# Patient Record
Sex: FEMALE | Race: WHITE | Employment: FULL TIME | URBAN - METROPOLITAN AREA
[De-identification: names, ages, dates, MRNs, and addresses within clinical notes are randomized per-mention and may not be internally consistent; named-entity substitution may affect disease eponyms.]

---

## 2017-03-20 ENCOUNTER — OFFICE VISIT (OUTPATIENT)
Dept: FAMILY MEDICINE CLINIC | Facility: CLINIC | Age: 52
End: 2017-03-20

## 2017-03-20 VITALS
TEMPERATURE: 98 F | BODY MASS INDEX: 36 KG/M2 | WEIGHT: 221 LBS | RESPIRATION RATE: 20 BRPM | OXYGEN SATURATION: 97 % | HEART RATE: 77 BPM | DIASTOLIC BLOOD PRESSURE: 68 MMHG | SYSTOLIC BLOOD PRESSURE: 122 MMHG

## 2017-03-20 DIAGNOSIS — D64.9 ANEMIA, UNSPECIFIED TYPE: ICD-10-CM

## 2017-03-20 DIAGNOSIS — I10 BENIGN DIASTOLIC HYPERTENSION: Primary | ICD-10-CM

## 2017-03-20 DIAGNOSIS — G47.30 SLEEP APNEA, UNSPECIFIED TYPE: ICD-10-CM

## 2017-03-20 PROCEDURE — 99213 OFFICE O/P EST LOW 20 MIN: CPT | Performed by: FAMILY MEDICINE

## 2017-03-20 RX ORDER — NEBIVOLOL 10 MG/1
10 TABLET ORAL DAILY
Qty: 90 TABLET | Refills: 1 | Status: SHIPPED | OUTPATIENT
Start: 2017-03-20 | End: 2018-08-03 | Stop reason: ALTCHOICE

## 2017-03-20 RX ORDER — RIZATRIPTAN BENZOATE 10 MG/1
10 TABLET ORAL AS NEEDED
Qty: 9 TABLET | Refills: 1 | Status: SHIPPED | OUTPATIENT
Start: 2017-03-20 | End: 2017-03-20

## 2017-03-20 RX ORDER — RIZATRIPTAN BENZOATE 10 MG/1
10 TABLET ORAL AS NEEDED
Qty: 9 TABLET | Refills: 1 | Status: SHIPPED | OUTPATIENT
Start: 2017-03-20 | End: 2017-10-11

## 2017-03-20 NOTE — PROGRESS NOTES
Presents for discussion of recent stress echocardiogram that she had performed.   It was especially noteworthy because of persistent diastolic hypertension both before during and after her stress test.  I contrasted this interpretation with a similar exam d

## 2017-03-22 ENCOUNTER — TELEPHONE (OUTPATIENT)
Dept: FAMILY MEDICINE CLINIC | Facility: CLINIC | Age: 52
End: 2017-03-22

## 2017-03-22 ENCOUNTER — LAB ENCOUNTER (OUTPATIENT)
Dept: LAB | Age: 52
End: 2017-03-22
Attending: FAMILY MEDICINE
Payer: COMMERCIAL

## 2017-03-22 DIAGNOSIS — D64.9 ANEMIA, UNSPECIFIED TYPE: ICD-10-CM

## 2017-03-22 DIAGNOSIS — D64.9 ANEMIA, UNSPECIFIED: Primary | ICD-10-CM

## 2017-03-22 LAB
25-HYDROXYVITAMIN D (TOTAL): 18.3 NG/ML (ref 30–100)
ALBUMIN SERPL-MCNC: 4 G/DL (ref 3.5–4.8)
ALP LIVER SERPL-CCNC: 131 U/L (ref 39–100)
ALT SERPL-CCNC: 31 U/L (ref 14–54)
AST SERPL-CCNC: 16 U/L (ref 15–41)
BASOPHILS # BLD AUTO: 0.06 X10(3) UL (ref 0–0.1)
BASOPHILS NFR BLD AUTO: 0.7 %
BILIRUB SERPL-MCNC: 0.4 MG/DL (ref 0.1–2)
BUN BLD-MCNC: 17 MG/DL (ref 8–20)
CALCIUM BLD-MCNC: 9.4 MG/DL (ref 8.3–10.3)
CHLORIDE: 105 MMOL/L (ref 101–111)
CHOLEST SMN-MCNC: 187 MG/DL (ref ?–200)
CO2: 28 MMOL/L (ref 22–32)
CREAT BLD-MCNC: 0.74 MG/DL (ref 0.55–1.02)
EOSINOPHIL # BLD AUTO: 0.67 X10(3) UL (ref 0–0.3)
EOSINOPHIL NFR BLD AUTO: 8.3 %
ERYTHROCYTE [DISTWIDTH] IN BLOOD BY AUTOMATED COUNT: 13.5 % (ref 11.5–16)
EST. AVERAGE GLUCOSE BLD GHB EST-MCNC: 123 MG/DL (ref 68–126)
GLUCOSE BLD-MCNC: 111 MG/DL (ref 70–99)
HBA1C MFR BLD HPLC: 5.9 % (ref ?–5.7)
HCT VFR BLD AUTO: 42.6 % (ref 34–50)
HDLC SERPL-MCNC: 55 MG/DL (ref 45–?)
HDLC SERPL: 3.4 {RATIO} (ref ?–4.44)
HEPATITIS C VIRUS AB INTERPRETATION: NONREACTIVE
HGB BLD-MCNC: 13.9 G/DL (ref 12–16)
IMMATURE GRANULOCYTE COUNT: 0.01 X10(3) UL (ref 0–1)
IMMATURE GRANULOCYTE RATIO %: 0.1 %
LDLC SERPL CALC-MCNC: 104 MG/DL (ref ?–130)
LYMPHOCYTES # BLD AUTO: 3.04 X10(3) UL (ref 0.9–4)
LYMPHOCYTES NFR BLD AUTO: 37.9 %
M PROTEIN MFR SERPL ELPH: 6.9 G/DL (ref 6.1–8.3)
MCH RBC QN AUTO: 30.5 PG (ref 27–33.2)
MCHC RBC AUTO-ENTMCNC: 32.6 G/DL (ref 31–37)
MCV RBC AUTO: 93.6 FL (ref 81–100)
MONOCYTES # BLD AUTO: 0.44 X10(3) UL (ref 0.1–0.6)
MONOCYTES NFR BLD AUTO: 5.5 %
NEUTROPHIL ABS PRELIM: 3.81 X10 (3) UL (ref 1.3–6.7)
NEUTROPHILS # BLD AUTO: 3.81 X10(3) UL (ref 1.3–6.7)
NEUTROPHILS NFR BLD AUTO: 47.5 %
NONHDLC SERPL-MCNC: 132 MG/DL (ref ?–130)
PLATELET # BLD AUTO: 192 10(3)UL (ref 150–450)
POTASSIUM SERPL-SCNC: 4.4 MMOL/L (ref 3.6–5.1)
RBC # BLD AUTO: 4.55 X10(6)UL (ref 3.8–5.1)
RED CELL DISTRIBUTION WIDTH-SD: 45.4 FL (ref 35.1–46.3)
SODIUM SERPL-SCNC: 141 MMOL/L (ref 136–144)
TRIGLYCERIDES: 138 MG/DL (ref ?–150)
TSI SER-ACNC: 1.64 MIU/ML (ref 0.35–5.5)
VLDL: 28 MG/DL (ref 5–40)
WBC # BLD AUTO: 8 X10(3) UL (ref 4–13)

## 2017-03-22 PROCEDURE — 84443 ASSAY THYROID STIM HORMONE: CPT

## 2017-03-22 PROCEDURE — 85025 COMPLETE CBC W/AUTO DIFF WBC: CPT

## 2017-03-22 PROCEDURE — 83036 HEMOGLOBIN GLYCOSYLATED A1C: CPT

## 2017-03-22 PROCEDURE — 86803 HEPATITIS C AB TEST: CPT

## 2017-03-22 PROCEDURE — 36415 COLL VENOUS BLD VENIPUNCTURE: CPT

## 2017-03-22 PROCEDURE — 82306 VITAMIN D 25 HYDROXY: CPT

## 2017-03-22 PROCEDURE — 80061 LIPID PANEL: CPT

## 2017-03-22 PROCEDURE — 80053 COMPREHEN METABOLIC PANEL: CPT

## 2017-04-12 ENCOUNTER — OFFICE VISIT (OUTPATIENT)
Dept: SLEEP CENTER | Facility: HOSPITAL | Age: 52
End: 2017-04-12
Attending: FAMILY MEDICINE
Payer: COMMERCIAL

## 2017-04-12 PROCEDURE — 95810 POLYSOM 6/> YRS 4/> PARAM: CPT

## 2017-04-17 ENCOUNTER — PATIENT MESSAGE (OUTPATIENT)
Dept: FAMILY MEDICINE CLINIC | Facility: CLINIC | Age: 52
End: 2017-04-17

## 2017-04-17 NOTE — PROCEDURES
[address] [address]       Accredited by the [facility] ([facility])    PATIENT'S NAME:        [facility]  ATTENDING PHYSICIAN:   Velia Tillman M.D.   REFERRING PHYSICIAN:   Ricky Alas D.O. hypopneas with a total apnea-hypopnea index of 14 events per hour. The supine apnea-hypopnea index was 20 events per hour. The non-supine apnea-hypopnea index was 9 events per hour.   Throughout the study, the patient maintained an oxyhemoglobin saturatio

## 2017-04-18 NOTE — TELEPHONE ENCOUNTER
From: Laura Du  To: Clari Romero DO  Sent: 4/17/2017 6:19 PM CDT  Subject: Test Results Question    Never mind. ... I see them. ...agustín.

## 2017-04-18 NOTE — TELEPHONE ENCOUNTER
From: Chet Covarrubias  To: Lisa Baez DO  Sent: 4/17/2017 6:17 PM CDT  Subject: Test Results Question    Are my lab tests back yet?

## 2017-10-11 RX ORDER — RIZATRIPTAN BENZOATE 10 MG/1
10 TABLET ORAL AS NEEDED
Qty: 9 TABLET | Refills: 1
Start: 2017-10-11

## 2017-10-12 NOTE — TELEPHONE ENCOUNTER
From: Carmen Wong  Sent: 10/11/2017 3:15 PM CDT  Subject: Medication Renewal Request    Carmen Wong would like a refill of the following medications:     Ondansetron HCl (ZOFRAN) 4 mg tablet JHON Cano   Patient Comment: Can I have MLT

## 2017-10-12 NOTE — TELEPHONE ENCOUNTER
From: Buck Boothe  Sent: 10/11/2017 3:13 PM CDT  Subject: Medication Renewal Request    Buck Boothe would like a refill of the following medications:     Rizatriptan Benzoate (MAXALT) 10 MG Oral Tab Jake Jimenez DO]   Patient Comment: Same co

## 2017-10-14 RX ORDER — ONDANSETRON 4 MG/1
4 TABLET, FILM COATED ORAL EVERY 8 HOURS PRN
Qty: 30 TABLET | Refills: 0 | Status: SHIPPED
Start: 2017-10-14 | End: 2017-12-05

## 2017-10-14 RX ORDER — RIZATRIPTAN BENZOATE 10 MG/1
10 TABLET ORAL AS NEEDED
Qty: 9 TABLET | Refills: 1
Start: 2017-10-14 | End: 2017-10-20

## 2017-10-17 ENCOUNTER — TELEPHONE (OUTPATIENT)
Dept: FAMILY MEDICINE CLINIC | Facility: CLINIC | Age: 52
End: 2017-10-17

## 2017-10-18 RX ORDER — RIZATRIPTAN BENZOATE 10 MG/1
TABLET ORAL
Qty: 9 TABLET | Refills: 0 | OUTPATIENT
Start: 2017-10-18

## 2017-10-20 RX ORDER — RIZATRIPTAN BENZOATE 10 MG/1
10 TABLET ORAL AS NEEDED
Qty: 9 TABLET | Refills: 1 | Status: SHIPPED | OUTPATIENT
Start: 2017-10-20 | End: 2017-12-05

## 2017-10-20 NOTE — TELEPHONE ENCOUNTER
Pharmacy checking status of rizatripton    Pharmacist is calling about Maxalt refill   States patient has same co pay whether quantity is #10 or #24   Patient would rather have #24. Please advise if OK? Thanks.

## 2017-10-21 NOTE — TELEPHONE ENCOUNTER
Called pharmacy and spoke with Brown Jason. Ross Pena gave approval for Maxalt refill #24   Task is complete.

## 2017-12-05 DIAGNOSIS — R32 INCONTINENCE IN FEMALE: ICD-10-CM

## 2017-12-06 ENCOUNTER — TELEPHONE (OUTPATIENT)
Dept: FAMILY MEDICINE CLINIC | Facility: CLINIC | Age: 52
End: 2017-12-06

## 2017-12-06 RX ORDER — RIZATRIPTAN BENZOATE 10 MG/1
10 TABLET ORAL AS NEEDED
Qty: 9 TABLET | Refills: 1 | Status: SHIPPED
Start: 2017-12-06 | End: 2018-08-03 | Stop reason: ALTCHOICE

## 2017-12-06 RX ORDER — ONDANSETRON 4 MG/1
4 TABLET, FILM COATED ORAL EVERY 8 HOURS PRN
Qty: 30 TABLET | Refills: 0 | Status: SHIPPED
Start: 2017-12-06 | End: 2018-05-04

## 2017-12-06 RX ORDER — RIZATRIPTAN BENZOATE 10 MG/1
10 TABLET, ORALLY DISINTEGRATING ORAL AS NEEDED
Qty: 36 TABLET | Refills: 0 | Status: SHIPPED
Start: 2017-12-06 | End: 2018-03-22

## 2017-12-06 RX ORDER — OXYBUTYNIN CHLORIDE 10 MG/1
10 TABLET, EXTENDED RELEASE ORAL DAILY
Qty: 90 TABLET | Refills: 1 | Status: SHIPPED
Start: 2017-12-06 | End: 2018-08-03

## 2017-12-06 NOTE — TELEPHONE ENCOUNTER
From: Tari Calderon  Sent: 12/5/2017 10:09 PM CST  Subject: Medication Renewal Request    Tari Calderon would like a refill of the following medications:     Oxybutynin Chloride ER (DITROPAN XL) 10 MG Oral Tablet 24 Hr Leopoldo Smothers, PA]    Preferr

## 2017-12-06 NOTE — TELEPHONE ENCOUNTER
From: Adelina Smith  Sent: 12/5/2017 10:09 PM CST  Subject: Medication Renewal Request    Adelina Smith would like a refill of the following medications:     Rizatriptan Benzoate 10 MG Oral Tablet Dispersible JHON Ignacio   Patient Comment: I

## 2017-12-06 NOTE — TELEPHONE ENCOUNTER
Last ov was  3/20/17  Last refill zofran 4mg 10/14/17  Last refill maxalt 10 mg 10/20/17  Last refill ritzatriptan bemzotae 10 mg 10/20/17

## 2018-02-26 ENCOUNTER — OFFICE VISIT (OUTPATIENT)
Dept: FAMILY MEDICINE CLINIC | Facility: CLINIC | Age: 53
End: 2018-02-26

## 2018-02-26 VITALS
RESPIRATION RATE: 20 BRPM | DIASTOLIC BLOOD PRESSURE: 60 MMHG | TEMPERATURE: 101 F | HEART RATE: 156 BPM | SYSTOLIC BLOOD PRESSURE: 118 MMHG

## 2018-02-26 DIAGNOSIS — J11.00 PNEUMONIA AND INFLUENZA: Primary | ICD-10-CM

## 2018-02-26 PROCEDURE — 99213 OFFICE O/P EST LOW 20 MIN: CPT | Performed by: FAMILY MEDICINE

## 2018-02-26 PROCEDURE — 96372 THER/PROPH/DIAG INJ SC/IM: CPT | Performed by: FAMILY MEDICINE

## 2018-02-26 RX ORDER — AZITHROMYCIN 250 MG/1
TABLET, FILM COATED ORAL
Qty: 6 TABLET | Refills: 0 | Status: SHIPPED | OUTPATIENT
Start: 2018-02-26 | End: 2018-08-03 | Stop reason: ALTCHOICE

## 2018-02-26 RX ORDER — CEFTRIAXONE 1 G/1
1 INJECTION, POWDER, FOR SOLUTION INTRAMUSCULAR; INTRAVENOUS ONCE
Status: COMPLETED | OUTPATIENT
Start: 2018-02-26 | End: 2018-02-26

## 2018-02-26 RX ADMIN — CEFTRIAXONE 1 G: 1 INJECTION, POWDER, FOR SOLUTION INTRAMUSCULAR; INTRAVENOUS at 19:02:00

## 2018-02-26 NOTE — PROGRESS NOTES
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 Unfortunately has extremely limited finances and declines the usual workup and treatment.   Her chief complaint is that of shortness of breath, deep cough, audible wheezing, sweating, and

## 2018-02-27 ENCOUNTER — TELEPHONE (OUTPATIENT)
Dept: FAMILY MEDICINE CLINIC | Facility: CLINIC | Age: 53
End: 2018-02-27

## 2018-02-27 ENCOUNTER — PATIENT MESSAGE (OUTPATIENT)
Dept: FAMILY MEDICINE CLINIC | Facility: CLINIC | Age: 53
End: 2018-02-27

## 2018-02-27 RX ORDER — PREDNISONE 20 MG/1
20 TABLET ORAL DAILY
Qty: 7 TABLET | Refills: 0 | Status: SHIPPED | OUTPATIENT
Start: 2018-02-27 | End: 2018-03-06

## 2018-02-27 NOTE — TELEPHONE ENCOUNTER
Call placed to patient per Dr. Oscar Mai. Just wanted to let you know, each time I use the Agusto , I throw up from such forceful coughing. Any advice? Patient in bed with headache,cough and body aches. To [facility] for review.

## 2018-02-27 NOTE — TELEPHONE ENCOUNTER
From: Laura Du  To: Clari Romero DO  Sent: 2/27/2018 12:52 PM CST  Subject: Other    Just wanted to let you know, each time I use the Agusto , I throw up from such forceful coughing. Any advice?

## 2018-03-22 ENCOUNTER — TELEPHONE (OUTPATIENT)
Dept: FAMILY MEDICINE CLINIC | Facility: CLINIC | Age: 53
End: 2018-03-22

## 2018-03-22 RX ORDER — RIZATRIPTAN BENZOATE 10 MG/1
10 TABLET, ORALLY DISINTEGRATING ORAL AS NEEDED
Qty: 36 TABLET | Refills: 0 | Status: SHIPPED | OUTPATIENT
Start: 2018-03-22 | End: 2018-05-04

## 2018-03-22 NOTE — TELEPHONE ENCOUNTER
From: Harold Pallas  Sent: 3/22/2018 12:14 PM CDT  Subject: Medication Renewal Request    Harold Pallas would like a refill of the following medications:     Rizatriptan Benzoate 10 MG Oral Tablet Dispersible JOHN Brito    Preferred pharmac

## 2018-05-04 RX ORDER — RIZATRIPTAN BENZOATE 10 MG/1
10 TABLET, ORALLY DISINTEGRATING ORAL AS NEEDED
Qty: 36 TABLET | Refills: 0 | Status: SHIPPED
Start: 2018-05-04 | End: 2018-07-01

## 2018-05-04 RX ORDER — ONDANSETRON 4 MG/1
4 TABLET, FILM COATED ORAL EVERY 8 HOURS PRN
Qty: 30 TABLET | Refills: 0 | Status: SHIPPED
Start: 2018-05-04 | End: 2018-07-06

## 2018-05-04 NOTE — TELEPHONE ENCOUNTER
From: Livia Malave  Sent: 5/4/2018 1:24 PM CDT  Subject: Medication Renewal Request    Livia Malave would like a refill of the following medications:     Ondansetron HCl (ZOFRAN) 4 mg tablet Holly Weiner, DOBill     Rizatriptan Benzoate 10 MG Oral

## 2018-07-03 RX ORDER — ONDANSETRON 4 MG/1
4 TABLET, FILM COATED ORAL EVERY 8 HOURS PRN
Qty: 30 TABLET | Refills: 0 | Status: CANCELLED
Start: 2018-07-03

## 2018-07-03 RX ORDER — RIZATRIPTAN BENZOATE 10 MG/1
10 TABLET, ORALLY DISINTEGRATING ORAL AS NEEDED
Qty: 36 TABLET | Refills: 0 | Status: CANCELLED
Start: 2018-07-03

## 2018-07-06 RX ORDER — ONDANSETRON 4 MG/1
4 TABLET, FILM COATED ORAL EVERY 8 HOURS PRN
Qty: 30 TABLET | Refills: 0 | Status: SHIPPED
Start: 2018-07-06 | End: 2018-09-17

## 2018-07-06 RX ORDER — RIZATRIPTAN BENZOATE 10 MG/1
10 TABLET, ORALLY DISINTEGRATING ORAL AS NEEDED
Qty: 36 TABLET | Refills: 0 | Status: SHIPPED
Start: 2018-07-06 | End: 2018-09-13

## 2018-07-06 RX ORDER — RIZATRIPTAN BENZOATE 10 MG/1
10 TABLET, ORALLY DISINTEGRATING ORAL AS NEEDED
Qty: 36 TABLET | Refills: 0
Start: 2018-07-06

## 2018-07-06 RX ORDER — RIZATRIPTAN BENZOATE 10 MG/1
10 TABLET, ORALLY DISINTEGRATING ORAL AS NEEDED
Qty: 36 TABLET | Refills: 0 | Status: SHIPPED
Start: 2018-07-06 | End: 2018-08-03

## 2018-07-06 RX ORDER — ONDANSETRON 4 MG/1
4 TABLET, FILM COATED ORAL EVERY 8 HOURS PRN
Qty: 30 TABLET | Refills: 0
Start: 2018-07-06

## 2018-07-06 NOTE — TELEPHONE ENCOUNTER
From: Zahida Campuzano  Sent: 7/3/2018 8:43 AM CDT  Subject: Medication Renewal Request    Zahida Campuzano would like a refill of the following medications:     Ondansetron HCl (ZOFRAN) 4 mg tablet JHON Diallo     Rizatriptan Benzoate 10 MG Oral

## 2018-07-06 NOTE — TELEPHONE ENCOUNTER
From: Tari Calderon  Sent: 7/6/2018 2:34 PM CDT  Subject: Medication Renewal Request    Tari Calderon would like a refill of the following medications:     Ondansetron HCl (ZOFRAN) 4 mg tablet Aleena Dey, DOBill     Rizatriptan Benzoate 10 MG Oral

## 2018-07-06 NOTE — TELEPHONE ENCOUNTER
From: Sage Campa  Sent: 7/1/2018 7:12 PM CDT  Subject: Medication Renewal Request    Sage Campa would like a refill of the following medications:     Rizatriptan Benzoate 10 MG Oral Tablet Dispersible Michelle Wise DO]    Preferred pharmacy:

## 2018-08-03 ENCOUNTER — APPOINTMENT (OUTPATIENT)
Dept: LAB | Age: 53
End: 2018-08-03
Attending: FAMILY MEDICINE
Payer: COMMERCIAL

## 2018-08-03 ENCOUNTER — OFFICE VISIT (OUTPATIENT)
Dept: FAMILY MEDICINE CLINIC | Facility: CLINIC | Age: 53
End: 2018-08-03
Payer: COMMERCIAL

## 2018-08-03 VITALS
DIASTOLIC BLOOD PRESSURE: 84 MMHG | RESPIRATION RATE: 16 BRPM | HEIGHT: 66 IN | TEMPERATURE: 99 F | WEIGHT: 246 LBS | BODY MASS INDEX: 39.53 KG/M2 | SYSTOLIC BLOOD PRESSURE: 124 MMHG | HEART RATE: 76 BPM | OXYGEN SATURATION: 98 %

## 2018-08-03 DIAGNOSIS — F32.A DEPRESSION, UNSPECIFIED DEPRESSION TYPE: ICD-10-CM

## 2018-08-03 DIAGNOSIS — E55.9 HYPOVITAMINOSIS D: ICD-10-CM

## 2018-08-03 DIAGNOSIS — Z13.1 SCREENING FOR DIABETES MELLITUS: ICD-10-CM

## 2018-08-03 DIAGNOSIS — Z79.899 POLYPHARMACY: ICD-10-CM

## 2018-08-03 DIAGNOSIS — Z12.31 ENCOUNTER FOR SCREENING MAMMOGRAM FOR MALIGNANT NEOPLASM OF BREAST: ICD-10-CM

## 2018-08-03 DIAGNOSIS — M19.90 ARTHRITIS: Primary | ICD-10-CM

## 2018-08-03 LAB
ALBUMIN SERPL-MCNC: 4.1 G/DL (ref 3.5–4.8)
ALBUMIN/GLOB SERPL: 1.6 {RATIO} (ref 1–2)
ALP LIVER SERPL-CCNC: 115 U/L (ref 41–108)
ALT SERPL-CCNC: 32 U/L (ref 14–54)
ANION GAP SERPL CALC-SCNC: 4 MMOL/L (ref 0–18)
AST SERPL-CCNC: 20 U/L (ref 15–41)
BASOPHILS # BLD AUTO: 0.05 X10(3) UL (ref 0–0.1)
BASOPHILS NFR BLD AUTO: 0.5 %
BILIRUB SERPL-MCNC: 0.4 MG/DL (ref 0.1–2)
BUN BLD-MCNC: 23 MG/DL (ref 8–20)
BUN/CREAT SERPL: 28 (ref 10–20)
CALCIUM BLD-MCNC: 8.9 MG/DL (ref 8.3–10.3)
CHLORIDE SERPL-SCNC: 109 MMOL/L (ref 101–111)
CHOLEST SMN-MCNC: 173 MG/DL (ref ?–200)
CO2 SERPL-SCNC: 31 MMOL/L (ref 22–32)
CREAT BLD-MCNC: 0.82 MG/DL (ref 0.55–1.02)
EOSINOPHIL # BLD AUTO: 1.38 X10(3) UL (ref 0–0.3)
EOSINOPHIL NFR BLD AUTO: 14.3 %
ERYTHROCYTE [DISTWIDTH] IN BLOOD BY AUTOMATED COUNT: 13.6 % (ref 11.5–16)
EST. AVERAGE GLUCOSE BLD GHB EST-MCNC: 126 MG/DL (ref 68–126)
GLOBULIN PLAS-MCNC: 2.6 G/DL (ref 2.5–3.7)
GLUCOSE BLD-MCNC: 98 MG/DL (ref 70–99)
HBA1C MFR BLD HPLC: 6 % (ref ?–5.7)
HCT VFR BLD AUTO: 41.2 % (ref 34–50)
HDLC SERPL-MCNC: 50 MG/DL (ref 40–59)
HGB BLD-MCNC: 13 G/DL (ref 12–16)
IMMATURE GRANULOCYTE COUNT: 0.02 X10(3) UL (ref 0–1)
IMMATURE GRANULOCYTE RATIO %: 0.2 %
LDLC SERPL CALC-MCNC: 98 MG/DL (ref ?–100)
LYMPHOCYTES # BLD AUTO: 3.37 X10(3) UL (ref 0.9–4)
LYMPHOCYTES NFR BLD AUTO: 35 %
M PROTEIN MFR SERPL ELPH: 6.7 G/DL (ref 6.1–8.3)
MCH RBC QN AUTO: 29.5 PG (ref 27–33.2)
MCHC RBC AUTO-ENTMCNC: 31.6 G/DL (ref 31–37)
MCV RBC AUTO: 93.6 FL (ref 81–100)
MONOCYTES # BLD AUTO: 0.61 X10(3) UL (ref 0.1–1)
MONOCYTES NFR BLD AUTO: 6.3 %
NEUTROPHIL ABS PRELIM: 4.2 X10 (3) UL (ref 1.3–6.7)
NEUTROPHILS # BLD AUTO: 4.2 X10(3) UL (ref 1.3–6.7)
NEUTROPHILS NFR BLD AUTO: 43.7 %
NONHDLC SERPL-MCNC: 123 MG/DL (ref ?–130)
OSMOLALITY SERPL CALC.SUM OF ELEC: 302 MOSM/KG (ref 275–295)
PLATELET # BLD AUTO: 256 10(3)UL (ref 150–450)
POTASSIUM SERPL-SCNC: 4.6 MMOL/L (ref 3.6–5.1)
RBC # BLD AUTO: 4.4 X10(6)UL (ref 3.8–5.1)
RED CELL DISTRIBUTION WIDTH-SD: 47 FL (ref 35.1–46.3)
SED RATE-ML: 3 MM/HR (ref 0–25)
SODIUM SERPL-SCNC: 144 MMOL/L (ref 136–144)
TRIGL SERPL-MCNC: 127 MG/DL (ref 30–149)
TSI SER-ACNC: 1.53 MIU/ML (ref 0.35–5.5)
VIT D+METAB SERPL-MCNC: 20.2 NG/ML (ref 30–100)
VLDLC SERPL CALC-MCNC: 25 MG/DL (ref 0–30)
WBC # BLD AUTO: 9.6 X10(3) UL (ref 4–13)

## 2018-08-03 PROCEDURE — 82306 VITAMIN D 25 HYDROXY: CPT | Performed by: FAMILY MEDICINE

## 2018-08-03 PROCEDURE — 90732 PPSV23 VACC 2 YRS+ SUBQ/IM: CPT | Performed by: FAMILY MEDICINE

## 2018-08-03 PROCEDURE — 85652 RBC SED RATE AUTOMATED: CPT | Performed by: FAMILY MEDICINE

## 2018-08-03 PROCEDURE — 80061 LIPID PANEL: CPT | Performed by: FAMILY MEDICINE

## 2018-08-03 PROCEDURE — 90472 IMMUNIZATION ADMIN EACH ADD: CPT | Performed by: FAMILY MEDICINE

## 2018-08-03 PROCEDURE — 90632 HEPA VACCINE ADULT IM: CPT | Performed by: FAMILY MEDICINE

## 2018-08-03 PROCEDURE — 90471 IMMUNIZATION ADMIN: CPT | Performed by: FAMILY MEDICINE

## 2018-08-03 PROCEDURE — 99213 OFFICE O/P EST LOW 20 MIN: CPT | Performed by: FAMILY MEDICINE

## 2018-08-03 PROCEDURE — 36415 COLL VENOUS BLD VENIPUNCTURE: CPT | Performed by: FAMILY MEDICINE

## 2018-08-03 PROCEDURE — 80050 GENERAL HEALTH PANEL: CPT | Performed by: FAMILY MEDICINE

## 2018-08-03 PROCEDURE — 83036 HEMOGLOBIN GLYCOSYLATED A1C: CPT | Performed by: FAMILY MEDICINE

## 2018-08-03 NOTE — PROGRESS NOTES
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 She is fallen under higher times in her life and has, through hard work, pulled herself up to safety.     I took the time reviewing her numerous medications and pointing o

## 2018-09-15 RX ORDER — RIZATRIPTAN BENZOATE 10 MG/1
10 TABLET, ORALLY DISINTEGRATING ORAL AS NEEDED
Qty: 36 TABLET | Refills: 0 | Status: SHIPPED | OUTPATIENT
Start: 2018-09-15 | End: 2018-12-19

## 2018-09-17 RX ORDER — ONDANSETRON 4 MG/1
4 TABLET, FILM COATED ORAL EVERY 8 HOURS PRN
Qty: 30 TABLET | Refills: 0 | Status: SHIPPED | OUTPATIENT
Start: 2018-09-17 | End: 2018-12-18

## 2018-10-04 DIAGNOSIS — Z12.31 ENCOUNTER FOR SCREENING MAMMOGRAM FOR MALIGNANT NEOPLASM OF BREAST: Primary | ICD-10-CM

## 2018-11-03 ENCOUNTER — HOSPITAL ENCOUNTER (OUTPATIENT)
Dept: MAMMOGRAPHY | Age: 53
Discharge: HOME | End: 2018-11-03
Attending: INTERNAL MEDICINE
Payer: COMMERCIAL

## 2018-11-03 DIAGNOSIS — Z12.31 ENCOUNTER FOR SCREENING MAMMOGRAM FOR MALIGNANT NEOPLASM OF BREAST: ICD-10-CM

## 2018-11-03 PROCEDURE — 77067 SCR MAMMO BI INCL CAD: CPT | Performed by: INTERNAL MEDICINE

## 2018-11-03 PROCEDURE — 77063 BREAST TOMOSYNTHESIS BI: CPT | Performed by: INTERNAL MEDICINE

## 2018-12-18 ENCOUNTER — OFFICE VISIT (OUTPATIENT)
Dept: FAMILY MEDICINE CLINIC | Facility: CLINIC | Age: 53
End: 2018-12-18
Payer: COMMERCIAL

## 2018-12-18 ENCOUNTER — TELEPHONE (OUTPATIENT)
Dept: FAMILY MEDICINE CLINIC | Facility: CLINIC | Age: 53
End: 2018-12-18

## 2018-12-18 VITALS
OXYGEN SATURATION: 96 % | WEIGHT: 246 LBS | HEIGHT: 66 IN | RESPIRATION RATE: 16 BRPM | TEMPERATURE: 98 F | DIASTOLIC BLOOD PRESSURE: 86 MMHG | SYSTOLIC BLOOD PRESSURE: 138 MMHG | HEART RATE: 96 BPM | BODY MASS INDEX: 39.53 KG/M2

## 2018-12-18 DIAGNOSIS — M54.16 LUMBAR RADICULOPATHY: ICD-10-CM

## 2018-12-18 DIAGNOSIS — R07.89 ATYPICAL CHEST PAIN: Primary | ICD-10-CM

## 2018-12-18 PROCEDURE — 99213 OFFICE O/P EST LOW 20 MIN: CPT | Performed by: FAMILY MEDICINE

## 2018-12-18 RX ORDER — MELOXICAM 15 MG/1
TABLET ORAL
COMMUNITY
Start: 2018-09-19 | End: 2019-10-31 | Stop reason: ALTCHOICE

## 2018-12-18 RX ORDER — ONDANSETRON 4 MG/1
4 TABLET, FILM COATED ORAL EVERY 8 HOURS PRN
Qty: 30 TABLET | Refills: 2 | Status: SHIPPED | OUTPATIENT
Start: 2018-12-18 | End: 2019-03-08

## 2018-12-18 RX ORDER — RIZATRIPTAN BENZOATE 10 MG/1
10 TABLET ORAL AS NEEDED
Qty: 36 TABLET | Refills: 0 | Status: SHIPPED | OUTPATIENT
Start: 2018-12-18 | End: 2018-12-19

## 2018-12-18 RX ORDER — CYCLOBENZAPRINE HYDROCHLORIDE 30 MG/1
30 CAPSULE, EXTENDED RELEASE ORAL
Qty: 21 CAPSULE | Refills: 0 | Status: SHIPPED | OUTPATIENT
Start: 2018-12-18 | End: 2019-05-25

## 2018-12-18 RX ORDER — NEBIVOLOL 20 MG/1
20 TABLET ORAL
Qty: 90 TABLET | Refills: 3 | Status: SHIPPED | OUTPATIENT
Start: 2018-12-18 | End: 2019-05-25

## 2018-12-18 RX ORDER — DULOXETIN HYDROCHLORIDE 60 MG/1
120 CAPSULE, DELAYED RELEASE ORAL DAILY
COMMUNITY
Start: 2018-11-09

## 2018-12-18 NOTE — PROGRESS NOTES
Came in complaining of disseminated pain. The vast majority of the complaints satisfied the diagnosis of or variation of fibromyalgia. Thankfully she is managed to stay away from opioids. None of the medicines are necessarily habit-forming Mani Serrano

## 2018-12-19 RX ORDER — RIZATRIPTAN BENZOATE 10 MG/1
TABLET ORAL
Qty: 36 TABLET | Refills: 0 | Status: SHIPPED | OUTPATIENT
Start: 2018-12-19 | End: 2019-05-25

## 2018-12-19 NOTE — TELEPHONE ENCOUNTER
Pharmacy requesting sig clarification - \"take 1 tablet, may repeat in 2 hours not to exceed 2 tabs per 24 hrs\"    Sig correct, med resent to pharm

## 2018-12-31 ENCOUNTER — HOSPITAL ENCOUNTER (OUTPATIENT)
Dept: CV DIAGNOSTICS | Facility: HOSPITAL | Age: 53
Discharge: HOME | End: 2018-12-31
Attending: FAMILY MEDICINE
Payer: COMMERCIAL

## 2018-12-31 DIAGNOSIS — R07.89 ATYPICAL CHEST PAIN: ICD-10-CM

## 2018-12-31 PROCEDURE — 93018 CV STRESS TEST I&R ONLY: CPT | Performed by: FAMILY MEDICINE

## 2018-12-31 PROCEDURE — 78452 HT MUSCLE IMAGE SPECT MULT: CPT | Performed by: FAMILY MEDICINE

## 2018-12-31 PROCEDURE — 93017 CV STRESS TEST TRACING ONLY: CPT | Performed by: FAMILY MEDICINE

## 2019-03-08 ENCOUNTER — PATIENT MESSAGE (OUTPATIENT)
Dept: FAMILY MEDICINE CLINIC | Facility: CLINIC | Age: 54
End: 2019-03-08

## 2019-03-08 RX ORDER — RIZATRIPTAN BENZOATE 10 MG/1
TABLET ORAL
Qty: 36 TABLET | Refills: 0 | Status: CANCELLED | OUTPATIENT
Start: 2019-03-08

## 2019-03-08 RX ORDER — ONDANSETRON 4 MG/1
4 TABLET, FILM COATED ORAL EVERY 8 HOURS PRN
Qty: 30 TABLET | Refills: 2 | Status: SHIPPED | OUTPATIENT
Start: 2019-03-08 | End: 2019-05-25

## 2019-03-08 RX ORDER — RIZATRIPTAN BENZOATE 10 MG/1
TABLET, ORALLY DISINTEGRATING ORAL
Qty: 36 TABLET | Refills: 0 | Status: SHIPPED | OUTPATIENT
Start: 2019-03-08 | End: 2020-01-09

## 2019-03-08 NOTE — TELEPHONE ENCOUNTER
From: Carmen Links  To: Selin Shine DO  Sent: 3/8/2019 12:38 PM CST  Subject: Test Results Question    I had a sleep study back in 2017, April I think, but I do not see the result. Is it possible to put that here so I can review it?

## 2019-03-09 NOTE — TELEPHONE ENCOUNTER
From: Ace Coates  To: Bryson Rodriges DO  Sent: 3/8/2019 4:21 PM CST  Subject: Prescription Question    Will do the blood test. Will you refill rxs??

## 2019-03-11 ENCOUNTER — TELEPHONE (OUTPATIENT)
Dept: FAMILY MEDICINE CLINIC | Facility: CLINIC | Age: 54
End: 2019-03-11

## 2019-03-15 NOTE — TELEPHONE ENCOUNTER
Received notification that medication has been approved.  And paperwork will follow via [facility] Triage

## 2019-03-18 ENCOUNTER — TELEPHONE (OUTPATIENT)
Dept: FAMILY MEDICINE CLINIC | Facility: CLINIC | Age: 54
End: 2019-03-18

## 2019-03-18 NOTE — TELEPHONE ENCOUNTER
Left message for pt RE fax we received for Rizatriptan  PA. See telephone note from  3/11/19 stating Rizatriptan approved.

## 2019-04-28 ENCOUNTER — PATIENT MESSAGE (OUTPATIENT)
Dept: FAMILY MEDICINE CLINIC | Facility: CLINIC | Age: 54
End: 2019-04-28

## 2019-04-29 NOTE — TELEPHONE ENCOUNTER
From: Jessy Rossi  To: Corina Molina DO  Sent: 4/28/2019 10:53 AM CDT  Subject: Prescription Question    I never received bystolic from my insurance and too expensive to buy.  Could you please send in Rx for metoprolol 100mg BID, which is what I hav

## 2019-05-25 ENCOUNTER — TELEPHONE (OUTPATIENT)
Dept: FAMILY MEDICINE CLINIC | Facility: CLINIC | Age: 54
End: 2019-05-25

## 2019-05-25 ENCOUNTER — OFFICE VISIT (OUTPATIENT)
Dept: FAMILY MEDICINE CLINIC | Facility: CLINIC | Age: 54
End: 2019-05-25
Payer: COMMERCIAL

## 2019-05-25 VITALS
DIASTOLIC BLOOD PRESSURE: 88 MMHG | OXYGEN SATURATION: 98 % | HEIGHT: 66 IN | RESPIRATION RATE: 20 BRPM | TEMPERATURE: 98 F | HEART RATE: 67 BPM | BODY MASS INDEX: 39.21 KG/M2 | SYSTOLIC BLOOD PRESSURE: 124 MMHG | WEIGHT: 244 LBS

## 2019-05-25 DIAGNOSIS — K00.2 HYPERTAURODONTISM: Primary | ICD-10-CM

## 2019-05-25 PROCEDURE — 99213 OFFICE O/P EST LOW 20 MIN: CPT | Performed by: FAMILY MEDICINE

## 2019-05-25 RX ORDER — ONDANSETRON 4 MG/1
4 TABLET, FILM COATED ORAL EVERY 8 HOURS PRN
Qty: 30 TABLET | Refills: 2 | Status: SHIPPED | OUTPATIENT
Start: 2019-05-25 | End: 2019-10-04

## 2019-05-25 RX ORDER — METOPROLOL TARTRATE 100 MG/1
100 TABLET ORAL 2 TIMES DAILY
Qty: 180 TABLET | Refills: 3 | Status: SHIPPED | OUTPATIENT
Start: 2019-05-25 | End: 2020-06-04

## 2019-05-25 RX ORDER — RIZATRIPTAN BENZOATE 10 MG/1
TABLET ORAL
Qty: 36 TABLET | Refills: 1 | Status: SHIPPED | OUTPATIENT
Start: 2019-05-25 | End: 2019-09-26

## 2019-05-25 RX ORDER — TRAZODONE HYDROCHLORIDE 50 MG/1
50 TABLET ORAL NIGHTLY PRN
COMMUNITY
Start: 2019-02-20

## 2019-05-25 RX ORDER — LOSARTAN POTASSIUM 100 MG/1
100 TABLET ORAL DAILY
Qty: 90 TABLET | Refills: 3 | Status: SHIPPED | OUTPATIENT
Start: 2019-05-25 | End: 2019-10-31 | Stop reason: ALTCHOICE

## 2019-05-25 RX ORDER — ALPRAZOLAM 0.25 MG/1
TABLET ORAL
Refills: 0 | COMMUNITY
Start: 2019-03-14 | End: 2020-06-30 | Stop reason: ALTCHOICE

## 2019-05-25 NOTE — PROGRESS NOTES
Patient is here for refill of her medications. She does have essential hypertension that is controlled her blood pressure today is 124/88 with no cysts or areas side effects. Continues to struggle with depression. Her insight remains good.   She is con

## 2019-06-01 ENCOUNTER — HOSPITAL ENCOUNTER (OUTPATIENT)
Dept: MRI IMAGING | Age: 54
Discharge: HOME | End: 2019-06-01
Attending: SPECIALIST
Payer: COMMERCIAL

## 2019-06-01 DIAGNOSIS — R51.9 NEW ONSET OF HEADACHES: ICD-10-CM

## 2019-06-01 PROCEDURE — 70551 MRI BRAIN STEM W/O DYE: CPT | Performed by: SPECIALIST

## 2019-06-05 ENCOUNTER — TELEPHONE (OUTPATIENT)
Dept: FAMILY MEDICINE CLINIC | Facility: CLINIC | Age: 54
End: 2019-06-05

## 2019-07-10 ENCOUNTER — OFFICE VISIT (OUTPATIENT)
Dept: FAMILY MEDICINE CLINIC | Facility: CLINIC | Age: 54
End: 2019-07-10
Payer: COMMERCIAL

## 2019-07-10 VITALS
SYSTOLIC BLOOD PRESSURE: 118 MMHG | RESPIRATION RATE: 20 BRPM | HEART RATE: 84 BPM | BODY MASS INDEX: 37.12 KG/M2 | TEMPERATURE: 98 F | WEIGHT: 231 LBS | HEIGHT: 66 IN | DIASTOLIC BLOOD PRESSURE: 78 MMHG | OXYGEN SATURATION: 98 %

## 2019-07-10 DIAGNOSIS — H66.002 NON-RECURRENT ACUTE SUPPURATIVE OTITIS MEDIA OF LEFT EAR WITHOUT SPONTANEOUS RUPTURE OF TYMPANIC MEMBRANE: Primary | ICD-10-CM

## 2019-07-10 PROCEDURE — 99213 OFFICE O/P EST LOW 20 MIN: CPT | Performed by: NURSE PRACTITIONER

## 2019-07-10 RX ORDER — CEFDINIR 300 MG/1
300 CAPSULE ORAL 2 TIMES DAILY
Qty: 20 CAPSULE | Refills: 0 | Status: SHIPPED | OUTPATIENT
Start: 2019-07-10 | End: 2019-07-20

## 2019-07-10 NOTE — PROGRESS NOTES
CHIEF COMPLAINT:   Patient presents with:  Nasal Congestion: Feeling off balance, chills, cough, left ear pain, sinus pressure, X 1-2 week      HPI:   Jenna Caldwell is a 48year old female who presents for upper respiratory symptoms for  1 weeks.  Patient gabapentin (NEURONTIN) 300 MG Oral Cap Take 1 capsule (300 mg total) by mouth 3 (three) times daily as needed. Disp: 90 capsule Rfl: 3   Ondansetron HCl (ZOFRAN) 4 mg tablet Take 1 tablet (4 mg total) by mouth every 8 (eight) hours as needed for Nausea.  Marvel Memory HEAD: atraumatic, normocephalic.  no tenderness on palpation of  sinuses  EYES: conjunctiva clear, EOM intact  EARS: Right TM gray, no bulging, no retraction,no fluid, bony landmarks visible. Left Tm erythematous, + bulging, +fluid.    NOSE: Nostrils patent You have an infection of the middle ear, the space behind the eardrum. This is also called acute otitis media (AOM). Sometimes it is caused by the common cold.  This is because congestion can block the internal passage (eustachian tube) that drains fluid fr The patient indicates understanding of these issues and agrees to the plan. The patient is asked to return if sx's persist or worsen.

## 2019-09-20 ENCOUNTER — TELEPHONE (OUTPATIENT)
Dept: FAMILY MEDICINE CLINIC | Facility: CLINIC | Age: 54
End: 2019-09-20

## 2019-09-20 NOTE — TELEPHONE ENCOUNTER
Patient called and stated back on 9/9/19 she was in ER at [facility]. She had collapsed at work. Her BP was low as well as her pulse. She currently states she is extremely fatigued and is having numbness and tingling in legs.     Offered her appt tod

## 2019-09-26 RX ORDER — RIZATRIPTAN BENZOATE 10 MG/1
TABLET ORAL
Qty: 36 TABLET | Refills: 1 | Status: SHIPPED | OUTPATIENT
Start: 2019-09-26 | End: 2019-10-04

## 2019-10-07 RX ORDER — RIZATRIPTAN BENZOATE 10 MG/1
TABLET ORAL
Qty: 36 TABLET | Refills: 1 | Status: SHIPPED | OUTPATIENT
Start: 2019-10-07 | End: 2019-10-31

## 2019-10-07 RX ORDER — ONDANSETRON 4 MG/1
4 TABLET, FILM COATED ORAL EVERY 8 HOURS PRN
Qty: 30 TABLET | Refills: 2 | Status: SHIPPED | OUTPATIENT
Start: 2019-10-07 | End: 2020-01-09

## 2019-10-07 RX ORDER — RIZATRIPTAN BENZOATE 10 MG/1
TABLET, ORALLY DISINTEGRATING ORAL
Qty: 36 TABLET | Refills: 0 | OUTPATIENT
Start: 2019-10-07

## 2019-10-31 ENCOUNTER — OFFICE VISIT (OUTPATIENT)
Dept: FAMILY MEDICINE CLINIC | Facility: CLINIC | Age: 54
End: 2019-10-31
Payer: COMMERCIAL

## 2019-10-31 VITALS
OXYGEN SATURATION: 97 % | DIASTOLIC BLOOD PRESSURE: 72 MMHG | RESPIRATION RATE: 16 BRPM | HEART RATE: 72 BPM | TEMPERATURE: 99 F | SYSTOLIC BLOOD PRESSURE: 118 MMHG | BODY MASS INDEX: 38.09 KG/M2 | WEIGHT: 237 LBS | HEIGHT: 66 IN

## 2019-10-31 DIAGNOSIS — S80.12XA HEMATOMA OF LEG, LEFT, INITIAL ENCOUNTER: Primary | ICD-10-CM

## 2019-10-31 PROCEDURE — 99213 OFFICE O/P EST LOW 20 MIN: CPT | Performed by: FAMILY MEDICINE

## 2019-10-31 NOTE — PROGRESS NOTES
About 5 days ago accidentally fell into a hole striking her left leg and producing a very large clot the tibia is in tact there is no crepitance neurovascular status is normal no chest pain or shortness of breath.     Also requests of filling out of FMLA fo

## 2019-12-09 ENCOUNTER — TELEPHONE (OUTPATIENT)
Dept: FAMILY MEDICINE CLINIC | Facility: CLINIC | Age: 54
End: 2019-12-09

## 2019-12-09 NOTE — TELEPHONE ENCOUNTER
Pt received message Dr. Jonathan Handy wanted to speak with her. She will try back later as she is not able to answer her phone where she works.

## 2019-12-18 ENCOUNTER — PATIENT MESSAGE (OUTPATIENT)
Dept: FAMILY MEDICINE CLINIC | Facility: CLINIC | Age: 54
End: 2019-12-18

## 2019-12-18 DIAGNOSIS — M25.551 PAIN OF BOTH HIP JOINTS: Primary | ICD-10-CM

## 2019-12-18 DIAGNOSIS — M25.552 PAIN OF BOTH HIP JOINTS: Primary | ICD-10-CM

## 2019-12-18 NOTE — TELEPHONE ENCOUNTER
From: Dexter Brizuela  To: Nader Gomez DO  Sent: 12/18/2019 12:56 PM CST  Subject: Non-Urgent Medical Question    I have been having R hip pain, more severe lately. Not able to sleep, waking me up at night.  When I sit for even short periods of time, I

## 2019-12-19 NOTE — TELEPHONE ENCOUNTER
Yes   Ok for bilateral hips and pelvis       Sure it. >s nor trochanteric bursitis      ?             Sophia spears

## 2020-01-01 NOTE — LETTER
21    RE: Ace Median     : 3/10/1965    Dear Dr. Key Bailey,    This letter is to inform you that your patient is being scheduled for surgery with Dr. Janki Song on 3-8-2021 at [facility]. We have asked the patient to contact your office to sc [place] affected by maternal hypertensive disorders [place] infant of 41 completed weeks of gestation [place] affected by maternal hypertensive disorders [place] affected by maternal hypertensive disorders [place] infant of 41 completed weeks of gestation [place] infant of 41 completed weeks of gestation

## 2020-01-06 ENCOUNTER — HOSPITAL ENCOUNTER (OUTPATIENT)
Dept: GENERAL RADIOLOGY | Age: 55
Discharge: HOME | End: 2020-01-06
Attending: FAMILY MEDICINE
Payer: COMMERCIAL

## 2020-01-06 DIAGNOSIS — M25.551 PAIN OF BOTH HIP JOINTS: ICD-10-CM

## 2020-01-06 DIAGNOSIS — M25.552 PAIN OF BOTH HIP JOINTS: ICD-10-CM

## 2020-01-06 PROCEDURE — 73523 X-RAY EXAM HIPS BI 5/> VIEWS: CPT | Performed by: FAMILY MEDICINE

## 2020-01-10 RX ORDER — RIZATRIPTAN BENZOATE 10 MG/1
TABLET, ORALLY DISINTEGRATING ORAL
Qty: 36 TABLET | Refills: 0 | Status: SHIPPED | OUTPATIENT
Start: 2020-01-10 | End: 2020-04-08

## 2020-01-10 RX ORDER — ONDANSETRON 4 MG/1
4 TABLET, FILM COATED ORAL EVERY 8 HOURS PRN
Qty: 30 TABLET | Refills: 2 | Status: SHIPPED | OUTPATIENT
Start: 2020-01-10 | End: 2020-04-08

## 2020-01-16 ENCOUNTER — HOSPITAL ENCOUNTER (OUTPATIENT)
Dept: GENERAL RADIOLOGY | Age: 55
Discharge: HOME | End: 2020-01-16
Attending: FAMILY MEDICINE
Payer: COMMERCIAL

## 2020-01-16 ENCOUNTER — OFFICE VISIT (OUTPATIENT)
Dept: FAMILY MEDICINE CLINIC | Facility: CLINIC | Age: 55
End: 2020-01-16
Payer: COMMERCIAL

## 2020-01-16 VITALS
OXYGEN SATURATION: 98 % | HEIGHT: 66 IN | TEMPERATURE: 99 F | DIASTOLIC BLOOD PRESSURE: 82 MMHG | HEART RATE: 72 BPM | SYSTOLIC BLOOD PRESSURE: 112 MMHG | BODY MASS INDEX: 35.84 KG/M2 | WEIGHT: 223 LBS | RESPIRATION RATE: 16 BRPM

## 2020-01-16 DIAGNOSIS — M54.16 LUMBAR RADICULOPATHY: Primary | ICD-10-CM

## 2020-01-16 DIAGNOSIS — M54.16 LUMBAR RADICULOPATHY: ICD-10-CM

## 2020-01-16 PROBLEM — M70.61 TROCHANTERIC BURSITIS OF RIGHT HIP: Status: ACTIVE | Noted: 2020-01-16

## 2020-01-16 PROCEDURE — 99213 OFFICE O/P EST LOW 20 MIN: CPT | Performed by: FAMILY MEDICINE

## 2020-01-16 PROCEDURE — 72110 X-RAY EXAM L-2 SPINE 4/>VWS: CPT | Performed by: FAMILY MEDICINE

## 2020-01-16 RX ORDER — ACETAMINOPHEN AND CODEINE PHOSPHATE 300; 60 MG/1; MG/1
1 TABLET ORAL EVERY 8 HOURS PRN
Refills: 0 | COMMUNITY
Start: 2019-12-11 | End: 2021-04-27

## 2020-01-16 RX ORDER — LOSARTAN POTASSIUM 100 MG/1
100 TABLET ORAL
Refills: 3 | COMMUNITY
Start: 2019-11-29 | End: 2020-06-04

## 2020-01-16 RX ORDER — LURASIDONE HYDROCHLORIDE 60 MG/1
60 TABLET, FILM COATED ORAL
COMMUNITY
Start: 2020-01-13

## 2020-01-16 NOTE — PROGRESS NOTES
Accidental fall at a party in her home. Left her with pain in the right hip region accentuated with external rotation actively.   She also has pain on palpation in the upper third of the midline of the spine x-rays both done recently and on today's study w

## 2020-04-08 RX ORDER — ONDANSETRON 4 MG/1
4 TABLET, FILM COATED ORAL EVERY 8 HOURS PRN
Qty: 30 TABLET | Refills: 2 | Status: SHIPPED | OUTPATIENT
Start: 2020-04-08 | End: 2020-08-10

## 2020-04-08 RX ORDER — RIZATRIPTAN BENZOATE 10 MG/1
TABLET, ORALLY DISINTEGRATING ORAL
Qty: 36 TABLET | Refills: 0 | Status: SHIPPED | OUTPATIENT
Start: 2020-04-08 | End: 2020-08-10

## 2020-04-08 NOTE — TELEPHONE ENCOUNTER
Last Office Visit: 1-16-20 with Baldemar Senior for back pain   Last Rx Filled:   Rizatriptan 1-10-20 36 tabs with no refills   Zofran 1-10-20 30 tabs with 2 refills   Last Labs: 8-3-18 cbc/cmp/lipid/vitamin D/sed rate/tsh/hga1c  Future Appointment: none    Pe

## 2020-06-04 RX ORDER — LOSARTAN POTASSIUM 100 MG/1
TABLET ORAL
Qty: 90 TABLET | Refills: 3 | Status: ON HOLD | OUTPATIENT
Start: 2020-06-04 | End: 2021-03-08

## 2020-06-04 RX ORDER — METOPROLOL TARTRATE 100 MG/1
TABLET ORAL
Qty: 180 TABLET | Refills: 3 | Status: ON HOLD | OUTPATIENT
Start: 2020-06-04 | End: 2021-03-08

## 2020-06-07 ENCOUNTER — HOSPITAL ENCOUNTER (OUTPATIENT)
Dept: MRI IMAGING | Age: 55
Discharge: HOME | End: 2020-06-07
Attending: SPECIALIST
Payer: COMMERCIAL

## 2020-06-07 DIAGNOSIS — M54.50 LOW BACK PAIN: ICD-10-CM

## 2020-06-07 PROCEDURE — 72148 MRI LUMBAR SPINE W/O DYE: CPT | Performed by: SPECIALIST

## 2020-06-12 ENCOUNTER — TELEPHONE (OUTPATIENT)
Dept: SURGERY | Facility: CLINIC | Age: 55
End: 2020-06-12

## 2020-06-12 NOTE — TELEPHONE ENCOUNTER
Pt calling self referring herself to Dr Aileen Smith. I let pt know that we need a referral from a Dr. To see NS.  Pt understands & will see if Dr Dee Cummins will send us a referral so we can schedule her

## 2020-06-17 ENCOUNTER — PATIENT MESSAGE (OUTPATIENT)
Dept: FAMILY MEDICINE CLINIC | Facility: CLINIC | Age: 55
End: 2020-06-17

## 2020-06-17 DIAGNOSIS — D18.09 SPINAL HEMANGIOMA: Primary | ICD-10-CM

## 2020-06-18 NOTE — TELEPHONE ENCOUNTER
Dr. Efe Moeller PA-C. Is it ok to put referral in? Please see tele enc from 6/12/2020 Dr. Ann Marie eFrraro office.

## 2020-06-18 NOTE — TELEPHONE ENCOUNTER
From: Dolly Phan  To: Rizwana Levine DO  Sent: 6/17/2020 9:22 PM CDT  Subject: Test Results Question    I wrote on Friday last week about an L Spine MRI I had at [facility] on June 7. Its in your system. Please review.  It's quite abnormal with many hema

## 2020-06-23 ENCOUNTER — TELEPHONE (OUTPATIENT)
Dept: SURGERY | Facility: CLINIC | Age: 55
End: 2020-06-23

## 2020-06-23 DIAGNOSIS — D18.09 HEMANGIOMA OF BONE: Primary | ICD-10-CM

## 2020-06-23 NOTE — TELEPHONE ENCOUNTER
patient called was referred by Dr. Matt Ortega to Dr. Nirmal Hurtado    has multiple hemangiomas ushing in the spine. Please advise where we add this patient.

## 2020-06-24 NOTE — TELEPHONE ENCOUNTER
MRI lumbar spine completed on 6/7/20 and per Dr. Darby Florence:    \"CONCLUSION:    1.  There is an aggressive vertebral hemangioma involving the entire L2 vertebral body with extension into the lamina bilaterally and expansion with an extraosseous component

## 2020-06-24 NOTE — TELEPHONE ENCOUNTER
Patient called. No answer, left detailed VM per HIPPA. Reviewed imaging with Dr. Isidro Muhammad, patient requires new MRI lumbar with and without contrast. Will order now. Central Scheduling phone provided.      Patient to complete prior to next Tuesday if possi

## 2020-06-25 NOTE — TELEPHONE ENCOUNTER
Pt returning [facility] phone call, made pt aware new MRI lumbar order is in chart, pt states she will have all imaging done prior to 6/30/20 apt.  Pt aware she will be seen in [address] at 11am.

## 2020-06-25 NOTE — TELEPHONE ENCOUNTER
Noted that imaging appointments have been made for 6/28/20, and follow up appointment to review imaging scheduled for 6/30/20 with Dr. Charla Meng.

## 2020-06-25 NOTE — TELEPHONE ENCOUNTER
Patient called to see if any further questions/concerns in regards to imaging orders. Left VM per HIPPA.  Provided call back number if needed

## 2020-06-28 ENCOUNTER — HOSPITAL ENCOUNTER (OUTPATIENT)
Dept: MRI IMAGING | Facility: HOSPITAL | Age: 55
Discharge: HOME | End: 2020-06-28
Attending: PHYSICIAN ASSISTANT
Payer: COMMERCIAL

## 2020-06-28 DIAGNOSIS — D18.09 HEMANGIOMA OF BONE: ICD-10-CM

## 2020-06-28 PROCEDURE — 72156 MRI NECK SPINE W/O & W/DYE: CPT | Performed by: PHYSICIAN ASSISTANT

## 2020-06-28 PROCEDURE — 72157 MRI CHEST SPINE W/O & W/DYE: CPT | Performed by: PHYSICIAN ASSISTANT

## 2020-06-28 PROCEDURE — A9575 INJ GADOTERATE MEGLUMI 0.1ML: HCPCS

## 2020-06-28 PROCEDURE — 72158 MRI LUMBAR SPINE W/O & W/DYE: CPT | Performed by: PHYSICIAN ASSISTANT

## 2020-06-30 ENCOUNTER — OFFICE VISIT (OUTPATIENT)
Dept: SURGERY | Facility: CLINIC | Age: 55
End: 2020-06-30
Payer: COMMERCIAL

## 2020-06-30 VITALS
WEIGHT: 210 LBS | BODY MASS INDEX: 33.75 KG/M2 | HEART RATE: 70 BPM | HEIGHT: 66 IN | SYSTOLIC BLOOD PRESSURE: 115 MMHG | DIASTOLIC BLOOD PRESSURE: 72 MMHG

## 2020-06-30 DIAGNOSIS — M54.16 LUMBAR RADICULITIS: ICD-10-CM

## 2020-06-30 DIAGNOSIS — M48.061 SPINAL STENOSIS OF LUMBAR REGION, UNSPECIFIED WHETHER NEUROGENIC CLAUDICATION PRESENT: Primary | ICD-10-CM

## 2020-06-30 DIAGNOSIS — M47.816 LUMBAR SPONDYLOSIS: ICD-10-CM

## 2020-06-30 PROCEDURE — 99204 OFFICE O/P NEW MOD 45 MIN: CPT | Performed by: NEUROLOGICAL SURGERY

## 2020-06-30 NOTE — PROGRESS NOTES
[facility]  Neurosurgery Consult    Da Fernando Patient Status:  No patient class for patient encounter    3/10/1965 MRN [id number]     REASON FOR CONSULTATION:  Multiple hemangiomas    HISTORY OF PRESENT ILLNESS:  Da Fernando is a(n) 50 ye • ABDOMINAL HYSTERECTOMY, BSO Bilateral 2015    Performed by Shirley Lynn MD at [facility] MAIN OR   • APPENDECTOMY  08   • BREAST SURGERY PROCEDURE UNLISTED      enlargement   •      • COLONOSCOPY  09    negative   • HYSTERECTOMY 5 5 5     Left 5 5 5 5 5   Lower extremity strength:      Iliospoas  Hamstrings  Quads  D-flexion  P-flexion     Right 5 5 5 5 5     Left 5 5 5 5 5     Reflexes:     Biceps Brachioradialis Triceps Patella Achilles Clonus Joya's   Right 2/4 2/4 2/4 2/4 2 There is deformity of the exiting right-sided   L2 nerve root which is swollen. No definite pathologic fracture noted. There is deformity of the ventral surface of the thecal sac right greater than left.   CORD/CAUDA EQUINA:  Normal caliber, contour, and within the cervical and thoracic spine show typical signal features of hemangioma, do not show any pathologic fracture, do not show any soft tissue mass extension or signs of any associated central canal stenosis or   cord compression.      As shown on the bulge  Lumbar spondylosis  Lumbar radiculitis    Plan:  Pt was seen with Dr. Soham Chavez. Pt's imaging was reviewed today in neuro-oncology conference. We discussed with pt that she does have multiple hemangiomas through the spine, but that they are benign.  The

## 2020-06-30 NOTE — PROGRESS NOTES
New Pt referred by Pt's PCP for Hamangioma     Imaging:     MRI of the cervical, thoracic and lumbar     Pt states that she has been having issues with pain in the right rip. Pt states that she has issues with radiating pain in the right leg.  Pt states shahida

## 2020-08-10 RX ORDER — RIZATRIPTAN BENZOATE 10 MG/1
TABLET, ORALLY DISINTEGRATING ORAL
Qty: 36 TABLET | Refills: 0 | Status: CANCELLED | OUTPATIENT
Start: 2020-08-10

## 2020-08-12 RX ORDER — RIZATRIPTAN BENZOATE 10 MG/1
TABLET, ORALLY DISINTEGRATING ORAL
Qty: 36 TABLET | Refills: 0 | Status: SHIPPED | OUTPATIENT
Start: 2020-08-12 | End: 2021-04-27

## 2020-08-12 RX ORDER — ONDANSETRON 4 MG/1
4 TABLET, FILM COATED ORAL EVERY 8 HOURS PRN
Qty: 30 TABLET | Refills: 2 | Status: SHIPPED | OUTPATIENT
Start: 2020-08-12 | End: 2021-04-27

## 2020-08-13 ENCOUNTER — OFFICE VISIT (OUTPATIENT)
Dept: SURGERY | Facility: CLINIC | Age: 55
End: 2020-08-13
Payer: COMMERCIAL

## 2020-08-13 VITALS — SYSTOLIC BLOOD PRESSURE: 132 MMHG | DIASTOLIC BLOOD PRESSURE: 82 MMHG | HEART RATE: 70 BPM

## 2020-08-13 DIAGNOSIS — D18.09 HEMANGIOMA OF BONE: ICD-10-CM

## 2020-08-13 DIAGNOSIS — M47.816 LUMBAR SPONDYLOSIS: ICD-10-CM

## 2020-08-13 DIAGNOSIS — M51.16 LUMBAR DISC HERNIATION WITH RADICULOPATHY: Primary | ICD-10-CM

## 2020-08-13 PROCEDURE — 3075F SYST BP GE 130 - 139MM HG: CPT | Performed by: PHYSICIAN ASSISTANT

## 2020-08-13 PROCEDURE — 99214 OFFICE O/P EST MOD 30 MIN: CPT | Performed by: PHYSICIAN ASSISTANT

## 2020-08-13 PROCEDURE — 3079F DIAST BP 80-89 MM HG: CPT | Performed by: PHYSICIAN ASSISTANT

## 2020-08-13 NOTE — PROGRESS NOTES
Pt is here for follow for post injections. Pt was last seen 6/30/2020     FREEMAN lumbar epidural injection Dr Ramakrishna Adamson. July 10 th 0% of relief. Pt states that since LOV she has noticed a increase in pain since LOV.  Per Pt more so in the back and bilateral l

## 2020-08-13 NOTE — PROGRESS NOTES
[facility]  Neurosurgery Follow up  [address] Patient Status:  No patient class for patient encounter    3/10/1965 MRN [id number]         HISTORY OF PRESENT ILLNESS:  Mando Hernandez is a(n) 50year old female in follow up.  Lying rig (postoperative nausea and vomiting)     • Snoring Edward PSG 4-8-14     AHI 4.4 SaO2 janelle 86-.5 primary snoring         PAST SURGICAL HISTORY:        Past Surgical History:   Procedure Laterality Date   • ABDOMINAL HYSTERECTOMY, BSO Bilateral 2/20/2015 bilaterally. Motor: Slightly antalgic gait. DTRs symmetric. No clonus. Neg Joya's. Difficulty with standing knee bend on the right, normal on the left. Tender L1-2. Paraspinal muscle tight. R. SI pain.  Right hip      Upper extremity strength:    this involves T1, T2, T4, T7, T8, and T11.  No pathologic fracture noted thoracic spine on the localizing image. Clemente Jeffrey is an   hemangioma replacing the entire L2 vertebral body.  There is posterior wall expansion by the hemangioma into the epidural space the midline.  T7 hemangioma in the vertebral body to the right side 17 mm.  Anterior vertebral body hemangioma measuring 8 mm within T8.  Tiny patchy hemangiomas within T9.  Large hemangioma centrally and to the left side of T11 measuring 2.7   cm.  Tiny h hemangioma involves L2 with complete replacement, soft tissue   breakthrough including right greater than left anterior epidural soft tissue, and soft tissue extending into the right neural foramen around the nerve root     ASSESSMENT:  Multiple spinal hem

## 2020-10-29 ENCOUNTER — OFFICE VISIT (OUTPATIENT)
Dept: PAIN CLINIC | Facility: CLINIC | Age: 55
End: 2020-10-29
Payer: COMMERCIAL

## 2020-10-29 VITALS
BODY MASS INDEX: 32.78 KG/M2 | HEIGHT: 66 IN | OXYGEN SATURATION: 99 % | RESPIRATION RATE: 16 BRPM | HEART RATE: 66 BPM | SYSTOLIC BLOOD PRESSURE: 120 MMHG | DIASTOLIC BLOOD PRESSURE: 82 MMHG | WEIGHT: 204 LBS

## 2020-10-29 DIAGNOSIS — M54.16 LUMBAR RADICULITIS: Primary | ICD-10-CM

## 2020-10-29 PROCEDURE — 3079F DIAST BP 80-89 MM HG: CPT | Performed by: ANESTHESIOLOGY

## 2020-10-29 PROCEDURE — 3074F SYST BP LT 130 MM HG: CPT | Performed by: ANESTHESIOLOGY

## 2020-10-29 PROCEDURE — 99204 OFFICE O/P NEW MOD 45 MIN: CPT | Performed by: ANESTHESIOLOGY

## 2020-10-29 PROCEDURE — 3008F BODY MASS INDEX DOCD: CPT | Performed by: ANESTHESIOLOGY

## 2020-10-29 RX ORDER — LEVOFLOXACIN 500 MG/1
TABLET, FILM COATED ORAL
COMMUNITY
Start: 2020-08-21 | End: 2021-02-04 | Stop reason: ALTCHOICE

## 2020-10-29 RX ORDER — ALBUTEROL SULFATE 90 UG/1
AEROSOL, METERED RESPIRATORY (INHALATION)
Status: ON HOLD | COMMUNITY
Start: 2020-08-21 | End: 2021-03-08

## 2020-10-29 RX ORDER — AZITHROMYCIN 250 MG/1
250 TABLET, FILM COATED ORAL DAILY
COMMUNITY
Start: 2020-08-14 | End: 2021-02-04 | Stop reason: ALTCHOICE

## 2020-10-29 NOTE — PROGRESS NOTES
PHYSICAL EXAM:   Physical Exam   Constitutional:           Patient presents in office today with reported pain in lower back, and right thigh    Current pain level reported = 4/10    Acetaminophen-codeine 600-60mg pt states day prior to OV    Location of

## 2020-10-30 NOTE — PROGRESS NOTES
Name: Ivy Wilson   : 3/10/1965   DOS: 10/29/2020     Chief complaint: Low back pain    History of present illness:  Ivy Wilson is a 50year old female complaining of  pain in the lower back for  years but the last 1 year got significantly wors Sig Dispense Refill   • azithromycin 250 MG Oral Tab Take 250 mg by mouth daily.      • levofloxacin 500 MG Oral Tab TAKE 1 TABLET BY MOUTH EVERY DAY FOR INFECTION     • Albuterol Sulfate  (90 Base) MCG/ACT Inhalation Aero Soln INHALE 2 PUFFS EVERY 4 type 2   • Other (renal failure) Paternal Grandmother    • Other (leukemia) Maternal Grandfather    • Depression Mother    • Other (Other) Mother    • Hypertension Father    • Other (Other) Father    • Other (lung cancer) Paternal Grandfather a normal affect. The patient ambulates with normal gait. HEENT: No gross lesion noted. PEERL. No icterus. Neck and Upper Extremity: Supple. No thyromegaly or lymphadenopathy. No tenderness over the cervical paravertebral muscle and trapezius muscle.  Flex Examination:   (R)   (L)   Hip Abduction:               5    5   Hip Flexion:    5    5   Knee Extension:   5    5   Knee Flexion:    5    5   Ant.  Tibialis:    5    5  Extensor Hallucis Longus:   5    5  Peroneals:     5    5  Gastrocsoleus:     5    5

## 2020-11-13 ENCOUNTER — TELEPHONE (OUTPATIENT)
Dept: SURGERY | Facility: CLINIC | Age: 55
End: 2020-11-13

## 2020-11-13 DIAGNOSIS — M54.16 LUMBAR RADICULITIS: Primary | ICD-10-CM

## 2020-11-13 NOTE — TELEPHONE ENCOUNTER
Prior authorization request completed for: Right L2 SNRB   Authorization #No Prior Authorization/Nor Pre Determination is Required   -No Exclusions   Spoke with Justin Mcclure at [address] [facility]  Reference #:[id number]  Time:07:34    Authorization dates

## 2020-11-13 NOTE — TELEPHONE ENCOUNTER
Spoke to pt to advise that Dr. Antonina Valdez did not relay recommendations to staff pertaining to procedure. Nereyda Juarez that PA will be initiated today and will request that PA is processed today d/t the delay.  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

## 2020-11-13 NOTE — TELEPHONE ENCOUNTER
Medical clearance needed- No    Implanted cardiac device/SCS/PNS: N/A    Pt evaluated by Dr. Antonina Valdez and recommended for right selective nerve root block (X 1). Please begin PA process for procedure(s).      Laterality: Right  Level(s): L2    Pt informed call

## 2020-11-16 NOTE — TELEPHONE ENCOUNTER
I will order the injection without sedation. If she prefers sedation please feel free to change the consent to with sedation.   Thank you

## 2020-11-19 NOTE — TELEPHONE ENCOUNTER
Patient advised of insurance approval to proceed with injections and is agreeable to scheduling. Patient scheduled for procedure, pre-procedure instructions reviewed. Patient prefers local sedation.  Reviewed sedation instructions including no need to be fa Number of days you need to be off for the following medications:  ? Aggrenox 10 days   ? Agrylin (Anagrelide) 10 days  ? Brilinta (Ticagrelor) 7 days  ? Imbruvica (Ibrutinib) 3 days   ? Enbrel (Etanercept) 24 hours   ? Fragmin (Dalteparin) 24 hours   ?  Pl Please call our office with any questions or concerns before or after your procedure at  [phone].   If you are a diabetic, please increase the frequency of your glucose monitoring after the procedure as this may cause a temporary increase in your blood

## 2020-11-30 ENCOUNTER — APPOINTMENT (OUTPATIENT)
Dept: GENERAL RADIOLOGY | Facility: HOSPITAL | Age: 55
End: 2020-11-30
Attending: ANESTHESIOLOGY
Payer: COMMERCIAL

## 2020-11-30 ENCOUNTER — HOSPITAL ENCOUNTER (OUTPATIENT)
Facility: HOSPITAL | Age: 55
Setting detail: HOSPITAL OUTPATIENT SURGERY
Discharge: HOME | End: 2020-11-30
Attending: ANESTHESIOLOGY | Admitting: ANESTHESIOLOGY
Payer: COMMERCIAL

## 2020-11-30 VITALS
RESPIRATION RATE: 18 BRPM | TEMPERATURE: 98 F | OXYGEN SATURATION: 100 % | SYSTOLIC BLOOD PRESSURE: 159 MMHG | DIASTOLIC BLOOD PRESSURE: 94 MMHG | HEART RATE: 94 BPM

## 2020-11-30 DIAGNOSIS — R52 PAIN: ICD-10-CM

## 2020-11-30 DIAGNOSIS — M54.16 LUMBAR RADICULITIS: ICD-10-CM

## 2020-11-30 PROCEDURE — 3E0R33Z INTRODUCTION OF ANTI-INFLAMMATORY INTO SPINAL CANAL, PERCUTANEOUS APPROACH: ICD-10-PCS | Performed by: ANESTHESIOLOGY

## 2020-11-30 PROCEDURE — 3E0R3BZ INTRODUCTION OF ANESTHETIC AGENT INTO SPINAL CANAL, PERCUTANEOUS APPROACH: ICD-10-PCS | Performed by: ANESTHESIOLOGY

## 2020-11-30 PROCEDURE — 77002 NEEDLE LOCALIZATION BY XRAY: CPT | Performed by: ANESTHESIOLOGY

## 2020-11-30 RX ORDER — LIDOCAINE HYDROCHLORIDE 10 MG/ML
INJECTION, SOLUTION EPIDURAL; INFILTRATION; INTRACAUDAL; PERINEURAL AS NEEDED
Status: DISCONTINUED | OUTPATIENT
Start: 2020-11-30 | End: 2020-11-30

## 2020-11-30 RX ORDER — ONDANSETRON 2 MG/ML
4 INJECTION INTRAMUSCULAR; INTRAVENOUS ONCE AS NEEDED
Status: DISCONTINUED | OUTPATIENT
Start: 2020-11-30 | End: 2020-11-30

## 2020-11-30 RX ORDER — DEXAMETHASONE SODIUM PHOSPHATE 10 MG/ML
INJECTION, SOLUTION INTRAMUSCULAR; INTRAVENOUS AS NEEDED
Status: DISCONTINUED | OUTPATIENT
Start: 2020-11-30 | End: 2020-11-30

## 2020-11-30 RX ORDER — DIPHENHYDRAMINE HYDROCHLORIDE 50 MG/ML
50 INJECTION INTRAMUSCULAR; INTRAVENOUS ONCE AS NEEDED
Status: DISCONTINUED | OUTPATIENT
Start: 2020-11-30 | End: 2020-11-30

## 2020-11-30 NOTE — H&P
History & Physical Examination    Patient Name: Jose Stinson  MRN: [id number]  CSN: [id number]  YOB: 1965    Pre-Operative Diagnosis:  Lumbar radiculitis [M54.16]    Present Illness: 82-year-old female patient with chronic low back pain jr daily ), Disp: 90 capsule, Rfl: 3      No current facility-administered medications for this encounter.        Allergies:   Hydrocodone             NAUSEA AND VOMITING  Benzoyl Peroxide        RASH    Comment:CREA  Pcn [Penicillins]       RASH    Past Medic ]    UROGENITAL [x ] [x ]    EXTREMITIES [x ] [x ]    OTHER        [ x ] I have discussed the risks and benefits and alternatives with the patient/family. They understand and agree to proceed with plan of care.   [ x ] I have reviewed the History and Physi

## 2020-12-01 NOTE — OPERATIVE REPORT
[facility]  Operative Report  2020     Jessy Rossi Patient Status:  Hospital Outpatient Surgery    3/10/1965 MRN [id number]   [facility] ENDOSCOPY Attending No att. providers found   Hosp Day # 0 PCP Rhina Carr DO     I position was confirmed under AP and lateral fluoroscopic view. Following negative aspiration for CSF and blood, approximately 1 cc of Omnipaque 240 was injected.   An excellent contrast spread along the epidural space and the right L2 nerve root was obtain

## 2020-12-03 ENCOUNTER — OFFICE VISIT (OUTPATIENT)
Dept: SURGERY | Facility: CLINIC | Age: 55
End: 2020-12-03
Payer: COMMERCIAL

## 2020-12-03 VITALS
SYSTOLIC BLOOD PRESSURE: 110 MMHG | HEIGHT: 66 IN | BODY MASS INDEX: 32.14 KG/M2 | DIASTOLIC BLOOD PRESSURE: 68 MMHG | HEART RATE: 62 BPM | WEIGHT: 200 LBS

## 2020-12-03 DIAGNOSIS — M51.16 LUMBAR DISC HERNIATION WITH RADICULOPATHY: ICD-10-CM

## 2020-12-03 DIAGNOSIS — M54.16 LUMBAR RADICULITIS: Primary | ICD-10-CM

## 2020-12-03 PROCEDURE — 3074F SYST BP LT 130 MM HG: CPT | Performed by: NEUROLOGICAL SURGERY

## 2020-12-03 PROCEDURE — 3078F DIAST BP <80 MM HG: CPT | Performed by: NEUROLOGICAL SURGERY

## 2020-12-03 PROCEDURE — 3008F BODY MASS INDEX DOCD: CPT | Performed by: NEUROLOGICAL SURGERY

## 2020-12-03 PROCEDURE — 99213 OFFICE O/P EST LOW 20 MIN: CPT | Performed by: NEUROLOGICAL SURGERY

## 2020-12-03 RX ORDER — METAXALONE 800 MG/1
TABLET ORAL
COMMUNITY
Start: 2020-07-03 | End: 2021-04-27

## 2020-12-03 NOTE — PROGRESS NOTES
[facility]  Neurosurgery Follow up  [address] Patient Status:  No patient class for patient encounter    3/10/1965 MRN [id number]         HISTORY OF PRESENT ILLNESS:  Danita Muhammad is a(n) 50year old female in follow up after [facility] Diagnosis Date   • Anesthesia complication       n&v   • Arrhythmia       PAT   • Bronchitis, not specified as acute or chronic     • Depression     • Extrinsic asthma, unspecified     • Lipid screening 12/29/11   • GIANLUCA (obstructive sleep apnea) 4/12/17- (95.3 kg)   LMP 12/23/2014 (Exact Date)   BMI 33.89 kg/m²   GENERAL:  Patient is a 50year old female in no acute distress. HEENT:  Normocephalic, atraumatic. NEUROLOGICAL:  This patient is alert and orientated x 3. Speech fluent. Comprehension intact.   There is a small left parasagittal disc protrusion deforming the thecal sac and adjacent to the exiting left-sided nerve root without central canal stenosis.  The facet joints are unremarkable.     PARASPINAL AREA:  Normal with no visible mass.    BONY S hemangioma.     Numerous hemangioma are seen, generally showing increased signal on T1 and T2 weighted images.     Left upper posterior corner of C6 vertebra, 6 mm hemangioma.  There is 11 mm hemangioma left upper posterior corner of T1.  Within T2 there i the cervical, and thoracic spine, with a single aggressive lesion in the lumbar spine.  The cervical and thoracic lesions are not associated with aggressive appearance at this time, show no soft   tissue breakthrough, central canal stenosis, cord compressi

## 2020-12-03 NOTE — PATIENT INSTRUCTIONS
Refill policies:    • Allow 2-3 business days for refills; controlled substances may take longer.   • Contact your pharmacy at least 5 days prior to running out of medication and have them send an electronic request or submit request through the “request re Depending on your insurance carrier, approval may take 3-10 days. It is highly recommended patients contact their insurance carrier directly to determine coverage.   If test is done without insurance authorization, patient may be responsible for the entire able to embolize lesion then we will consider surgery

## 2020-12-03 NOTE — PROGRESS NOTES
Patient here for follow-up post injections. Patient had L2 injection performed on Monday, had relief all day Tuesday, but pain  To right thigh returned, rated 6/10 today. Patient notes pain to L3-5 region due to herniated discs.

## 2020-12-11 ENCOUNTER — TELEPHONE (OUTPATIENT)
Dept: SURGERY | Facility: CLINIC | Age: 55
End: 2020-12-11

## 2020-12-11 NOTE — TELEPHONE ENCOUNTER
Per last office visit \"-Dr Lena Yoon to discuss hemangioma and see if they can embolize it.  -She will likely need an angiogram  -if not able to embolize lesion then we will consider surgery\"    [facility]--please address, do you want pt to make an apt?

## 2020-12-11 NOTE — TELEPHONE ENCOUNTER
From last visit on 12/3 with Dr. Darrel Manzanares he was going to talk with interventional to see if they could do embolization on hemangioma on spine.  patient calling to inquire about status

## 2021-01-22 NOTE — TELEPHONE ENCOUNTER
LVM on patieint number to call back and schedule an appointment with one of the Interventional doctors. Please help patient make appointment when she calls back.

## 2021-01-22 NOTE — TELEPHONE ENCOUNTER
Discussed with Dr. Darrel Manzanares, this patient needs to schedule an appointment with [facility].        They will need to decide if they can preform a Spinal angiogram and to determine if they can embolize the hemangioma at the L2-3

## 2021-02-04 ENCOUNTER — TELEPHONE (OUTPATIENT)
Dept: SURGERY | Facility: CLINIC | Age: 56
End: 2021-02-04

## 2021-02-04 ENCOUNTER — OFFICE VISIT (OUTPATIENT)
Dept: SURGERY | Facility: CLINIC | Age: 56
End: 2021-02-04
Payer: COMMERCIAL

## 2021-02-04 VITALS
HEIGHT: 66 IN | WEIGHT: 200 LBS | SYSTOLIC BLOOD PRESSURE: 136 MMHG | BODY MASS INDEX: 32.14 KG/M2 | DIASTOLIC BLOOD PRESSURE: 88 MMHG

## 2021-02-04 DIAGNOSIS — D18.09 HEMANGIOMA OF VERTEBRAL BODY: Primary | ICD-10-CM

## 2021-02-04 DIAGNOSIS — D18.09 VERTEBRAL BODY HEMANGIOMA: Primary | ICD-10-CM

## 2021-02-04 PROCEDURE — 3079F DIAST BP 80-89 MM HG: CPT | Performed by: RADIOLOGY

## 2021-02-04 PROCEDURE — 3008F BODY MASS INDEX DOCD: CPT | Performed by: RADIOLOGY

## 2021-02-04 PROCEDURE — 3075F SYST BP GE 130 - 139MM HG: CPT | Performed by: RADIOLOGY

## 2021-02-04 PROCEDURE — 99203 OFFICE O/P NEW LOW 30 MIN: CPT | Performed by: RADIOLOGY

## 2021-02-04 NOTE — TELEPHONE ENCOUNTER
Patient is scheduled for spinal angiogram with possible hemangioma particle embolization on 3-8-21 with Dr. Carlos Boykin.     X  Neuro IR (IVS) order placed  X  Pre-op lab order placed  X [facility] message sent with pre-op instructions  X Faxed pre-op clearance req

## 2021-02-04 NOTE — PROGRESS NOTES
Patient is here for consult on multiple spinal hemangiomas. She complains of low back pain that radiates to her right groin. Her pain is an 8 today and she took T#3 this morning and it did not help.   She has been having this pain for many years but it has

## 2021-02-04 NOTE — TELEPHONE ENCOUNTER
Patient scheduled for a spinal angiogram with possible hemangioma particle embolization on 3-8-21 at 8 am.     Discussed Angiogram instructions with patient:     PCP Clearance is needed. Please call their office for an appointment.     · Labs can be complet

## 2021-02-04 NOTE — TELEPHONE ENCOUNTER
pt wanting to make 2wk and 1mo follow up post march procedure. was unable to schedule as d[place]'s schedules are not up that far out.  Pt will call in one month to schedule

## 2021-02-04 NOTE — H&P
[facility]  Interventional Neuroradiology Clinic Note        Neurology  [employer]    Ole Vaucl[place], DO  Primary Care      Date of Service: 2/4/2021    Dear Kathleen Oh,     We had the pleasure of seeing Danita Muhammad in the Int • BREAST SURGERY PROCEDURE UNLISTED      enlargement   •      • COLONOSCOPY  09    negative   • HYSTERECTOMY     • OOPHORECTOMY      partial oopherectomy   • SELECTIVE NERVE ROOT BLOCKS Right 2020    Performed by Chelsea Allred 300-60 MG Oral Tab, , Disp: , Rfl: 0  •  LATUDA 60 MG Oral Tab, , Disp: , Rfl:   •  triamcinolone acetonide 0.1 % External Cream, Apply topically 2 (two) times daily.  To affected skin., Disp: 80 g, Rfl: 1  •  traZODone HCl 50 MG Oral Tab, , Disp: , Rfl: epidural extension with deformity of the thecal sac, right greater than   left. There is some expansion into the right neural foramina superiorly at L2-3 with right-sided exiting nerve swelling and edema. No pathologic fracture.   2. There is a small left [place], [address] [address]  Patient seen together with Dr. Juan Carlos Joy    2/4/2021 12:22 PM      I spent approximately 45 minutes with the patient with at least half the time spent on counseling the patient on her

## 2021-02-05 NOTE — TELEPHONE ENCOUNTER
March & April schedules not yet available, will call pt once able to schedule    PA needed   - please call patient to schedule 2 and 4 week post op apts, pt left without scheduling today   Thanks ladies!

## 2021-02-18 ENCOUNTER — PATIENT MESSAGE (OUTPATIENT)
Dept: SURGERY | Facility: CLINIC | Age: 56
End: 2021-02-18

## 2021-02-19 NOTE — TELEPHONE ENCOUNTER
From: Harold Pallas  To: Jin Ramos MD  Sent: 2/18/2021 11:21 AM CST  Subject: Visit Follow-up Question    My procedure for the L2 hemangioma is scheduled for March 8 and I stay overnight until the 9th. Am I able to drive myself home?  I don't want m

## 2021-02-19 NOTE — TELEPHONE ENCOUNTER
Patient will not be able to drive her self home day after the surgery. Will route to providers to advise on work status.

## 2021-02-24 ENCOUNTER — EKG ENCOUNTER (OUTPATIENT)
Dept: LAB | Age: 56
End: 2021-02-24
Attending: FAMILY MEDICINE
Payer: COMMERCIAL

## 2021-02-24 ENCOUNTER — LAB ENCOUNTER (OUTPATIENT)
Dept: LAB | Age: 56
End: 2021-02-24
Attending: FAMILY MEDICINE
Payer: COMMERCIAL

## 2021-02-24 ENCOUNTER — OFFICE VISIT (OUTPATIENT)
Dept: FAMILY MEDICINE CLINIC | Facility: CLINIC | Age: 56
End: 2021-02-24
Payer: COMMERCIAL

## 2021-02-24 VITALS
TEMPERATURE: 98 F | HEIGHT: 66.25 IN | BODY MASS INDEX: 32.56 KG/M2 | DIASTOLIC BLOOD PRESSURE: 72 MMHG | OXYGEN SATURATION: 98 % | SYSTOLIC BLOOD PRESSURE: 116 MMHG | WEIGHT: 202.63 LBS | RESPIRATION RATE: 18 BRPM | HEART RATE: 74 BPM

## 2021-02-24 DIAGNOSIS — Z01.818 PRE-OP EXAMINATION: Primary | ICD-10-CM

## 2021-02-24 DIAGNOSIS — R73.03 PREDIABETES: ICD-10-CM

## 2021-02-24 DIAGNOSIS — D18.09 SPINAL HEMANGIOMA: ICD-10-CM

## 2021-02-24 DIAGNOSIS — Z01.818 PRE-OP EXAMINATION: ICD-10-CM

## 2021-02-24 LAB
ALBUMIN SERPL-MCNC: 4.2 G/DL (ref 3.4–5)
ALBUMIN/GLOB SERPL: 1.7 {RATIO} (ref 1–2)
ALP LIVER SERPL-CCNC: 91 U/L
ALT SERPL-CCNC: 26 U/L
ANION GAP SERPL CALC-SCNC: 3 MMOL/L (ref 0–18)
APTT PPP: 25.6 SECONDS (ref 25.4–36.1)
AST SERPL-CCNC: 9 U/L (ref 15–37)
ATRIAL RATE: 64 BPM
BASOPHILS # BLD AUTO: 0.09 X10(3) UL (ref 0–0.2)
BASOPHILS NFR BLD AUTO: 1.2 %
BILIRUB SERPL-MCNC: 0.4 MG/DL (ref 0.1–2)
BUN BLD-MCNC: 22 MG/DL (ref 7–18)
BUN/CREAT SERPL: 33.8 (ref 10–20)
CALCIUM BLD-MCNC: 9.2 MG/DL (ref 8.5–10.1)
CHLORIDE SERPL-SCNC: 109 MMOL/L (ref 98–112)
CO2 SERPL-SCNC: 28 MMOL/L (ref 21–32)
CREAT BLD-MCNC: 0.65 MG/DL
DEPRECATED RDW RBC AUTO: 50.4 FL (ref 35.1–46.3)
EOSINOPHIL # BLD AUTO: 1.34 X10(3) UL (ref 0–0.7)
EOSINOPHIL NFR BLD AUTO: 17.2 %
ERYTHROCYTE [DISTWIDTH] IN BLOOD BY AUTOMATED COUNT: 14 % (ref 11–15)
EST. AVERAGE GLUCOSE BLD GHB EST-MCNC: 111 MG/DL (ref 68–126)
GLOBULIN PLAS-MCNC: 2.5 G/DL (ref 2.8–4.4)
GLUCOSE BLD-MCNC: 107 MG/DL (ref 70–99)
HBA1C MFR BLD HPLC: 5.5 % (ref ?–5.7)
HCT VFR BLD AUTO: 42.5 %
HGB BLD-MCNC: 13.2 G/DL
IMM GRANULOCYTES # BLD AUTO: 0.01 X10(3) UL (ref 0–1)
IMM GRANULOCYTES NFR BLD: 0.1 %
INR BLD: 0.92 (ref 0.89–1.11)
LYMPHOCYTES # BLD AUTO: 2.48 X10(3) UL (ref 1–4)
LYMPHOCYTES NFR BLD AUTO: 31.8 %
M PROTEIN MFR SERPL ELPH: 6.7 G/DL (ref 6.4–8.2)
MCH RBC QN AUTO: 30.1 PG (ref 26–34)
MCHC RBC AUTO-ENTMCNC: 31.1 G/DL (ref 31–37)
MCV RBC AUTO: 96.8 FL
MONOCYTES # BLD AUTO: 0.54 X10(3) UL (ref 0.1–1)
MONOCYTES NFR BLD AUTO: 6.9 %
NEUTROPHILS # BLD AUTO: 3.34 X10 (3) UL (ref 1.5–7.7)
NEUTROPHILS # BLD AUTO: 3.34 X10(3) UL (ref 1.5–7.7)
NEUTROPHILS NFR BLD AUTO: 42.8 %
OSMOLALITY SERPL CALC.SUM OF ELEC: 294 MOSM/KG (ref 275–295)
P AXIS: -6 DEGREES
P-R INTERVAL: 152 MS
PATIENT FASTING Y/N/NP: YES
PLATELET # BLD AUTO: 257 10(3)UL (ref 150–450)
POTASSIUM SERPL-SCNC: 4.8 MMOL/L (ref 3.5–5.1)
PSA SERPL DL<=0.01 NG/ML-MCNC: 12.7 SECONDS (ref 12.4–14.6)
Q-T INTERVAL: 430 MS
QRS DURATION: 96 MS
QTC CALCULATION (BEZET): 443 MS
R AXIS: 7 DEGREES
RBC # BLD AUTO: 4.39 X10(6)UL
SODIUM SERPL-SCNC: 140 MMOL/L (ref 136–145)
T AXIS: 48 DEGREES
VENTRICULAR RATE: 64 BPM
WBC # BLD AUTO: 7.8 X10(3) UL (ref 4–11)

## 2021-02-24 PROCEDURE — 85025 COMPLETE CBC W/AUTO DIFF WBC: CPT | Performed by: RADIOLOGY

## 2021-02-24 PROCEDURE — 93010 ELECTROCARDIOGRAM REPORT: CPT | Performed by: INTERNAL MEDICINE

## 2021-02-24 PROCEDURE — 3074F SYST BP LT 130 MM HG: CPT | Performed by: FAMILY MEDICINE

## 2021-02-24 PROCEDURE — 85730 THROMBOPLASTIN TIME PARTIAL: CPT | Performed by: RADIOLOGY

## 2021-02-24 PROCEDURE — 93005 ELECTROCARDIOGRAM TRACING: CPT

## 2021-02-24 PROCEDURE — 3078F DIAST BP <80 MM HG: CPT | Performed by: FAMILY MEDICINE

## 2021-02-24 PROCEDURE — 36415 COLL VENOUS BLD VENIPUNCTURE: CPT | Performed by: RADIOLOGY

## 2021-02-24 PROCEDURE — 80053 COMPREHEN METABOLIC PANEL: CPT | Performed by: RADIOLOGY

## 2021-02-24 PROCEDURE — 83036 HEMOGLOBIN GLYCOSYLATED A1C: CPT | Performed by: FAMILY MEDICINE

## 2021-02-24 PROCEDURE — 36415 COLL VENOUS BLD VENIPUNCTURE: CPT | Performed by: FAMILY MEDICINE

## 2021-02-24 PROCEDURE — 99243 OFF/OP CNSLTJ NEW/EST LOW 30: CPT | Performed by: FAMILY MEDICINE

## 2021-02-24 PROCEDURE — 85610 PROTHROMBIN TIME: CPT | Performed by: RADIOLOGY

## 2021-02-24 PROCEDURE — 3008F BODY MASS INDEX DOCD: CPT | Performed by: FAMILY MEDICINE

## 2021-02-24 NOTE — H&P
HPI:   Carmen Wong is a 50year old female who presents for a pre-op exam  Pt is scheduled for surgery of L2 hemangioma on 3/8/21 with Dr. Willian Mathis. This is a chronic problem which onset  > 1 year ago.  Associated symptoms inclued pain of right gr differently: Take 300 mg by mouth 3 (three) times daily as needed.  3 tabs daily 3 times daily ) 90 capsule 3      Past Medical History:   Diagnosis Date   • Anesthesia complication     n&v   • Arrhythmia     PAT   • Bronchitis, not specified as acute or ch sore throat  LUNGS: denies shortness of breath, chest heaviness or cough  CV: denies chest pain, pressure or palpitations  GI: denies abdominal pain; denies heartburn, frequent diarrhea or constipation  : denies dysuria, vaginal discharge or itching; den

## 2021-03-02 NOTE — TELEPHONE ENCOUNTER
Prior authorization request completed for: Spinal Angiogram and possible particle embloization  Authorization #NO AUTHORIZATION REQUIRED  Authorization dates: 3/8/21  CPT codes approved: 44609,91401  Number of visits/dates of service approved: OUTPATIENT

## 2021-03-03 NOTE — TELEPHONE ENCOUNTER
LMTCB on unidentified VM      Sx 3-8.  Please call and schedule patient for 2 week f/u with Hieu Xiong set reminder to make 1 month f/u with Dr Rolando Francis    Routing comment

## 2021-03-03 NOTE — TELEPHONE ENCOUNTER
Per PCP Dr Jazmín Swift for surgery. Results are fine\"    PCP clearance received. 2 week post op still needed. Will need to set up 1 month f/u with Dr Rolando Francis once April schedule is out.

## 2021-03-05 ENCOUNTER — LAB ENCOUNTER (OUTPATIENT)
Dept: LAB | Age: 56
End: 2021-03-05
Attending: RADIOLOGY
Payer: COMMERCIAL

## 2021-03-05 DIAGNOSIS — Z01.812 PRE-PROCEDURE LAB EXAM: Primary | ICD-10-CM

## 2021-03-06 LAB — SARS-COV-2 RNA RESP QL NAA+PROBE: NOT DETECTED

## 2021-03-07 ENCOUNTER — ANESTHESIA EVENT (OUTPATIENT)
Dept: INTERVENTIONAL RADIOLOGY/VASCULAR | Facility: HOSPITAL | Age: 56
DRG: 316 | End: 2021-03-07
Payer: COMMERCIAL

## 2021-03-08 ENCOUNTER — ANESTHESIA (OUTPATIENT)
Dept: INTERVENTIONAL RADIOLOGY/VASCULAR | Facility: HOSPITAL | Age: 56
DRG: 316 | End: 2021-03-08
Payer: COMMERCIAL

## 2021-03-08 ENCOUNTER — HOSPITAL ENCOUNTER (OUTPATIENT)
Dept: INTERVENTIONAL RADIOLOGY/VASCULAR | Facility: HOSPITAL | Age: 56
Discharge: HOME | DRG: 316 | End: 2021-03-09
Attending: RADIOLOGY | Admitting: RADIOLOGY
Payer: COMMERCIAL

## 2021-03-08 DIAGNOSIS — D18.09 HEMANGIOMA OF VERTEBRAL BODY: ICD-10-CM

## 2021-03-08 LAB
ISTAT ACTIVATED CLOTTING TIME: 197 SECONDS (ref 74–137)
ISTAT ACTIVATED CLOTTING TIME: 202 SECONDS (ref 74–137)
ISTAT ACTIVATED CLOTTING TIME: 208 SECONDS (ref 74–137)
ISTAT ACTIVATED CLOTTING TIME: 208 SECONDS (ref 74–137)

## 2021-03-08 PROCEDURE — B310YZZ FLUOROSCOPY OF THORACIC AORTA USING OTHER CONTRAST: ICD-10-PCS | Performed by: RADIOLOGY

## 2021-03-08 PROCEDURE — 99291 CRITICAL CARE FIRST HOUR: CPT | Performed by: INTERNAL MEDICINE

## 2021-03-08 PROCEDURE — 3077F SYST BP >= 140 MM HG: CPT | Performed by: INTERNAL MEDICINE

## 2021-03-08 PROCEDURE — 3079F DIAST BP 80-89 MM HG: CPT | Performed by: INTERNAL MEDICINE

## 2021-03-08 PROCEDURE — 04L03DZ OCCLUSION OF ABDOMINAL AORTA WITH INTRALUMINAL DEVICE, PERCUTANEOUS APPROACH: ICD-10-PCS | Performed by: RADIOLOGY

## 2021-03-08 PROCEDURE — B410YZZ FLUOROSCOPY OF ABDOMINAL AORTA USING OTHER CONTRAST: ICD-10-PCS | Performed by: RADIOLOGY

## 2021-03-08 RX ORDER — TRAZODONE HYDROCHLORIDE 50 MG/1
50 TABLET ORAL NIGHTLY PRN
Status: DISCONTINUED | OUTPATIENT
Start: 2021-03-08 | End: 2021-03-09

## 2021-03-08 RX ORDER — PROTAMINE SULFATE 10 MG/ML
INJECTION, SOLUTION INTRAVENOUS AS NEEDED
Status: DISCONTINUED | OUTPATIENT
Start: 2021-03-08 | End: 2021-03-08 | Stop reason: SURG

## 2021-03-08 RX ORDER — CLINDAMYCIN PHOSPHATE 150 MG/ML
INJECTION, SOLUTION INTRAVENOUS
Status: COMPLETED
Start: 2021-03-08 | End: 2021-03-08

## 2021-03-08 RX ORDER — GLYCOPYRROLATE 0.2 MG/ML
INJECTION, SOLUTION INTRAMUSCULAR; INTRAVENOUS AS NEEDED
Status: DISCONTINUED | OUTPATIENT
Start: 2021-03-08 | End: 2021-03-08 | Stop reason: SURG

## 2021-03-08 RX ORDER — ACETAMINOPHEN 325 MG/1
650 TABLET ORAL EVERY 4 HOURS PRN
Status: DISCONTINUED | OUTPATIENT
Start: 2021-03-08 | End: 2021-03-09

## 2021-03-08 RX ORDER — LIDOCAINE HYDROCHLORIDE 10 MG/ML
INJECTION, SOLUTION INFILTRATION; PERINEURAL
Status: COMPLETED
Start: 2021-03-08 | End: 2021-03-08

## 2021-03-08 RX ORDER — HEPARIN SODIUM 5000 [USP'U]/ML
INJECTION, SOLUTION INTRAVENOUS; SUBCUTANEOUS
Status: COMPLETED
Start: 2021-03-08 | End: 2021-03-08

## 2021-03-08 RX ORDER — SODIUM CHLORIDE 9 MG/ML
INJECTION, SOLUTION INTRAVENOUS CONTINUOUS
Status: DISCONTINUED | OUTPATIENT
Start: 2021-03-08 | End: 2021-03-09

## 2021-03-08 RX ORDER — ACETAMINOPHEN 650 MG/1
650 SUPPOSITORY RECTAL EVERY 4 HOURS PRN
Status: DISCONTINUED | OUTPATIENT
Start: 2021-03-08 | End: 2021-03-09

## 2021-03-08 RX ORDER — METOCLOPRAMIDE HYDROCHLORIDE 5 MG/ML
10 INJECTION INTRAMUSCULAR; INTRAVENOUS EVERY 8 HOURS PRN
Status: DISCONTINUED | OUTPATIENT
Start: 2021-03-08 | End: 2021-03-09

## 2021-03-08 RX ORDER — PHENYLEPHRINE HCL 10 MG/ML
VIAL (ML) INJECTION AS NEEDED
Status: DISCONTINUED | OUTPATIENT
Start: 2021-03-08 | End: 2021-03-08 | Stop reason: SURG

## 2021-03-08 RX ORDER — MIDAZOLAM HYDROCHLORIDE 1 MG/ML
1 INJECTION INTRAMUSCULAR; INTRAVENOUS EVERY 5 MIN PRN
Status: ACTIVE | OUTPATIENT
Start: 2021-03-08 | End: 2021-03-08

## 2021-03-08 RX ORDER — PROTAMINE SULFATE 10 MG/ML
INJECTION, SOLUTION INTRAVENOUS
Status: COMPLETED
Start: 2021-03-08 | End: 2021-03-08

## 2021-03-08 RX ORDER — CEFAZOLIN SODIUM/WATER 2 G/20 ML
SYRINGE (ML) INTRAVENOUS
Status: DISCONTINUED
Start: 2021-03-08 | End: 2021-03-08 | Stop reason: WASHOUT

## 2021-03-08 RX ORDER — NALOXONE HYDROCHLORIDE 0.4 MG/ML
80 INJECTION, SOLUTION INTRAMUSCULAR; INTRAVENOUS; SUBCUTANEOUS AS NEEDED
Status: ACTIVE | OUTPATIENT
Start: 2021-03-08 | End: 2021-03-08

## 2021-03-08 RX ORDER — ROCURONIUM BROMIDE 10 MG/ML
INJECTION, SOLUTION INTRAVENOUS AS NEEDED
Status: DISCONTINUED | OUTPATIENT
Start: 2021-03-08 | End: 2021-03-08 | Stop reason: SURG

## 2021-03-08 RX ORDER — HYDROMORPHONE HYDROCHLORIDE 1 MG/ML
0.4 INJECTION, SOLUTION INTRAMUSCULAR; INTRAVENOUS; SUBCUTANEOUS EVERY 5 MIN PRN
Status: ACTIVE | OUTPATIENT
Start: 2021-03-08 | End: 2021-03-08

## 2021-03-08 RX ORDER — NAPROXEN 500 MG/1
500 TABLET ORAL EVERY MORNING
Status: DISCONTINUED | OUTPATIENT
Start: 2021-03-08 | End: 2021-03-09

## 2021-03-08 RX ORDER — DEXAMETHASONE SODIUM PHOSPHATE 4 MG/ML
VIAL (ML) INJECTION AS NEEDED
Status: DISCONTINUED | OUTPATIENT
Start: 2021-03-08 | End: 2021-03-08 | Stop reason: SURG

## 2021-03-08 RX ORDER — VERAPAMIL HYDROCHLORIDE 2.5 MG/ML
INJECTION, SOLUTION INTRAVENOUS
Status: COMPLETED
Start: 2021-03-08 | End: 2021-03-08

## 2021-03-08 RX ORDER — NEOSTIGMINE METHYLSULFATE 1 MG/ML
INJECTION INTRAVENOUS AS NEEDED
Status: DISCONTINUED | OUTPATIENT
Start: 2021-03-08 | End: 2021-03-08 | Stop reason: SURG

## 2021-03-08 RX ORDER — METOCLOPRAMIDE HYDROCHLORIDE 5 MG/ML
10 INJECTION INTRAMUSCULAR; INTRAVENOUS AS NEEDED
Status: ACTIVE | OUTPATIENT
Start: 2021-03-08 | End: 2021-03-08

## 2021-03-08 RX ORDER — METOPROLOL TARTRATE 50 MG/1
100 TABLET, FILM COATED ORAL
Status: DISCONTINUED | OUTPATIENT
Start: 2021-03-08 | End: 2021-03-09

## 2021-03-08 RX ORDER — LABETALOL HYDROCHLORIDE 5 MG/ML
10 INJECTION, SOLUTION INTRAVENOUS EVERY 10 MIN PRN
Status: COMPLETED | OUTPATIENT
Start: 2021-03-08 | End: 2021-03-09

## 2021-03-08 RX ORDER — ONDANSETRON 2 MG/ML
4 INJECTION INTRAMUSCULAR; INTRAVENOUS AS NEEDED
Status: ACTIVE | OUTPATIENT
Start: 2021-03-08 | End: 2021-03-08

## 2021-03-08 RX ORDER — ONDANSETRON 2 MG/ML
4 INJECTION INTRAMUSCULAR; INTRAVENOUS EVERY 6 HOURS PRN
Status: DISCONTINUED | OUTPATIENT
Start: 2021-03-08 | End: 2021-03-09

## 2021-03-08 RX ORDER — HEPARIN SODIUM 5000 [USP'U]/ML
INJECTION, SOLUTION INTRAVENOUS; SUBCUTANEOUS AS NEEDED
Status: DISCONTINUED | OUTPATIENT
Start: 2021-03-08 | End: 2021-03-08 | Stop reason: SURG

## 2021-03-08 RX ORDER — ONDANSETRON 2 MG/ML
INJECTION INTRAMUSCULAR; INTRAVENOUS AS NEEDED
Status: DISCONTINUED | OUTPATIENT
Start: 2021-03-08 | End: 2021-03-08 | Stop reason: SURG

## 2021-03-08 RX ORDER — LIDOCAINE HYDROCHLORIDE 10 MG/ML
INJECTION, SOLUTION EPIDURAL; INFILTRATION; INTRACAUDAL; PERINEURAL AS NEEDED
Status: DISCONTINUED | OUTPATIENT
Start: 2021-03-08 | End: 2021-03-08 | Stop reason: SURG

## 2021-03-08 RX ORDER — METOPROLOL TARTRATE 100 MG/1
100 TABLET ORAL 2 TIMES DAILY
COMMUNITY
End: 2021-04-27

## 2021-03-08 RX ORDER — ACETAMINOPHEN 500 MG
1000 TABLET ORAL ONCE AS NEEDED
Status: COMPLETED | OUTPATIENT
Start: 2021-03-08 | End: 2021-03-08

## 2021-03-08 RX ORDER — SODIUM CHLORIDE 9 MG/ML
INJECTION, SOLUTION INTRAVENOUS CONTINUOUS PRN
Status: DISCONTINUED | OUTPATIENT
Start: 2021-03-08 | End: 2021-03-08 | Stop reason: SURG

## 2021-03-08 RX ORDER — LOSARTAN POTASSIUM 100 MG/1
100 TABLET ORAL DAILY
COMMUNITY
End: 2021-04-27

## 2021-03-08 RX ORDER — CLINDAMYCIN PHOSPHATE 900 MG/50ML
INJECTION INTRAVENOUS AS NEEDED
Status: DISCONTINUED | OUTPATIENT
Start: 2021-03-08 | End: 2021-03-08 | Stop reason: SURG

## 2021-03-08 RX ORDER — ARMODAFINIL 250 MG/1
250 TABLET ORAL DAILY
COMMUNITY

## 2021-03-08 RX ORDER — SODIUM CHLORIDE, SODIUM LACTATE, POTASSIUM CHLORIDE, CALCIUM CHLORIDE 600; 310; 30; 20 MG/100ML; MG/100ML; MG/100ML; MG/100ML
INJECTION, SOLUTION INTRAVENOUS CONTINUOUS
Status: DISCONTINUED | OUTPATIENT
Start: 2021-03-08 | End: 2021-03-08

## 2021-03-08 RX ORDER — NAPROXEN 250 MG/1
500 TABLET ORAL EVERY MORNING
COMMUNITY
End: 2021-08-31

## 2021-03-08 RX ADMIN — SODIUM CHLORIDE: 9 INJECTION, SOLUTION INTRAVENOUS at 08:04:00

## 2021-03-08 RX ADMIN — NAPROXEN 500 MG: 500 TABLET ORAL at 15:16:00

## 2021-03-08 RX ADMIN — METOPROLOL TARTRATE 100 MG: 50 TABLET, FILM COATED ORAL at 17:22:00

## 2021-03-08 RX ADMIN — LIDOCAINE HYDROCHLORIDE 50 MG: 10 INJECTION, SOLUTION EPIDURAL; INFILTRATION; INTRACAUDAL; PERINEURAL at 08:05:00

## 2021-03-08 RX ADMIN — SODIUM CHLORIDE: 9 INJECTION, SOLUTION INTRAVENOUS at 07:15:00

## 2021-03-08 RX ADMIN — PROTAMINE SULFATE 50 MG: 10 INJECTION, SOLUTION INTRAVENOUS at 10:53:00

## 2021-03-08 RX ADMIN — LABETALOL HYDROCHLORIDE 10 MG: 5 INJECTION, SOLUTION INTRAVENOUS at 19:29:00

## 2021-03-08 RX ADMIN — ACETAMINOPHEN 650 MG: 325 TABLET ORAL at 23:40:00

## 2021-03-08 RX ADMIN — ACETAMINOPHEN 1000 MG: 500 MG TABLET ORAL at 19:07:00

## 2021-03-08 RX ADMIN — DEXAMETHASONE SODIUM PHOSPHATE 8 MG: 4 MG/ML VIAL (ML) INJECTION at 08:30:00

## 2021-03-08 RX ADMIN — ROCURONIUM BROMIDE 60 MG: 10 INJECTION, SOLUTION INTRAVENOUS at 08:05:00

## 2021-03-08 RX ADMIN — HEPARIN SODIUM 1500 UNITS: 5000 INJECTION, SOLUTION INTRAVENOUS; SUBCUTANEOUS at 09:24:00

## 2021-03-08 RX ADMIN — NEOSTIGMINE METHYLSULFATE 1 MG: 1 INJECTION INTRAVENOUS at 11:10:00

## 2021-03-08 RX ADMIN — CLINDAMYCIN PHOSPHATE 900 MG: 900 INJECTION INTRAVENOUS at 08:40:00

## 2021-03-08 RX ADMIN — GLYCOPYRROLATE 0.2 MG: 0.2 INJECTION, SOLUTION INTRAMUSCULAR; INTRAVENOUS at 11:10:00

## 2021-03-08 RX ADMIN — ONDANSETRON 8 MG: 2 INJECTION INTRAMUSCULAR; INTRAVENOUS at 10:55:00

## 2021-03-08 RX ADMIN — SODIUM CHLORIDE: 9 INJECTION, SOLUTION INTRAVENOUS at 13:45:00

## 2021-03-08 RX ADMIN — HEPARIN SODIUM 1500 UNITS: 5000 INJECTION, SOLUTION INTRAVENOUS; SUBCUTANEOUS at 09:54:00

## 2021-03-08 RX ADMIN — PHENYLEPHRINE HCL 40 MCG: 10 MG/ML VIAL (ML) INJECTION at 10:09:00

## 2021-03-08 RX ADMIN — SODIUM CHLORIDE: 9 INJECTION, SOLUTION INTRAVENOUS at 11:43:00

## 2021-03-08 RX ADMIN — HEPARIN SODIUM 7000 UNITS: 5000 INJECTION, SOLUTION INTRAVENOUS; SUBCUTANEOUS at 08:54:00

## 2021-03-08 NOTE — PROCEDURES
[facility]  Neurointerventional Surgery Operative Report  Val Connelly Patient Status:  Inpatient    3/10/1965 MRN [id number]   [facility] 6NE-A Attending David Gill MD   Hosp Day # 0 PCP Gigi Bocanegra DO     Procedure: spinal

## 2021-03-08 NOTE — CONSULTS
[facility]  Neurocritical Care Consult Note    Jeronimo Sharp Patient Status:  Outpatient in a Bed    3/10/1965 MRN [id number]   Location [address] [facility] Attending Lindsay Vargas MD   Hosp Day # 0 PCP Hieu Mclain time  Rizatriptan Benzoate 10 MG Oral Tablet Dispersible, Take 1 tablet by mouth as needed for migraine.  May take second tablet 2 hours later if needed, not to exceed 2 tabs in 24 hours, Disp: 36 tablet, Rfl: 0, 3/7/2021 at Unknown time  METOPROLOL TARTRAT used    Substance and Sexual Activity      Alcohol use: Not Currently        Alcohol/week: 3.3 standard drinks        Types: 4 Standard drinks or equivalent per week        Comment: rare      Drug use: No    Other Topics      Concerns:        Caffeine Conc PRN  0.9% NaCl infusion, , Intravenous, Continuous PRN  propofol (DIPRIVAN) infusion, , Intravenous, Continuous PRN  clindamycin (CLEOCIN) in D5W 900mg/50ml premix, , Intravenous, PRN  Heparin Sodium (Porcine) 5000 UNIT/ML injection, , Intravenous, PRN  ph neurochecks/pt/ot  Depression- cont trazadone and duloxetine  Cardiac:  HTN- sbp goal 100-150, cont metop and losartan  Pulmonary:  GIANLUCA- stable on RA  Renal:  IVFs, monitor BUN/Cr  GI:  PO as tolerated  Heme/ID:  Afebrile, no leukocytosis  Endocrine:  [facility]

## 2021-03-08 NOTE — ANESTHESIA POSTPROCEDURE EVALUATION
[facility] Patient Status:  Inpatient   Age/Gender 50year old female MRN [id number]   [facility] 6NE-A Attending Lionel Suero MD   [facility] Day # 0 PCP Bruna Vizcaino DO       Anesthesia Post-op Note    * No procedures

## 2021-03-08 NOTE — ANESTHESIA PROCEDURE NOTES
Arterial Line  Performed by: Hayde Chambers MD  Authorized by: Hayde Chambers MD     General Information and Staff    Procedure Start:   Anesthesiologist: Hayde Chambers MD  Patient Location:  OR  Indication: continuous blood pressure m

## 2021-03-08 NOTE — ANESTHESIA PREPROCEDURE EVALUATION
PRE-OP EVALUATION    Patient Name: Zahida Campuzano    Pre-op Diagnosis: * No pre-op diagnosis entered *    * No procedures listed *    * No surgeons found in log *    Pre-op vitals reviewed. There is no height or weight on file to calculate BMI. 3/8/2021 at Unknown time  Ondansetron HCl (ZOFRAN) 4 mg tablet, Take 1 tablet (4 mg total) by mouth every 8 (eight) hours as needed for Nausea., Disp: 30 tablet, Rfl: 2        Allergies: Hydrocodone, Benzoyl Peroxide, and Pcn [Penicillins]      Anesthesia 02/24/2021    CA 9.2 02/24/2021     Lab Results   Component Value Date    INR 0.92 02/24/2021         Airway      Mallampati: II  Mouth opening: >3 FB  TM distance: > 6 cm  Neck ROM: full Cardiovascular    Cardiovascular exam normal.  Rhythm: regular  Rate

## 2021-03-08 NOTE — ANESTHESIA PROCEDURE NOTES
Airway  Urgency: elective    Airway not difficult    General Information and Staff    Patient location during procedure: OR  Anesthesiologist: Freda Mccabe MD  Performed: anesthesiologist     Indications and Patient Condition  Indications for airwa

## 2021-03-08 NOTE — H&P
History & Physical Examination    Patient Name: Jeronimo Sharp  MRN: [id number]  CSN: [id number]  YOB: 1965    Diagnosis: L2-L3 Hemangioma    Present Illness:  Josie Stone is a 50year old female with history of back pain that radiates to b Latest Ref Rng & Units 2/24/2021   PT      12.4 - 14.6 seconds 12.7   INR      0.89 - 1.11 0.92   APTT      25.4 - 36.1 seconds 25.6     Component      Latest Ref Rng & Units 2/24/2021   Glucose      70 - 99 mg/dL 107 (H)   Sodium      136 - 145 mmol/L 140 negative   • HYSTERECTOMY     • OOPHORECTOMY  94/98    partial oopherectomy   • SELECTIVE NERVE ROOT BLOCKS Right 11/30/2020    Performed by Jennifer Lockett MD at [facility] ENDOSCOPY     Family History   Problem Relation Age of Onset   • Heart Attack Maternal G NSAID perioperatively. The risk of spinal cord injury is there but less than 1% with good angiographic assessment and avoidance of the artery of Adamkiewicz.

## 2021-03-09 VITALS
SYSTOLIC BLOOD PRESSURE: 140 MMHG | TEMPERATURE: 99 F | DIASTOLIC BLOOD PRESSURE: 82 MMHG | OXYGEN SATURATION: 99 % | HEART RATE: 65 BPM | RESPIRATION RATE: 16 BRPM

## 2021-03-09 LAB
ANION GAP SERPL CALC-SCNC: 5 MMOL/L (ref 0–18)
BUN BLD-MCNC: 16 MG/DL (ref 7–18)
BUN/CREAT SERPL: 34.8 (ref 10–20)
CALCIUM BLD-MCNC: 9.1 MG/DL (ref 8.5–10.1)
CHLORIDE SERPL-SCNC: 107 MMOL/L (ref 98–112)
CO2 SERPL-SCNC: 27 MMOL/L (ref 21–32)
CREAT BLD-MCNC: 0.46 MG/DL
DEPRECATED RDW RBC AUTO: 44 FL (ref 35.1–46.3)
ERYTHROCYTE [DISTWIDTH] IN BLOOD BY AUTOMATED COUNT: 13.4 % (ref 11–15)
GLUCOSE BLD-MCNC: 107 MG/DL (ref 70–99)
HCT VFR BLD AUTO: 33.8 %
HGB BLD-MCNC: 11.4 G/DL
MCH RBC QN AUTO: 30.4 PG (ref 26–34)
MCHC RBC AUTO-ENTMCNC: 33.7 G/DL (ref 31–37)
MCV RBC AUTO: 90.1 FL
OSMOLALITY SERPL CALC.SUM OF ELEC: 290 MOSM/KG (ref 275–295)
PLATELET # BLD AUTO: 233 10(3)UL (ref 150–450)
POTASSIUM SERPL-SCNC: 3.6 MMOL/L (ref 3.5–5.1)
RBC # BLD AUTO: 3.75 X10(6)UL
SODIUM SERPL-SCNC: 139 MMOL/L (ref 136–145)
WBC # BLD AUTO: 9.2 X10(3) UL (ref 4–11)

## 2021-03-09 PROCEDURE — 99291 CRITICAL CARE FIRST HOUR: CPT | Performed by: INTERNAL MEDICINE

## 2021-03-09 PROCEDURE — 99232 SBSQ HOSP IP/OBS MODERATE 35: CPT | Performed by: NURSE PRACTITIONER

## 2021-03-09 RX ORDER — LOSARTAN POTASSIUM 100 MG/1
100 TABLET ORAL DAILY
Status: DISCONTINUED | OUTPATIENT
Start: 2021-03-09 | End: 2021-03-09

## 2021-03-09 RX ADMIN — ACETAMINOPHEN 650 MG: 325 TABLET ORAL at 06:39:00

## 2021-03-09 RX ADMIN — LABETALOL HYDROCHLORIDE 10 MG: 5 INJECTION, SOLUTION INTRAVENOUS at 06:38:00

## 2021-03-09 RX ADMIN — NAPROXEN 500 MG: 500 TABLET ORAL at 08:09:00

## 2021-03-09 RX ADMIN — LOSARTAN POTASSIUM 100 MG: 100 TABLET ORAL at 09:49:00

## 2021-03-09 RX ADMIN — METOPROLOL TARTRATE 100 MG: 50 TABLET, FILM COATED ORAL at 06:39:00

## 2021-03-09 NOTE — PROGRESS NOTES
[facility]  Interventional Neuroradiology Progress Note    Val Connelly Patient Status:  Inpatient    3/10/1965 MRN [id number]   [facility] 6NE-A Attending David Gill MD   [facility] Day # 1 PCP Gigi Bocanegra DO     CC:   L2/L3 YUKI DUMONT & LULU SOSA [facility] Angiogram 3/8/2021  The preliminary findings are: dilated capillary bed in L2 vertebral predominantly supplied by right L2 lumbar artery.  Coil ligated the left L2 lumbar proximally and embosphere 300-500 particle embolized the right L2 supply with 1 vial r

## 2021-03-09 NOTE — PAYOR COMM NOTE
--------------  CONTINUED STAY REVIEW    Payor: Roz Aleman #:  [id number]  Authorization Number: pend ip # [id number]    Admit date: 3/8/21  Admit time: 11:37 AM    Admitting Physician: Moody De La Rosa MD  Attending Physician:  Prem Darby supply. The right L1 gives only a trace amount of collateral supply. Robyn Hoang           REVIEW DOCUMENTATION:   3-9-21       03/09/21 0400 97.7 °F (36.5 °C) 73 13 126/63 96 % — None (Room air) —    03/09/21 0300 — 75 13 123/66 95 % — — —    03/09/21 0200 — 68 1 hemangioma s/p embolization pod #1  Lumbar pain     Ambulate              MEDICATIONS ADMINISTERED IN LAST 1 DAY:  acetaminophen (TYLENOL) tab 650 mg     Date Action Dose Route User    3/9/2021 0639 Given 650 mg Oral Kendrick Ghosh RN    3/8/2021 7521 Gi Date Action Dose Route User    3/8/2021 1115 Given 50 mg Intravenous Rasta Mccabe MD      propofol (DIPRIVAN) infusion     Date Action Dose Route User    3/8/2021 1111 Rate/Dose Change 10 mcg/kg/min × 90 kg (Order-Specific) Intravenous Reji Rodriguez

## 2021-03-09 NOTE — PLAN OF CARE
Patient VSS. Neuro assessments intact with no change in baseline. Groin site stable and intact with no signs of complications. A-line and duff to be discontinued in the AM.  Up to chair and bathroom, possible discharge 3/9.

## 2021-03-09 NOTE — PHYSICAL THERAPY NOTE
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 SUBJECTIVE  Pt reports she has been up and ambulating to the bathroom and states, \"I'm ready! \" to walk. OBJECTIVE  Precautions:  Other (Comment) (-150)  Fall Risk: Standard fall risk    WEIGHT BEARING RESTRIC Pt ascended/descended 12 stairs with use of unilateral handrail. Pt able to ascend/descend stairs with a step-over step stair pattern, but pt experienced increased pain in R groin with step-over pattern.   Pt instructed in step-to pattern and had decreased degree of impairment in mobility. Research supports that patients with this level of impairment may be safe for discharge home without therapy services.     Based on this evaluation, patient's clinical presentation is stable and overall evaluation complexit

## 2021-03-09 NOTE — PLAN OF CARE
Received pt at 0730. Pt alert and oriented x4, neurologically intact with some intermittent mild pain in her lower back. VSS. On RA. Pt given discharge orders as long as she urinates and walks the unit.  Pt successfully voids and walks with stand-by assist.

## 2021-03-09 NOTE — OCCUPATIONAL THERAPY NOTE
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 Pt lives alone in 2 story home with two children often staying.  24 y/o son Yenni Likes stairs. Pt c/o some pain in groin. Pt educated on appropriate LB clothing after surgery. Pt mentioned one of her dogs that is bigger jumps at her, educated pt on proper set up upon return home to avoid injury or fall. Pt demonstrated understanding.  Pt repo not wear mask, reports being fully vaccinated for COVID-19.

## 2021-03-09 NOTE — PLAN OF CARE
Received pt at 1145. Pt drowsy but oriented x4. Pt with q1hr neuros, intact. Denies any numbness/tingling. Pt on 2L NC initially and weaned to RA. Pt in NSR, SBP goal of 100-150 maintained.  Pt R groin is soft with no hematoma, dressing is C/D/I, pt flat fo

## 2021-03-09 NOTE — PROGRESS NOTES
[facility]  Neurocritical Care Progress Note     Dion Douglas Patient Status:  Inpatient    3/10/1965 MRN [id number]   [facility] 6NE-A Attending Moody De La Rosa MD   [facility] Day # 1 PCP Nikhil Hodges DO     Subjective: throughout  · Sens-  Intact to light touch    Diagnostics:   No results found.   Lab Review     Recent Labs   Lab 03/09/21  0416      K 3.6      CO2 27.0   *   BUN 16   CREATSERUM 0.46*     Recent Labs   Lab 03/09/21 0416   WBC 9.2   HGB

## 2021-03-10 NOTE — PAYOR COMM NOTE
--------------  DISCHARGE REVIEW    Payor: Ernesto Champ #:  [id number]  Authorization Number: pend ip # [id number]    Admit date: 3/8/21  Admit time:  11:37 AM  Discharge Date: 3/9/2021 11:55 AM     Admitting Physician: Alda Carroll MD

## 2021-03-11 NOTE — PROGRESS NOTES
Patient referred by Neurosurgery for vertebral hemangioma of L2 with extension to right L2-3 foramen and adjacent L2 nerve swelling. She has severe right L2 sciatica despite multiple conservative strategies including pain injections.  Neurosurgery did not f ENDOSCOPY     Metaxalone 800 MG Oral Tab, Take by mouth.  , Disp: , Rfl:   Rizatriptan Benzoate 10 MG Oral Tablet Dispersible, Take 1 tablet by mouth as needed for migraine.  May take second tablet 2 hours later if needed, not to exceed 2 tabs in 24 hours, History      Not on file    Tobacco Use      Smoking status: Never Smoker      Smokeless tobacco: Never Used    Vaping Use      Vaping Use: Never used    Substance and Sexual Activity      Alcohol use: Not Currently        Alcohol/week: 3.3 standard drinks sensation in all extremities. Impression:    Intractable R L2 sciatica associated with extension of L2 vertebral hemangioma. As discussed above we will proceed with a transarterial particle embolizatoin and watch for a clinical response.     Total fac

## 2021-03-16 ENCOUNTER — PATIENT MESSAGE (OUTPATIENT)
Dept: SURGERY | Facility: CLINIC | Age: 56
End: 2021-03-16

## 2021-03-17 NOTE — TELEPHONE ENCOUNTER
From: Francesca Hopkins  To: BRE Swenson  Sent: 3/16/2021 5:48 PM CDT  Subject: Other    Just wanted to give you an update on how I'm feeling. I thought I was doing very well right after the procedure.  I didn't have any of the pain around my right

## 2021-03-18 NOTE — TELEPHONE ENCOUNTER
Per Lauren Cole 'I was able to discuss with Dr. Duarte Ronquillo. Ruslan Bach" (embospheres) take months to break down.  We are unsure why the pain has returned at this time. [facility] clinic appointment for next week. Maximiliano White would be do too early to do an MRI.   \"    pascaleh

## 2021-03-24 ENCOUNTER — OFFICE VISIT (OUTPATIENT)
Dept: SURGERY | Facility: CLINIC | Age: 56
End: 2021-03-24
Payer: COMMERCIAL

## 2021-03-24 VITALS — DIASTOLIC BLOOD PRESSURE: 86 MMHG | SYSTOLIC BLOOD PRESSURE: 130 MMHG

## 2021-03-24 DIAGNOSIS — D18.09 HEMANGIOMA OF VERTEBRAL BODY: Primary | ICD-10-CM

## 2021-03-24 DIAGNOSIS — M54.16 LUMBAR RADICULITIS: ICD-10-CM

## 2021-03-24 PROCEDURE — 99212 OFFICE O/P EST SF 10 MIN: CPT | Performed by: NURSE PRACTITIONER

## 2021-03-24 PROCEDURE — 3079F DIAST BP 80-89 MM HG: CPT | Performed by: NURSE PRACTITIONER

## 2021-03-24 PROCEDURE — 3075F SYST BP GE 130 - 139MM HG: CPT | Performed by: NURSE PRACTITIONER

## 2021-03-24 NOTE — PROGRESS NOTES
[facility]  Interventional Neuroradiology Progress Note    Ulysess Median     3/10/1965 MRN [id number]   Location [facility], [facility] [place] PCP Emanuel Jeffries DO     CC:   2 week follow up groin check after L2/L3 hemangioma e the following day. Current complaints: since her embo she continues to deny groin discomfort. She has on going lower back pain. She reports the pain is exacerbated by too little activity or too much activity or going up the stairs.   She has not had an Tab, Take 60 mg by mouth daily with food. , Disp: , Rfl:   •  triamcinolone acetonide 0.1 % External Cream, Apply topically 2 (two) times daily. To affected skin., Disp: 80 g, Rfl: 1  •  traZODone HCl 50 MG Oral Tab, Take 50 mg by mouth nightly as needed. collateral down to the level of L2 with no significant supply to the vertebra at L2. Assessment:  49year old female L2/L3 hemangioma s/p embolization  Chronic back pain    Plan:    Follow up prn based on symptoms.      Luetta Hunter, APRN Darylene Span

## 2021-03-24 NOTE — PROGRESS NOTES
Patient is here to follow up on spinal angiography and embolization of L2 vertebral hemangioma performed on 3-8-21. She had an ART line placed on left radial and right groin used for catheter which is still bruised.         Review of Systems:    Sebas Shields

## 2021-03-25 ENCOUNTER — TELEPHONE (OUTPATIENT)
Dept: SURGERY | Facility: CLINIC | Age: 56
End: 2021-03-25

## 2021-03-25 NOTE — TELEPHONE ENCOUNTER
Received [facility] procedure record from patient's spinal angiogram she had on 3-8-21. Patient had follow up apt on 3-24-21. Plan to follow up as needed based on symptoms. Copy made and sent to scanning. Copy placed in [facility] 2021 binder.

## 2021-04-27 ENCOUNTER — TELEPHONE (OUTPATIENT)
Dept: FAMILY MEDICINE CLINIC | Facility: CLINIC | Age: 56
End: 2021-04-27

## 2021-04-27 ENCOUNTER — OFFICE VISIT (OUTPATIENT)
Dept: FAMILY MEDICINE CLINIC | Facility: CLINIC | Age: 56
End: 2021-04-27
Payer: COMMERCIAL

## 2021-04-27 VITALS
HEART RATE: 72 BPM | WEIGHT: 207 LBS | OXYGEN SATURATION: 96 % | RESPIRATION RATE: 16 BRPM | BODY MASS INDEX: 33.27 KG/M2 | DIASTOLIC BLOOD PRESSURE: 80 MMHG | SYSTOLIC BLOOD PRESSURE: 128 MMHG | HEIGHT: 66.25 IN

## 2021-04-27 DIAGNOSIS — Z12.11 COLON CANCER SCREENING: Primary | ICD-10-CM

## 2021-04-27 DIAGNOSIS — Z12.31 ENCOUNTER FOR SCREENING MAMMOGRAM FOR MALIGNANT NEOPLASM OF BREAST: ICD-10-CM

## 2021-04-27 DIAGNOSIS — G89.29 CHRONIC RIGHT-SIDED LOW BACK PAIN WITHOUT SCIATICA: ICD-10-CM

## 2021-04-27 DIAGNOSIS — Z00.00 LABORATORY EXAMINATION ORDERED AS PART OF A COMPLETE PHYSICAL EXAMINATION: ICD-10-CM

## 2021-04-27 DIAGNOSIS — I10 ESSENTIAL HYPERTENSION: ICD-10-CM

## 2021-04-27 DIAGNOSIS — M54.50 CHRONIC RIGHT-SIDED LOW BACK PAIN WITHOUT SCIATICA: ICD-10-CM

## 2021-04-27 DIAGNOSIS — L30.9 ECZEMA, UNSPECIFIED TYPE: ICD-10-CM

## 2021-04-27 DIAGNOSIS — E55.9 VITAMIN D DEFICIENCY: ICD-10-CM

## 2021-04-27 DIAGNOSIS — M79.604 RIGHT LEG PAIN: ICD-10-CM

## 2021-04-27 DIAGNOSIS — R73.03 PREDIABETES: ICD-10-CM

## 2021-04-27 DIAGNOSIS — Z78.0 POST-MENOPAUSAL: ICD-10-CM

## 2021-04-27 PROCEDURE — 3074F SYST BP LT 130 MM HG: CPT | Performed by: FAMILY MEDICINE

## 2021-04-27 PROCEDURE — 3008F BODY MASS INDEX DOCD: CPT | Performed by: FAMILY MEDICINE

## 2021-04-27 PROCEDURE — 99214 OFFICE O/P EST MOD 30 MIN: CPT | Performed by: FAMILY MEDICINE

## 2021-04-27 PROCEDURE — 3079F DIAST BP 80-89 MM HG: CPT | Performed by: FAMILY MEDICINE

## 2021-04-27 RX ORDER — LOSARTAN POTASSIUM 100 MG/1
100 TABLET ORAL DAILY
Qty: 90 TABLET | Refills: 1 | Status: SHIPPED | OUTPATIENT
Start: 2021-04-27 | End: 2022-01-18

## 2021-04-27 RX ORDER — METOPROLOL TARTRATE 100 MG/1
100 TABLET ORAL 2 TIMES DAILY
Qty: 180 TABLET | Refills: 2 | Status: SHIPPED | OUTPATIENT
Start: 2021-04-27

## 2021-04-27 RX ORDER — GABAPENTIN 400 MG/1
400 CAPSULE ORAL 3 TIMES DAILY PRN
Qty: 90 CAPSULE | Refills: 3 | Status: SHIPPED | OUTPATIENT
Start: 2021-04-27 | End: 2021-06-01 | Stop reason: DRUGHIGH

## 2021-04-27 RX ORDER — ONDANSETRON 4 MG/1
4 TABLET, FILM COATED ORAL EVERY 8 HOURS PRN
Qty: 30 TABLET | Refills: 2 | Status: SHIPPED | OUTPATIENT
Start: 2021-04-27

## 2021-04-27 RX ORDER — RIZATRIPTAN BENZOATE 10 MG/1
10 TABLET, ORALLY DISINTEGRATING ORAL AS NEEDED
Qty: 90 TABLET | Refills: 1 | Status: SHIPPED | OUTPATIENT
Start: 2021-04-27

## 2021-04-27 RX ORDER — ACETAMINOPHEN AND CODEINE PHOSPHATE 300; 60 MG/1; MG/1
1 TABLET ORAL EVERY 8 HOURS PRN
Qty: 30 TABLET | Refills: 0 | Status: SHIPPED | OUTPATIENT
Start: 2021-04-27 | End: 2021-05-22

## 2021-04-27 RX ORDER — METAXALONE 800 MG/1
400 TABLET ORAL DAILY PRN
Qty: 90 TABLET | Refills: 1 | Status: SHIPPED | OUTPATIENT
Start: 2021-04-27 | End: 2021-07-02

## 2021-04-27 NOTE — PROGRESS NOTES
HPI/Subjective:   Patient ID: Laura Du is a 49year old female. Prediabetes  Hx of prediabetes. This is a chronic problem. This problem onset over 1 year ago. This problem occurs constantly. Pts last a1c on 2/24/21 was 5.4. Pt is not symptomatic.  P History/Other:   Review of Systems   Constitutional: Negative for malaise/fatigue. Eyes: Negative for blurred vision. Cardiovascular: Negative for chest pain and palpitations. Gastrointestinal: Positive for nausea.    Musculoskeletal: Positive f Physical Exam  Vitals reviewed. Constitutional:       General: She is not in acute distress. Appearance: She is well-developed. She is not diaphoretic. Eyes:      General:         Right eye: No discharge. Left eye: No discharge.       Conj she is able to establish care. - Ondansetron HCl (ZOFRAN) 4 mg tablet; Take 1 tablet (4 mg total) by mouth every 8 (eight) hours as needed for Nausea. Dispense: 30 tablet; Refill: 2  - Metaxalone 800 MG Oral Tab;  Take 0.5 tablets (400 mg total) by mouth topically 2 (two) times daily. To affected skin. Dispense: 80 g; Refill: 1    10. Essential hypertension  Controlled, CPM. Buy blood pressure cuff for home and monitor regularly due to recent fluctuations. - Metoprolol Tartrate 100 MG Oral Tab;  Take 1 ta

## 2021-05-11 ENCOUNTER — TELEMEDICINE (OUTPATIENT)
Dept: SURGERY | Facility: CLINIC | Age: 56
End: 2021-05-11
Payer: COMMERCIAL

## 2021-05-11 DIAGNOSIS — M54.16 LUMBAR RADICULITIS: ICD-10-CM

## 2021-05-11 DIAGNOSIS — D18.09 HEMANGIOMA OF VERTEBRAL BODY: Primary | ICD-10-CM

## 2021-05-11 PROCEDURE — 99213 OFFICE O/P EST LOW 20 MIN: CPT | Performed by: PHYSICIAN ASSISTANT

## 2021-05-11 NOTE — PROGRESS NOTES
Virtual Telephone Check-In     Damaso De La Rosa verbally consents to a Virtual/Telephone Check-In visit on 05/11/21.   Patient has been referred to the [facility] website at [email or web address] to review the yearly Consent to Treat document.     Patient unde

## 2021-05-20 DIAGNOSIS — G89.29 CHRONIC RIGHT-SIDED LOW BACK PAIN WITHOUT SCIATICA: ICD-10-CM

## 2021-05-20 DIAGNOSIS — M79.604 RIGHT LEG PAIN: ICD-10-CM

## 2021-05-20 DIAGNOSIS — M54.50 CHRONIC RIGHT-SIDED LOW BACK PAIN WITHOUT SCIATICA: ICD-10-CM

## 2021-05-21 DIAGNOSIS — M79.604 RIGHT LEG PAIN: ICD-10-CM

## 2021-05-21 DIAGNOSIS — M54.50 CHRONIC RIGHT-SIDED LOW BACK PAIN WITHOUT SCIATICA: ICD-10-CM

## 2021-05-21 DIAGNOSIS — G89.29 CHRONIC RIGHT-SIDED LOW BACK PAIN WITHOUT SCIATICA: ICD-10-CM

## 2021-05-21 NOTE — TELEPHONE ENCOUNTER
Rx Request  acetaminophen-codeine 300-60 MG Oral Tab    Disp:    30                R: 0    Associated Dx: Chronic right-sided low back pain without sciatica, Right leg pain    Last Refilled: 04/27/2021    Last Visit: 04/27/2021

## 2021-05-22 ENCOUNTER — PATIENT MESSAGE (OUTPATIENT)
Dept: PAIN CLINIC | Facility: CLINIC | Age: 56
End: 2021-05-22

## 2021-05-22 ENCOUNTER — PATIENT MESSAGE (OUTPATIENT)
Dept: FAMILY MEDICINE CLINIC | Facility: CLINIC | Age: 56
End: 2021-05-22

## 2021-05-22 RX ORDER — ACETAMINOPHEN AND CODEINE PHOSPHATE 300; 60 MG/1; MG/1
TABLET ORAL
Qty: 30 TABLET | Refills: 0 | Status: SHIPPED | OUTPATIENT
Start: 2021-05-22 | End: 2021-08-31

## 2021-05-24 NOTE — TELEPHONE ENCOUNTER
From: Julien Lizarraga  To: Qasim Fernandes DO  Sent: 5/22/2021 5:54 PM CDT  Subject: Prescription Question    Thank you for approving the medicine.  Without something for pain, I can't garden, I can't walk my dogs, I can't do laundry or get dishes out of the

## 2021-05-24 NOTE — TELEPHONE ENCOUNTER
From: Tari Calderon  To: Kelsey Bustamante MD  Sent: 5/22/2021 6:23 PM CDT  Subject: Non-Urgent Medical Question    Do you take care of pain management medications?  I was seeing an md in [place], but when I questioned a tray charge of $300 for literally a

## 2021-05-25 ENCOUNTER — PATIENT MESSAGE (OUTPATIENT)
Dept: PAIN CLINIC | Facility: CLINIC | Age: 56
End: 2021-05-25

## 2021-05-25 RX ORDER — ACETAMINOPHEN AND CODEINE PHOSPHATE 300; 60 MG/1; MG/1
1 TABLET ORAL EVERY 8 HOURS PRN
Qty: 30 TABLET | Refills: 0 | OUTPATIENT
Start: 2021-05-25

## 2021-05-26 NOTE — TELEPHONE ENCOUNTER
From: Letitia Farah  To: Elier Hudson MD  Sent: 5/25/2021 7:30 PM CDT  Subject: Non-Urgent Medical Question    To Lynda Lizama. I know I would need to make an appt with Dr Pierre Puga to establish for medication management.  And I will call the office to do so

## 2021-06-01 ENCOUNTER — OFFICE VISIT (OUTPATIENT)
Dept: PAIN CLINIC | Facility: CLINIC | Age: 56
End: 2021-06-01
Payer: COMMERCIAL

## 2021-06-01 ENCOUNTER — LAB ENCOUNTER (OUTPATIENT)
Dept: LAB | Age: 56
End: 2021-06-01
Attending: ANESTHESIOLOGY
Payer: COMMERCIAL

## 2021-06-01 ENCOUNTER — MED REC SCAN ONLY (OUTPATIENT)
Dept: PAIN CLINIC | Facility: CLINIC | Age: 56
End: 2021-06-01

## 2021-06-01 VITALS
HEART RATE: 64 BPM | BODY MASS INDEX: 33 KG/M2 | WEIGHT: 207 LBS | SYSTOLIC BLOOD PRESSURE: 118 MMHG | OXYGEN SATURATION: 98 % | DIASTOLIC BLOOD PRESSURE: 68 MMHG

## 2021-06-01 DIAGNOSIS — D18.09 HEMANGIOMA OF VERTEBRAL BODY: Primary | ICD-10-CM

## 2021-06-01 PROCEDURE — 99215 OFFICE O/P EST HI 40 MIN: CPT | Performed by: ANESTHESIOLOGY

## 2021-06-01 PROCEDURE — 80307 DRUG TEST PRSMV CHEM ANLYZR: CPT | Performed by: ANESTHESIOLOGY

## 2021-06-01 PROCEDURE — 3074F SYST BP LT 130 MM HG: CPT | Performed by: ANESTHESIOLOGY

## 2021-06-01 PROCEDURE — 3078F DIAST BP <80 MM HG: CPT | Performed by: ANESTHESIOLOGY

## 2021-06-01 RX ORDER — ACETAMINOPHEN AND CODEINE PHOSPHATE 60; 300 MG/1; MG/1
1 TABLET ORAL EVERY 8 HOURS PRN
Qty: 90 TABLET | Refills: 0 | Status: SHIPPED | OUTPATIENT
Start: 2021-06-01 | End: 2021-07-02

## 2021-06-01 RX ORDER — GABAPENTIN 300 MG/1
300 CAPSULE ORAL 3 TIMES DAILY
COMMUNITY
End: 2021-08-02

## 2021-06-01 NOTE — PROGRESS NOTES
Patient presents in office today with reported pain in low back, shoulders, right leg    Current pain level reported = 4/10    Last reported dose of tylenol, naproxen, Cymbalta and muscle relaxer

## 2021-06-01 NOTE — PROGRESS NOTES
Pt here in office for a visit with Dr Chelly Watters . Reviewed pain agreement with pt. Pt verbalized understanding and signed. Copy provided. Pt sent to lab for urine specimen. Document sent to scanning. Snapshot and FYI updated.

## 2021-06-07 NOTE — PROGRESS NOTES
Name: Jessy Rossi   : 3/10/1965   DOS: 2021     Follow-up visit note: Jessy Rossi is a 49year old female patient with chronic low back pain is being followed in the pain clinic.   The patient was seen by me for a consultation 1 year ago unspecified    • Lipid screening 12/29/11   • GIANLUCA (obstructive sleep apnea) 4/12/17-EDW PSG    AHI 14 Supine AHI 20 non-supine AHI 9   • PONV (postoperative nausea and vomiting)    • Snoring Edward PSG 4-8-14    AHI 4.4 SaO2 janelle 86-.5 primary snoring   • partial oopherectomy   • OTHER  03/08/2021    spinal angiogram with embolization   • OTHER SURGICAL HISTORY Bilateral 2/20/2015    Procedure: ABDOMINAL HYSTERECTOMY, BSO;  Surgeon: Kay Fischer MD;  Location: [address] [place] [address] OR      Family History   Proble Extension:     5    5  Finger Flexsion:     5    5    Deep Tendon Reflexes:            Biceps:     5   5   Triceps:    5   5   Brachioradialis:               5   5  Sensory Examination:    R   L   C5:     N   N   C6:     N   N   C7:     N   N   C8:     N S1:      N                          N   S3:      N                          N    Radiology diagnostic studies: MRI lumbar spine showed aggressive hemangioma involving L2 vertebral body with complete replacement soft tissue breakthrough the spinal canal

## 2021-06-29 ENCOUNTER — TELEPHONE (OUTPATIENT)
Dept: PAIN CLINIC | Facility: CLINIC | Age: 56
End: 2021-06-29

## 2021-06-29 DIAGNOSIS — G89.29 CHRONIC RIGHT-SIDED LOW BACK PAIN WITHOUT SCIATICA: ICD-10-CM

## 2021-06-29 DIAGNOSIS — M54.50 CHRONIC RIGHT-SIDED LOW BACK PAIN WITHOUT SCIATICA: ICD-10-CM

## 2021-06-29 DIAGNOSIS — M79.604 RIGHT LEG PAIN: ICD-10-CM

## 2021-06-29 RX ORDER — GABAPENTIN 300 MG/1
300 CAPSULE ORAL 3 TIMES DAILY
Refills: 0 | Status: CANCELLED | OUTPATIENT
Start: 2021-06-29

## 2021-06-29 RX ORDER — ACETAMINOPHEN AND CODEINE PHOSPHATE 60; 300 MG/1; MG/1
1 TABLET ORAL EVERY 8 HOURS PRN
Qty: 90 TABLET | Refills: 0 | Status: CANCELLED | OUTPATIENT
Start: 2021-06-29

## 2021-06-29 NOTE — TELEPHONE ENCOUNTER
Patient calling to request refill of     Acetaminophen-Codeine #4 300-60 MG Oral Tab    gabapentin (NEURONTIN) 400 MG Oral Cap     losartan (COZAAR) tab 100 mg    [facility] - [address], [place] - [address] [facility], [phone]

## 2021-06-29 NOTE — TELEPHONE ENCOUNTER
Attempted to contact pt to clarify med request, however mailbox is full. Losartan is not managed by Dr Andrew Christiansen and would need to be requested through Dr Mikala Davis. Also, gabapentin in med list does not match dosage indicated in LOV notes OR the msg below.

## 2021-07-01 NOTE — TELEPHONE ENCOUNTER
Spoke to pt who states she is currently taking 900 mg TID. Asked pt about request for losartan. Pt states she did not request that, she requested generic skelaxin. Pt also asking that scripts are sent to [address] [place] [address].  Advised pt that these will be processed a

## 2021-07-01 NOTE — TELEPHONE ENCOUNTER
**Only processing script for T#4. Unable to reach pt to clarify gabapentin dosage and advise that Losartan is not prescribed through this office.      Medication: Acetaminophen-Codeine #4 300-60 MG Oral Tab    Date of last refill: 6/1/2021  Date last filled

## 2021-07-02 RX ORDER — METAXALONE 800 MG/1
400 TABLET ORAL DAILY PRN
Qty: 90 TABLET | Refills: 1 | Status: SHIPPED | OUTPATIENT
Start: 2021-07-02 | End: 2021-08-02

## 2021-07-02 RX ORDER — GABAPENTIN 300 MG/1
900 CAPSULE ORAL 3 TIMES DAILY
Qty: 270 CAPSULE | Refills: 0 | Status: SHIPPED | OUTPATIENT
Start: 2021-07-02 | End: 2021-08-02

## 2021-07-02 RX ORDER — ACETAMINOPHEN AND CODEINE PHOSPHATE 60; 300 MG/1; MG/1
1 TABLET ORAL EVERY 8 HOURS PRN
Qty: 90 TABLET | Refills: 0 | Status: SHIPPED | OUTPATIENT
Start: 2021-07-02 | End: 2021-08-02

## 2021-07-02 NOTE — TELEPHONE ENCOUNTER
Julie Fothergill from [facility] calling to clarify directions for medications sent today. Please advise.

## 2021-07-02 NOTE — TELEPHONE ENCOUNTER
Spoke to Edin Goldman who is asking to clarify script for metaxalone. Edin Goldman states pt was most recently prescribed 800 mg QID instead of 400 mg daily. Advised that we are taking over this medication and duplicated the last script in pt's chart.  Reviewed LOV notes,

## 2021-07-27 ENCOUNTER — HOSPITAL ENCOUNTER (OUTPATIENT)
Age: 56
Discharge: HOME | End: 2021-07-27
Payer: OTHER MISCELLANEOUS

## 2021-07-27 VITALS
DIASTOLIC BLOOD PRESSURE: 92 MMHG | RESPIRATION RATE: 16 BRPM | OXYGEN SATURATION: 98 % | HEART RATE: 72 BPM | SYSTOLIC BLOOD PRESSURE: 137 MMHG | TEMPERATURE: 99 F

## 2021-07-27 DIAGNOSIS — M54.50 LOW BACK PAIN AT MULTIPLE SITES: Primary | ICD-10-CM

## 2021-07-27 DIAGNOSIS — M54.2 NECK PAIN: ICD-10-CM
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
## 2021-07-27 PROCEDURE — 99213 OFFICE O/P EST LOW 20 MIN: CPT | Performed by: PHYSICIAN ASSISTANT

## 2021-07-27 NOTE — ED INITIAL ASSESSMENT (HPI)
Pt. Reports she fell last week slipping on wet floor at work. Pt. Works as Physician at [employer].   She reports she spoke with Nurse line when original injury happened and was told to rest.  She reports she is resting, using NSAIDS, ice.  C/o pain

## 2021-07-27 NOTE — ED PROVIDER NOTES
Patient Seen in: [facility]      History   Patient presents with:  Fall    Stated Complaint: [place];  All over body pain    HPI/Subjective:   HPI    Kesha Coates is a 82-year-old female who presents today for evaluation after sustaining a fa SURGICAL HISTORY Bilateral 2/20/2015    Procedure: ABDOMINAL HYSTERECTOMY, BSO;  Surgeon: Fatemeh Pace MD;  Location: [facility] MAIN OR                Social History    Tobacco Use      Smoking status: Never Smoker      Smokeless tobacco: Never Used    Vapin activities of daily living. She states that even getting into the facility where she works can be strenuous, so I recommend staying off for a few more days to facilitate further healing. X-rays are offered and politely declined.   Patient has been improvi

## 2021-07-30 ENCOUNTER — TELEPHONE (OUTPATIENT)
Dept: PAIN CLINIC | Facility: CLINIC | Age: 56
End: 2021-07-30

## 2021-07-30 DIAGNOSIS — G89.29 CHRONIC RIGHT-SIDED LOW BACK PAIN WITHOUT SCIATICA: ICD-10-CM

## 2021-07-30 DIAGNOSIS — M79.604 RIGHT LEG PAIN: ICD-10-CM

## 2021-07-30 DIAGNOSIS — M54.50 CHRONIC RIGHT-SIDED LOW BACK PAIN WITHOUT SCIATICA: ICD-10-CM

## 2021-07-30 NOTE — TELEPHONE ENCOUNTER
Pt called to request a refill for 3 medications. See alternate TE from today 7/30. Pt requested to let MD know she recently has a fall at work and went to [facility] and was given a letter to be off work.  Pt states she is feeling a whole new different type o

## 2021-07-30 NOTE — TELEPHONE ENCOUNTER
Patient calling to request refill of     gabapentin 300 MG Oral Cap    Acetaminophen-Codeine #4 300-60 MG Oral Tab    Metaxalone 800 MG Oral Tab    [facility] [id number] - [address], [place] - [address] [phone], [phone]    Patient inf

## 2021-08-02 RX ORDER — ACETAMINOPHEN AND CODEINE PHOSPHATE 60; 300 MG/1; MG/1
1 TABLET ORAL EVERY 8 HOURS PRN
Qty: 90 TABLET | Refills: 0 | Status: SHIPPED | OUTPATIENT
Start: 2021-08-02 | End: 2021-09-03

## 2021-08-02 RX ORDER — GABAPENTIN 300 MG/1
900 CAPSULE ORAL 3 TIMES DAILY
Qty: 270 CAPSULE | Refills: 0 | Status: SHIPPED | OUTPATIENT
Start: 2021-08-02 | End: 2021-09-03

## 2021-08-02 RX ORDER — METAXALONE 800 MG/1
400 TABLET ORAL DAILY PRN
Qty: 90 TABLET | Refills: 1 | Status: SHIPPED | OUTPATIENT
Start: 2021-08-02 | End: 2021-09-03

## 2021-08-02 NOTE — TELEPHONE ENCOUNTER
Medication: gabapentin 300 MG Oral Cap      Medication: Metaxalone 800 MG Oral Tab        Medication: Acetaminophen-Codeine #4 300-60 MG     Date of last refill: 07/02/21  [place] 59/01/17    Last office visit: 06/01/21

## 2021-08-03 ENCOUNTER — TELEMEDICINE (OUTPATIENT)
Dept: FAMILY MEDICINE CLINIC | Facility: CLINIC | Age: 56
End: 2021-08-03
Payer: COMMERCIAL

## 2021-08-03 DIAGNOSIS — M54.16 LUMBAR RADICULOPATHY: Primary | ICD-10-CM

## 2021-08-03 DIAGNOSIS — S39.92XD INJURY OF BACK, SUBSEQUENT ENCOUNTER: ICD-10-CM
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
## 2021-08-03 PROCEDURE — 99213 OFFICE O/P EST LOW 20 MIN: CPT | Performed by: FAMILY MEDICINE
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
## 2021-08-31 ENCOUNTER — OFFICE VISIT (OUTPATIENT)
Dept: PAIN CLINIC | Facility: CLINIC | Age: 56
End: 2021-08-31
Payer: COMMERCIAL

## 2021-08-31 VITALS
RESPIRATION RATE: 16 BRPM | HEART RATE: 80 BPM | DIASTOLIC BLOOD PRESSURE: 80 MMHG | SYSTOLIC BLOOD PRESSURE: 110 MMHG | BODY MASS INDEX: 34 KG/M2 | WEIGHT: 210 LBS | OXYGEN SATURATION: 99 %

## 2021-08-31 DIAGNOSIS — M54.16 LUMBAR RADICULITIS: Primary | ICD-10-CM

## 2021-08-31 PROCEDURE — 3074F SYST BP LT 130 MM HG: CPT | Performed by: ANESTHESIOLOGY

## 2021-08-31 PROCEDURE — 99215 OFFICE O/P EST HI 40 MIN: CPT | Performed by: ANESTHESIOLOGY

## 2021-08-31 PROCEDURE — 3079F DIAST BP 80-89 MM HG: CPT | Performed by: ANESTHESIOLOGY

## 2021-08-31 NOTE — PROGRESS NOTES
Patient presents in office today with reported pain in low back     Current pain level reported = 4/10    Last reported dose of tylenol with codeine this PM    Narcotic Contract renewal 06/01/21    Urine Drug screen 06/01/21

## 2021-09-02 DIAGNOSIS — G89.29 CHRONIC RIGHT-SIDED LOW BACK PAIN WITHOUT SCIATICA: ICD-10-CM

## 2021-09-02 DIAGNOSIS — M54.50 CHRONIC RIGHT-SIDED LOW BACK PAIN WITHOUT SCIATICA: ICD-10-CM

## 2021-09-02 DIAGNOSIS — M79.604 RIGHT LEG PAIN: ICD-10-CM

## 2021-09-03 ENCOUNTER — PATIENT MESSAGE (OUTPATIENT)
Dept: PAIN CLINIC | Facility: CLINIC | Age: 56
End: 2021-09-03

## 2021-09-03 DIAGNOSIS — G89.29 CHRONIC RIGHT-SIDED LOW BACK PAIN WITHOUT SCIATICA: ICD-10-CM

## 2021-09-03 DIAGNOSIS — M54.50 CHRONIC RIGHT-SIDED LOW BACK PAIN WITHOUT SCIATICA: ICD-10-CM

## 2021-09-03 DIAGNOSIS — M79.604 RIGHT LEG PAIN: ICD-10-CM

## 2021-09-03 RX ORDER — GABAPENTIN 300 MG/1
900 CAPSULE ORAL 3 TIMES DAILY
Qty: 270 CAPSULE | Refills: 0 | Status: SHIPPED | OUTPATIENT
Start: 2021-09-03 | End: 2021-10-28

## 2021-09-03 RX ORDER — ACETAMINOPHEN AND CODEINE PHOSPHATE 60; 300 MG/1; MG/1
TABLET ORAL
Qty: 90 TABLET | Refills: 0 | OUTPATIENT
Start: 2021-09-03

## 2021-09-03 RX ORDER — METAXALONE 800 MG/1
800 TABLET ORAL 3 TIMES DAILY PRN
Qty: 90 TABLET | Refills: 0 | Status: SHIPPED | OUTPATIENT
Start: 2021-09-03 | End: 2021-10-05

## 2021-09-13 NOTE — TELEPHONE ENCOUNTER
From: Tari Calderon  To: Kelsey Bustamante MD  Sent: 9/3/2021 9:56 AM CDT  Subject: Referral Request    Page 2 - I thank you in advance for doing this letter. I am to send the letter by snail mail along with all of my imaging on a disc that has been done.

## 2021-09-13 NOTE — PROGRESS NOTES
Name: Letiita Farah   : 3/10/1965   DOS: 2021     Follow-up visit note: Letitia Farah is a 49year old female patient with chronic low back pain is being followed in the pain clinic.   The patient was seen by me for a consultation 1 year ago unspecified    • Lipid screening 12/29/11   • GIANLUCA (obstructive sleep apnea) 4/12/17-[facility]W PSG    AHI 14 Supine AHI 20 non-supine AHI 9   • PONV (postoperative nausea and vomiting)    • Snoring Edward PSG 4-8-14    AHI 4.4 SaO2 janelle 86-.5 primary snoring   • Procedure: ABDOMINAL HYSTERECTOMY, BSO;  Surgeon: Merissa Singleton MD;  Location: [facility] MAIN OR      Family History   Problem Relation Age of Onset   • Heart Attack Maternal Grandmother    • Breast Cancer Maternal Grandmother    • Other (Other) Maternal [facility] and the [place] Reflexes:            Biceps:     5   5   Triceps:    5   5   Brachioradialis:               5   5  Sensory Examination:    R   L   C5:     N   N   C6:     N   N   C7:     N   N   C8:     N   N   T1:     N   N    Cardiovascular system: Regular rate and rhyt N    Radiology diagnostic studies: MRI lumbar spine showed aggressive hemangioma involving L2 vertebral body with complete replacement soft tissue breakthrough the spinal canal including right greater than the left anterior epidural soft tissue and soft

## 2021-09-20 NOTE — LETTER
Date & Time: 7/27/2021, 2:25 PM  Patient: Val Connelly  Encounter Provider(s):    Kapil Martin PA-C       To Whom It May Concern:    Alexia Bledsoe was seen and treated in our department on 7/27/2021. She should not return to work until 8/2/21.     If
normal...

## 2021-09-25 ENCOUNTER — PATIENT MESSAGE (OUTPATIENT)
Dept: PAIN CLINIC | Facility: CLINIC | Age: 56
End: 2021-09-25

## 2021-09-25 DIAGNOSIS — M79.604 RIGHT LEG PAIN: ICD-10-CM

## 2021-09-25 DIAGNOSIS — M54.50 CHRONIC RIGHT-SIDED LOW BACK PAIN WITHOUT SCIATICA: ICD-10-CM

## 2021-09-25 DIAGNOSIS — G89.29 CHRONIC RIGHT-SIDED LOW BACK PAIN WITHOUT SCIATICA: ICD-10-CM
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
## 2021-10-01 RX ORDER — ACETAMINOPHEN AND CODEINE PHOSPHATE 60; 300 MG/1; MG/1
1 TABLET ORAL EVERY 8 HOURS PRN
Qty: 21 TABLET | Refills: 0 | Status: SHIPPED | OUTPATIENT
Start: 2021-10-01 | End: 2021-10-05

## 2021-10-05 ENCOUNTER — HOSPITAL ENCOUNTER (OUTPATIENT)
Dept: BONE DENSITY | Age: 56
Discharge: HOME | End: 2021-10-05
Attending: FAMILY MEDICINE
Payer: COMMERCIAL

## 2021-10-05 ENCOUNTER — TELEPHONE (OUTPATIENT)
Dept: PAIN CLINIC | Facility: CLINIC | Age: 56
End: 2021-10-05

## 2021-10-05 ENCOUNTER — OFFICE VISIT (OUTPATIENT)
Dept: PAIN CLINIC | Facility: CLINIC | Age: 56
End: 2021-10-05
Payer: COMMERCIAL

## 2021-10-05 VITALS
OXYGEN SATURATION: 98 % | HEART RATE: 76 BPM | WEIGHT: 210 LBS | SYSTOLIC BLOOD PRESSURE: 124 MMHG | DIASTOLIC BLOOD PRESSURE: 80 MMHG | BODY MASS INDEX: 34 KG/M2

## 2021-10-05 DIAGNOSIS — M79.604 RIGHT LEG PAIN: ICD-10-CM

## 2021-10-05 DIAGNOSIS — G89.29 CHRONIC RIGHT-SIDED LOW BACK PAIN WITHOUT SCIATICA: ICD-10-CM

## 2021-10-05 DIAGNOSIS — M54.16 LUMBAR RADICULITIS: Primary | ICD-10-CM

## 2021-10-05 DIAGNOSIS — M54.50 CHRONIC RIGHT-SIDED LOW BACK PAIN WITHOUT SCIATICA: ICD-10-CM

## 2021-10-05 DIAGNOSIS — Z78.0 POST-MENOPAUSAL: ICD-10-CM

## 2021-10-05 PROCEDURE — 3079F DIAST BP 80-89 MM HG: CPT | Performed by: ANESTHESIOLOGY

## 2021-10-05 PROCEDURE — 99215 OFFICE O/P EST HI 40 MIN: CPT | Performed by: ANESTHESIOLOGY

## 2021-10-05 PROCEDURE — 77080 DXA BONE DENSITY AXIAL: CPT | Performed by: FAMILY MEDICINE

## 2021-10-05 PROCEDURE — 3074F SYST BP LT 130 MM HG: CPT | Performed by: ANESTHESIOLOGY

## 2021-10-05 RX ORDER — ACETAMINOPHEN AND CODEINE PHOSPHATE 60; 300 MG/1; MG/1
1 TABLET ORAL EVERY 8 HOURS PRN
Qty: 90 TABLET | Refills: 0 | Status: SHIPPED | OUTPATIENT
Start: 2021-10-05 | End: 2021-11-03

## 2021-10-05 RX ORDER — METAXALONE 800 MG/1
800 TABLET ORAL 3 TIMES DAILY PRN
Qty: 90 TABLET | Refills: 0 | Status: SHIPPED | OUTPATIENT
Start: 2021-10-05 | End: 2021-11-01

## 2021-10-05 NOTE — PROGRESS NOTES
Patient presents in office today with reported pain in thoracic spine area    Current pain level reported = 4/10    Last reported dose of tylenol 4, 2 tabs this morning, cymbalta, gabapentin, metaxalone      Narcotic Contract renewal 06/01/21    Urine Drug

## 2021-10-05 NOTE — TELEPHONE ENCOUNTER
Fax rcv'd advising that PA is needed for metaxalone.      Prior authorization request for Metaxalone 800 mg    Patient has diagnosis of: Chronic right-sided low back pain without sciatica, right leg pain    Patient's current age:56    Medication is consider

## 2021-10-05 NOTE — TELEPHONE ENCOUNTER
Medication: Acetaminophen-Codeine #4 300-60 MG Oral Tab    Date of last refill: 10/1/2021 SEVEN DAY SUPPLY  [place]      Medication: Metaxalone 800 MG Oral Tab    Date of last refill: 9/3/2021

## 2021-10-05 NOTE — TELEPHONE ENCOUNTER
Patient calling to request refill of Metaxalone 800 MG Oral Tab, 30 day supply of Acetaminophen-Codeine #4 300-60 [facility] Oral Tab  Pharmacy [facility] [id number] - [address], [address] [phone], [phone]    Patient informed of 50

## 2021-10-07 DIAGNOSIS — L30.9 ECZEMA, UNSPECIFIED TYPE: ICD-10-CM

## 2021-10-07 NOTE — PROGRESS NOTES
Name: Julien Lizarraga   : 3/10/1965   DOS: 2021     Follow-up visit note: Julien Lizarraga is a 49year old female patient with chronic low back pain is being followed in the pain clinic.   The patient was seen by me for a consultation 1 year ago but they needed a letter from me. I will write the letter for her.     Past Medical History:   Diagnosis Date   • Anesthesia complication     n&v   • Arrhythmia     PAT   • Bronchitis, not specified as acute or chronic    • Depression    • Extrinsic asthma enlargement   •      • COLONOSCOPY  09    negative   • HYSTERECTOMY     • OOPHORECTOMY  94/    partial oopherectomy   • OTHER  2021    spinal angiogram with embolization   • OTHER SURGICAL HISTORY Bilateral 2015    Procedu 5  Biceps:       5    5  Triceps:      5    5  Wrist Extension:     5    5  Wrist Flexsion:                 5    5  Finger Extension:     5    5  Finger Flexsion:     5    5    Deep Tendon Reflexes:            Biceps:     5   5   Triceps:    5   5   Brachi N   L3:      N               N   L4:      N                          N   L5:      N                          N    S1:      N                          N   S3:      N                          N    Radiology diagnostic studies: MRI lumbar spine showed aggre

## 2021-10-13 ENCOUNTER — TELEPHONE (OUTPATIENT)
Dept: PAIN CLINIC | Facility: CLINIC | Age: 56
End: 2021-10-13

## 2021-10-14 ENCOUNTER — TELEPHONE (OUTPATIENT)
Dept: PAIN CLINIC | Facility: CLINIC | Age: 56
End: 2021-10-14

## 2021-10-14 NOTE — TELEPHONE ENCOUNTER
Patient dropped off FMLA forms with Dr Andrew Christiansen at [facility] 10/5/2021. Forms initiated. Placed on Dr Lauralyn Spatz desk for completion and signature.

## 2021-10-26 NOTE — TELEPHONE ENCOUNTER
Received a fax from [facility] per policy L-750    Denial reasons- Documentation of therapeutic failure or intolerance to three of the follow ing - Cyclobenzaprine 5 mg or 10 mg tabs, Methocarbamol tablets, chlorzoxazone 500 mg tablets or

## 2021-10-28 DIAGNOSIS — M79.604 RIGHT LEG PAIN: ICD-10-CM

## 2021-10-28 DIAGNOSIS — G89.29 CHRONIC RIGHT-SIDED LOW BACK PAIN WITHOUT SCIATICA: ICD-10-CM

## 2021-10-28 DIAGNOSIS — M54.50 CHRONIC RIGHT-SIDED LOW BACK PAIN WITHOUT SCIATICA: ICD-10-CM

## 2021-10-28 RX ORDER — GABAPENTIN 300 MG/1
CAPSULE ORAL
Qty: 270 CAPSULE | Refills: 0 | Status: SHIPPED | OUTPATIENT
Start: 2021-10-28 | End: 2021-11-30

## 2021-10-28 NOTE — TELEPHONE ENCOUNTER
Medication: gabapentin 300 MG Oral Cap    Date of last refill: 09/03/21      Last office visit: 10/05/21  Due back to clinic per last office note:  na  Date next office visit scheduled:  none      Last OV note recommendation: Assessment:  Lumbar radiculopa

## 2021-10-30 RX ORDER — CYCLOBENZAPRINE HCL 10 MG
10 TABLET ORAL 3 TIMES DAILY PRN
Qty: 90 TABLET | Refills: 0 | Status: SHIPPED | OUTPATIENT
Start: 2021-10-30 | End: 2021-12-02 | Stop reason: ALTCHOICE

## 2021-11-01 NOTE — TELEPHONE ENCOUNTER
Nery Hough MD  You 2 days ago         I sent a prescription for Flexeril 10 mg p.o. 3 times daily.     Message text

## 2021-11-01 NOTE — TELEPHONE ENCOUNTER
Spoke with Gualberto Calderon @ [facility] pharmacy , [address] and informed of alternative medication- Flexeril prescribed by Dr Laina Lorenz since [facility] was denied by patient's insurance. Understanding verbalized.  Gualberto Calderon will notify patient and get medication ready for

## 2021-11-03 DIAGNOSIS — G89.29 CHRONIC RIGHT-SIDED LOW BACK PAIN WITHOUT SCIATICA: ICD-10-CM

## 2021-11-03 DIAGNOSIS — M54.50 CHRONIC RIGHT-SIDED LOW BACK PAIN WITHOUT SCIATICA: ICD-10-CM

## 2021-11-03 DIAGNOSIS — M79.604 RIGHT LEG PAIN: ICD-10-CM

## 2021-11-03 RX ORDER — METAXALONE 800 MG/1
800 TABLET ORAL 3 TIMES DAILY PRN
Qty: 90 TABLET | Refills: 0 | Status: SHIPPED | OUTPATIENT
Start: 2021-11-04 | End: 2021-12-07

## 2021-11-03 RX ORDER — ACETAMINOPHEN AND CODEINE PHOSPHATE 60; 300 MG/1; MG/1
1 TABLET ORAL EVERY 8 HOURS PRN
Qty: 90 TABLET | Refills: 0 | Status: SHIPPED | OUTPATIENT
Start: 2021-11-06 | End: 2021-12-06

## 2021-11-03 RX ORDER — GABAPENTIN 300 MG/1
900 CAPSULE ORAL 3 TIMES DAILY
Qty: 270 CAPSULE | Refills: 0 | Status: CANCELLED | OUTPATIENT
Start: 2021-11-03

## 2021-11-03 NOTE — TELEPHONE ENCOUNTER
Medication: GABAPENTIN 300 MG Oral Cap    Date of last refill: 10/28/21      Medication: Metaxalone 800 MG Oral Tab    Date of last refill: 10/05/21        Medication:Acetaminophen-Codeine #4 300-60 MG Oral Tab  Date of last refill: 10/07/21          Date

## 2021-11-03 NOTE — TELEPHONE ENCOUNTER
To: GREGORIO PAIN NURSE      From: Carmen Links      Created: 11/3/2021 10:27 AM        *-*-*This message has not been handled. *-*-*    I am writing this time to request my usual medications.   I will need a refill on the gabapentin, generic Skelaxin, (I forg

## 2021-11-03 NOTE — TELEPHONE ENCOUNTER
She just filled gabapentin 6 days ago, so should not require that. Refilled metaxalone 10/5/21 and T#4 10/7/21. As such, can fill them on 11/4 and 11/6, respectively.

## 2021-11-16 NOTE — TELEPHONE ENCOUNTER
Carol Mcnulty MD  You 4 days ago         Bairon Banuelos, could you ask her to send another set of paper.  I believe with all the paper with through it went with those.  I am working on other ladies paper Karen Police    Message text

## 2021-11-20 ENCOUNTER — PATIENT MESSAGE (OUTPATIENT)
Dept: FAMILY MEDICINE CLINIC | Facility: CLINIC | Age: 56
End: 2021-11-20

## 2021-11-20 DIAGNOSIS — Z00.00 LABORATORY EXAMINATION ORDERED AS PART OF A COMPLETE PHYSICAL EXAMINATION: Primary | ICD-10-CM

## 2021-11-20 DIAGNOSIS — E55.9 VITAMIN D DEFICIENCY: ICD-10-CM

## 2021-11-20 DIAGNOSIS — I10 ESSENTIAL HYPERTENSION: ICD-10-CM

## 2021-11-20 DIAGNOSIS — Z00.00 LABORATORY EXAMINATION ORDERED AS PART OF A ROUTINE GENERAL MEDICAL EXAMINATION: ICD-10-CM

## 2021-11-20 DIAGNOSIS — R73.03 PREDIABETES: ICD-10-CM

## 2021-11-20 DIAGNOSIS — Z13.220 SCREENING FOR LIPOID DISORDERS: ICD-10-CM

## 2021-11-20 DIAGNOSIS — Z13.29 SCREENING FOR THYROID DISORDER: ICD-10-CM

## 2021-11-22 NOTE — TELEPHONE ENCOUNTER
From: Ivy Wilson  To: Chilo Sweeney DO  Sent: 11/20/2021 3:40 PM CST  Subject: Labs re-order request    I can’t believe 6 months has passed since my last visit with you. I have mammo scheduled for Thanksgiving weekend.  I have not scheduled colonoscopy

## 2021-11-30 DIAGNOSIS — M54.50 CHRONIC RIGHT-SIDED LOW BACK PAIN WITHOUT SCIATICA: ICD-10-CM

## 2021-11-30 DIAGNOSIS — G89.29 CHRONIC RIGHT-SIDED LOW BACK PAIN WITHOUT SCIATICA: ICD-10-CM

## 2021-11-30 DIAGNOSIS — M79.604 RIGHT LEG PAIN: ICD-10-CM

## 2021-12-01 RX ORDER — GABAPENTIN 300 MG/1
900 CAPSULE ORAL 3 TIMES DAILY
Qty: 270 CAPSULE | Refills: 0 | Status: SHIPPED | OUTPATIENT
Start: 2021-12-01 | End: 2021-12-30

## 2021-12-01 NOTE — TELEPHONE ENCOUNTER
Medication: GABAPENTIN 300 MG Oral Cap    Date of last refill: 10/28/21      Last office visit: 10/05/21  Due back to clinic per last office note:  na  Date next office visit scheduled:  12/02/21    Last OV note recommendation:     Assessment:  Lumbar radi

## 2021-12-02 ENCOUNTER — OFFICE VISIT (OUTPATIENT)
Dept: PAIN CLINIC | Facility: CLINIC | Age: 56
End: 2021-12-02
Payer: COMMERCIAL

## 2021-12-02 VITALS
SYSTOLIC BLOOD PRESSURE: 98 MMHG | BODY MASS INDEX: 34 KG/M2 | DIASTOLIC BLOOD PRESSURE: 70 MMHG | WEIGHT: 210 LBS | OXYGEN SATURATION: 98 % | HEART RATE: 68 BPM

## 2021-12-02 DIAGNOSIS — M54.16 LUMBAR RADICULITIS: Primary | ICD-10-CM

## 2021-12-02 PROCEDURE — 99213 OFFICE O/P EST LOW 20 MIN: CPT | Performed by: ANESTHESIOLOGY

## 2021-12-02 PROCEDURE — 3078F DIAST BP <80 MM HG: CPT | Performed by: ANESTHESIOLOGY

## 2021-12-02 PROCEDURE — 3074F SYST BP LT 130 MM HG: CPT | Performed by: ANESTHESIOLOGY

## 2021-12-02 NOTE — PROGRESS NOTES
Patient presents in office today with reported pain in low back pain/tumor    Current pain level reported = 5/10    Last reported dose of T3, metaxalone and gabapentin this morning.       Narcotic Contract renewal 06/01/21    Urine Drug screen 06/01/21

## 2021-12-06 DIAGNOSIS — G89.29 CHRONIC RIGHT-SIDED LOW BACK PAIN WITHOUT SCIATICA: ICD-10-CM

## 2021-12-06 DIAGNOSIS — M54.50 CHRONIC RIGHT-SIDED LOW BACK PAIN WITHOUT SCIATICA: ICD-10-CM

## 2021-12-06 DIAGNOSIS — M79.604 RIGHT LEG PAIN: ICD-10-CM

## 2021-12-06 RX ORDER — METAXALONE 800 MG/1
800 TABLET ORAL 3 TIMES DAILY PRN
Qty: 90 TABLET | Refills: 0 | Status: CANCELLED | OUTPATIENT
Start: 2021-12-06 | End: 2022-01-05

## 2021-12-06 RX ORDER — ACETAMINOPHEN AND CODEINE PHOSPHATE 60; 300 MG/1; MG/1
1 TABLET ORAL EVERY 8 HOURS PRN
Qty: 90 TABLET | Refills: 0 | Status: SHIPPED | OUTPATIENT
Start: 2021-12-06 | End: 2022-01-04

## 2021-12-06 NOTE — TELEPHONE ENCOUNTER
Medication: Metaxalone 800 MG Oral Tab     Date of last refill: 11/06/21 and 11/01/21 REFILL REQUEST DENIED.     Medication: Acetaminophen-Codeine #4 300-60 MG Oral Tab    Date of last refill: 11/03/21  [place] 65/57/20

## 2021-12-07 RX ORDER — METAXALONE 800 MG/1
800 TABLET ORAL 3 TIMES DAILY PRN
Qty: 90 TABLET | Refills: 0 | Status: SHIPPED | OUTPATIENT
Start: 2021-12-07 | End: 2022-01-06

## 2021-12-07 NOTE — TELEPHONE ENCOUNTER
Medication: Metaxalone 800 MG Oral Tab     Date of last refill: 11/4/2021    Last office visit: 12/2/2021  Due back to clinic per last office note:  Not indicated  Date next office visit scheduled:  None      Last OV note recommendation: Assessment:  Roge

## 2021-12-07 NOTE — PROGRESS NOTES
Name: Carmen Wong   : 3/10/1965   DOS: 2021     Follow-up visit note: Carmen Wong is a 49year old female patient with chronic low back pain is being followed in the pain clinic.   The patient was seen by me for a consultation 1 year ago but they needed a letter from me. I will write the letter for her.     Past Medical History:   Diagnosis Date   • Anesthesia complication     n&v   • Arrhythmia     PAT   • Bronchitis, not specified as acute or chronic    • Depression    • Extrinsic asthma oopherectomy   • OTHER  03/08/2021    spinal angiogram with embolization   • OTHER SURGICAL HISTORY Bilateral 2/20/2015    Procedure: ABDOMINAL HYSTERECTOMY, BSO;  Surgeon: Nichol Gary MD;  Location: [facility] MAIN OR      Family History   Problem Relation 5  Finger Flexsion:     5    5    Deep Tendon Reflexes:            Biceps:     5   5   Triceps:    5   5   Brachioradialis:               5   5  Sensory Examination:    R   L   C5:     N   N   C6:     N   N   C7:     N   N   C8:     N   N   T1:     N   N N   S3:      N                          N    Radiology diagnostic studies: MRI lumbar spine showed aggressive hemangioma involving L2 vertebral body with complete replacement soft tissue breakthrough the spinal canal including right greater than t

## 2021-12-11 ENCOUNTER — LAB ENCOUNTER (OUTPATIENT)
Dept: LAB | Age: 56
End: 2021-12-11
Attending: FAMILY MEDICINE
Payer: COMMERCIAL

## 2021-12-11 ENCOUNTER — HOSPITAL ENCOUNTER (OUTPATIENT)
Dept: MAMMOGRAPHY | Age: 56
Discharge: HOME | End: 2021-12-11
Attending: FAMILY MEDICINE
Payer: COMMERCIAL

## 2021-12-11 DIAGNOSIS — Z00.00 LABORATORY EXAMINATION ORDERED AS PART OF A COMPLETE PHYSICAL EXAMINATION: ICD-10-CM

## 2021-12-11 DIAGNOSIS — Z13.29 SCREENING FOR THYROID DISORDER: ICD-10-CM

## 2021-12-11 DIAGNOSIS — Z00.00 LABORATORY EXAMINATION ORDERED AS PART OF A ROUTINE GENERAL MEDICAL EXAMINATION: ICD-10-CM

## 2021-12-11 DIAGNOSIS — Z13.220 SCREENING FOR LIPOID DISORDERS: ICD-10-CM

## 2021-12-11 DIAGNOSIS — I10 ESSENTIAL HYPERTENSION: ICD-10-CM

## 2021-12-11 DIAGNOSIS — Z12.31 ENCOUNTER FOR SCREENING MAMMOGRAM FOR MALIGNANT NEOPLASM OF BREAST: ICD-10-CM

## 2021-12-11 DIAGNOSIS — E55.9 VITAMIN D DEFICIENCY: ICD-10-CM

## 2021-12-11 DIAGNOSIS — R73.03 PREDIABETES: ICD-10-CM

## 2021-12-11 PROCEDURE — 84439 ASSAY OF FREE THYROXINE: CPT

## 2021-12-11 PROCEDURE — 82306 VITAMIN D 25 HYDROXY: CPT

## 2021-12-11 PROCEDURE — 80053 COMPREHEN METABOLIC PANEL: CPT | Performed by: FAMILY MEDICINE

## 2021-12-11 PROCEDURE — 83036 HEMOGLOBIN GLYCOSYLATED A1C: CPT

## 2021-12-11 PROCEDURE — 80061 LIPID PANEL: CPT | Performed by: FAMILY MEDICINE

## 2021-12-11 PROCEDURE — 84443 ASSAY THYROID STIM HORMONE: CPT

## 2021-12-11 PROCEDURE — 85025 COMPLETE CBC W/AUTO DIFF WBC: CPT | Performed by: FAMILY MEDICINE

## 2021-12-15 RX ORDER — METAXALONE 800 MG/1
TABLET ORAL
COMMUNITY
Start: 2020-09-01

## 2021-12-23 NOTE — TELEPHONE ENCOUNTER
FMLA forms completed, signed and faxed to [facility]. Confirmation received. Copy sent to scan.

## 2021-12-27 ENCOUNTER — PATIENT MESSAGE (OUTPATIENT)
Dept: PAIN CLINIC | Facility: CLINIC | Age: 56
End: 2021-12-27

## 2021-12-28 NOTE — TELEPHONE ENCOUNTER
From: Cee Thomas  To: Helen Tang MD  Sent: 12/27/2021 4:38 PM CST  Subject: FMLA paperwork    Did you receive the fax I sent last week or the week prior of the [facility] paperwork (resent)? I am just confirming you received it.  Also, my company is wa

## 2021-12-28 NOTE — TELEPHONE ENCOUNTER
See ALT TE- Complete form 10/14/2021     José Miguel MCCOY    2:30 PM  Note  FMLA forms completed, signed and faxed to [facility]. Confirmation received.      Copy sent to scan. [employer] message sent to patient informing above.

## 2021-12-30 DIAGNOSIS — M79.604 RIGHT LEG PAIN: ICD-10-CM

## 2021-12-30 DIAGNOSIS — G89.29 CHRONIC RIGHT-SIDED LOW BACK PAIN WITHOUT SCIATICA: ICD-10-CM

## 2021-12-30 DIAGNOSIS — M54.50 CHRONIC RIGHT-SIDED LOW BACK PAIN WITHOUT SCIATICA: ICD-10-CM

## 2021-12-30 RX ORDER — GABAPENTIN 300 MG/1
CAPSULE ORAL
Qty: 270 CAPSULE | Refills: 0 | Status: SHIPPED | OUTPATIENT
Start: 2021-12-30 | End: 2022-01-27

## 2021-12-30 NOTE — TELEPHONE ENCOUNTER
Medication: gabapentin 300 MG Oral Cap    Date of last refill: 12/1/21  [place] 68/3/75    Last office visit: 12/2/21  Due back to clinic per last office note:  n/a  Date next office visit scheduled: none    Last URINE S

## 2022-01-03 DIAGNOSIS — M54.50 CHRONIC RIGHT-SIDED LOW BACK PAIN WITHOUT SCIATICA: ICD-10-CM

## 2022-01-03 DIAGNOSIS — M79.604 RIGHT LEG PAIN: ICD-10-CM

## 2022-01-03 DIAGNOSIS — G89.29 CHRONIC RIGHT-SIDED LOW BACK PAIN WITHOUT SCIATICA: ICD-10-CM

## 2022-01-03 RX ORDER — ACETAMINOPHEN AND CODEINE PHOSPHATE 60; 300 MG/1; MG/1
1 TABLET ORAL EVERY 8 HOURS PRN
Qty: 90 TABLET | Refills: 0 | Status: CANCELLED | OUTPATIENT
Start: 2022-01-03 | End: 2022-02-02

## 2022-01-04 RX ORDER — ACETAMINOPHEN AND CODEINE PHOSPHATE 60; 300 MG/1; MG/1
1 TABLET ORAL EVERY 8 HOURS PRN
Qty: 90 TABLET | Refills: 0 | Status: SHIPPED | OUTPATIENT
Start: 2022-01-05 | End: 2022-02-01

## 2022-01-04 NOTE — TELEPHONE ENCOUNTER
Medication: Acetaminophen-Codeine #4 300-60 MG Oral Tab    Date of last refill: 12/06/21  [place] 37/32/86    Last office visit: 12/02/21  Due back to clinic per last office note:  na  Date next office visit scheduled:

## 2022-01-16 DIAGNOSIS — I10 ESSENTIAL HYPERTENSION: ICD-10-CM

## 2022-01-18 ENCOUNTER — PATIENT MESSAGE (OUTPATIENT)
Dept: FAMILY MEDICINE CLINIC | Facility: CLINIC | Age: 57
End: 2022-01-18

## 2022-01-18 RX ORDER — LOSARTAN POTASSIUM 100 MG/1
TABLET ORAL
Qty: 90 TABLET | Refills: 1 | Status: SHIPPED | OUTPATIENT
Start: 2022-01-18

## 2022-01-27 DIAGNOSIS — M54.50 CHRONIC RIGHT-SIDED LOW BACK PAIN WITHOUT SCIATICA: ICD-10-CM

## 2022-01-27 DIAGNOSIS — G89.29 CHRONIC RIGHT-SIDED LOW BACK PAIN WITHOUT SCIATICA: ICD-10-CM

## 2022-01-27 DIAGNOSIS — M79.604 RIGHT LEG PAIN: ICD-10-CM

## 2022-01-27 RX ORDER — GABAPENTIN 300 MG/1
CAPSULE ORAL
Qty: 270 CAPSULE | Refills: 0 | Status: SHIPPED | OUTPATIENT
Start: 2022-01-27

## 2022-01-27 NOTE — TELEPHONE ENCOUNTER
Medication: GABAPENTIN 300 MG Oral Cap    Date of last refill: 12/30/21      Last office visit: 12/02/21  Due back to clinic per last office note:  na  Date next office visit scheduled:  03/01/22        Last OV note recommendation:   Assessment:  Lumbar ra

## 2022-02-01 RX ORDER — ACETAMINOPHEN AND CODEINE PHOSPHATE 60; 300 MG/1; MG/1
1 TABLET ORAL EVERY 8 HOURS PRN
Qty: 90 TABLET | Refills: 0 | Status: SHIPPED | OUTPATIENT
Start: 2022-02-03 | End: 2022-03-01

## 2022-02-01 NOTE — TELEPHONE ENCOUNTER
Medication: Acetaminophen-Codeine #4 300-60 MG Oral Tab    Date of last refill: 1/4/22  [place] 6/3/70    Last office visit: 12/2/21  Due back to clinic per last office note:  unspecified  Date next office visit scheduled:  3/1/22    Last URINE Screen: 6/1/21  Screen Results:  See in chart      Narcotic Contract EXPIRATION date: 6/1/22    Last OV note recommendation:  Plan: I had long discussion with the patient about her management. The patient is not a surgical candidate according to the neurosurgery department. I have discussed with her about doing a spinal cord stimulator trial and possible implantation which could help her with the pain. For the time being I will take over her Tylenol No. 4 1 tablet every 8 as needed. I will continue her gabapentin 900 mg p.o. 3 times daily and Skelaxin 800 mg p.o. 3 times daily. The patient signed a narcotic contract with us. I have discussed the narcotic contract in details with the patient. I also recommended the patient to be seen by a psychologist for biofeedback. 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  Thank you very much for referring the patient to me.

## 2022-02-14 ENCOUNTER — TELEPHONE (OUTPATIENT)
Dept: FAMILY MEDICINE CLINIC | Facility: CLINIC | Age: 57
End: 2022-02-14

## 2022-02-14 NOTE — TELEPHONE ENCOUNTER
Pt said she's returning a call for results.   She will not be available for return call and asking to send message through [facility]

## 2022-03-01 ENCOUNTER — LAB ENCOUNTER (OUTPATIENT)
Dept: LAB | Age: 57
End: 2022-03-01
Attending: FAMILY MEDICINE
Payer: COMMERCIAL

## 2022-03-01 ENCOUNTER — OFFICE VISIT (OUTPATIENT)
Dept: FAMILY MEDICINE CLINIC | Facility: CLINIC | Age: 57
End: 2022-03-01
Payer: COMMERCIAL

## 2022-03-01 ENCOUNTER — OFFICE VISIT (OUTPATIENT)
Dept: PAIN CLINIC | Facility: CLINIC | Age: 57
End: 2022-03-01
Payer: COMMERCIAL

## 2022-03-01 VITALS
RESPIRATION RATE: 16 BRPM | SYSTOLIC BLOOD PRESSURE: 128 MMHG | HEIGHT: 66 IN | HEART RATE: 56 BPM | WEIGHT: 205 LBS | BODY MASS INDEX: 32.95 KG/M2 | DIASTOLIC BLOOD PRESSURE: 84 MMHG | OXYGEN SATURATION: 98 %

## 2022-03-01 VITALS — OXYGEN SATURATION: 99 % | HEART RATE: 80 BPM | DIASTOLIC BLOOD PRESSURE: 80 MMHG | SYSTOLIC BLOOD PRESSURE: 110 MMHG

## 2022-03-01 DIAGNOSIS — M54.50 CHRONIC RIGHT-SIDED LOW BACK PAIN WITHOUT SCIATICA: ICD-10-CM

## 2022-03-01 DIAGNOSIS — Z12.11 COLON CANCER SCREENING: ICD-10-CM

## 2022-03-01 DIAGNOSIS — D18.09 HEMANGIOMA OF VERTEBRAL BODY: Primary | ICD-10-CM

## 2022-03-01 DIAGNOSIS — Z23 NEED FOR TDAP VACCINATION: ICD-10-CM

## 2022-03-01 DIAGNOSIS — F11.90 CHRONIC, CONTINUOUS USE OF OPIOIDS: ICD-10-CM

## 2022-03-01 DIAGNOSIS — M79.604 RIGHT LEG PAIN: ICD-10-CM

## 2022-03-01 DIAGNOSIS — Z00.00 ANNUAL PHYSICAL EXAM: Primary | ICD-10-CM

## 2022-03-01 DIAGNOSIS — G89.29 CHRONIC RIGHT-SIDED LOW BACK PAIN WITHOUT SCIATICA: ICD-10-CM

## 2022-03-01 PROCEDURE — 3079F DIAST BP 80-89 MM HG: CPT | Performed by: FAMILY MEDICINE

## 2022-03-01 PROCEDURE — 3074F SYST BP LT 130 MM HG: CPT | Performed by: NURSE PRACTITIONER

## 2022-03-01 PROCEDURE — 3079F DIAST BP 80-89 MM HG: CPT | Performed by: NURSE PRACTITIONER

## 2022-03-01 PROCEDURE — 99213 OFFICE O/P EST LOW 20 MIN: CPT | Performed by: NURSE PRACTITIONER

## 2022-03-01 PROCEDURE — 90471 IMMUNIZATION ADMIN: CPT | Performed by: FAMILY MEDICINE

## 2022-03-01 PROCEDURE — 80048 BASIC METABOLIC PNL TOTAL CA: CPT | Performed by: FAMILY MEDICINE

## 2022-03-01 PROCEDURE — 3074F SYST BP LT 130 MM HG: CPT | Performed by: FAMILY MEDICINE

## 2022-03-01 PROCEDURE — 90715 TDAP VACCINE 7 YRS/> IM: CPT | Performed by: FAMILY MEDICINE

## 2022-03-01 PROCEDURE — 3008F BODY MASS INDEX DOCD: CPT | Performed by: FAMILY MEDICINE

## 2022-03-01 PROCEDURE — 99396 PREV VISIT EST AGE 40-64: CPT | Performed by: FAMILY MEDICINE

## 2022-03-01 RX ORDER — METAXALONE 800 MG/1
800 TABLET ORAL 3 TIMES DAILY
Qty: 90 TABLET | Refills: 0 | Status: SHIPPED | OUTPATIENT
Start: 2022-03-01 | End: 2022-03-31

## 2022-03-01 RX ORDER — GABAPENTIN 300 MG/1
900 CAPSULE ORAL 3 TIMES DAILY
Qty: 270 CAPSULE | Refills: 0 | Status: SHIPPED | OUTPATIENT
Start: 2022-03-01 | End: 2022-03-28

## 2022-03-01 RX ORDER — ACETAMINOPHEN AND CODEINE PHOSPHATE 60; 300 MG/1; MG/1
1 TABLET ORAL EVERY 8 HOURS PRN
Qty: 90 TABLET | Refills: 0 | Status: SHIPPED | OUTPATIENT
Start: 2022-03-01 | End: 2022-03-31

## 2022-03-01 NOTE — PROGRESS NOTES
Patient presents in office today with reported pain in upper and low back  Current pain level reported = 6/10    Last reported dose of tylenol #3 last night       Narcotic Contract renewal 06/01/21    Urine Drug screen 06/01/21

## 2022-03-03 RX ORDER — GABAPENTIN 300 MG/1
CAPSULE ORAL
Qty: 270 CAPSULE | Refills: 0 | OUTPATIENT
Start: 2022-03-03

## 2022-03-28 RX ORDER — GABAPENTIN 300 MG/1
CAPSULE ORAL
Qty: 270 CAPSULE | Refills: 0 | Status: SHIPPED | OUTPATIENT
Start: 2022-03-28

## 2022-03-28 NOTE — TELEPHONE ENCOUNTER
Medication: gabapentin 300 MG Oral Cap    Date of last refill: 03/01/22      Last office visit: 03/01/22  Due back to clinic per last office note:  3 months  Date next office visit scheduled:  none    Last OV note recommendation: Diagnosis:    Hemangioma of vertebral body  (primary encounter diagnosis)  Chronic, continuous use of opioids  Impression:   Patient is a 79-year-old with a hemangioma occupying the L2 vertebrae. Patient has chronic upper lumbar pain and bilateral intermittent groin pain. She is stable on her medication management she is using Tylenol No. 4 3 times daily as needed for pain gabapentin 300 mg 3 times daily as needed for pain and metaxalone 800 mg: Reporting taking a half tab in the morning half tab at lunch and 1.5 tabs later in the evening when she gets home from work. She denies any side effects of the medications. She reports that they are improving her quality of life activities of daily living. 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 She is pending a visit with [place] to determine if there is any surgical intervention for this vertebral body so she can taper and wean off these medications. She does need refills today on all of her medications also given to her and she will follow-up in 3 months  Plan:   > Refill Tylenol No. 4 3 times daily as needed refill gabapentin 300 mg : 3 caps 3 times daily refill metaxalone 800 mg  > Follow-up in 3 months  No orders of the defined types were placed in this encounter.

## 2022-04-05 ENCOUNTER — PATIENT MESSAGE (OUTPATIENT)
Dept: PAIN CLINIC | Facility: CLINIC | Age: 57
End: 2022-04-05

## 2022-04-06 RX ORDER — ACETAMINOPHEN AND CODEINE PHOSPHATE 60; 300 MG/1; MG/1
TABLET ORAL
Qty: 90 TABLET | Refills: 0 | Status: SHIPPED | OUTPATIENT
Start: 2022-04-06

## 2022-04-06 RX ORDER — METAXALONE 800 MG/1
TABLET ORAL
Qty: 90 TABLET | Refills: 0 | Status: SHIPPED | OUTPATIENT
Start: 2022-04-06

## 2022-04-06 NOTE — TELEPHONE ENCOUNTER
Medication: Metaxalone 800 MG Oral Tab     Date of last refill: 03/01/22      Last office visit: 03/01/22   Due back to clinic per last office note:  3 months  Date next office visit scheduled:  none        Last OV note recommendation: Diagnosis:    Hemangioma of vertebral body  (primary encounter diagnosis)  Chronic, continuous use of opioids  Impression:   Patient is a 70-year-old with a hemangioma occupying the L2 vertebrae. Patient has chronic upper lumbar pain and bilateral intermittent groin pain. She is stable on her medication management she is using Tylenol No. 4 3 times daily as needed for pain gabapentin 300 mg 3 times daily as needed for pain and metaxalone 800 mg: Reporting taking a half tab in the morning half tab at lunch and 1.5 tabs later in the evening when she gets home from work. She denies any side effects of the medications. She reports that they are improving her quality of life activities of daily living. 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 She does need refills today on all of her medications also given to her and she will follow-up in 3 months  Plan:   > Refill Tylenol No. 4 3 times daily as needed refill gabapentin 300 mg : 3 caps 3 times daily refill metaxalone 800 mg  > Follow-up in 3 months  No orders of the defined types were placed in this encounter.

## 2022-04-06 NOTE — TELEPHONE ENCOUNTER
Medication: Acetaminophen-Codeine #4 300-60 MG Oral Tab     Date of last refill: 3/5/22  [place] 7/8/57    Last office visit: 3/1/22  Due back to clinic per last office note:  3 months  Date next office visit scheduled:  none    Last URINE Screen: 6/1/21  Screen Results:  See in chart      Narcotic Contract EXPIRATION date: 6/1/22    Last OV note recommendation:     > Refill Tylenol No. 4 3 times daily as needed refill gabapentin 300 mg : 3 caps 3 times daily refill metaxalone 800 mg  > Follow-up in 3 months

## 2022-05-02 RX ORDER — METOPROLOL TARTRATE 100 MG/1
TABLET ORAL
Qty: 180 TABLET | Refills: 2 | Status: SHIPPED | OUTPATIENT
Start: 2022-05-02

## 2022-05-03 RX ORDER — ACETAMINOPHEN AND CODEINE PHOSPHATE 60; 300 MG/1; MG/1
1 TABLET ORAL EVERY 8 HOURS
Qty: 90 TABLET | Refills: 0 | Status: SHIPPED | OUTPATIENT
Start: 2022-05-06 | End: 2022-06-05

## 2022-05-03 RX ORDER — GABAPENTIN 300 MG/1
CAPSULE ORAL
Qty: 270 CAPSULE | Refills: 0 | Status: SHIPPED | OUTPATIENT
Start: 2022-05-03

## 2022-05-03 RX ORDER — METAXALONE 800 MG/1
800 TABLET ORAL 3 TIMES DAILY PRN
Qty: 90 TABLET | Refills: 0 | Status: SHIPPED | OUTPATIENT
Start: 2022-05-06 | End: 2022-06-05

## 2022-05-03 NOTE — TELEPHONE ENCOUNTER
Medication: GABAPENTIN 300 MG Oral Cap    Date of last refill: 3/28/22    Last office visit: 3/1/22  Due back to clinic per last office note:  3 months  Date next office visit scheduled:  none      Last OV note recommendation:   Plan:   > Refill Tylenol No. 4 3 times daily as needed refill gabapentin 300 mg : 3 caps 3 times daily refill metaxalone 800 mg  > Follow-up in 3 months

## 2022-05-03 NOTE — TELEPHONE ENCOUNTER
Medication: ACETAMINOPHEN-CODEINE #4 300-60 MG Oral Tab    Date of last refill: 4/6/22  [place]    Last URINE Screen: 6/1/22  Screen Results:  See in chart      Narcotic Contract EXPIRATION date: 6/1/22      Medication: METAXALONE 800 MG Oral Tab    Date of last refill: 4/6/22    Last office visit: 3/1/22  Due back to clinic per last office note:  3 months  Date next office visit scheduled:  none      Last OV note recommendation:   Plan:   > Refill Tylenol No. 4 3 times daily as needed refill gabapentin 300 mg : 3 caps 3 times daily refill metaxalone 800 mg  > Follow-up in 3 months

## 2022-05-20 ENCOUNTER — TELEMEDICINE (OUTPATIENT)
Dept: FAMILY MEDICINE CLINIC | Facility: CLINIC | Age: 57
End: 2022-05-20

## 2022-05-20 DIAGNOSIS — N30.01 ACUTE CYSTITIS WITH HEMATURIA: Primary | ICD-10-CM

## 2022-05-20 PROBLEM — M79.7 FIBROMYALGIA: Status: ACTIVE | Noted: 2022-05-20

## 2022-05-20 PROBLEM — IMO0002 CHRONIC MIGRAINE: Status: ACTIVE | Noted: 2022-05-20

## 2022-05-20 PROCEDURE — 99213 OFFICE O/P EST LOW 20 MIN: CPT | Performed by: FAMILY MEDICINE

## 2022-05-20 RX ORDER — SULFAMETHOXAZOLE AND TRIMETHOPRIM 800; 160 MG/1; MG/1
1 TABLET ORAL 2 TIMES DAILY
Qty: 10 TABLET | Refills: 1 | Status: SHIPPED | OUTPATIENT
Start: 2022-05-20

## 2022-05-20 RX ORDER — SOLIFENACIN SUCCINATE 5 MG/1
5 TABLET, FILM COATED ORAL DAILY
Qty: 30 TABLET | Refills: 1 | Status: SHIPPED | OUTPATIENT
Start: 2022-05-20

## 2022-06-03 ENCOUNTER — PATIENT MESSAGE (OUTPATIENT)
Dept: PAIN CLINIC | Facility: CLINIC | Age: 57
End: 2022-06-03

## 2022-06-03 DIAGNOSIS — G89.29 CHRONIC RIGHT-SIDED LOW BACK PAIN WITHOUT SCIATICA: ICD-10-CM

## 2022-06-03 DIAGNOSIS — M54.50 CHRONIC RIGHT-SIDED LOW BACK PAIN WITHOUT SCIATICA: ICD-10-CM

## 2022-06-03 DIAGNOSIS — M79.604 RIGHT LEG PAIN: ICD-10-CM

## 2022-06-03 RX ORDER — GABAPENTIN 300 MG/1
900 CAPSULE ORAL 3 TIMES DAILY
Qty: 270 CAPSULE | Refills: 0 | Status: SHIPPED | OUTPATIENT
Start: 2022-06-03

## 2022-06-03 RX ORDER — ACETAMINOPHEN AND CODEINE PHOSPHATE 60; 300 MG/1; MG/1
1 TABLET ORAL EVERY 8 HOURS
Qty: 90 TABLET | Refills: 0 | Status: CANCELLED | OUTPATIENT
Start: 2022-06-04 | End: 2022-07-04

## 2022-06-03 NOTE — TELEPHONE ENCOUNTER
Called pt to schedule med review, no answer and voicemail box is full. Sent pt a [facility] message instructing her to call us to schedule.

## 2022-06-03 NOTE — TELEPHONE ENCOUNTER
Medication: GABAPENTIN 300 MG Oral Cap    Date of last refill: 5/3/22      Medication: Acetaminophen-Codeine #4 300-60 MG Oral Tab    Date of last refill: 5/6/22  [place]    Last office visit: 3/1/22  Due back to clinic per last office note:  3 months  Date next office visit scheduled:  none    Last URINE Screen: 6/1/21  Screen Results:  See in chart     Narcotic Contract EXPIRATION date: 6/1/22    Last OV note recommendation:   Plan:   > Refill Tylenol No. 4 3 times daily as needed refill gabapentin 300 mg : 3 caps 3 times daily refill metaxalone 800 mg  > Follow-up in 3 months

## 2022-06-03 NOTE — TELEPHONE ENCOUNTER
She was told to follow up in 3 months, and it has been over 3 months now. Have her schedule an appointment, and when she does, I will release this.

## 2022-06-06 RX ORDER — ACETAMINOPHEN AND CODEINE PHOSPHATE 60; 300 MG/1; MG/1
1 TABLET ORAL EVERY 8 HOURS
Qty: 90 TABLET | Refills: 0 | Status: SHIPPED | OUTPATIENT
Start: 2022-06-06 | End: 2022-07-06

## 2022-06-12 DIAGNOSIS — N30.01 ACUTE CYSTITIS WITH HEMATURIA: ICD-10-CM

## 2022-06-13 RX ORDER — SOLIFENACIN SUCCINATE 5 MG/1
5 TABLET, FILM COATED ORAL DAILY
Qty: 30 TABLET | Refills: 1 | OUTPATIENT
Start: 2022-06-13

## 2022-06-29 DIAGNOSIS — M54.50 CHRONIC RIGHT-SIDED LOW BACK PAIN WITHOUT SCIATICA: ICD-10-CM

## 2022-06-29 DIAGNOSIS — M79.604 RIGHT LEG PAIN: ICD-10-CM

## 2022-06-29 DIAGNOSIS — G89.29 CHRONIC RIGHT-SIDED LOW BACK PAIN WITHOUT SCIATICA: ICD-10-CM

## 2022-06-29 RX ORDER — GABAPENTIN 300 MG/1
CAPSULE ORAL
Qty: 270 CAPSULE | Refills: 0 | Status: SHIPPED | OUTPATIENT
Start: 2022-06-29

## 2022-06-29 NOTE — TELEPHONE ENCOUNTER
Medication: gabapentin 300 MG Oral Cap    Date of last refill: 06/03/22      Last office visit: 03/01/22  Due back to clinic per last office note:  3 months  Date next office visit scheduled:  07/12/22      Last OV note recommendation:   Medical Decision Making:   Diagnosis:    Hemangioma of vertebral body  (primary encounter diagnosis)  Chronic, continuous use of opioids  Impression:   Patient is a 53-year-old with a hemangioma occupying the L2 vertebrae. Patient has chronic upper lumbar pain and bilateral intermittent groin pain. She is stable on her medication management she is using Tylenol No. 4 3 times daily as needed for pain gabapentin 300 mg 3 times daily as needed for pain and metaxalone 800 mg: Reporting taking a half tab in the morning half tab at lunch and 1.5 tabs later in the evening when she gets home from work. She denies any side effects of the medications. She reports that they are improving her quality of life activities of daily living. 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 She is pending a visit with [facility] to determine if there is any surgical intervention for this vertebral body so she can taper and wean off these medications. She does need refills today on all of her medications also given to her and she will follow-up in 3 months  Plan:   > Refill Tylenol No. 4 3 times daily as needed refill gabapentin 300 mg : 3 caps 3 times daily refill metaxalone 800 mg  > Follow-up in 3 months  No orders of the defined types were placed in this encounter.

## 2022-07-06 ENCOUNTER — PATIENT MESSAGE (OUTPATIENT)
Dept: PAIN CLINIC | Facility: CLINIC | Age: 57
End: 2022-07-06

## 2022-07-06 DIAGNOSIS — M54.50 CHRONIC RIGHT-SIDED LOW BACK PAIN WITHOUT SCIATICA: ICD-10-CM

## 2022-07-06 DIAGNOSIS — G89.29 CHRONIC RIGHT-SIDED LOW BACK PAIN WITHOUT SCIATICA: ICD-10-CM

## 2022-07-06 DIAGNOSIS — M79.604 RIGHT LEG PAIN: ICD-10-CM

## 2022-07-06 NOTE — TELEPHONE ENCOUNTER
From: Ryann Crawford  To: Chaparro Woodward MD  Sent: 7/6/2022 2:48 PM CDT  Subject: Skelaxin RF    I also need genetic Skelaxin refill. I take TID. I have f/u appt with  on Tuesday.

## 2022-07-07 RX ORDER — ACETAMINOPHEN AND CODEINE PHOSPHATE 60; 300 MG/1; MG/1
1 TABLET ORAL EVERY 8 HOURS
Qty: 90 TABLET | Refills: 0 | Status: SHIPPED | OUTPATIENT
Start: 2022-07-07 | End: 2022-08-06

## 2022-07-07 RX ORDER — METAXALONE 800 MG/1
800 TABLET ORAL 3 TIMES DAILY PRN
Qty: 90 TABLET | Refills: 0 | Status: SHIPPED | OUTPATIENT
Start: 2022-07-07 | End: 2022-08-06

## 2022-07-07 RX ORDER — GABAPENTIN 300 MG/1
900 CAPSULE ORAL 3 TIMES DAILY
Qty: 270 CAPSULE | Refills: 0 | Status: SHIPPED | OUTPATIENT
Start: 2022-07-07

## 2022-07-07 NOTE — TELEPHONE ENCOUNTER
Medication: Metaxalone 800 MG Oral Tab     Date of last refill: 5/6/22    Last office visit: 3/1/22  Due back to clinic per last office note:  3 months  Date next office visit scheduled:  7/12/22    Last OV note recommendation:     Plan:   > Refill Tylenol No. 4 3 times daily as needed refill gabapentin 300 mg : 3 caps 3 times daily refill metaxalone 800 mg  > Follow-up in 3 months

## 2022-07-07 NOTE — TELEPHONE ENCOUNTER
I will refill her medication, as she has an appointment scheduled, though she needs to keep that appointment.

## 2022-07-07 NOTE — TELEPHONE ENCOUNTER
Medication: GABAPENTIN 300 MG Oral Cap    Date of last refill: 6/29/22      Medication: Acetaminophen-Codeine #4 300-60 MG Oral Tab     Date of last refill: 6/6/22  [place] 8[place]    Last office visit: 3/1/22  Due back to clinic per last office note:  3 months  Date next office visit scheduled:  7/12/22    Last URINE Screen: 6/1/21  Screen Results:  See in chart      Narcotic Contract EXPIRATION date: 6/15/22    Last OV note recommendation:     Plan:   > Refill Tylenol No. 4 3 times daily as needed refill gabapentin 300 mg : 3 caps 3 times daily refill metaxalone 800 mg  > Follow-up in 3 months

## 2022-07-12 ENCOUNTER — OFFICE VISIT (OUTPATIENT)
Dept: PAIN CLINIC | Facility: CLINIC | Age: 57
End: 2022-07-12
Payer: COMMERCIAL

## 2022-07-12 VITALS
HEART RATE: 75 BPM | WEIGHT: 200 LBS | DIASTOLIC BLOOD PRESSURE: 82 MMHG | SYSTOLIC BLOOD PRESSURE: 126 MMHG | BODY MASS INDEX: 32.14 KG/M2 | HEIGHT: 66 IN | RESPIRATION RATE: 16 BRPM | OXYGEN SATURATION: 98 %

## 2022-07-12 DIAGNOSIS — M54.16 LUMBAR RADICULITIS: Primary | ICD-10-CM

## 2022-07-12 PROCEDURE — 3074F SYST BP LT 130 MM HG: CPT | Performed by: ANESTHESIOLOGY

## 2022-07-12 PROCEDURE — 99214 OFFICE O/P EST MOD 30 MIN: CPT | Performed by: ANESTHESIOLOGY

## 2022-07-12 PROCEDURE — 3079F DIAST BP 80-89 MM HG: CPT | Performed by: ANESTHESIOLOGY

## 2022-07-12 PROCEDURE — 3008F BODY MASS INDEX DOCD: CPT | Performed by: ANESTHESIOLOGY

## 2022-07-12 NOTE — PROGRESS NOTES
Patient presents in office today with reported pain in back     Current pain level reported = 4/10    Last reported dose of tylenol #4, metaxalone 800 mg took them this morning      Narcotic Contract renewal N/a    Urine Drug screen N/a

## 2022-07-16 DIAGNOSIS — I10 ESSENTIAL HYPERTENSION: ICD-10-CM

## 2022-07-18 RX ORDER — LOSARTAN POTASSIUM 100 MG/1
TABLET ORAL
Qty: 90 TABLET | Refills: 1 | Status: SHIPPED | OUTPATIENT
Start: 2022-07-18

## 2022-07-25 DIAGNOSIS — L30.9 ECZEMA, UNSPECIFIED TYPE: ICD-10-CM

## 2022-07-25 DIAGNOSIS — G89.29 CHRONIC RIGHT-SIDED LOW BACK PAIN WITHOUT SCIATICA: ICD-10-CM

## 2022-07-25 DIAGNOSIS — M79.604 RIGHT LEG PAIN: ICD-10-CM

## 2022-07-25 DIAGNOSIS — N30.01 ACUTE CYSTITIS WITH HEMATURIA: ICD-10-CM

## 2022-07-25 DIAGNOSIS — M54.50 CHRONIC RIGHT-SIDED LOW BACK PAIN WITHOUT SCIATICA: ICD-10-CM

## 2022-07-25 NOTE — TELEPHONE ENCOUNTER
Last ov 3/1/2022    Last refill solifenacin 5/20/2022    Last refill triamcinolone 10/7/2021    Last refill Zofran and rizatriptan 4/27/2021

## 2022-07-26 RX ORDER — TRIAMCINOLONE ACETONIDE 1 MG/G
CREAM TOPICAL 2 TIMES DAILY
Qty: 80 G | Refills: 1 | Status: SHIPPED | OUTPATIENT
Start: 2022-07-26

## 2022-07-26 RX ORDER — RIZATRIPTAN BENZOATE 10 MG/1
10 TABLET, ORALLY DISINTEGRATING ORAL AS NEEDED
Qty: 90 TABLET | Refills: 1 | Status: SHIPPED | OUTPATIENT
Start: 2022-07-26

## 2022-07-26 RX ORDER — ONDANSETRON 4 MG/1
4 TABLET, FILM COATED ORAL EVERY 8 HOURS PRN
Qty: 30 TABLET | Refills: 2 | Status: SHIPPED | OUTPATIENT
Start: 2022-07-26

## 2022-07-26 RX ORDER — SOLIFENACIN SUCCINATE 5 MG/1
5 TABLET, FILM COATED ORAL DAILY
Qty: 30 TABLET | Refills: 1 | Status: SHIPPED | OUTPATIENT
Start: 2022-07-26

## 2022-08-02 DIAGNOSIS — M54.50 CHRONIC RIGHT-SIDED LOW BACK PAIN WITHOUT SCIATICA: ICD-10-CM

## 2022-08-02 DIAGNOSIS — G89.29 CHRONIC RIGHT-SIDED LOW BACK PAIN WITHOUT SCIATICA: ICD-10-CM

## 2022-08-02 DIAGNOSIS — M79.604 RIGHT LEG PAIN: ICD-10-CM

## 2022-08-02 RX ORDER — ACETAMINOPHEN AND CODEINE PHOSPHATE 60; 300 MG/1; MG/1
1 TABLET ORAL EVERY 8 HOURS
Qty: 90 TABLET | Refills: 0 | Status: CANCELLED | OUTPATIENT
Start: 2022-08-02 | End: 2022-09-01

## 2022-08-03 RX ORDER — ACETAMINOPHEN AND CODEINE PHOSPHATE 60; 300 MG/1; MG/1
1 TABLET ORAL EVERY 8 HOURS
Qty: 90 TABLET | Refills: 0 | Status: SHIPPED | OUTPATIENT
Start: 2022-08-06 | End: 2022-09-05

## 2022-08-03 NOTE — TELEPHONE ENCOUNTER
Medication: Acetaminophen-Codeine #4 300-60 MG Oral Tab    Date of last refill: 07/07/22  [place] 71[place]08    Last office visit: 07/12/22  Due back to clinic per last office note:  na  Date next office visit scheduled:  10/11/22    Last URINE Screen: 06/01/22  Screen Results:  na     Narcotic Contract EXPIRATION date: 06/01/22    Last OV note recommendation:   Note not finished

## 2022-08-04 DIAGNOSIS — G89.29 CHRONIC RIGHT-SIDED LOW BACK PAIN WITHOUT SCIATICA: ICD-10-CM

## 2022-08-04 DIAGNOSIS — M79.604 RIGHT LEG PAIN: ICD-10-CM

## 2022-08-04 DIAGNOSIS — M54.50 CHRONIC RIGHT-SIDED LOW BACK PAIN WITHOUT SCIATICA: ICD-10-CM

## 2022-08-04 RX ORDER — GABAPENTIN 300 MG/1
900 CAPSULE ORAL 3 TIMES DAILY
Qty: 270 CAPSULE | Refills: 0 | OUTPATIENT
Start: 2022-08-04

## 2022-08-04 RX ORDER — METAXALONE 800 MG/1
800 TABLET ORAL 3 TIMES DAILY PRN
Qty: 90 TABLET | Refills: 0 | Status: SHIPPED | OUTPATIENT
Start: 2022-08-04 | End: 2022-09-03

## 2022-08-04 NOTE — TELEPHONE ENCOUNTER
Medication: Metaxalone 800 MG     Date of last refill: 07/07/22      Last office visit: 07/12/22  Due back to clinic per last office note:  na  Date next office visit scheduled:  10/11/22        Last OV note recommendation: note not signed

## 2022-08-21 DIAGNOSIS — N30.01 ACUTE CYSTITIS WITH HEMATURIA: ICD-10-CM

## 2022-08-22 RX ORDER — SOLIFENACIN SUCCINATE 5 MG/1
5 TABLET, FILM COATED ORAL DAILY
Qty: 30 TABLET | Refills: 1 | Status: SHIPPED | OUTPATIENT
Start: 2022-08-22

## 2022-09-01 ENCOUNTER — PATIENT MESSAGE (OUTPATIENT)
Dept: PAIN CLINIC | Facility: CLINIC | Age: 57
End: 2022-09-01

## 2022-09-01 DIAGNOSIS — M54.50 CHRONIC RIGHT-SIDED LOW BACK PAIN WITHOUT SCIATICA: ICD-10-CM

## 2022-09-01 DIAGNOSIS — M79.604 RIGHT LEG PAIN: ICD-10-CM

## 2022-09-01 DIAGNOSIS — G89.29 CHRONIC RIGHT-SIDED LOW BACK PAIN WITHOUT SCIATICA: ICD-10-CM

## 2022-09-01 RX ORDER — GABAPENTIN 300 MG/1
CAPSULE ORAL
Qty: 270 CAPSULE | Refills: 0 | OUTPATIENT
Start: 2022-09-01

## 2022-09-01 RX ORDER — GABAPENTIN 300 MG/1
900 CAPSULE ORAL 3 TIMES DAILY
Qty: 270 CAPSULE | Refills: 0 | Status: CANCELLED | OUTPATIENT
Start: 2022-09-01

## 2022-09-02 RX ORDER — METAXALONE 800 MG/1
800 TABLET ORAL 3 TIMES DAILY PRN
Qty: 90 TABLET | Refills: 0 | Status: CANCELLED | OUTPATIENT
Start: 2022-09-02 | End: 2022-10-02

## 2022-09-02 RX ORDER — GABAPENTIN 300 MG/1
900 CAPSULE ORAL 3 TIMES DAILY
Qty: 270 CAPSULE | Refills: 0 | Status: SHIPPED | OUTPATIENT
Start: 2022-09-02

## 2022-09-02 RX ORDER — METAXALONE 800 MG/1
800 TABLET ORAL 3 TIMES DAILY PRN
Qty: 90 TABLET | Refills: 0 | Status: SHIPPED | OUTPATIENT
Start: 2022-09-02 | End: 2022-10-02

## 2022-09-02 RX ORDER — ACETAMINOPHEN AND CODEINE PHOSPHATE 60; 300 MG/1; MG/1
1 TABLET ORAL EVERY 8 HOURS
Qty: 90 TABLET | Refills: 0 | Status: SHIPPED | OUTPATIENT
Start: 2022-09-05 | End: 2022-10-05

## 2022-09-02 RX ORDER — GABAPENTIN 300 MG/1
900 CAPSULE ORAL 3 TIMES DAILY
Qty: 270 CAPSULE | Refills: 0 | Status: CANCELLED | OUTPATIENT
Start: 2022-09-02

## 2022-09-02 NOTE — TELEPHONE ENCOUNTER
Medication: gabapentin 300 MG Oral Cap    Date of last refill: 08/04/22    Last office visit: 07/12/22  Due back to clinic per last office note:  na  Date next office visit scheduled:  10/11/22    Last OV note recommendation:     Plan: I had long discussion with the patient about her management. The patient is not a surgical candidate according to the neurosurgery department. I have discussed with her about doing a spinal cord stimulator trial and possible implantation which could help her with the pain. For the time being I will take over her Tylenol No. 4 1 tablet every 8 as needed. I will continue her gabapentin 900 mg p.o. 3 times daily and Skelaxin 800 mg p.o. 3 times daily. The patient signed a narcotic contract with us. I have discussed the narcotic contract in details with the patient. I also recommended the patient to be seen by a psychologist for biofeedback. 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  Thank you very much for referring the patient to me.

## 2022-09-26 DIAGNOSIS — M79.604 RIGHT LEG PAIN: ICD-10-CM

## 2022-09-26 DIAGNOSIS — M54.50 CHRONIC RIGHT-SIDED LOW BACK PAIN WITHOUT SCIATICA: ICD-10-CM

## 2022-09-26 DIAGNOSIS — G89.29 CHRONIC RIGHT-SIDED LOW BACK PAIN WITHOUT SCIATICA: ICD-10-CM

## 2022-09-26 RX ORDER — GABAPENTIN 300 MG/1
CAPSULE ORAL
Qty: 270 CAPSULE | Refills: 0 | Status: SHIPPED | OUTPATIENT
Start: 2022-09-26

## 2022-09-26 NOTE — TELEPHONE ENCOUNTER
Medication: gabapentin 300 MG Oral Cap    Date of last refill: 9/2/22    Last office visit: 7/12/22  Due back to clinic per last office note:  n/a  Date next office visit scheduled: 10/11/22       Last OV note recommendation:   Plan: I had long discussion with the patient about her management. The patient is not a surgical candidate according to the neurosurgery department. I have discussed with her about doing a spinal cord stimulator trial and possible implantation which could help her with the pain. For the time being I will take over her Tylenol No. 4 1 tablet every 8 as needed. I will continue her gabapentin 900 mg p.o. 3 times daily and Skelaxin 800 mg p.o. 3 times daily. The patient signed a narcotic contract with us. I have discussed the narcotic contract in details with the patient. I also recommended the patient to be seen by a psychologist for biofeedback. 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  Thank you very much for referring the patient to me.

## 2022-09-30 ENCOUNTER — PATIENT MESSAGE (OUTPATIENT)
Dept: PAIN CLINIC | Facility: CLINIC | Age: 57
End: 2022-09-30

## 2022-09-30 DIAGNOSIS — M79.604 RIGHT LEG PAIN: ICD-10-CM

## 2022-09-30 DIAGNOSIS — G89.29 CHRONIC RIGHT-SIDED LOW BACK PAIN WITHOUT SCIATICA: ICD-10-CM

## 2022-09-30 DIAGNOSIS — M54.50 CHRONIC RIGHT-SIDED LOW BACK PAIN WITHOUT SCIATICA: ICD-10-CM

## 2022-10-03 RX ORDER — METAXALONE 800 MG/1
800 TABLET ORAL 3 TIMES DAILY PRN
Qty: 90 TABLET | Refills: 0 | Status: SHIPPED | OUTPATIENT
Start: 2022-10-03 | End: 2022-11-02

## 2022-10-03 RX ORDER — ACETAMINOPHEN AND CODEINE PHOSPHATE 60; 300 MG/1; MG/1
1 TABLET ORAL EVERY 8 HOURS
Qty: 90 TABLET | Refills: 0 | Status: SHIPPED | OUTPATIENT
Start: 2022-10-03 | End: 2022-11-02

## 2022-10-09 ENCOUNTER — HOSPITAL ENCOUNTER (OUTPATIENT)
Dept: MRI IMAGING | Age: 57
End: 2022-10-09
Attending: ANESTHESIOLOGY
Payer: COMMERCIAL

## 2022-10-09 ENCOUNTER — HOSPITAL ENCOUNTER (OUTPATIENT)
Dept: MRI IMAGING | Age: 57
Discharge: HOME | End: 2022-10-09
Attending: ANESTHESIOLOGY
Payer: COMMERCIAL

## 2022-10-09 DIAGNOSIS — M54.16 LUMBAR RADICULOPATHY: ICD-10-CM

## 2022-10-09 DIAGNOSIS — M19.032 PRIMARY OSTEOARTHRITIS OF LEFT WRIST: ICD-10-CM

## 2022-10-09 PROCEDURE — 72148 MRI LUMBAR SPINE W/O DYE: CPT | Performed by: ANESTHESIOLOGY

## 2022-10-09 PROCEDURE — 73218 MRI UPPER EXTREMITY W/O DYE: CPT | Performed by: ANESTHESIOLOGY

## 2022-10-11 ENCOUNTER — OFFICE VISIT (OUTPATIENT)
Dept: PAIN CLINIC | Facility: CLINIC | Age: 57
End: 2022-10-11
Payer: COMMERCIAL

## 2022-10-11 ENCOUNTER — MED REC SCAN ONLY (OUTPATIENT)
Dept: PAIN CLINIC | Facility: CLINIC | Age: 57
End: 2022-10-11

## 2022-10-11 VITALS — HEART RATE: 71 BPM | DIASTOLIC BLOOD PRESSURE: 80 MMHG | OXYGEN SATURATION: 98 % | SYSTOLIC BLOOD PRESSURE: 110 MMHG

## 2022-10-11 DIAGNOSIS — M54.16 LUMBAR RADICULITIS: Primary | ICD-10-CM

## 2022-10-11 PROCEDURE — 3079F DIAST BP 80-89 MM HG: CPT | Performed by: ANESTHESIOLOGY

## 2022-10-11 PROCEDURE — 3074F SYST BP LT 130 MM HG: CPT | Performed by: ANESTHESIOLOGY

## 2022-10-11 PROCEDURE — 99214 OFFICE O/P EST MOD 30 MIN: CPT | Performed by: ANESTHESIOLOGY

## 2022-10-11 NOTE — PROGRESS NOTES
Patient presents in office today with reported pain in low back left side radiating into left hip and left leg.  Bilateral leg numbness     Current pain level reported = 6/10    Last reported dose of tylenol #3 this am       Narcotic Contract renewal 10/11/22    Urine Drug screen 06/01/21

## 2022-10-21 DIAGNOSIS — M79.604 RIGHT LEG PAIN: ICD-10-CM

## 2022-10-21 DIAGNOSIS — G89.29 CHRONIC RIGHT-SIDED LOW BACK PAIN WITHOUT SCIATICA: ICD-10-CM

## 2022-10-21 DIAGNOSIS — M54.50 CHRONIC RIGHT-SIDED LOW BACK PAIN WITHOUT SCIATICA: ICD-10-CM

## 2022-10-22 RX ORDER — ONDANSETRON 4 MG/1
4 TABLET, FILM COATED ORAL EVERY 8 HOURS PRN
Qty: 30 TABLET | Refills: 2 | Status: SHIPPED | OUTPATIENT
Start: 2022-10-22

## 2022-10-27 DIAGNOSIS — M79.604 RIGHT LEG PAIN: ICD-10-CM

## 2022-10-27 DIAGNOSIS — M54.50 CHRONIC RIGHT-SIDED LOW BACK PAIN WITHOUT SCIATICA: ICD-10-CM

## 2022-10-27 DIAGNOSIS — G89.29 CHRONIC RIGHT-SIDED LOW BACK PAIN WITHOUT SCIATICA: ICD-10-CM

## 2022-10-27 RX ORDER — GABAPENTIN 300 MG/1
900 CAPSULE ORAL 3 TIMES DAILY
Qty: 270 CAPSULE | Refills: 0 | Status: SHIPPED | OUTPATIENT
Start: 2022-10-27

## 2022-10-31 ENCOUNTER — PATIENT MESSAGE (OUTPATIENT)
Dept: PAIN CLINIC | Facility: CLINIC | Age: 57
End: 2022-10-31

## 2022-10-31 DIAGNOSIS — G89.29 CHRONIC RIGHT-SIDED LOW BACK PAIN WITHOUT SCIATICA: ICD-10-CM

## 2022-10-31 DIAGNOSIS — M79.604 RIGHT LEG PAIN: ICD-10-CM

## 2022-10-31 DIAGNOSIS — M54.50 CHRONIC RIGHT-SIDED LOW BACK PAIN WITHOUT SCIATICA: ICD-10-CM

## 2022-10-31 RX ORDER — ACETAMINOPHEN AND CODEINE PHOSPHATE 60; 300 MG/1; MG/1
1 TABLET ORAL EVERY 8 HOURS
Qty: 90 TABLET | Refills: 0 | Status: SHIPPED | OUTPATIENT
Start: 2022-10-31 | End: 2022-11-30

## 2022-12-02 DIAGNOSIS — M54.50 CHRONIC RIGHT-SIDED LOW BACK PAIN WITHOUT SCIATICA: ICD-10-CM

## 2022-12-02 DIAGNOSIS — M79.604 RIGHT LEG PAIN: ICD-10-CM

## 2022-12-02 DIAGNOSIS — G89.29 CHRONIC RIGHT-SIDED LOW BACK PAIN WITHOUT SCIATICA: ICD-10-CM

## 2022-12-02 RX ORDER — GABAPENTIN 300 MG/1
900 CAPSULE ORAL 3 TIMES DAILY
Qty: 270 CAPSULE | Refills: 0 | Status: SHIPPED | OUTPATIENT
Start: 2022-12-02

## 2022-12-02 NOTE — TELEPHONE ENCOUNTER
Pt calling requesting refills on medications gabapentin 300 MG Oral Cap genetic Skelaxin and Tylenol with codeine   Pt states she was unable to get labs done due to being sick for the past 2 weeks.       [facility] [id number] [place] [id number] [place], [phone]

## 2022-12-02 NOTE — TELEPHONE ENCOUNTER
Medication: Gabapentin    Date of last refill: 10/27/2022  [place] 0[place]/5781    Last office visit: 10/11/2022  Due back to clinic per last office note:  n/a  Date next office visit scheduled:  1/17/2022    Last URINE Screen: 10/31/2022 ordered External no result available of chart  Screen Results:  none    Narcotic Contract EXPIRATION date: 10/11/2023    Last OV note recommendation: Plan: I had long discussion with the patient about her management. The patient is not a surgical candidate according to the neurosurgery department. I have discussed with her about doing a spinal cord stimulator trial and possible implantation which could help her with the pain. For the time being I will take over her Tylenol No. 4 1 tablet every 8 as needed. I will continue her gabapentin 900 mg p.o. 3 times daily and Skelaxin 800 mg p.o. 3 times daily. The patient signed a narcotic contract with us. I have discussed the narcotic contract in details with the patient. I also recommended the patient to be seen by a psychologist for biofeedback. 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  Thank you very much for referring the patient to me.

## 2022-12-05 ENCOUNTER — PATIENT MESSAGE (OUTPATIENT)
Dept: PAIN CLINIC | Facility: CLINIC | Age: 57
End: 2022-12-05

## 2022-12-05 ENCOUNTER — LAB ENCOUNTER (OUTPATIENT)
Dept: LAB | Age: 57
End: 2022-12-05
Attending: NURSE PRACTITIONER
Payer: COMMERCIAL

## 2022-12-05 DIAGNOSIS — M54.50 CHRONIC RIGHT-SIDED LOW BACK PAIN WITHOUT SCIATICA: ICD-10-CM

## 2022-12-05 DIAGNOSIS — M79.604 RIGHT LEG PAIN: ICD-10-CM

## 2022-12-05 DIAGNOSIS — G89.29 CHRONIC RIGHT-SIDED LOW BACK PAIN WITHOUT SCIATICA: ICD-10-CM

## 2022-12-05 PROCEDURE — 80307 DRUG TEST PRSMV CHEM ANLYZR: CPT | Performed by: NURSE PRACTITIONER

## 2022-12-05 RX ORDER — METAXALONE 800 MG/1
800 TABLET ORAL 3 TIMES DAILY PRN
Qty: 90 TABLET | Refills: 0 | Status: SHIPPED | OUTPATIENT
Start: 2022-12-05 | End: 2023-01-04

## 2022-12-05 RX ORDER — ACETAMINOPHEN AND CODEINE PHOSPHATE 60; 300 MG/1; MG/1
1 TABLET ORAL EVERY 8 HOURS
Qty: 90 TABLET | Refills: 0 | Status: SHIPPED | OUTPATIENT
Start: 2022-12-05 | End: 2023-01-04

## 2022-12-07 LAB
6-ACETYLMORHINE CUTOFF 20 NG/ML: NOT DETECTED
7-AMINOCLONAZEPAM 40 NG/ML: NOT DETECTED
A-OH-ALPRAZOLM CUTOFF 20 NG/ML: NOT DETECTED
ALPHA-OH-MIDAZOLAM (CUTOFF 20 NG/ML): NOT DETECTED
ALPRAZOLAM CUTOFF 40 NG/ML: NOT DETECTED
AMPHETAMINE CUTOFF 10 NG/ML: NOT DETECTED
BARBITURATES CUTOFF 200 NG/ML: NOT DETECTED
BENZOYLECGONINE  150 NG/ML: NOT DETECTED
BUPRENORPHINE CUTOFF 5 NG/ML: NOT DETECTED
CARISOPRODOL CUTOFF 100 NG/ML: NOT DETECTED
CODINE CUTOFF 40 NG/ML: PRESENT
COLNAZEPAM CUTOFF 20 NG/ML: NOT DETECTED
CREATININE,URINE: 205.6 MG/DL
DIAZEPAM CUTOFF 50 NG/ML: NOT DETECTED
ETHYL-GLUCURONIDE 500 NG/ML: PRESENT
FENTANYL CUTOFF 2 NG/ML: NOT DETECTED
GABAPENTIN (CUTOFF 100 NG/ML): PRESENT
HYDROCODONE CUTOFF 40 NG/ML: PRESENT
HYDROMORPHONE CUTOFF 40 NG/ML: PRESENT
LORAZEPAM CUTOFF 60 NG/ML: NOT DETECTED
MARIJUANA CUTOFF 20 NG/ML: NOT DETECTED
MDA CUTOFF 200 NG/ML: NOT DETECTED
MDEA EVE CUTOFF 200 NG/ML: NOT DETECTED
MDMA-ECSTASY CUTOFF 200 NG/ML: NOT DETECTED
MEPERIDINE MET CUTOFF 50 NG/ML: NOT DETECTED
METHADONE CUTOFF 150 NG/ML: NOT DETECTED
METHAMPHETMN CUTOFF 400 NG/ML: NOT DETECTED
METHYLPHENIDATE (CUTOFF 100 NG/ML): NOT DETECTED
MIDAZOLAM CUTOFF 20 NG/ML: NOT DETECTED
MORHINE CUTOFF 20 NG/ML: PRESENT
NALOXONE (CUTOFF 100 NG/ML): NOT DETECTED
NORBUPRENORPHINE  20 NG/ML: NOT DETECTED
NORDIAZEPAM CUTOFF 50 NG/ML: NOT DETECTED
NORFENTANYL CUTOFF 2 NG/ML: NOT DETECTED
NORHYDRCODONE CUTOFF 100 NG/ML: PRESENT
NOROXYCODONE CUTOFF 100 NG/ML: NOT DETECTED
NOROXYMORPHNE CUTOFF 100 NG/ML: NOT DETECTED
OXAZEPAM CUTOFF 50 NG/ML: NOT DETECTED
OXYCODONE CUTOFF 40 NG/ML: NOT DETECTED
OXYMORPHONE CUTOFF 40 NG/ML: NOT DETECTED
PCP CUTOFF 25 NG/ML: NOT DETECTED
PHENTERMINE CUTOFF 100 NG/ML: NOT DETECTED
PREGABALIN (CUTOFF 100 NG/ML): NOT DETECTED
TAPENTADOL CUTOFF 100 NG/ML: NOT DETECTED
TAPENTADOL-O SULF 200 NG/ML: NOT DETECTED
TEMAZEPAM CUTOFF 50 NG/ML: NOT DETECTED
TRAMADOL CUTOFF 200 NG/ML: NOT DETECTED
ZOLPIDEM CUTOFF 20 NG/ML: NOT DETECTED
ZOLPIDEM METABOLITE (CUTOFF 100 NG/ML): NOT DETECTED

## 2022-12-12 ENCOUNTER — TELEPHONE (OUTPATIENT)
Dept: PAIN CLINIC | Facility: CLINIC | Age: 57
End: 2022-12-12

## 2022-12-12 NOTE — TELEPHONE ENCOUNTER
Contacted patient to notify her that her last UDS 12/5/2022 was positive for alcohol. Patient was reminded that she cannot mix alcohol with her opioid medications due to risk for sedation and respiratory depression. Patient states she indicated understanding and will like to get again.   This was a warning for her

## 2022-12-14 ENCOUNTER — PATIENT MESSAGE (OUTPATIENT)
Dept: FAMILY MEDICINE CLINIC | Facility: CLINIC | Age: 57
End: 2022-12-14

## 2022-12-14 DIAGNOSIS — I10 ESSENTIAL HYPERTENSION: ICD-10-CM

## 2022-12-14 DIAGNOSIS — N30.01 ACUTE CYSTITIS WITH HEMATURIA: ICD-10-CM

## 2022-12-14 RX ORDER — LOSARTAN POTASSIUM 100 MG/1
TABLET ORAL
Qty: 90 TABLET | Refills: 1 | Status: SHIPPED | OUTPATIENT
Start: 2022-12-14

## 2022-12-14 RX ORDER — SOLIFENACIN SUCCINATE 5 MG/1
TABLET, FILM COATED ORAL
Qty: 90 TABLET | Refills: 0 | Status: SHIPPED | OUTPATIENT
Start: 2022-12-14

## 2022-12-14 NOTE — TELEPHONE ENCOUNTER
Patient's appointment was changed from completed to a reschedule for Friday as requested. Spoke with patient regarding the copay and the change should reverse it. If not she is to let us know.   Notes made on appointment

## 2022-12-14 NOTE — TELEPHONE ENCOUNTER
From: Todd Leavitt  To: Tomás Medley DO  Sent: 12/14/2022 1:08 PM CST  Subject: Valente Comer visit    My phone would not connect today. My co pay went through. So now what.

## 2022-12-16 ENCOUNTER — TELEMEDICINE (OUTPATIENT)
Dept: FAMILY MEDICINE CLINIC | Facility: CLINIC | Age: 57
End: 2022-12-16
Payer: COMMERCIAL

## 2022-12-16 DIAGNOSIS — I10 PRIMARY HYPERTENSION: ICD-10-CM

## 2022-12-16 DIAGNOSIS — Z12.11 COLON CANCER SCREENING: ICD-10-CM

## 2022-12-16 DIAGNOSIS — G43.011 INTRACTABLE MIGRAINE WITHOUT AURA AND WITH STATUS MIGRAINOSUS: Primary | ICD-10-CM

## 2022-12-16 PROCEDURE — 99214 OFFICE O/P EST MOD 30 MIN: CPT | Performed by: FAMILY MEDICINE

## 2022-12-16 RX ORDER — ERENUMAB-AOOE 140 MG/ML
140 INJECTION, SOLUTION SUBCUTANEOUS ONCE
Qty: 1 ML | Refills: 11 | Status: SHIPPED | OUTPATIENT
Start: 2022-12-16 | End: 2022-12-16

## 2022-12-16 RX ORDER — METOPROLOL SUCCINATE 100 MG/1
100 TABLET, EXTENDED RELEASE ORAL DAILY
Qty: 90 TABLET | Refills: 1 | Status: SHIPPED | OUTPATIENT
Start: 2022-12-16

## 2022-12-17 ENCOUNTER — HOSPITAL ENCOUNTER (OUTPATIENT)
Dept: MAMMOGRAPHY | Age: 57
Discharge: HOME | End: 2022-12-17
Attending: FAMILY MEDICINE
Payer: COMMERCIAL

## 2022-12-17 DIAGNOSIS — Z12.31 ENCOUNTER FOR SCREENING MAMMOGRAM FOR MALIGNANT NEOPLASM OF BREAST: ICD-10-CM

## 2022-12-17 PROCEDURE — 77067 SCR MAMMO BI INCL CAD: CPT | Performed by: FAMILY MEDICINE

## 2022-12-17 PROCEDURE — 77063 BREAST TOMOSYNTHESIS BI: CPT | Performed by: FAMILY MEDICINE

## 2022-12-31 DIAGNOSIS — M54.50 CHRONIC RIGHT-SIDED LOW BACK PAIN WITHOUT SCIATICA: ICD-10-CM

## 2022-12-31 DIAGNOSIS — M79.604 RIGHT LEG PAIN: ICD-10-CM

## 2022-12-31 DIAGNOSIS — G89.29 CHRONIC RIGHT-SIDED LOW BACK PAIN WITHOUT SCIATICA: ICD-10-CM

## 2023-01-03 DIAGNOSIS — M54.50 CHRONIC RIGHT-SIDED LOW BACK PAIN WITHOUT SCIATICA: ICD-10-CM

## 2023-01-03 DIAGNOSIS — G89.29 CHRONIC RIGHT-SIDED LOW BACK PAIN WITHOUT SCIATICA: ICD-10-CM

## 2023-01-03 DIAGNOSIS — M79.604 RIGHT LEG PAIN: ICD-10-CM

## 2023-01-03 RX ORDER — METAXALONE 800 MG/1
800 TABLET ORAL 3 TIMES DAILY PRN
Qty: 90 TABLET | Refills: 0 | Status: SHIPPED | OUTPATIENT
Start: 2023-01-03 | End: 2023-02-02

## 2023-01-03 RX ORDER — ACETAMINOPHEN AND CODEINE PHOSPHATE 60; 300 MG/1; MG/1
1 TABLET ORAL EVERY 8 HOURS
Qty: 90 TABLET | Refills: 0 | Status: SHIPPED | OUTPATIENT
Start: 2023-01-04 | End: 2023-02-03

## 2023-01-03 RX ORDER — GABAPENTIN 300 MG/1
CAPSULE ORAL
Qty: 270 CAPSULE | Refills: 0 | Status: SHIPPED | OUTPATIENT
Start: 2023-01-03

## 2023-01-03 RX ORDER — GABAPENTIN 300 MG/1
900 CAPSULE ORAL 3 TIMES DAILY
Qty: 270 CAPSULE | Refills: 0 | Status: SHIPPED | OUTPATIENT
Start: 2023-01-03

## 2023-01-18 PROBLEM — G89.29 OTHER CHRONIC PAIN: Status: ACTIVE | Noted: 2023-01-18

## 2023-02-02 DIAGNOSIS — G89.29 CHRONIC RIGHT-SIDED LOW BACK PAIN WITHOUT SCIATICA: ICD-10-CM

## 2023-02-02 DIAGNOSIS — M79.604 RIGHT LEG PAIN: ICD-10-CM

## 2023-02-02 DIAGNOSIS — M54.50 CHRONIC RIGHT-SIDED LOW BACK PAIN WITHOUT SCIATICA: ICD-10-CM

## 2023-02-03 RX ORDER — ACETAMINOPHEN AND CODEINE PHOSPHATE 60; 300 MG/1; MG/1
1 TABLET ORAL EVERY 8 HOURS
Qty: 90 TABLET | Refills: 0 | Status: SHIPPED | OUTPATIENT
Start: 2023-02-03 | End: 2023-03-05

## 2023-02-03 RX ORDER — METAXALONE 800 MG/1
800 TABLET ORAL 3 TIMES DAILY PRN
Qty: 90 TABLET | Refills: 0 | Status: SHIPPED | OUTPATIENT
Start: 2023-02-03 | End: 2023-03-05

## 2023-02-03 NOTE — TELEPHONE ENCOUNTER
Medication: Metaxalone 800 MG Oral Tab    Date of last refill: 01/03/23  [place] 36/91/50          Medication: Acetaminophen-Codeine #4 300-60 MG Oral Tab    Date of last refill: 01/04/23  [place] 67/53/51    Last office visit: 01/18/23  Due back to clinic per last office note:  3 months   Date next office visit scheduled:  04/25/23    Last URINE Screen: 12/05/22  Screen Results:  See results in chart      Narcotic Contract EXPIRATION date: 10/11/23    Last OV note recommendation:     Plan: can call in for medication when needed, and will see her back in 3 months.

## 2023-02-26 DIAGNOSIS — M79.604 RIGHT LEG PAIN: ICD-10-CM

## 2023-02-26 DIAGNOSIS — G89.29 CHRONIC RIGHT-SIDED LOW BACK PAIN WITHOUT SCIATICA: ICD-10-CM

## 2023-02-26 DIAGNOSIS — M54.50 CHRONIC RIGHT-SIDED LOW BACK PAIN WITHOUT SCIATICA: ICD-10-CM

## 2023-02-27 RX ORDER — GABAPENTIN 300 MG/1
CAPSULE ORAL
Qty: 270 CAPSULE | Refills: 0 | Status: SHIPPED | OUTPATIENT
Start: 2023-02-27

## 2023-02-27 NOTE — TELEPHONE ENCOUNTER
Medication: gabapentin 300 MG Oral Cap    Date of last refill: 1/3/23      Last office visit: 1/18/23  Due back to clinic per last office note:  3 months  Date next office visit scheduled:  4/25/23      Last OV note recommendation:   Impression: Patient is stable on her medication regimen, and has no adverse effects. States that she has been doing very well over the past few months, and requires no adjustments to her regimen. Treatment agreement is up-to-date, as is urine screen. Can call in when she is ready for refills of her metaxalone, Neurontin, and Tylenol No. 4 with codeine. Follow-up 3 months. Plan: can call in for medication when needed, and will see her back in 3 months.

## 2023-03-02 ENCOUNTER — PATIENT MESSAGE (OUTPATIENT)
Dept: PAIN CLINIC | Facility: CLINIC | Age: 58
End: 2023-03-02

## 2023-03-02 DIAGNOSIS — G89.29 CHRONIC RIGHT-SIDED LOW BACK PAIN WITHOUT SCIATICA: ICD-10-CM

## 2023-03-02 DIAGNOSIS — M79.604 RIGHT LEG PAIN: ICD-10-CM

## 2023-03-02 DIAGNOSIS — M54.50 CHRONIC RIGHT-SIDED LOW BACK PAIN WITHOUT SCIATICA: ICD-10-CM

## 2023-03-02 RX ORDER — GABAPENTIN 300 MG/1
900 CAPSULE ORAL 3 TIMES DAILY
Qty: 270 CAPSULE | Refills: 0 | OUTPATIENT
Start: 2023-03-02

## 2023-03-02 RX ORDER — GABAPENTIN 300 MG/1
900 CAPSULE ORAL 3 TIMES DAILY
Qty: 270 CAPSULE | Refills: 0
Start: 2023-03-02

## 2023-03-02 RX ORDER — ACETAMINOPHEN AND CODEINE PHOSPHATE 60; 300 MG/1; MG/1
1 TABLET ORAL EVERY 8 HOURS
Qty: 90 TABLET | Refills: 0 | Status: SHIPPED | OUTPATIENT
Start: 2023-03-05 | End: 2023-04-04

## 2023-03-02 RX ORDER — METAXALONE 800 MG/1
800 TABLET ORAL 3 TIMES DAILY PRN
Qty: 90 TABLET | Refills: 0 | Status: SHIPPED | OUTPATIENT
Start: 2023-03-05 | End: 2023-04-04

## 2023-03-02 NOTE — TELEPHONE ENCOUNTER
She just filled #[id number] neurontin on 2/27/23, and should have plenty remaining. As such, denied this. Refilled other 2 meds for appropriate refill dates.

## 2023-03-02 NOTE — TELEPHONE ENCOUNTER
Medication: Metaxalone 800 MG Oral Tab    Date of last refill: 02/03/23    Medication: GABAPENTIN 300 MG Oral Cap  Date of last refill: 02/27/23      Medication: Acetaminophen-Codeine #4 300-60 MG Oral Tab  Date of last refill: 02/03/23  [place] 53/04/46    Last office visit: 01/18/23  Due back to clinic per last office note:  3 months   Date next office visit scheduled:  04/25/23    Last URINE Screen: 12/05/22  Screen Results:  See results in chart     Narcotic Contract EXPIRATION date: 10/11/23    Last OV note recommendation:    Plan: can call in for medication when needed, and will see her back in 3 months.

## 2023-04-01 ENCOUNTER — LAB ENCOUNTER (OUTPATIENT)
Dept: LAB | Facility: HOSPITAL | Age: 58
End: 2023-04-01
Attending: NURSE PRACTITIONER
Payer: COMMERCIAL

## 2023-04-01 PROCEDURE — 80307 DRUG TEST PRSMV CHEM ANLYZR: CPT | Performed by: NURSE PRACTITIONER

## 2023-04-02 DIAGNOSIS — M79.604 RIGHT LEG PAIN: ICD-10-CM

## 2023-04-02 DIAGNOSIS — G89.29 CHRONIC RIGHT-SIDED LOW BACK PAIN WITHOUT SCIATICA: ICD-10-CM

## 2023-04-02 DIAGNOSIS — M54.50 CHRONIC RIGHT-SIDED LOW BACK PAIN WITHOUT SCIATICA: ICD-10-CM

## 2023-04-03 RX ORDER — METAXALONE 800 MG/1
800 TABLET ORAL 3 TIMES DAILY PRN
Qty: 90 TABLET | Refills: 0 | Status: SHIPPED | OUTPATIENT
Start: 2023-04-03 | End: 2023-05-03

## 2023-04-03 RX ORDER — ACETAMINOPHEN AND CODEINE PHOSPHATE 60; 300 MG/1; MG/1
1 TABLET ORAL EVERY 8 HOURS
Qty: 90 TABLET | Refills: 0 | Status: SHIPPED | OUTPATIENT
Start: 2023-04-03 | End: 2023-05-03

## 2023-04-03 RX ORDER — GABAPENTIN 300 MG/1
CAPSULE ORAL
Qty: 270 CAPSULE | Refills: 0 | Status: SHIPPED | OUTPATIENT
Start: 2023-04-03

## 2023-04-25 ENCOUNTER — OFFICE VISIT (OUTPATIENT)
Dept: PAIN CLINIC | Facility: CLINIC | Age: 58
End: 2023-04-25
Payer: COMMERCIAL

## 2023-04-25 VITALS
WEIGHT: 200 LBS | DIASTOLIC BLOOD PRESSURE: 92 MMHG | HEART RATE: 80 BPM | BODY MASS INDEX: 32 KG/M2 | OXYGEN SATURATION: 98 % | SYSTOLIC BLOOD PRESSURE: 148 MMHG

## 2023-04-25 DIAGNOSIS — G89.29 CHRONIC RIGHT-SIDED LOW BACK PAIN WITHOUT SCIATICA: ICD-10-CM

## 2023-04-25 DIAGNOSIS — M54.50 CHRONIC RIGHT-SIDED LOW BACK PAIN WITHOUT SCIATICA: ICD-10-CM

## 2023-04-25 DIAGNOSIS — M79.604 RIGHT LEG PAIN: ICD-10-CM

## 2023-04-25 PROCEDURE — 3077F SYST BP >= 140 MM HG: CPT | Performed by: PHYSICIAN ASSISTANT

## 2023-04-25 PROCEDURE — 3080F DIAST BP >= 90 MM HG: CPT | Performed by: PHYSICIAN ASSISTANT

## 2023-04-25 PROCEDURE — 99214 OFFICE O/P EST MOD 30 MIN: CPT | Performed by: PHYSICIAN ASSISTANT

## 2023-04-25 RX ORDER — METAXALONE 800 MG/1
800 TABLET ORAL 3 TIMES DAILY PRN
Qty: 90 TABLET | Refills: 0 | Status: SHIPPED | OUTPATIENT
Start: 2023-05-03 | End: 2023-06-02

## 2023-04-25 RX ORDER — GABAPENTIN 300 MG/1
900 CAPSULE ORAL 3 TIMES DAILY
Qty: 270 CAPSULE | Refills: 0 | Status: SHIPPED | OUTPATIENT
Start: 2023-04-25

## 2023-04-25 RX ORDER — VALACYCLOVIR HYDROCHLORIDE 1 G/1
1000 TABLET, FILM COATED ORAL 2 TIMES DAILY
COMMUNITY
Start: 2023-03-16

## 2023-04-25 RX ORDER — ALBUTEROL SULFATE 90 UG/1
2 AEROSOL, METERED RESPIRATORY (INHALATION) EVERY 6 HOURS PRN
COMMUNITY
Start: 2023-03-16

## 2023-04-25 RX ORDER — ACETAMINOPHEN AND CODEINE PHOSPHATE 60; 300 MG/1; MG/1
1 TABLET ORAL EVERY 8 HOURS
Qty: 90 TABLET | Refills: 0 | Status: SHIPPED | OUTPATIENT
Start: 2023-05-03 | End: 2023-06-02

## 2023-04-25 NOTE — PROGRESS NOTES
Patient presents in office today with reported pain in low back    Current pain level reported = 7/10    Last reported dose of T3, Metaxalone, gabapentin, naprosyn this morning      Narcotic Contract renewal 10/11/23    Urine Drug screen 04/01/23

## 2023-05-04 DIAGNOSIS — M54.50 CHRONIC RIGHT-SIDED LOW BACK PAIN WITHOUT SCIATICA: ICD-10-CM

## 2023-05-04 DIAGNOSIS — M79.604 RIGHT LEG PAIN: ICD-10-CM

## 2023-05-04 DIAGNOSIS — G89.29 CHRONIC RIGHT-SIDED LOW BACK PAIN WITHOUT SCIATICA: ICD-10-CM

## 2023-05-04 RX ORDER — GABAPENTIN 300 MG/1
900 CAPSULE ORAL 3 TIMES DAILY
Qty: 270 CAPSULE | Refills: 0 | Status: SHIPPED | OUTPATIENT
Start: 2023-05-25

## 2023-05-31 DIAGNOSIS — G89.29 CHRONIC RIGHT-SIDED LOW BACK PAIN WITHOUT SCIATICA: ICD-10-CM

## 2023-05-31 DIAGNOSIS — M54.50 CHRONIC RIGHT-SIDED LOW BACK PAIN WITHOUT SCIATICA: ICD-10-CM

## 2023-05-31 DIAGNOSIS — M79.604 RIGHT LEG PAIN: ICD-10-CM

## 2023-05-31 RX ORDER — GABAPENTIN 300 MG/1
CAPSULE ORAL
Qty: 270 CAPSULE | Refills: 0 | OUTPATIENT
Start: 2023-05-31

## 2023-05-31 NOTE — TELEPHONE ENCOUNTER
Medication: gabapentin 300 MG     Date of last refill: 05/25/23      gabapentin 300 MG Oral Cap 270 capsule 0 5/25/2023     Sig - Route:  Take 3 capsules (900 mg total) by mouth 3 (three) times daily. - Oral    Sent to pharmacy as: Gabapentin 300 MG Oral Capsule (Neurontin)    E-Prescribing Status: Receipt confirmed by pharmacy (5/4/2023 12:52 PM CDT)

## 2023-06-01 DIAGNOSIS — G89.29 CHRONIC RIGHT-SIDED LOW BACK PAIN WITHOUT SCIATICA: ICD-10-CM

## 2023-06-01 DIAGNOSIS — M54.50 CHRONIC RIGHT-SIDED LOW BACK PAIN WITHOUT SCIATICA: ICD-10-CM

## 2023-06-01 DIAGNOSIS — M79.604 RIGHT LEG PAIN: ICD-10-CM

## 2023-06-02 RX ORDER — GABAPENTIN 300 MG/1
900 CAPSULE ORAL 3 TIMES DAILY
Qty: 270 CAPSULE | Refills: 0 | Status: SHIPPED | OUTPATIENT
Start: 2023-06-02

## 2023-06-02 RX ORDER — METAXALONE 800 MG/1
800 TABLET ORAL 3 TIMES DAILY PRN
Qty: 90 TABLET | Refills: 0 | Status: SHIPPED | OUTPATIENT
Start: 2023-06-02 | End: 2023-07-02

## 2023-06-30 DIAGNOSIS — M54.50 CHRONIC RIGHT-SIDED LOW BACK PAIN WITHOUT SCIATICA: ICD-10-CM

## 2023-06-30 DIAGNOSIS — G89.29 CHRONIC RIGHT-SIDED LOW BACK PAIN WITHOUT SCIATICA: ICD-10-CM

## 2023-06-30 DIAGNOSIS — M79.604 RIGHT LEG PAIN: ICD-10-CM

## 2023-06-30 RX ORDER — GABAPENTIN 300 MG/1
900 CAPSULE ORAL 3 TIMES DAILY
Qty: 270 CAPSULE | Refills: 0 | Status: SHIPPED | OUTPATIENT
Start: 2023-06-30

## 2023-06-30 RX ORDER — ACETAMINOPHEN AND CODEINE PHOSPHATE 300; 60 MG/1; MG/1
1 TABLET ORAL EVERY 8 HOURS PRN
Qty: 90 TABLET | Refills: 0 | Status: SHIPPED | OUTPATIENT
Start: 2023-07-02 | End: 2023-08-01

## 2023-06-30 RX ORDER — METAXALONE 800 MG/1
800 TABLET ORAL 3 TIMES DAILY PRN
Qty: 90 TABLET | Refills: 0 | Status: SHIPPED | OUTPATIENT
Start: 2023-06-30 | End: 2023-07-30

## 2023-07-01 DIAGNOSIS — G89.29 CHRONIC RIGHT-SIDED LOW BACK PAIN WITHOUT SCIATICA: ICD-10-CM

## 2023-07-01 DIAGNOSIS — M54.50 CHRONIC RIGHT-SIDED LOW BACK PAIN WITHOUT SCIATICA: ICD-10-CM

## 2023-07-01 DIAGNOSIS — M79.604 RIGHT LEG PAIN: ICD-10-CM

## 2023-07-03 RX ORDER — GABAPENTIN 300 MG/1
CAPSULE ORAL
Qty: 270 CAPSULE | Refills: 0 | OUTPATIENT
Start: 2023-07-03

## 2023-07-11 DIAGNOSIS — I10 ESSENTIAL HYPERTENSION: ICD-10-CM

## 2023-07-11 RX ORDER — METOPROLOL SUCCINATE 100 MG/1
100 TABLET, EXTENDED RELEASE ORAL DAILY
Qty: 90 TABLET | Refills: 1 | Status: SHIPPED | OUTPATIENT
Start: 2023-07-11

## 2023-07-11 RX ORDER — LOSARTAN POTASSIUM 100 MG/1
100 TABLET ORAL DAILY
Qty: 90 TABLET | Refills: 1 | Status: SHIPPED | OUTPATIENT
Start: 2023-07-11

## 2023-07-11 NOTE — TELEPHONE ENCOUNTER
A refill request was received for:  Requested Prescriptions     Pending Prescriptions Disp Refills    METOPROLOL SUCCINATE  MG Oral Tablet 24 Hr [Pharmacy Med Name: METOPROLOL SUCC  MG TAB] 90 tablet 1     Sig: TAKE 1 TABLET BY MOUTH EVERY DAY    LOSARTAN 100 MG Oral Tab [Pharmacy Med Name: LOSARTAN POTASSIUM 100 MG TAB] 90 tablet 1     Sig: TAKE 1 TABLET BY MOUTH EVERY DAY       Last refill date:   12-14-22    Last office visit: 12-16-22    Follow up due:  Future Appointments   Date Time Provider Clyde Cerda   7/25/2023  8:30 AM Corrine Arrieta PA [facility]

## 2023-07-25 ENCOUNTER — OFFICE VISIT (OUTPATIENT)
Dept: PAIN CLINIC | Facility: CLINIC | Age: 58
End: 2023-07-25
Payer: COMMERCIAL

## 2023-07-25 VITALS — HEART RATE: 92 BPM | OXYGEN SATURATION: 95 % | SYSTOLIC BLOOD PRESSURE: 128 MMHG | DIASTOLIC BLOOD PRESSURE: 72 MMHG

## 2023-07-25 DIAGNOSIS — M79.604 RIGHT LEG PAIN: ICD-10-CM

## 2023-07-25 DIAGNOSIS — M54.50 CHRONIC RIGHT-SIDED LOW BACK PAIN WITHOUT SCIATICA: ICD-10-CM

## 2023-07-25 DIAGNOSIS — G89.29 CHRONIC RIGHT-SIDED LOW BACK PAIN WITHOUT SCIATICA: ICD-10-CM

## 2023-07-25 PROCEDURE — 99214 OFFICE O/P EST MOD 30 MIN: CPT | Performed by: PHYSICIAN ASSISTANT

## 2023-07-25 PROCEDURE — 3078F DIAST BP <80 MM HG: CPT | Performed by: PHYSICIAN ASSISTANT

## 2023-07-25 PROCEDURE — 3074F SYST BP LT 130 MM HG: CPT | Performed by: PHYSICIAN ASSISTANT

## 2023-07-25 RX ORDER — METAXALONE 800 MG/1
800 TABLET ORAL 3 TIMES DAILY PRN
Qty: 90 TABLET | Refills: 0 | Status: SHIPPED | OUTPATIENT
Start: 2023-07-25 | End: 2023-08-24

## 2023-07-25 RX ORDER — GABAPENTIN 300 MG/1
900 CAPSULE ORAL 3 TIMES DAILY
Qty: 270 CAPSULE | Refills: 0 | Status: SHIPPED | OUTPATIENT
Start: 2023-07-25

## 2023-07-25 RX ORDER — ACETAMINOPHEN AND CODEINE PHOSPHATE 300; 60 MG/1; MG/1
1 TABLET ORAL EVERY 8 HOURS PRN
Qty: 90 TABLET | Refills: 0 | Status: SHIPPED | OUTPATIENT
Start: 2023-08-01 | End: 2023-08-31

## 2023-07-25 NOTE — PROGRESS NOTES
Patient presents in office today with reported pain in bilateral SI joints, left groin    Current pain level reported = 4/10    Last reported dose of tylenol-codeine half an hour ago      Narcotic Contract renewal 10/11/22    Urine Drug screen 4/1/23

## 2023-08-02 NOTE — TELEPHONE ENCOUNTER
Medication: acetaminophen-codeine 300-60 MG Oral Tab     Date of last refill: 7/3/23  [place] 2[place]18    Last office visit: 7/25/23  Due back to clinic per last office note:  3 months  Date next office visit scheduled:  10/25/23    Last URINE Screen: 4/1/23  Screen Results:    BRE Griffin  4/10/2023  8:10 AM CDT Back to Top      As expected         Narcotic Contract EXPIRATION date: 10/11/23    Last OV note recommendation: Impression: Patient is stable on her medication regimen, and has no adverse effects. States that she has been doing very well over the past few months, and requires no adjustments to her regimen. Treatment agreement is up-to-date, as is urine screen (4/2023). Can call in when she is ready for refills of her metaxalone, Neurontin, and Tylenol No. 4 with codeine, and I did sent next months refills at this time. Follow-up 3 months. Plan: sent in refills for next week, can call in for medication when needed, and will see her back in 3 months (end of 10/2023).

## 2023-08-04 RX ORDER — ACETAMINOPHEN AND CODEINE PHOSPHATE 300; 60 MG/1; MG/1
1 TABLET ORAL EVERY 8 HOURS PRN
Qty: 90 TABLET | Refills: 0 | OUTPATIENT
Start: 2023-08-04

## 2023-08-29 ENCOUNTER — PATIENT MESSAGE (OUTPATIENT)
Dept: PAIN CLINIC | Facility: CLINIC | Age: 58
End: 2023-08-29

## 2023-08-29 DIAGNOSIS — M79.604 RIGHT LEG PAIN: ICD-10-CM

## 2023-08-29 DIAGNOSIS — G89.29 CHRONIC RIGHT-SIDED LOW BACK PAIN WITHOUT SCIATICA: ICD-10-CM

## 2023-08-29 DIAGNOSIS — M54.50 CHRONIC RIGHT-SIDED LOW BACK PAIN WITHOUT SCIATICA: ICD-10-CM

## 2023-08-29 RX ORDER — METAXALONE 800 MG/1
800 TABLET ORAL 3 TIMES DAILY PRN
Qty: 90 TABLET | Refills: 0 | Status: SHIPPED | OUTPATIENT
Start: 2023-08-29 | End: 2023-09-28

## 2023-08-29 RX ORDER — GABAPENTIN 300 MG/1
900 CAPSULE ORAL 3 TIMES DAILY
Qty: 270 CAPSULE | Refills: 0 | Status: SHIPPED | OUTPATIENT
Start: 2023-08-29

## 2023-08-29 RX ORDER — ACETAMINOPHEN AND CODEINE PHOSPHATE 300; 60 MG/1; MG/1
1 TABLET ORAL EVERY 8 HOURS PRN
Qty: 90 TABLET | Refills: 0 | Status: SHIPPED | OUTPATIENT
Start: 2023-08-30 | End: 2023-09-29

## 2023-08-31 DIAGNOSIS — M79.604 RIGHT LEG PAIN: ICD-10-CM

## 2023-08-31 DIAGNOSIS — M54.50 CHRONIC RIGHT-SIDED LOW BACK PAIN WITHOUT SCIATICA: ICD-10-CM

## 2023-08-31 DIAGNOSIS — G89.29 CHRONIC RIGHT-SIDED LOW BACK PAIN WITHOUT SCIATICA: ICD-10-CM

## 2023-08-31 RX ORDER — GABAPENTIN 300 MG/1
900 CAPSULE ORAL 3 TIMES DAILY
Qty: 270 CAPSULE | Refills: 0 | OUTPATIENT
Start: 2023-08-31

## 2023-09-01 DIAGNOSIS — M54.50 CHRONIC RIGHT-SIDED LOW BACK PAIN WITHOUT SCIATICA: ICD-10-CM

## 2023-09-01 DIAGNOSIS — G89.29 CHRONIC RIGHT-SIDED LOW BACK PAIN WITHOUT SCIATICA: ICD-10-CM

## 2023-09-01 DIAGNOSIS — M79.604 RIGHT LEG PAIN: ICD-10-CM

## 2023-09-01 RX ORDER — METAXALONE 800 MG/1
800 TABLET ORAL 3 TIMES DAILY PRN
Qty: 90 TABLET | Refills: 0 | Status: CANCELLED | OUTPATIENT
Start: 2023-09-01 | End: 2023-10-01

## 2023-09-05 NOTE — TELEPHONE ENCOUNTER
Medication: Metaxalone 800 MG       Metaxalone 800 MG Oral Tab 90 tablet 0 8/29/2023 9/28/2023    Sig - Route: Take 1 tablet (800 mg total) by mouth 3 (three) times daily as needed for Pain. - Oral    Sent to pharmacy as: Metaxalone 800 MG Oral Tablet    E-Prescribing Status: Receipt confirmed by pharmacy (8/29/2023  3:32 PM CDT)        Duplicate denied.

## 2023-09-09 DIAGNOSIS — N30.01 ACUTE CYSTITIS WITH HEMATURIA: ICD-10-CM

## 2023-09-12 RX ORDER — SOLIFENACIN SUCCINATE 5 MG/1
5 TABLET, FILM COATED ORAL DAILY
Qty: 90 TABLET | Refills: 0 | Status: SHIPPED | OUTPATIENT
Start: 2023-09-12

## 2023-09-28 DIAGNOSIS — M79.604 RIGHT LEG PAIN: ICD-10-CM

## 2023-09-28 DIAGNOSIS — G89.29 CHRONIC RIGHT-SIDED LOW BACK PAIN WITHOUT SCIATICA: ICD-10-CM

## 2023-09-28 DIAGNOSIS — M54.50 CHRONIC RIGHT-SIDED LOW BACK PAIN WITHOUT SCIATICA: ICD-10-CM

## 2023-09-29 DIAGNOSIS — G89.29 CHRONIC RIGHT-SIDED LOW BACK PAIN WITHOUT SCIATICA: ICD-10-CM

## 2023-09-29 DIAGNOSIS — M79.604 RIGHT LEG PAIN: ICD-10-CM

## 2023-09-29 DIAGNOSIS — M54.50 CHRONIC RIGHT-SIDED LOW BACK PAIN WITHOUT SCIATICA: ICD-10-CM

## 2023-09-29 RX ORDER — METAXALONE 800 MG/1
800 TABLET ORAL 3 TIMES DAILY PRN
Qty: 90 TABLET | Refills: 0 | Status: SHIPPED | OUTPATIENT
Start: 2023-09-29 | End: 2023-10-29

## 2023-09-29 RX ORDER — GABAPENTIN 300 MG/1
900 CAPSULE ORAL 3 TIMES DAILY
Qty: 270 CAPSULE | Refills: 0 | OUTPATIENT
Start: 2023-09-29

## 2023-09-29 RX ORDER — GABAPENTIN 300 MG/1
900 CAPSULE ORAL 3 TIMES DAILY
Qty: 270 CAPSULE | Refills: 0 | Status: SHIPPED | OUTPATIENT
Start: 2023-09-29

## 2023-09-29 RX ORDER — ACETAMINOPHEN AND CODEINE PHOSPHATE 300; 60 MG/1; MG/1
1 TABLET ORAL EVERY 8 HOURS PRN
Qty: 90 TABLET | Refills: 0 | Status: SHIPPED | OUTPATIENT
Start: 2023-09-29 | End: 2023-10-29

## 2023-09-29 NOTE — TELEPHONE ENCOUNTER
Medication:   gabapentin 300 MG Oral Cap   Date of last refill: 9/29/23- Denying    Medication:   acetaminophen-codeine 300-60 MG Oral Tab   Date of last refill: 8/30/23  [place]    Last office visit: 7/25/23  Due back to clinic per last office note:  FU in 3M  Date next office visit scheduled:  10/25/23    Last URINE Screen: 4/1/23  Screen Results:  As Expected      Narcotic Contract EXPIRATION date: 10/11/23, FU already scheduled    Last OV note recommendation:   Medical Decision Making:   Diagnosis:    Right leg pain  Chronic right-sided low back pain without sciatica      Impression: Patient is stable on her medication regimen, and has no adverse effects. States that she has been doing very well over the past few months, and requires no adjustments to her regimen. Treatment agreement is up-to-date, as is urine screen (4/2023). Can call in when she is ready for refills of her metaxalone, Neurontin, and Tylenol No. 4 with codeine, and I did sent next months refills at this time. Follow-up 3 months. Plan: sent in refills for next week, can call in for medication when needed, and will see her back in 3 months (end of 10/2023). No orders of the defined types were placed in this encounter. Meds & Refills for this Visit:  Requested Prescriptions             Signed Prescriptions Disp Refills    acetaminophen-codeine 300-60 MG Oral Tab 90 tablet 0       Sig: Take 1 tablet by mouth every 8 (eight) hours as needed for Pain.    gabapentin 300 MG Oral Cap 270 capsule 0       Sig: Take 3 capsules (900 mg total) by mouth 3 (three) times daily. Metaxalone 800 MG Oral Tab 90 tablet 0       Sig: Take 1 tablet (800 mg total) by mouth 3 (three) times daily as needed for Pain. Imaging & Consults:  None     The patient indicates understanding of these issues and agrees to the plan.      Willy Heredia

## 2023-09-29 NOTE — TELEPHONE ENCOUNTER
Critical Care Progress Note    Date of admission  9/9/2020    Chief Complaint  58 y.o. male with H/o Afib admitted 9/9/2020 with respiratory failure, sepsis and pneumonia - intubated in the ER.    Hospital Course   9/17 -    developed worsening hypercarbic and hypoxemic respiratory failure requiring emergent intubation.  Full vent support.  9/18 -    continue vent support.  Titrating phenylephrine.  Replete electrolytes.  SAT/SBT as tolerated.  9/19 -    SAT/SBT as tolerated.  Continue vent support.  Titrating phenylephrine.  Replete potassium.  Continue Unasyn.  9/20 -    liberated from the ventilator yesterday and did very well until last night when he required reintubation.  Continue vent support.  Continue Unasyn and complete 5 days of therapy.  10/3 -    continue vent support.  T-piece trials during the day as tolerated.  10/4 -    continue T-piece trials during the day as tolerated.  Nocturnal ventilatory support.  10/5 - nocturnal vent support and T-piece trials as they as tolerated, may need LTAC/home vent after that-trilogy?  10/6 - HS ASV vent, days Tpiece w/PSMV at times, still fatigued at end of day, CM working on dispo  10/7 - HS ASV ventilation, T-piece days with PSMV at times, case management working on dyspnea with LTAC referral to facility that accepts [facility] in [place], trilogy ventilation nocturnally still being considered  Ongoing therapies  10/8 - HS ASV ventilation, Tpiece and therapies on day, prob depressed, LTA[facility] referal ongoing  10/9 - HS ASV ventilation, Tpiece days w/PSMV as tolerated, therapies  10/10 - HS ASV, Tpiece days, CAF, PSMV, want to go home  10/11 -  HS % x 8 hrs, T piece days w/PSMV, CAF   10/12 - % last night, T-piece, barium swallow and passed, doing PSMV trials, a-fib with RVR-->dose of amio  10/13 - % overnight, passed swallow eval, cont to work with speaking valve, a-fib  10/14 - %/8 overnight, a-fib rate controlled, improving  Medication: Metaxalone 800 MG     Date of last refill: 08/29/23      Last office visit: 07/25/23  Due back to clinic per last office note:  10/23  Date next office visit scheduled:  10/25/23        Last OV note recommendation:   Medical Decision Making:   Diagnosis:    Right leg pain  Chronic right-sided low back pain without sciatica      Impression: Patient is stable on her medication regimen, and has no adverse effects. States that she has been doing very well over the past few months, and requires no adjustments to her regimen. Treatment agreement is up-to-date, as is urine screen (4/2023). Can call in when she is ready for refills of her metaxalone, Neurontin, and Tylenol No. 4 with codeine, and I did sent next months refills at this time. Follow-up 3 months. Plan: sent in refills for next week, can call in for medication when needed, and will see her back in 3 months (end of 10/2023). No orders of the defined types were placed in this encounter. eating, using speaking valve, T-piece at 10 lpm at 40%   10/15 - pt remained on T-piece overnight at 8 lpm at 40%, eating more meals, went to the [employer] yesterday  10/16 - T-piece at 6-15 lpm at 40% for past 48 hours, TFs nocturnally now, meals during the day, PT/OT  10/17 - vent discontinued, T-piece at 10 lpm at 40%, ambulating more on own-->later in the day: PEA arrest with short CPR with ROSC achieved, placed back on the vent, hydroceles noted to scrotum  10/18 - weaned off ventilator, back to using speaking valve and eating this morning     10/19-  Remains off vent for two days  10/20- off vent on t-piece three days  10/21- transient lost of pulse, CPR, bagged and back to baseline  10/22- on vent now 24/7- no wean efforts indefinitely    Interval Problem Update  Reviewed last 24 hour events:        Prior  Desaturation episode this am while on speaking valve- saturations to 50s, came up in several minutes with suctioning  Off vent three days  Minimal suctioning requested  Stable vitals  Working with OT/PT/ Speech  Not weaning in the foreseeable future- vent 24/7  Needs placement in LTAC or equivalent when bed available (does not need acute care hospital for his level of care but does need facility that can manage ventilators  Pt asking about hospice options/ end-of-life care today:       Prior  Admitted 9/9  PEA for one minute two days ago  Trach 9/23  Afib 90s  BP OK, afebrile  Tube feeds night, diet during day  Mobilizes to chair, stands and walks in room  Suctioned q 4 hours  Offvent 24 hours  Possible transfer to [facility] this Wednesday  PRN Zanax stopped      Prior   - pt with PEA code yesterday afternoon   - went back on the vent   - a/ox4   - a-fib 90-120s   - SBP 100s   - afebrile   - up to chair   - multiple BMs   - 40cc UOP with morton    -CXR(reviewed): poor film, appears to have emphysema changes   - xarelto   - home ppi   - Mg 1.7    - K 3.4    **weaned off vent quickly this morning and back to  T-piece and PSMV trials      Yesterday's Events:   - no events overnight   - a/ox4   - pt reports feeling stronger   - a-fib 90-110s   - -110s   - Tmax 97.5   - tolerating TFs at night   - tolerating PSMV trials and pureed diet   - BM 2 days ago   - adequate UOP with urinal   - T-piece at 10 lpm at 40%   - ambulating    Review of Systems  Review of Systems   Constitutional: Positive for malaise/fatigue. Negative for chills.   HENT: Negative for congestion and sore throat.    Eyes: Negative for blurred vision and double vision.   Respiratory: Positive for cough, sputum production and shortness of breath.    Cardiovascular: Negative for chest pain and leg swelling.   Gastrointestinal: Negative for abdominal pain, diarrhea, nausea and vomiting.   Genitourinary: Negative for frequency.   Musculoskeletal: Negative for back pain and neck pain.   Skin: Negative for rash.   Neurological: Positive for weakness. Negative for headaches.   Endo/Heme/Allergies: Bruises/bleeds easily.   Psychiatric/Behavioral: Negative for depression. The patient is nervous/anxious.         Vital Signs for last 24 hours   Temp:  [36.2 °C (97.1 °F)-36.3 °C (97.3 °F)] 36.3 °C (97.3 °F)  Pulse:  [] 97  Resp:  [10-28] 19  BP: ()/(48-85) 89/59  SpO2:  [91 %-96 %] 94 %    Hemodynamic parameters for last 24 hours       Respiratory Information for the last 24 hours  Vent Mode: APVCMV  Rate (breaths/min): 12  Vt Target (mL): 390  PEEP/CPAP: 8  MAP: 14  Control VTE (exp VT): 397    Physical Exam   Physical Exam  Vitals signs and nursing note reviewed.   Constitutional:       General: He is not in acute distress.     Appearance: He is ill-appearing. He is not toxic-appearing.      Comments: Pt appears chronically ill and older than stated age   HENT:      Head: Normocephalic and atraumatic.      Right Ear: External ear normal.      Left Ear: External ear normal.      Nose: Nose normal. No rhinorrhea.      Mouth/Throat:      Mouth: Mucous  membranes are moist.      Pharynx: Oropharynx is clear. No oropharyngeal exudate.   Eyes:      General: No scleral icterus.     Extraocular Movements: Extraocular movements intact.      Conjunctiva/sclera: Conjunctivae normal.      Pupils: Pupils are equal, round, and reactive to light.   Neck:      Musculoskeletal: Normal range of motion and neck supple.      Comments: Trach in place without surrounding swelling/erythema  Cardiovascular:      Rate and Rhythm: Normal rate. Rhythm irregularly irregular.      Pulses: Normal pulses.      Heart sounds: Normal heart sounds. No murmur.   Pulmonary:      Breath sounds: No wheezing.      Comments: Strong cough, coarse breath sounds throughout  Chest:      Chest wall: No tenderness.   Abdominal:      General: Bowel sounds are normal. There is no distension.      Palpations: Abdomen is soft.      Tenderness: There is no abdominal tenderness. There is no guarding or rebound.   Musculoskeletal: Normal range of motion.      Right lower leg: No edema.      Left lower leg: No edema.   Lymphadenopathy:      Cervical: No cervical adenopathy.   Skin:     General: Skin is warm and dry.      Capillary Refill: Capillary refill takes less than 2 seconds.      Findings: No rash.   Neurological:      Mental Status: He is alert and oriented to person, place, and time.      Cranial Nerves: No cranial nerve deficit.      Sensory: No sensory deficit.      Motor: No weakness.      Comments: Back on vent with morning exam   Psychiatric:         Behavior: Behavior normal.         Thought Content: Thought content normal.      Comments: Low mood         Medications  Current Facility-Administered Medications   Medication Dose Route Frequency Provider Last Rate Last Dose   • ipratropium-albuterol (DUONEB) nebulizer solution  3 mL Nebulization Q2HRS PRN (RT) Dmitri Pascual M.D.       • ipratropium-albuterol (DUONEB) nebulizer solution  3 mL Nebulization Q6HRS (RT) Dmitri Pascual M.D.   3 mL at  10/22/20 0646   • MD Alert...ICU Electrolyte Replacement per Pharmacy   Other PHARMACY TO DOSE Nalini Aguirre M.D.       • Metoprolol Tartrate (LOPRESSOR) injection 5 mg  5 mg Intravenous Q5 MIN PRN Nalini Aguirre M.D.       • metoprolol (LOPRESSOR) tablet 25 mg  25 mg Enteral Tube TWICE DAILY Nalini Aguirre M.D.   25 mg at 10/22/20 0439   • senna-docusate (PERICOLACE or SENOKOT S) 8.6-50 MG per tablet 2 Tab  2 Tab Enteral Tube BID Dmitri Pascual M.D.   2 Tab at 10/21/20 1708    And   • polyethylene glycol/lytes (MIRALAX) PACKET 1 Packet  1 Packet Enteral Tube QDAY PRN Dmitri Pascual M.D.        And   • magnesium hydroxide (MILK OF MAGNESIA) suspension 30 mL  30 mL Enteral Tube QDAY PRN Dmitri Pascual M.D.        And   • bisacodyl (DULCOLAX) suppository 10 mg  10 mg Rectal QDAY PRN Dmitri Pascual M.D.   10 mg at 10/17/20 2013   • FLUoxetine (PROZAC) capsule 20 mg  20 mg Enteral Tube DAILY Dmitri Pascual M.D.   20 mg at 10/22/20 0433   • lidocaine (LIDODERM) 5 % 2 Patch  2 Patch Transdermal Q24HR Dmitri Pascual M.D.   2 Patch at 10/21/20 1958   • traZODone (DESYREL) tablet 50 mg  50 mg Enteral Tube HS PRN Dmitri Pascual M.D.   50 mg at 10/21/20 1949   • multivitamin (THERAGRAN) tablet 1 Tab  1 Tab Enteral Tube DAILY Dmitri Pascual M.D.   1 Tab at 10/22/20 0433   • acetaminophen (TYLENOL) tablet 650 mg  650 mg Enteral Tube Q6HRS PRN Dmitri Pascual M.D.   650 mg at 10/21/20 1941   • ondansetron (ZOFRAN ODT) dispertab 4 mg  4 mg Enteral Tube Q4HRS PRN Dmitri Pascual M.D.   4 mg at 10/14/20 1545   • Respiratory Therapy Consult   Nebulization Continuous RT Dmitri Pascual M.D.       • lidocaine (XYLOCAINE) 1 % injection 1-2 mL  1-2 mL Tracheal Tube Q30 MIN PRN Dmitri Pascual M.D.       • omeprazole (FIRST-OMEPRAZOLE) 2 mg/mL oral susp 40 mg  40 mg Enteral Tube DAILY Dmitri Pascual M.D.   40 mg at 10/22/20 0434   • Pharmacy Consult: Enteral tube insertion - review  meds/change route/product selection  1 Each Other PHARMACY TO DOSE Dmitri Pascual M.D.       • amiodarone (CORDARONE) tablet 200 mg  200 mg Enteral Tube Q DAY Dmitri Pascual M.D.   200 mg at 10/22/20 0433   • furosemide (LASIX) tablet 20 mg  20 mg Enteral Tube Q DAY Dmitri Pascual M.D.   20 mg at 10/22/20 0434   • DULoxetine (CYMBALTA) capsule 60 mg  60 mg Enteral Tube QHS Dmitri Pascual M.D.   60 mg at 10/21/20 1941   • topiramate (TOPAMAX) tablet 75 mg  75 mg Enteral Tube DAILY Dmitri Pascual M.D.   75 mg at 10/22/20 0434   • rivaroxaban (XARELTO) tablet 20 mg  20 mg Enteral Tube PM MEAL Dmitri Pascual M.D.   20 mg at 10/21/20 1708   • ondansetron (ZOFRAN) syringe/vial injection 4 mg  4 mg Intravenous Q4HRS PRN Dmitri Pascual M.D.   4 mg at 10/12/20 2201       Fluids    Intake/Output Summary (Last 24 hours) at 10/22/2020 1209  Last data filed at 10/22/2020 1105  Gross per 24 hour   Intake 890 ml   Output 725 ml   Net 165 ml       Laboratory  Recent Labs     10/21/20  0315 10/21/20  0319 10/21/20  0727   ICRID37B  --  7.21*  --    HWIMAH338Q  --  99.9*  --    XDFZU000V  --  87.7*  --    HLZK9RAM  --  94.2  --    ARTHCO3  --  39*  --    ARTBE  --  8*  --    ISTATAPH 7.177*  --  7.488   ISTATAPCO2 >100.0*  --  48.9*   ISTATAPO2 78  --  65   ISTATATCO2 see below  --  39*   SXYFQVF3KGP see below  --  94   ISTATARTHCO3 see below  --  37.1*   ISTATARTBE see below  --  12*   ISTATTEMP 96.7 F  --  97.2 F   ISTATFIO2 50  --  30   ISTATSPEC Arterial  --  Arterial   ISTATAPHTC 7.191*  --  7.500   LHICVSEB8SZ 73  --  62*         Recent Labs     10/21/20  0254 10/22/20  0420   SODIUM 138 139   POTASSIUM 4.9 3.4*   CHLORIDE 96 98   CO2 39* 38*   BUN 20 25*   CREATININE 0.36* 0.40*   MAGNESIUM  --  2.0   PHOSPHORUS  --  1.7*   CALCIUM 9.1 8.2*     Recent Labs     10/21/20  0254 10/22/20  0420   GLUCOSE 152* 103*     Recent Labs     10/21/20  0245 10/22/20  0420   WBC 18.3* 20.1*   NEUTSPOLYS 92.30*  88.30*   LYMPHOCYTES 1.80* 4.20*   MONOCYTES 5.20 6.60   EOSINOPHILS 0.00 0.00   BASOPHILS 0.20 0.10     Recent Labs     10/21/20  0245 10/22/20  0420   RBC 4.12* 3.54*   HEMOGLOBIN 10.3* 8.7*   HEMATOCRIT 38.8* 31.1*   PLATELETCT 277 257       Imaging  X-Ray:  I have personally reviewed the images and compared with prior images.  EKG:  I have personally reviewed the images and compared with prior images.  CT:    Reviewed  Echo:   Reviewed    Assessment/Plan  * Acute respiratory failure with hypoxia and hypercapnia (HCC)- (present on admission)  Assessment & Plan  Intubated 9/9-9/12, 9/17-9/19, 9/20-9/23 - now on 4th course on vent this admission alone  Trach 9/23  Ongoing T-piece trials, now off the vent > 48 hours  Patient remains deconditioned, continue therapies     having difficulty with placement  On facility with PEG or they will accept in transfer, I requested contact with their possible accepting physician to review case-pending    Continue ambulating, eating, using speaking valve  Cont T-piece trials, off vent > 48hours  Patient is already failed transfer to the floor with tracheostomy along with T-piece/HMTC, but getting stronger    Continue aggressive therapies of SLP/PT/OT  Passed SLP-->ongoing PSMV trials with speech and improved appetite, hoping to avoid PEG tube placement    Off vent two days, minimal needs for suctioning  May be good candidate to be managed on floor soon given lack of nursing/RT frequent needs    Needs mechanical ventilator 24/7. Too fragile for weaning (hypoventilates- arrests)  Needs LTAC or similar  Pt asking about hospice/ end-of-life care  Palliative care consulted  Pt does not seem to have made any new decision re: goals of care as of this morning    PEA (Pulseless electrical activity) (HCC)  Assessment & Plan  Brief CPR-->?vasovagal while attempting to urinate  Pt remains a full code  Weaned off vent quickly    No further issues in past 48 hours  Unclear etiology  of ? One minute PEA two days ago      COPD (chronic obstructive pulmonary disease) ([facility])  Assessment & Plan  No in exacerbation, monitoring closely  Appears to be end-stage  Patient has of hypoxemia and hypercarbia along with mild to moderate pulmonary pretension  Continue bronchodilators every 4-6 hours  RT protocols  With A. fib using Xopenex over albuterol  Pulmonary toilet/mucomyst    Pneumonia- (present on admission)  Assessment & Plan  Currently without concerning signs or symptoms of new HCAP, aggressive prevention ongoing  Monitor for recurrence, patient at high risk for nosocomial infection  Update vaccines per protocol  MSSA in sputum on 9/10  Usual ashwin in sputum culture on 9/17  S/P 3 days of Zosyn followed by 3 days of Unasyn which completed on 9/15  Second Unasyn course from 9/18-9/23  **Remains off antibiotics, remains afebrile without new S/S of infection.    Paroxysmal atrial fibrillation (HCC)- (present on admission)  Assessment & Plan  Remains in chronic A. fib with controlled rate, patient asymptomatic.  Continue amiodarone 200 mg daily, cont metoprolol 25mg BID  Echo with normal LV systolic function and normal size of LA however significant RV abnormalities and PHTN is noted  Continue anticoagulation with rivaroxaban, monitor for bleeding  Optimize electrolytes, acid-base balance and oxygenation  Ongoing cardiac monitoring    Malnutrition ([facility])  Assessment & Plan  Continue nutritional support, currently at goal  Dietary following  Cont enteral nutrition  Passed SLP, cont pureed diet with enteral nutrition (at night now)  Optimize electrolytes  Supplements as needed      Normocytic anemia- (present on admission)  Assessment & Plan  Serial CBC, hemoglobin unchanged or slight better  Transfuse per protocols  Not bleeding clinically but patient is at risk of being on chronic anticoagulation and chronically on the vent.    Dysphagia- (present on admission)  Assessment & Plan  No more vent weans- too  fragile    Pulmonary hypertension (HCC)- (present on admission)  Assessment & Plan  RVSP 42  Treat underlying process, ongoing  Maintain normal oxygenation, suspect chronic hypoxemia primary etiology  Diuresis as needed  Serial echo as clinically appropriate.    Major depression- (present on admission)  Assessment & Plan  Continue Cymbalta  Continue to provide emotional support  At bedtime trazodone for sleep  Cupple trips as clinically safe  Xanax at 0.25mg BID as needed for anxiety  Requesting case management nursing staff to facilitate prep resume meeting with friends or family to help with spirits  Trips to the [employer]    Right ventricular dilation- (present on admission)  Assessment & Plan  Clinically not in severe right heart failure  Echo confirms severely dilated RV with mild RV systolic dysfunction  Presumably related to chronic hypoxemia and secondary pulmonary pretension  RVSP 42 mmHg  Forced diuresis as needed, judicious fluid management         VTE:  NOAC  Ulcer: PPI  Lines: Orozco Catheter  Ongoing indication addressed    I have performed a physical exam and reviewed and updated ROS and Plan today (10/22/2020). In review of yesterday's note (10/21/2020), there are no changes except as documented above.     Discussed patient condition and risk of morbidity and/or mortality with RN, RT, Pharmacy, Charge nurse / hot rounds and Patient

## 2023-09-29 NOTE — TELEPHONE ENCOUNTER
Medication: gabapentin 300 MG     Date of last refill: 08/29/23      Last office visit: 07/25/23  Due back to clinic per last office note:  10/2023  Date next office visit scheduled:  10/25/23        Last OV note recommendation:   Medical Decision Making:   Diagnosis:    Right leg pain  Chronic right-sided low back pain without sciatica      Impression: Patient is stable on her medication regimen, and has no adverse effects. States that she has been doing very well over the past few months, and requires no adjustments to her regimen. Treatment agreement is up-to-date, as is urine screen (4/2023). Can call in when she is ready for refills of her metaxalone, Neurontin, and Tylenol No. 4 with codeine, and I did sent next months refills at this time. Follow-up 3 months. Plan: sent in refills for next week, can call in for medication when needed, and will see her back in 3 months (end of 10/2023).

## 2023-10-25 ENCOUNTER — OFFICE VISIT (OUTPATIENT)
Dept: PAIN CLINIC | Facility: CLINIC | Age: 58
End: 2023-10-25

## 2023-10-25 ENCOUNTER — MED REC SCAN ONLY (OUTPATIENT)
Dept: PAIN CLINIC | Facility: CLINIC | Age: 58
End: 2023-10-25

## 2023-10-25 VITALS — OXYGEN SATURATION: 96 % | HEART RATE: 66 BPM | SYSTOLIC BLOOD PRESSURE: 132 MMHG | DIASTOLIC BLOOD PRESSURE: 86 MMHG

## 2023-10-25 DIAGNOSIS — M79.604 RIGHT LEG PAIN: ICD-10-CM

## 2023-10-25 DIAGNOSIS — M54.50 CHRONIC RIGHT-SIDED LOW BACK PAIN WITHOUT SCIATICA: ICD-10-CM

## 2023-10-25 DIAGNOSIS — G89.29 CHRONIC RIGHT-SIDED LOW BACK PAIN WITHOUT SCIATICA: ICD-10-CM

## 2023-10-25 PROCEDURE — 3079F DIAST BP 80-89 MM HG: CPT | Performed by: PHYSICIAN ASSISTANT

## 2023-10-25 PROCEDURE — 3075F SYST BP GE 130 - 139MM HG: CPT | Performed by: PHYSICIAN ASSISTANT

## 2023-10-25 PROCEDURE — 99214 OFFICE O/P EST MOD 30 MIN: CPT | Performed by: PHYSICIAN ASSISTANT

## 2023-10-25 RX ORDER — METAXALONE 800 MG/1
800 TABLET ORAL 3 TIMES DAILY PRN
Qty: 90 TABLET | Refills: 0 | Status: SHIPPED | OUTPATIENT
Start: 2023-10-25 | End: 2023-11-24

## 2023-10-25 RX ORDER — ACETAMINOPHEN AND CODEINE PHOSPHATE 300; 60 MG/1; MG/1
1 TABLET ORAL EVERY 8 HOURS PRN
Qty: 90 TABLET | Refills: 0 | Status: SHIPPED | OUTPATIENT
Start: 2023-10-31 | End: 2023-11-30

## 2023-10-25 RX ORDER — GABAPENTIN 300 MG/1
900 CAPSULE ORAL 3 TIMES DAILY
Qty: 270 CAPSULE | Refills: 0 | Status: SHIPPED | OUTPATIENT
Start: 2023-10-25

## 2023-10-25 NOTE — PROGRESS NOTES
Narcotic Contract Renewed on 10/25/23 at [facility] with Genie Villanueva, [facility]

## 2023-10-25 NOTE — PROGRESS NOTES
Patient presents in office today with reported pain in lower back    Current pain level reported = 6/10    Last reported dose of Tylenol codine this morning      Narcotic Contract renewal 10/11/23- Renewing today    Urine Drug screen 4/1/23

## 2023-11-28 ENCOUNTER — PATIENT MESSAGE (OUTPATIENT)
Dept: PAIN CLINIC | Facility: CLINIC | Age: 58
End: 2023-11-28

## 2023-11-28 DIAGNOSIS — M79.604 RIGHT LEG PAIN: ICD-10-CM

## 2023-11-28 DIAGNOSIS — G89.29 CHRONIC RIGHT-SIDED LOW BACK PAIN WITHOUT SCIATICA: ICD-10-CM

## 2023-11-28 DIAGNOSIS — M54.50 CHRONIC RIGHT-SIDED LOW BACK PAIN WITHOUT SCIATICA: ICD-10-CM

## 2023-11-28 RX ORDER — METAXALONE 800 MG/1
800 TABLET ORAL 3 TIMES DAILY PRN
Qty: 90 TABLET | Refills: 0 | Status: SHIPPED | OUTPATIENT
Start: 2023-11-28 | End: 2023-12-28

## 2023-11-28 RX ORDER — ACETAMINOPHEN AND CODEINE PHOSPHATE 300; 60 MG/1; MG/1
1 TABLET ORAL EVERY 8 HOURS PRN
Qty: 90 TABLET | Refills: 0 | Status: SHIPPED | OUTPATIENT
Start: 2023-11-28 | End: 2023-12-28

## 2023-11-28 RX ORDER — GABAPENTIN 300 MG/1
900 CAPSULE ORAL 3 TIMES DAILY
Qty: 270 CAPSULE | Refills: 0 | Status: SHIPPED | OUTPATIENT
Start: 2023-11-28

## 2023-11-28 NOTE — TELEPHONE ENCOUNTER
Medication: Metaxalone 800 MG Oral Tab     Date of last refill: 10/25/23      Last office visit: 10/25/23  Due back to clinic per last office note:  3 months  Date next office visit scheduled:  1/25/24        Last OV note recommendation: Impression: Patient is stable on her medication regimen, and has no adverse effects. States that she requires no adjustments to her regimen, and is scheduled for cryoablation of her hemangioma with [facility] in 11/2023. Treatment agreement was updated today, and last urine screen (4/2023) wnl. Can call in when she is ready for refills of her metaxalone, Neurontin, and Tylenol No. 4 with codeine, and I did sent next months refills at this time. Follow-up 3 months. Plan: sent in refills for next week, can call in for medication when needed, and will see her back in 3 months (end of 1/2024). Update UDS at next visit.

## 2023-11-28 NOTE — TELEPHONE ENCOUNTER
Medication: gabapentin 300 MG Oral Cap     Date of last refill: 10/25/23      Medication: acetaminophen-codeine 300-60 MG Oral Tab     Date of last refill: 10/29/23 (28 days supply)  [place] 06/54/[place] (28 days supply)    Last office visit: 10/25/23  Due back to clinic per last office note:  3 months  Date next office visit scheduled:  1/25/24    Last URINE Screen: 4/1/23  Screen Results:    BRE Parkinson  4/10/2023  8:10 AM CDT Back to Top      As expected         Narcotic Contract EXPIRATION date: 10/25/24    Last OV note recommendation: Impression: Patient is stable on her medication regimen, and has no adverse effects. States that she requires no adjustments to her regimen, and is scheduled for cryoablation of her hemangioma with [facility] in 11/2023. Treatment agreement was updated today, and last urine screen (4/2023) wnl. Can call in when she is ready for refills of her metaxalone, Neurontin, and Tylenol No. 4 with codeine, and I did sent next months refills at this time. Follow-up 3 months. Plan: sent in refills for next week, can call in for medication when needed, and will see her back in 3 months (end of 1/2024). Update UDS at next visit.

## 2023-11-28 NOTE — TELEPHONE ENCOUNTER
From: Janes Lyles  To: Felix Arrieta  Sent: 11/28/2023 9:53 AM CST  Subject: Med refills     Had the procedure at [facility]. Was one week ago. Still a quite a bit of pain. They expected that. Requesting refills, but did not see the Skelaxin on my list. I need that also. I take 800 mg tid prn. The spasms have been very bad since the procedure. I requested other two meds to go to [facility] in [facility], so same for this one also. Thx in advance.

## 2023-12-11 DIAGNOSIS — N30.01 ACUTE CYSTITIS WITH HEMATURIA: ICD-10-CM

## 2023-12-11 RX ORDER — SOLIFENACIN SUCCINATE 5 MG/1
5 TABLET, FILM COATED ORAL DAILY
Qty: 90 TABLET | Refills: 0 | OUTPATIENT
Start: 2023-12-11

## 2024-01-02 DIAGNOSIS — M54.50 CHRONIC RIGHT-SIDED LOW BACK PAIN WITHOUT SCIATICA: Primary | ICD-10-CM

## 2024-01-02 DIAGNOSIS — M79.604 RIGHT LEG PAIN: ICD-10-CM

## 2024-01-02 DIAGNOSIS — G89.29 CHRONIC RIGHT-SIDED LOW BACK PAIN WITHOUT SCIATICA: ICD-10-CM

## 2024-01-02 DIAGNOSIS — G89.29 CHRONIC RIGHT-SIDED LOW BACK PAIN WITHOUT SCIATICA: Primary | ICD-10-CM

## 2024-01-02 DIAGNOSIS — M54.16 LUMBAR RADICULITIS: ICD-10-CM

## 2024-01-02 DIAGNOSIS — M54.50 CHRONIC RIGHT-SIDED LOW BACK PAIN WITHOUT SCIATICA: ICD-10-CM

## 2024-01-02 DIAGNOSIS — G89.29 OTHER CHRONIC PAIN: ICD-10-CM

## 2024-01-02 RX ORDER — ACETAMINOPHEN AND CODEINE PHOSPHATE 300; 60 MG/1; MG/1
1 TABLET ORAL EVERY 8 HOURS PRN
Qty: 85 TABLET | Refills: 0 | Status: SHIPPED | OUTPATIENT
Start: 2024-01-02

## 2024-01-02 RX ORDER — GABAPENTIN 300 MG/1
900 CAPSULE ORAL 3 TIMES DAILY
Qty: 270 CAPSULE | Refills: 0 | Status: SHIPPED | OUTPATIENT
Start: 2024-01-02

## 2024-01-02 NOTE — TELEPHONE ENCOUNTER
Medication: gabapentin 300 MG Oral Cap     Date of last refill: 11/28/23      Last office visit: 10/25/23  Due back to clinic per last office note:  end of January  Date next office visit scheduled:  1/25/24        Last OV note recommendation: Impression: Patient is stable on her medication regimen, and has no adverse effects.  States that she requires no adjustments to her regimen, and is scheduled for cryoablation of her hemangioma with [facility] in 11/2023.  Treatment agreement was updated today, and last urine screen (4/2023) wnl.  Can call in when she is ready for refills of her metaxalone, Neurontin, and Tylenol No. 4 with codeine, and I did sent next months refills at this time.  Follow-up 3 months.    Plan: sent in refills for next week, can call in for medication when needed, and will see her back in 3 months (end of 1/2024).  Update UDS at next visit.

## 2024-01-02 NOTE — TELEPHONE ENCOUNTER
Medication: acetaminophen-codeine 300-60 MG     Date of last refill: 11/28/23  [place]    Last office visit: 10/25/23  Due back to clinic per last office note:  3 months  Date next office visit scheduled:  01/25/24    Last URINE Screen: 04/06/23  Screen Results:  \"as expected\"      Narcotic Contract EXPIRATION date: 10/25/24    Last OV note recommendation:   Plan: sent in refills for next week, can call in for medication when needed, and will see her back in 3 months (end of 1/2024).  Update UDS at next visit.    No orders of the defined types were placed in this encounter.

## 2024-01-17 DIAGNOSIS — I10 ESSENTIAL HYPERTENSION: ICD-10-CM

## 2024-01-17 RX ORDER — LOSARTAN POTASSIUM 100 MG/1
100 TABLET ORAL DAILY
Qty: 90 TABLET | Refills: 0 | Status: SHIPPED | OUTPATIENT
Start: 2024-01-17

## 2024-01-17 RX ORDER — METOPROLOL SUCCINATE 100 MG/1
100 TABLET, EXTENDED RELEASE ORAL DAILY
Qty: 90 TABLET | Refills: 0 | Status: SHIPPED | OUTPATIENT
Start: 2024-01-17

## 2024-01-25 ENCOUNTER — OFFICE VISIT (OUTPATIENT)
Dept: PAIN CLINIC | Facility: CLINIC | Age: 59
End: 2024-01-25
Payer: COMMERCIAL

## 2024-01-25 VITALS
HEART RATE: 78 BPM | WEIGHT: 200 LBS | OXYGEN SATURATION: 98 % | DIASTOLIC BLOOD PRESSURE: 86 MMHG | SYSTOLIC BLOOD PRESSURE: 124 MMHG | BODY MASS INDEX: 32 KG/M2

## 2024-01-25 DIAGNOSIS — D18.09 HEMANGIOMA OF VERTEBRAL BODY: Primary | ICD-10-CM

## 2024-01-25 DIAGNOSIS — G89.29 OTHER CHRONIC PAIN: ICD-10-CM

## 2024-01-25 PROCEDURE — 3079F DIAST BP 80-89 MM HG: CPT | Performed by: PHYSICIAN ASSISTANT

## 2024-01-25 PROCEDURE — 99214 OFFICE O/P EST MOD 30 MIN: CPT | Performed by: PHYSICIAN ASSISTANT

## 2024-01-25 PROCEDURE — 3074F SYST BP LT 130 MM HG: CPT | Performed by: PHYSICIAN ASSISTANT

## 2024-01-25 RX ORDER — GABAPENTIN 300 MG/1
900 CAPSULE ORAL 3 TIMES DAILY
Qty: 270 CAPSULE | Refills: 0 | Status: SHIPPED | OUTPATIENT
Start: 2024-01-25

## 2024-01-25 NOTE — PROGRESS NOTES
HPI:   Alesia Bland presents with complaints of Low back pain.    The pain is described as moderate aching that is intermittent.  The patient’s activity level has remained the same since last visit.  The pain is worst in the late evening.    Changes in condition/history since last visit: patient is here today for medication check.  She has been stable on gabapentin, T#4, and metaxalone, to no adverse effects.  UDS 4/2023 was WNL, and treatment agreement is up to date.        She had been evaluated at [facility], and underwent cryoablation on 11/20/24.  Procedure was well tolerated and is very pleased with her response, and reports elimination of all of her neuropathic pain.  In fact, she has substantially reduced her T#4 intake (down to 2/day) and has nearly eliminated her metaxalone (down to 1/day).  Continues to use Neurontin, having been on it prior to development of her spine issues for suppression of migraines.    The following activities will increase the patient’s pain: lying down, worse in PM    The following activities decrease the patient’s pain: medications, staying active     Functional Assessment: Patient reports that they are able to complete all of their ADL's such as eating, bathing, using the toilet, dressing and getting up from a bed or a chair independently.    Current Medications:  Current Outpatient Medications   Medication Sig Dispense Refill    losartan 100 MG Oral Tab TAKE 1 TABLET BY MOUTH EVERY DAY 90 tablet 0    metoprolol succinate  MG Oral Tablet 24 Hr TAKE 1 TABLET BY MOUTH EVERY DAY 90 tablet 0    acetaminophen-codeine 300-60 MG Oral Tab Take 1 tablet by mouth every 8 (eight) hours as needed for Pain. 90 tablet 0    Metaxalone 800 MG Oral Tab Take 1 tablet (800 mg total) by mouth 3 (three) times daily as needed for Pain. 90 tablet 0    Solifenacin Succinate 5 MG Oral Tab Take 1 tablet (5 mg total) by mouth daily. 90 tablet 0    gabapentin 300 MG Oral Cap Take 3 capsules (900  mg total) by mouth 3 (three) times daily. 270 capsule 0    albuterol 108 (90 Base) MCG/ACT Inhalation Aero Soln Inhale 2 puffs into the lungs every 6 (six) hours as needed.      valACYclovir 1 G Oral Tab Take 1 tablet (1,000 mg total) by mouth 2 (two) times daily.      ondansetron (ZOFRAN) 4 mg tablet Take 1 tablet (4 mg total) by mouth every 8 (eight) hours as needed for Nausea. 30 tablet 2    rizatriptan (MAXALT-MLT) 10 MG Oral Tablet Dispersible Take 1 tablet (10 mg total) by mouth as needed for Migraine. 90 tablet 1    triamcinolone 0.1 % External Cream Apply topically 2 (two) times daily. To affected skin. 80 g 1    Armodafinil 250 MG Oral Tab Take 250 mg by mouth daily.      LATUDA 60 MG Oral Tab Take 1 tablet (60 mg total) by mouth daily with food.      traZODone HCl 50 MG Oral Tab Take 1 tablet (50 mg total) by mouth nightly as needed.      DULoxetine HCl 60 MG Oral Cap DR Particles Take 2 capsules (120 mg total) by mouth daily.        Side effects of medications: None    Relief obtained from medication management: good relief     Patient requires assistance with: No assistance required    Reviewed Patient History Dated: 10/25/2023 no changes noted  Patient is currently on pain medications:  Yes  [place] Prescription Monitoring review: Yes-Compliant    Physical Exam:   Vitals: /86   Pulse 78   Wt 200 lb (90.7 kg)   LMP 12/23/2014 (Exact Date)   SpO2 98%   BMI 32.28 kg/m²   VAS Pain Score:  /10    General Appearance: Well developed, well nourished, normal build, independent body habitus, no apparent physical disabilities, well groomed    Neurological Exam: WNL-Orientation to time, place and person, normal mood & effect, normal concentration & attention span  Inspection: non-antalgic, no acute distress   Radiology/Lab Test Reviewed: MRI:  CONCLUSION:     1. Aggressive vertebral hemangioma at the L2 level with bony expansion and extension into the epidural space is again noted and not significantly  changed in the interval since the previous study.   2. Hemangioma in the T11 level is also unchanged.   3. There is multilevel degenerative disc disease in lumbar spine otherwise described in detail level by level above.  Left parasagittal disc protrusion noted previously at the L3-L4 level has decreased in size          Patient educated and verbalized understanding.  Medical Decision Making:   Diagnosis:    Encounter Diagnoses   Name Primary?    Hemangioma of vertebral body Yes    Other chronic pain        Impression: patient underwent cryoablation of her hemangioma with [facility] in 11/2023, and since that time, all of her lower extremity neuropathic pain has been eliminated.  She is very pleased with her response, and has reflexively begun reducing her medications.  She is down to 1 a day of metaxalone (down from 3 a day) and and has reduced to 2 a day up to #4, down from 3 a day.  Continues to use Neurontin, unchanged, as she had been on this prior to development of her neuropathic pain for suppression of migraines.    She does need a repeat MRI 3 to 6 months out from her cryoablation, and did place order for updated MRI.  If she continues to improve, her goal would be to first eliminate her T #4, and then metaxalone.  If she had been on Neurontin prior to the development of the symptoms for migraines, will defer to primary care and neurology for long-term maintenance of her migraines.  Plan: Refilled Neurontin, no need to refill her T #4 or metaxalone at this time.  Can call in for medication when needed, and will see her back in 3 months (end of 4/2024).    No orders of the defined types were placed in this encounter.      Meds & Refills for this Visit:  Requested Prescriptions      No prescriptions requested or ordered in this encounter       Imaging & Consults:  None    The patient indicates understanding of these issues and agrees to the plan.    KELLE Elizabeth

## 2024-01-25 NOTE — PROGRESS NOTES
Patient presents in office today with reported pain in right leg, low back    Current pain level reported = 4/10    Last reported dose of T4 this morning      Narcotic Contract renewal 10/25/24    Urine Drug screen 04/06/23

## 2024-01-25 NOTE — PATIENT INSTRUCTIONS
Refill policies:    Allow 2-3 business days for refills; controlled substances may take longer.  Contact your pharmacy at least 5 days prior to running out of medication and have them send an electronic request or submit request through the “request refill” option in your [employer] account.  Refills are not addressed on weekends; covering physicians do not authorize routine medications on weekends.  No narcotics or controlled substances are refilled after noon on Fridays or by on call physicians.  By law, narcotics must be electronically prescribed.  A 30 day supply with no refills is the maximum allowed.  If your prescription is due for a refill, you may be due for a follow up appointment.  To best provide you care, patients receiving routine medications need to be seen at least once a year.  Patients receiving narcotic/controlled substance medications need to be seen at least once every 3 months.  In the event that your preferred pharmacy does not have the requested medication in stock (e.g. Backordered), it is your responsibility to find another pharmacy that has the requested medication available.  We will gladly send a new prescription to that pharmacy at your request.    Scheduling Tests:    If your physician has ordered radiology tests such as MRI or CT scans, please contact Central Scheduling at [phone] right away to schedule the test.  Once scheduled, the [facility] Centralized Referral Team will work with your insurance carrier to obtain pre-certification or prior authorization.  Depending on your insurance carrier, approval may take 3-10 days.  It is highly recommended patients assure they have received an authorization before having a test performed.  If test is done without insurance authorization, patient may be responsible for the entire amount billed.      Precertification and Prior Authorizations:  If your physician has recommended that you have a procedure or additional testing performed the [facility] hospital  Centralized Referral Team will contact your insurance carrier to obtain pre-certification or prior authorization.    You are strongly encouraged to contact your insurance carrier to verify that your procedure/test has been approved and is a COVERED benefit.  Although the [facility] Centralized Referral Team does its due diligence, the insurance carrier gives the disclaimer that \"Although the procedure is authorized, this does not guarantee payment.\"    Ultimately the patient is responsible for payment.   Thank you for your understanding in this matter.  Paperwork Completion:  If you require FMLA or disability paperwork for your recovery, please make sure to either drop it off or have it faxed to our office at [phone]. Be sure the form has your name and date of birth on it.  The form will be faxed to our Forms Department and they will complete it for you.  There is a 25$ fee for all forms that need to be filled out.  Please be aware there is a 10-14 day turnaround time.  You will need to sign a release of information (ELIAS) form if your paperwork does not come with one.  You may call the Forms Department with any questions at [phone].  Their fax number is [phone].

## 2024-02-18 ENCOUNTER — APPOINTMENT (OUTPATIENT)
Dept: GENERAL RADIOLOGY | Facility: HOSPITAL | Age: 59
End: 2024-02-18
Attending: EMERGENCY MEDICINE
Payer: COMMERCIAL

## 2024-02-18 ENCOUNTER — HOSPITAL ENCOUNTER (EMERGENCY)
Facility: HOSPITAL | Age: 59
Discharge: HOME | End: 2024-02-19
Attending: EMERGENCY MEDICINE
Payer: COMMERCIAL

## 2024-02-18 VITALS
TEMPERATURE: 98 F | SYSTOLIC BLOOD PRESSURE: 155 MMHG | BODY MASS INDEX: 31.5 KG/M2 | RESPIRATION RATE: 16 BRPM | DIASTOLIC BLOOD PRESSURE: 82 MMHG | HEART RATE: 64 BPM | HEIGHT: 66 IN | WEIGHT: 196 LBS | OXYGEN SATURATION: 94 %

## 2024-02-18 DIAGNOSIS — M54.50 LOW BACK PAIN AT MULTIPLE SITES: Primary | ICD-10-CM

## 2024-02-18 DIAGNOSIS — S32.020A COMPRESSION FRACTURE OF L2 VERTEBRA, INITIAL ENCOUNTER (HCC): ICD-10-CM

## 2024-02-18 LAB
ALBUMIN SERPL-MCNC: 4 G/DL (ref 3.4–5)
ALBUMIN/GLOB SERPL: 1.3 {RATIO} (ref 1–2)
ALP LIVER SERPL-CCNC: 118 U/L
ALT SERPL-CCNC: 22 U/L
ANION GAP SERPL CALC-SCNC: 1 MMOL/L (ref 0–18)
AST SERPL-CCNC: 18 U/L (ref 15–37)
BASOPHILS # BLD AUTO: 0.04 X10(3) UL (ref 0–0.2)
BASOPHILS NFR BLD AUTO: 0.3 %
BILIRUB SERPL-MCNC: 0.4 MG/DL (ref 0.1–2)
BUN BLD-MCNC: 19 MG/DL (ref 9–23)
CALCIUM BLD-MCNC: 9.7 MG/DL (ref 8.5–10.1)
CHLORIDE SERPL-SCNC: 107 MMOL/L (ref 98–112)
CO2 SERPL-SCNC: 29 MMOL/L (ref 21–32)
CREAT BLD-MCNC: 0.77 MG/DL
EGFRCR SERPLBLD CKD-EPI 2021: 90 ML/MIN/1.73M2 (ref 60–?)
EOSINOPHIL # BLD AUTO: 0.15 X10(3) UL (ref 0–0.7)
EOSINOPHIL NFR BLD AUTO: 1.3 %
ERYTHROCYTE [DISTWIDTH] IN BLOOD BY AUTOMATED COUNT: 13.4 %
GLOBULIN PLAS-MCNC: 3.1 G/DL (ref 2.8–4.4)
GLUCOSE BLD-MCNC: 144 MG/DL (ref 70–99)
HCT VFR BLD AUTO: 39.9 %
HGB BLD-MCNC: 12.9 G/DL
IMM GRANULOCYTES # BLD AUTO: 0.04 X10(3) UL (ref 0–1)
IMM GRANULOCYTES NFR BLD: 0.3 %
LYMPHOCYTES # BLD AUTO: 1.49 X10(3) UL (ref 1–4)
LYMPHOCYTES NFR BLD AUTO: 12.5 %
MCH RBC QN AUTO: 30 PG (ref 26–34)
MCHC RBC AUTO-ENTMCNC: 32.3 G/DL (ref 31–37)
MCV RBC AUTO: 92.8 FL
MONOCYTES # BLD AUTO: 0.33 X10(3) UL (ref 0.1–1)
MONOCYTES NFR BLD AUTO: 2.8 %
NEUTROPHILS # BLD AUTO: 9.87 X10 (3) UL (ref 1.5–7.7)
NEUTROPHILS # BLD AUTO: 9.87 X10(3) UL (ref 1.5–7.7)
NEUTROPHILS NFR BLD AUTO: 82.8 %
OSMOLALITY SERPL CALC.SUM OF ELEC: 289 MOSM/KG (ref 275–295)
PLATELET # BLD AUTO: 252 10(3)UL (ref 150–450)
POTASSIUM SERPL-SCNC: 4.1 MMOL/L (ref 3.5–5.1)
PROT SERPL-MCNC: 7.1 G/DL (ref 6.4–8.2)
RBC # BLD AUTO: 4.3 X10(6)UL
SODIUM SERPL-SCNC: 137 MMOL/L (ref 136–145)
WBC # BLD AUTO: 11.9 X10(3) UL (ref 4–11)

## 2024-02-18 PROCEDURE — 99284 EMERGENCY DEPT VISIT MOD MDM: CPT

## 2024-02-18 PROCEDURE — 72220 X-RAY EXAM SACRUM TAILBONE: CPT | Performed by: EMERGENCY MEDICINE

## 2024-02-18 PROCEDURE — 85025 COMPLETE CBC W/AUTO DIFF WBC: CPT | Performed by: EMERGENCY MEDICINE

## 2024-02-18 PROCEDURE — 80053 COMPREHEN METABOLIC PANEL: CPT | Performed by: EMERGENCY MEDICINE

## 2024-02-18 PROCEDURE — 99285 EMERGENCY DEPT VISIT HI MDM: CPT

## 2024-02-18 PROCEDURE — 72100 X-RAY EXAM L-S SPINE 2/3 VWS: CPT | Performed by: EMERGENCY MEDICINE

## 2024-02-18 PROCEDURE — 96375 TX/PRO/DX INJ NEW DRUG ADDON: CPT

## 2024-02-18 PROCEDURE — 96374 THER/PROPH/DIAG INJ IV PUSH: CPT

## 2024-02-18 RX ORDER — KETOROLAC TROMETHAMINE 15 MG/ML
15 INJECTION, SOLUTION INTRAMUSCULAR; INTRAVENOUS ONCE
Status: COMPLETED | OUTPATIENT
Start: 2024-02-18 | End: 2024-02-18

## 2024-02-18 RX ORDER — ONDANSETRON 2 MG/ML
4 INJECTION INTRAMUSCULAR; INTRAVENOUS ONCE
Status: COMPLETED | OUTPATIENT
Start: 2024-02-18 | End: 2024-02-18

## 2024-02-18 RX ORDER — HYDROMORPHONE HYDROCHLORIDE 1 MG/ML
1 INJECTION, SOLUTION INTRAMUSCULAR; INTRAVENOUS; SUBCUTANEOUS ONCE
Status: COMPLETED | OUTPATIENT
Start: 2024-02-18 | End: 2024-02-18

## 2024-02-18 RX ORDER — DIAZEPAM 5 MG/ML
5 INJECTION, SOLUTION INTRAMUSCULAR; INTRAVENOUS ONCE
Status: COMPLETED | OUTPATIENT
Start: 2024-02-18 | End: 2024-02-18

## 2024-02-19 NOTE — ED INITIAL ASSESSMENT (HPI)
Lower back pain  radiating to both legs since  Wednesday . Patient has chronic  back pain   and hemangioma to her spine . Pain is increased after massage  .

## 2024-02-19 NOTE — ED PROVIDER NOTES
Patient Seen in: [facility]      History     Chief Complaint   Patient presents with    Back Pain     Stated Complaint: back pain    Subjective:   Patient is a 58-year-old female who presents emergency room for evaluation of low back pain.  Patient has a history of chronic back pain sees a pain specialist.  Patient reports that she had a massage last week since that time has had increased pain.  She reports some paresthesias and denies any fevers no consistent loss of bowel or bladder control no saddle anesthesia no IV drug use.  She denies any direct trauma.  She reports she has had a history of a hemangioma on her back in the past.  Patient reports her home medications are not helping with pain    The history is provided by the patient.           Objective:   Past Medical History:   Diagnosis Date    Anesthesia complication     n&v    Arrhythmia     PAT    Bronchitis, not specified as acute or chronic     Depression     Extrinsic asthma, unspecified     Lipid screening 11    GIANLUCA (obstructive sleep apnea) 17-[facility]    AHI 14 Supine AHI 20 non-supine AHI 9    PONV (postoperative nausea and vomiting)     Snoring [facility] 4-8-14    AHI 4.4 SaO2 janelle 86-.5 primary snoring    Spinal hemangioma               Past Surgical History:   Procedure Laterality Date    APPENDECTOMY  08    BREAST SURGERY PROCEDURE UNLISTED      enlargement          COLONOSCOPY  09    negative    HYSTERECTOMY      total hystero.    OOPHORECTOMY Bilateral     total hystero    OTHER  2021    spinal angiogram with embolization    OTHER SURGICAL HISTORY Bilateral 2015    Procedure: ABDOMINAL HYSTERECTOMY, BSO;  Surgeon: Calixto Weathers MD;  Location:  MAIN OR                Social History     Socioeconomic History    Marital status:    Tobacco Use    Smoking status: Never    Smokeless tobacco: Never   Vaping Use    Vaping Use: Never used   Substance and  Sexual Activity    Alcohol use: Not Currently     Alcohol/week: 3.3 standard drinks of alcohol     Types: 4 Standard drinks or equivalent per week     Comment: rare    Drug use: No   Other Topics Concern    Caffeine Concern Yes     Comment: 3-4 cups    Exercise Yes     Comment: walking              Review of Systems   Musculoskeletal:  Positive for back pain.   Neurological:         Paresthesias, radicular pain       Positive for stated complaint: back pain  Other systems are as noted in HPI.  Constitutional and vital signs reviewed.      All other systems reviewed and negative except as noted above.    Physical Exam     ED Triage Vitals [02/18/24 2033]   BP (!) 179/105   Pulse 68   Resp 17   Temp 97.8 °F (36.6 °C)   Temp src Temporal   SpO2 96 %   O2 Device None (Room air)       Current:/82   Pulse 64   Temp 97.8 °F (36.6 °C) (Temporal)   Resp 16   Ht 167.6 cm (5' 6\")   Wt 88.9 kg   LMP 12/23/2014 (Exact Date)   SpO2 94%   BMI 31.64 kg/m²         Physical Exam  Vitals and nursing note reviewed.   Constitutional:       General: She is not in acute distress.     Appearance: Normal appearance. She is obese. She is not toxic-appearing.   HENT:      Head: Normocephalic and atraumatic.   Eyes:      Extraocular Movements: Extraocular movements intact.      Pupils: Pupils are equal, round, and reactive to light.   Cardiovascular:      Rate and Rhythm: Normal rate and regular rhythm.      Pulses: Normal pulses.   Pulmonary:      Effort: Pulmonary effort is normal.      Breath sounds: Normal breath sounds.   Abdominal:      General: There is no distension.      Tenderness: There is no abdominal tenderness.   Musculoskeletal:      Comments: Tenderness ovation over sacral region no bony tenderness along the lumbar spine mild tenderness of patient in the left paraspinal muscles lumbar region no rash   Skin:     Capillary Refill: Capillary refill takes less than 2 seconds.      Findings: Bruising present.    Neurological:      General: No focal deficit present.      Mental Status: She is alert and oriented to person, place, and time.   Psychiatric:         Mood and Affect: Mood normal.         Behavior: Behavior normal.               ED Course     Labs Reviewed   COMP METABOLIC PANEL (14) - Abnormal; Notable for the following components:       Result Value    Glucose 144 (*)     All other components within normal limits   CBC W/ DIFFERENTIAL - Abnormal; Notable for the following components:    WBC 11.9 (*)     Neutrophil Absolute Prelim 9.87 (*)     Neutrophil Absolute 9.87 (*)     All other components within normal limits   CBC WITH DIFFERENTIAL WITH PLATELET    Narrative:     The following orders were created for panel order CBC With Differential With Platelet.  Procedure                               Abnormality         Status                     ---------                               -----------         ------                     CBC W/ DIFFERENTIAL[[id number]]          Abnormal            Final result                 Please view results for these tests on the individual orders.             XR SACRUM + COCCYX (MIN 2 VIEWS) (CPT=72220)    Result Date: 2/18/2024  PROCEDURE:  XR SACRUM + COCCYX (MIN 2 VIEWS) (CPT=72220)  INDICATIONS:  back pain  COMPARISON:  None.  PATIENT STATED HISTORY: (As transcribed by Technologist)  Lower back pain  radiating to both legs since  Wednesday . Patient has chronic  back pain   and hemangioma to her spine . Pain is increased after massage  .    FINDINGS:  No acute displaced displaced sacrococcygeal fracture identified.  Remainder of the osseous structures are intact.            CONCLUSION:  No displaced sacrococcygeal fracture.  Remainder of the visualized osseous structures are intact.  If there is continued clinical concern, consider CT evaluation.   LOCATION:  [facility]   Dictated by (CST): London Hayden MD on 2/18/2024 at 11:32 PM     Finalized by (CST): London Hayden MD on  2/18/2024 at 11:33 PM       XR LUMBAR SPINE (MIN 2 VIEWS) (CPT=72100)    Result Date: 2/18/2024  PROCEDURE:  XR LUMBAR SPINE (MIN 2 VIEWS) (CPT=72100)  TECHNIQUE:  AP, lateral, and coned down L5-S1 views were obtained.  COMPARISON:  [facility], MR, MRI SPINE LUMBAR (CPT=72148), 10/09/2022, 8:30 AM.  [address], XR, XR LUMBAR SPINE (MIN 4 VIEWS) (CPT=72110), 1/16/2020, 9:50 AM.  INDICATIONS:  back pain  PATIENT STATED HISTORY: (As transcribed by Technologist)  Lower back pain  radiating to both legs since  Wednesday . Patient has chronic  back pain   and hemangioma to her spine . Pain is increased after massage  .    FINDINGS:  There is a compression fracture at L2, which has developed intervally.  This is the level of known hemangioma.  Otherwise vertebral body heights are maintained.  There is lumbar lordosis.  No significant lateral curvature.  Embolization coils are present  at the level of L2 bilaterally.            CONCLUSION:  Interval development of compression fracture at L2.   LOCATION:  [facility]   Dictated by (CST): London Hayden MD on 2/18/2024 at 11:28 PM     Finalized by (CST): London Hayden MD on 2/18/2024 at 11:31 PM             MDM      Social -negative tobacco, negative etoh, negative drugs  Family History-noncontributory  Past Medical History-sleep apnea, depression, spinal hemangioma    Differential diagnosis before testing included chronic back pain, muscle spasm, radicular symptoms    Co-morbidities that add to the complexity of management include: History of chronic back pain    Testing ordered during this visit included x-rays given recent trauma of the massage, baseline labs    Radiographic images  I personally reviewed the radiographs and my individual interpretation shows compression fracture  I also reviewed the official reports that showed XR SACRUM + COCCYX (MIN 2 VIEWS) (CPT=72220)    Result Date: 2/18/2024  PROCEDURE:  XR SACRUM + COCCYX (MIN 2 VIEWS) (CPT=72220)  INDICATIONS:  back  pain  COMPARISON:  None.  PATIENT STATED HISTORY: (As transcribed by Technologist)  Lower back pain  radiating to both legs since  Wednesday . Patient has chronic  back pain   and hemangioma to her spine . Pain is increased after massage  .    FINDINGS:  No acute displaced displaced sacrococcygeal fracture identified.  Remainder of the osseous structures are intact.            CONCLUSION:  No displaced sacrococcygeal fracture.  Remainder of the visualized osseous structures are intact.  If there is continued clinical concern, consider CT evaluation.   LOCATION:  [facility]   Dictated by (CST): London Hayden MD on 2/18/2024 at 11:32 PM     Finalized by (CST): London Hayden MD on 2/18/2024 at 11:33 PM       XR LUMBAR SPINE (MIN 2 VIEWS) (CPT=72100)    Result Date: 2/18/2024  PROCEDURE:  XR LUMBAR SPINE (MIN 2 VIEWS) (CPT=72100)  TECHNIQUE:  AP, lateral, and coned down L5-S1 views were obtained.  COMPARISON:  [facility], MR, MRI SPINE LUMBAR (CPT=72148), 10/09/2022, 8:30 AM.  [address], XR, XR LUMBAR SPINE (MIN 4 VIEWS) (CPT=72110), 1/16/2020, 9:50 AM.  INDICATIONS:  back pain  PATIENT STATED HISTORY: (As transcribed by Technologist)  Lower back pain  radiating to both legs since  Wednesday . Patient has chronic  back pain   and hemangioma to her spine . Pain is increased after massage  .    FINDINGS:  There is a compression fracture at L2, which has developed intervally.  This is the level of known hemangioma.  Otherwise vertebral body heights are maintained.  There is lumbar lordosis.  No significant lateral curvature.  Embolization coils are present  at the level of L2 bilaterally.            CONCLUSION:  Interval development of compression fracture at L2.   LOCATION:  [facility]   Dictated by (CST): London Hayden MD on 2/18/2024 at 11:28 PM     Finalized by (CST): London Hadyen MD on 2/18/2024 at 11:31 PM            External chart review showed review of care everywhere in [facility] system shows patient sees pain  specialist and is currently on Tylenol No. 4    History obtained by an independent source included from patient    Discussion of management with patient    Social determinants of health that affect care include patient sees a pain specialist will not change patient's home medications we will give IV pain medications while in the emergency room      Medications Provided: Tylenol, Dilaudid, Toradol, patient already started on prednisone by herself    Course of Events during Emergency Room Visit include 58-year-old female presents emergency room for back pain acute on chronic.  Patient has no direct trauma but did have massage that aggravated her symptoms.  Patient baseline labs checked and negative x-rays of the lumbar and sacral region.  Patient to follow-up with her pain specialist.  Will give IV pain medicine here pains improved with medications plan for discharge home and follow-up with primary care physician she denies any urinary symptoms.          Disposition:          Discharge  I have discussed with the patient the results of test, differential diagnosis, treatment plan, warning signs and symptoms which should prompt immediate return.  They expressed understanding of these instructions and agrees to the following plan provided.  They were given written discharge instructions and agrees to return for any concerns and voiced understanding and all questions were answered.                                      Medical Decision Making      Disposition and Plan     Clinical Impression:  1. Low back pain at multiple sites    2. Compression fracture of L2 vertebra, initial encounter ([facility])         Disposition:  Discharge  2/18/2024 11:43 pm    Follow-up:  Toni Callahan DO  [address] [address]  [address]  [facility] [address] [phone]    Schedule an appointment as soon as possible for a visit      Wade Espinosa MD  [address]  [facility] [address] [phone]    Follow up            Medications  Prescribed:  Current Discharge Medication List

## 2024-02-20 ENCOUNTER — VIRTUAL PHONE E/M (OUTPATIENT)
Dept: PAIN CLINIC | Facility: CLINIC | Age: 59
End: 2024-02-20
Payer: COMMERCIAL

## 2024-02-20 ENCOUNTER — HOSPITAL ENCOUNTER (OUTPATIENT)
Dept: MRI IMAGING | Age: 59
Discharge: HOME | End: 2024-02-20
Attending: PHYSICIAN ASSISTANT
Payer: COMMERCIAL

## 2024-02-20 DIAGNOSIS — D18.09 HEMANGIOMA OF VERTEBRAL BODY: ICD-10-CM

## 2024-02-20 DIAGNOSIS — S32.020A CLOSED COMPRESSION FRACTURE OF L2 VERTEBRA, INITIAL ENCOUNTER (HCC): Primary | ICD-10-CM

## 2024-02-20 PROCEDURE — 99213 OFFICE O/P EST LOW 20 MIN: CPT | Performed by: PHYSICIAN ASSISTANT

## 2024-02-20 PROCEDURE — A9575 INJ GADOTERATE MEGLUMI 0.1ML: HCPCS | Performed by: PHYSICIAN ASSISTANT

## 2024-02-20 PROCEDURE — 72158 MRI LUMBAR SPINE W/O & W/DYE: CPT | Performed by: PHYSICIAN ASSISTANT

## 2024-02-20 RX ORDER — GADOTERATE MEGLUMINE 376.9 MG/ML
20 INJECTION INTRAVENOUS
Status: COMPLETED | OUTPATIENT
Start: 2024-02-20 | End: 2024-02-20

## 2024-02-20 RX ADMIN — GADOTERATE MEGLUMINE 19 ML: 376.9 INJECTION INTRAVENOUS at 18:47:00

## 2024-02-20 NOTE — PROGRESS NOTES
Alesia Bland verbally consents to a Tele Visit Check-In service on 02/20/24.    Duration of Service:  20 minutes    HPI:   Alesia Bland presents with complaints of Low back pain.    The pain is described as moderate aching that is intermittent.  The patient’s activity level has remained the same since last visit.  The pain is worst in the late evening.    Changes in condition/history since last visit: patient is here today via tele visit for severe flare of pain over the past 4 days.  By way of review, she had been evaluated at [facility], and underwent cryoablation of L2 on 11/20/24.  Procedure was well tolerated and was very pleased with her response.  She had reported elimination of all of her neuropathic pain and had eventually weaned off T#4.  Again, 4 days ago had onset of severe lumbosacral pain, and was seen in the ER on 2/18/2024.  This did reveal a new L2 compression fracture, and was told to follow with neurosurgery.  She has reached out to [facility], though is yet to hear back.  She is scheduled for routine follow-up MRI in the coming months, though has not had an MRI ordered since the discovery of this new acute compression deformity.  Since the acute onset of this pain, has returned to taking Tylenol No. 4.    The following activities will increase the patient’s pain: ROM    The following activities decrease the patient’s pain: medications     Functional Assessment: Patient reports that they are able to complete all of their ADL's such as eating, bathing, using the toilet, dressing and getting up from a bed or a chair independently.    Current Medications:  Current Outpatient Medications   Medication Sig Dispense Refill    acetaminophen-codeine 300-60 MG Oral Tab Take 1 tablet by mouth every 8 (eight) hours as needed for Pain. 90 tablet 0    gabapentin 300 MG Oral Cap Take 3 capsules (900 mg total) by mouth 3 (three) times daily. 270 capsule 0    losartan 100 MG Oral Tab TAKE 1 TABLET BY MOUTH  EVERY DAY 90 tablet 0    metoprolol succinate  MG Oral Tablet 24 Hr TAKE 1 TABLET BY MOUTH EVERY DAY 90 tablet 0    Solifenacin Succinate 5 MG Oral Tab Take 1 tablet (5 mg total) by mouth daily. 90 tablet 0    gabapentin 300 MG Oral Cap Take 3 capsules (900 mg total) by mouth 3 (three) times daily. 270 capsule 0    albuterol 108 (90 Base) MCG/ACT Inhalation Aero Soln Inhale 2 puffs into the lungs every 6 (six) hours as needed.      valACYclovir 1 G Oral Tab Take 1 tablet (1,000 mg total) by mouth 2 (two) times daily.      ondansetron (ZOFRAN) 4 mg tablet Take 1 tablet (4 mg total) by mouth every 8 (eight) hours as needed for Nausea. 30 tablet 2    rizatriptan (MAXALT-MLT) 10 MG Oral Tablet Dispersible Take 1 tablet (10 mg total) by mouth as needed for Migraine. 90 tablet 1    triamcinolone 0.1 % External Cream Apply topically 2 (two) times daily. To affected skin. 80 g 1    Armodafinil 250 MG Oral Tab Take 250 mg by mouth daily.      LATUDA 60 MG Oral Tab Take 1 tablet (60 mg total) by mouth daily with food.      traZODone HCl 50 MG Oral Tab Take 1 tablet (50 mg total) by mouth nightly as needed.      DULoxetine HCl 60 MG Oral Cap DR Particles Take 2 capsules (120 mg total) by mouth daily.        Side effects of medications: None    Relief obtained from medication management: good relief     Patient requires assistance with: No assistance required    Reviewed Patient History Dated: 1/25/24 no changes noted  Patient is currently on pain medications:  Yes  [place] Prescription Monitoring review: Yes-Compliant    Physical Exam:   Vitals: [facility] 12/23/2014 (Exact Date)   VAS Pain Score:  /10    General Appearance: Well developed, well nourished, normal build, independent body habitus, no apparent physical disabilities, well groomed    Neurological Exam: WNL-Orientation to time, place and person, normal mood & effect, normal concentration & attention span  Inspection: non-antalgic, no acute distress   Radiology/Lab  Test Reviewed: MRI:  CONCLUSION:     1. Aggressive vertebral hemangioma at the L2 level with bony expansion and extension into the epidural space is again noted and not significantly changed in the interval since the previous study.   2. Hemangioma in the T11 level is also unchanged.   3. There is multilevel degenerative disc disease in lumbar spine otherwise described in detail level by level above.  Left parasagittal disc protrusion noted previously at the L3-L4 level has decreased in size          Patient educated and verbalized understanding.  Medical Decision Making:   Diagnosis:    Encounter Diagnosis   Name Primary?    Closed compression fracture of L2 vertebra, initial encounter ([facility]) Yes         Impression: patient underwent cryoablation of her hemangioma with [facility] in 11/2023, and since that time, had significant improvement in pain.  As such, she had been reducing her T#4, and had just weaned off it without adverse effects when, 4 days ago, had acute onset of left lumbosacral pain.  Seen in the ER, and x-ray did reveal a new fracture of the L2.  She has since returned taking Tylenol #4 with codeine.   Given the newfound compression deformity of the L2, would asked that she move up her MRI to as soon as possible.  My fear is that the hemangioma had already been causing the vertebrae to expand in a dorsal vector, creating narrowing of the central canal.  If there are further retropulsed fragments after an acute fracture, this would limit our ability to consider a kyphoplasty.  If that is the case, she may need to follow-up with neurosurgery/spine surgery, or even return to [facility].  She states that she is already reached out to [facility], though has not heard back from them yet.  Plan: Asked that she move up her MRI, ideally in the next day or 2.  If there is an acute/subacute compression deformity of L2 without retropulsion, could give consideration to kyphoplasty, though given the morphology  of her hemangioma, I think it is likely that there is meaningful central canal stenosis as a result of this fracture.  She did request time off work, and I did write her a letter (see communications) to be off until the end of the week.  She has resumed taking Tylenol No. 4, and can call in for refill if needed.    No orders of the defined types were placed in this encounter.      Meds & Refills for this Visit:  Requested Prescriptions      No prescriptions requested or ordered in this encounter       Imaging & Consults:  None    The patient indicates understanding of these issues and agrees to the plan.    KELLE Elizabeth

## 2024-02-22 ENCOUNTER — TELEPHONE (OUTPATIENT)
Dept: SURGERY | Facility: CLINIC | Age: 59
End: 2024-02-22

## 2024-02-22 NOTE — TELEPHONE ENCOUNTER
Discussed with Dr. Bragg.     He has requested that I have the patient's apt cancelled, as the [facility] providers are not accepting new patients for kyphoplasty consultations.     According to chart review, she has been seen by pain management, Dr. Jacome, who advised the patient to follow up with her treatment team at [facility].     Please advise patient.    Thank you!

## 2024-02-27 ENCOUNTER — TELEPHONE (OUTPATIENT)
Dept: PAIN CLINIC | Facility: CLINIC | Age: 59
End: 2024-02-27

## 2024-02-27 DIAGNOSIS — M79.604 RIGHT LEG PAIN: ICD-10-CM

## 2024-02-27 DIAGNOSIS — G89.29 CHRONIC RIGHT-SIDED LOW BACK PAIN WITHOUT SCIATICA: ICD-10-CM

## 2024-02-27 DIAGNOSIS — F11.90 CHRONIC, CONTINUOUS USE OF OPIOIDS: ICD-10-CM

## 2024-02-27 DIAGNOSIS — M54.50 CHRONIC RIGHT-SIDED LOW BACK PAIN WITHOUT SCIATICA: ICD-10-CM

## 2024-02-27 RX ORDER — ACETAMINOPHEN AND CODEINE PHOSPHATE 300; 60 MG/1; MG/1
1 TABLET ORAL EVERY 8 HOURS PRN
Qty: 90 TABLET | Refills: 0 | Status: CANCELLED | OUTPATIENT
Start: 2024-02-27 | End: 2024-03-28

## 2024-02-27 NOTE — TELEPHONE ENCOUNTER
Patient calling to request refill of acetaminophen-codeine 300-60 MG Oral Tab   Pharmacy [facility] [id number] - [place], [place] - [address] [place] [address] [phone], [phone] [[id number]]   Patient will  script tomorrow.   Patient informed of 48 hour refill policy excluding weekends and holidays.  Informed patient prescription is sent directly to pharmacy.    Further explained patient will not receive a call back once prescription is ready.

## 2024-02-27 NOTE — TELEPHONE ENCOUNTER
Contacted pt at this time and advised that she's not due for refill until March 14. Per pt 'I know that but Rob knows I'm taking extra pills to be able to work and function. Pt states that she has fracture and 1 tab is not helping the pain. Advised pt that there's no documentation on Rob's note stating that she can increase the medication. Pt states that she's unable to walk and almost pee on the floor due to pain.without taking extra medication,she will not able to work.Asked how many she's taking pt states total of 9 tablets a day. Advised pt that we will reach out to our provider for recommendation. Pt Vu and no further questions at this tiime.

## 2024-02-28 NOTE — TELEPHONE ENCOUNTER
PA treating patient at [facility] calling to discuss patient's medication. Endorsed to RN.

## 2024-02-28 NOTE — TELEPHONE ENCOUNTER
Received a call from Serena Win from [facility], states that pt will be having procedure with them and pt is out of medication and will be needing some pain medication. Advised Serena that  recommendation is to reach out to pt's pcp to ask for short course of pain medication. Per Serena,she has no problem providing her pain medication but wanted to make sure she's not compromising patient's Narcotic contract. Advised Serena as of now pt's contract is suspended due to violations but this needs to be discuss with patient in her in office visit. Serena vu and no further questions and states that she will be providing the medication.

## 2024-02-28 NOTE — TELEPHONE ENCOUNTER
Rosie Hernandez, BRE  Ty, Chel, RN17 hours ago (3:31 PM)       I am not comfortable refilling this and she should be seen with an acute exacerbation of her pain.    Rosie

## 2024-02-28 NOTE — TELEPHONE ENCOUNTER
Per Dr. Jacome, narcotic contract is suspended at this time d/t contract violation. Pt would need to be seen in office to discuss reinstating this contract, however he does recommend that she continue to be managed through [facility] going forward.     Contacted pt to relay info above. Pt VU and states she has an OV scheduled w/ Rob after her procedure w/ [place]. Advised pt appt notes will be updated for that appt.

## 2024-02-28 NOTE — TELEPHONE ENCOUNTER
D/w Dr. Jacome who states he is unable to see pt today and he is off on Friday. Dr. Jacome recommends requesting a short course of medication from PCP to take during her trip to [place]. Dr. Jacome also recommends having [facility] paperwork completed through [facility].

## 2024-02-28 NOTE — TELEPHONE ENCOUNTER
Jeronimo Jacome MD  You2 minutes ago (11:09 AM)       Monday     Contacted pt to offer an appt on Monday and pt states she will be in [place] b/c she has a procedure on Tuesday. Advised pt that this was not relayed during our previous conversation. Pt states she is sure she advised she would not be available Monday. Advised pt that a msg would be sent to Dr. Jacome asking if he could fit her in on either today or Monday, indicating that he would be in the lab in the AM on Monday and in the office in the afternoon. Advised pt that another msg would be sent to Dr. Jacome.

## 2024-02-28 NOTE — TELEPHONE ENCOUNTER
D/W Dr. Jacome who states this request is too early and will not be refilled. No documentation that dosage was increased to 3 tabs TID. Per Dr. Jacome, pt can speak w/ PCP for acute exacerbation of pain, pt can be seen in the ED, strongly recommends that pt return to [facility], can see Neurosurgery, however unlikely they will be able to provide any interventions based on the level of retropulsion.     Contacted pt to discuss, who confirms that she has increase her medication \"up to\" 3 tabs TID, but she was not taking that dosage every day. Advised pt that our providers are not comfortable refilling her medication at this time and that there is no documentation showing that Rob was made aware of this increase in T#4 intake. Pt complains that she will have no meds available for her 7 hr drive to [facility]. Pt states she can't function w/out medication and now she only has 3 tabs left. Advised pt that it is a contract violation to increase narcotic dosage without speaking to a provider. Pt states she sent a  msg indicating she had taken 2 Oxy's and no one responded to her about that. Advised pt that  msgs go between the RN and the providers and Rob apparently did not see that portion of her msg b/c this is also a contract violation. Advised pt that any changes to medication need to be discussed w/ a provider and should be initiated by phone at the very least. Informed pt that narcotic medications are not to be increased without instruction and if pain is not managed by taking medication as prescribed, pt should contact our office. Further advised that this is all included in her narcotic contract. 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 Advised pt there should be no argument about these protocols as she should be aware. Pt states this is a discussion, not an argument. Pt states she was scheduled to see Rob on 2/28/24 and her appt was cxl'd b/c provider is not available. Advised pt that we attempted to reschedule all of  Rob's pts and we cannot control the situation w/ him being out of the office. Pt states this is pt abandonment in her opinion and asks if she can be seen by a provider ASAP to be evaluated. Advised pt that Dr. Jacome has reviewed her imaging and is unable to offer any interventions d/t the level of retropulsion. Pt states she knows he cannot do a kypho, she wants to discuss pain management. Advised pt that Dr. Jacome recommends speaking to her PCP for acute exacerbation of pain or being seen in the ED. Pt states she was seen in the ED and they told her to see her pain management physician for meds. Advised pt that a request will be sent to Dr. Jacome, however he is out of the office on Friday and Monday AM and he is completely booked. Advised it is to Dr. Jacome's discretion if he can fit her in for an in-person visit. Pt states she has FMLA and STD paperwork she was told to drop off, however it requires a physical exam that entails lifting, bending, etc. Advised that must be performed by a physical therapist as an FCE and that portion of paperwork cannot be completed by our providers. Further advised we refer to [employer] for FCE and do not believe it requires an order. Pt FRANCISCO.

## 2024-03-08 ENCOUNTER — OFFICE VISIT (OUTPATIENT)
Dept: PAIN CLINIC | Facility: CLINIC | Age: 59
End: 2024-03-08
Payer: COMMERCIAL

## 2024-03-08 VITALS — SYSTOLIC BLOOD PRESSURE: 136 MMHG | OXYGEN SATURATION: 97 % | DIASTOLIC BLOOD PRESSURE: 82 MMHG | HEART RATE: 82 BPM

## 2024-03-08 DIAGNOSIS — F11.90 CHRONIC, CONTINUOUS USE OF OPIOIDS: ICD-10-CM

## 2024-03-08 DIAGNOSIS — G89.29 CHRONIC RIGHT-SIDED LOW BACK PAIN WITHOUT SCIATICA: ICD-10-CM

## 2024-03-08 DIAGNOSIS — M54.50 CHRONIC RIGHT-SIDED LOW BACK PAIN WITHOUT SCIATICA: ICD-10-CM

## 2024-03-08 DIAGNOSIS — M79.604 RIGHT LEG PAIN: ICD-10-CM

## 2024-03-08 PROCEDURE — 3079F DIAST BP 80-89 MM HG: CPT | Performed by: PHYSICIAN ASSISTANT

## 2024-03-08 PROCEDURE — 99214 OFFICE O/P EST MOD 30 MIN: CPT | Performed by: PHYSICIAN ASSISTANT

## 2024-03-08 PROCEDURE — 3075F SYST BP GE 130 - 139MM HG: CPT | Performed by: PHYSICIAN ASSISTANT

## 2024-03-08 RX ORDER — METHYLPREDNISOLONE 4 MG/1
1 TABLET ORAL AS DIRECTED
COMMUNITY
Start: 2023-11-22

## 2024-03-08 RX ORDER — ALPRAZOLAM 0.25 MG/1
TABLET ORAL
COMMUNITY
Start: 2023-10-10

## 2024-03-08 RX ORDER — ACETAMINOPHEN AND CODEINE PHOSPHATE 300; 60 MG/1; MG/1
TABLET ORAL
COMMUNITY
Start: 2023-01-01

## 2024-03-08 RX ORDER — PREDNISONE 20 MG/1
TABLET ORAL
COMMUNITY
Start: 2024-02-15

## 2024-03-08 RX ORDER — OXYCODONE HYDROCHLORIDE 10 MG/1
10 TABLET ORAL
COMMUNITY
Start: 2024-03-05

## 2024-03-08 RX ORDER — ACETAMINOPHEN AND CODEINE PHOSPHATE 300; 60 MG/1; MG/1
1 TABLET ORAL EVERY 6 HOURS PRN
Qty: 28 TABLET | Refills: 0 | Status: SHIPPED | OUTPATIENT
Start: 2024-03-08 | End: 2024-03-15

## 2024-03-08 RX ORDER — OXYCODONE HYDROCHLORIDE 5 MG/1
TABLET ORAL
COMMUNITY
Start: 2023-11-22

## 2024-03-08 NOTE — PROGRESS NOTES
HPI:   Alesia Bland presents with complaints of Low back pain.    The pain is described as moderate aching that is intermittent.  The patient’s activity level has remained the same since last visit.  The pain is worst in the late evening.    Changes in condition/history since last visit: patient is here today for follow up, having been seen at [facility] for L2 kyphoplasty with bone jack on 3/5/24.  Procedure was well tolerated, and had no adverse effects.  Overall, reports gradual improvement, and at this time, is 60% improved.  She had been placed on oxycodone, to less significant relief than T#3.  She has since returned to T#3, initially, up to 6/day.  Here today to discuss weaning.      Last procedure:  L2 kypho (at [facility]) Date:  3/5/24    % relief:  60%, though continues to slowly improve    Duration:  sustained     The following activities will increase the patient’s pain: ROM    The following activities decrease the patient’s pain: medications     Functional Assessment: Patient reports that they are able to complete all of their ADL's such as eating, bathing, using the toilet, dressing and getting up from a bed or a chair independently.    Current Medications:  Current Outpatient Medications   Medication Sig Dispense Refill    ALPRAZolam 0.25 MG Oral Tab TAKE HALF TAB TO 1 TAB BY MOUTH 2 TIMES PER DAY IF NEEDED      acetaminophen-codeine 300-60 MG Oral Tab Take 1 tablet by mouth every 8 (eight) hours as needed for Pain. 90 tablet 0    losartan 100 MG Oral Tab TAKE 1 TABLET BY MOUTH EVERY DAY 90 tablet 0    metoprolol succinate  MG Oral Tablet 24 Hr TAKE 1 TABLET BY MOUTH EVERY DAY 90 tablet 0    Solifenacin Succinate 5 MG Oral Tab Take 1 tablet (5 mg total) by mouth daily. 90 tablet 0    gabapentin 300 MG Oral Cap Take 3 capsules (900 mg total) by mouth 3 (three) times daily. 270 capsule 0    albuterol 108 (90 Base) MCG/ACT Inhalation Aero Soln Inhale 2 puffs into the lungs every 6 (six) hours  as needed.      ondansetron (ZOFRAN) 4 mg tablet Take 1 tablet (4 mg total) by mouth every 8 (eight) hours as needed for Nausea. 30 tablet 2    rizatriptan (MAXALT-MLT) 10 MG Oral Tablet Dispersible Take 1 tablet (10 mg total) by mouth as needed for Migraine. 90 tablet 1    triamcinolone 0.1 % External Cream Apply topically 2 (two) times daily. To affected skin. 80 g 1    Armodafinil 250 MG Oral Tab Take 250 mg by mouth daily.      LATUDA 60 MG Oral Tab Take 1 tablet (60 mg total) by mouth daily with food.      traZODone HCl 50 MG Oral Tab Take 1 tablet (50 mg total) by mouth nightly as needed.      DULoxetine HCl 60 MG Oral Cap DR Particles Take 2 capsules (120 mg total) by mouth daily.      acetaminophen-codeine 300-60 MG Oral Tab  (Patient not taking: Reported on 3/8/2024)      predniSONE 20 MG Oral Tab TAKE FOUR TABLETS BY MOUTH DAILY FOR 6 DAYS (Patient not taking: Reported on 3/8/2024)      oxyCODONE 5 MG Oral Tab  (Patient not taking: Reported on 3/8/2024)      oxyCODONE 10 MG Oral Tab Take 1 tablet (10 mg total) by mouth. (Patient not taking: Reported on 3/8/2024)      methylPREDNISolone 4 MG Oral Tablet Therapy Pack Take 1 tablet (4 mg total) by mouth As Directed. (Patient not taking: Reported on 3/8/2024)      gabapentin 300 MG Oral Cap Take 3 capsules (900 mg total) by mouth 3 (three) times daily. (Patient not taking: Reported on 3/8/2024) 270 capsule 0    valACYclovir 1 G Oral Tab Take 1 tablet (1,000 mg total) by mouth 2 (two) times daily. (Patient not taking: Reported on 3/8/2024)        Side effects of medications: None    Relief obtained from medication management: good relief     Patient requires assistance with: No assistance required    Reviewed Patient History Dated: 2/20/24 no changes noted  Patient is currently on pain medications:  Yes  [place] Prescription Monitoring review: Yes-Compliant    Physical Exam:   Vitals: /82 (BP Location: Right arm, Patient Position: Sitting)   Pulse 82    LMP 12/23/2014 (Exact Date)   SpO2 97%   VAS Pain Score:  5/10    General Appearance: Well developed, well nourished, normal build, independent body habitus, no apparent physical disabilities, well groomed    Neurological Exam: WNL-Orientation to time, place and person, normal mood & effect, normal concentration & attention span  Inspection: non-antalgic, no acute distress   Radiology/Lab Test Reviewed: MRI:  CONCLUSION:     1. Aggressive vertebral hemangioma at the L2 level with bony expansion and extension into the epidural space is again noted and not significantly changed in the interval since the previous study.   2. Hemangioma in the T11 level is also unchanged.   3. There is multilevel degenerative disc disease in lumbar spine otherwise described in detail level by level above.  Left parasagittal disc protrusion noted previously at the L3-L4 level has decreased in size          Patient educated and verbalized understanding.  Medical Decision Making:   Diagnosis:    Encounter Diagnoses   Name Primary?    Chronic right-sided low back pain without sciatica     Right leg pain     Chronic, continuous use of opioids      Impression: patient underwent cryoablation of her hemangioma with [facility] in 11/2023, and since that time, had significant improvement in pain.  As such, she had been reducing her T#4, and had just weaned off it without adverse effects when, 4 days later, had acute onset of left lumbosacral pain.  Seen in the ER, and x-ray did reveal a new fracture of the L2.  She had returned to taking Tylenol #4 with codeine, and after MRI, returned to [facility].  3 days ago, underwent kyphoplasty with spine heena, from which, she has already begun to have significant relief of pain.    While at [place], she was given oxycodone, though did not find it to be as effective as Tylenol with codeine, and returned to T#4.  She had been using 6/day, though is ready to begin weaning again.    Plan: Status post L2  kyphoplasty with spine heena at [facility] on 3/5/2024, and is already making significant progress.  Had been using Tylenol with codeine up to 6 a day, though is ready to begin weaning again.  Give her a week supply at 4 a day, and if she is ready to continue weaning, can simply call in and we will gradually reduce her Tylenol with codeine until off.  If she is running into any issues with weaning, follow-up in office for discussion of options.  No orders of the defined types were placed in this encounter.      Meds & Refills for this Visit:  Requested Prescriptions      No prescriptions requested or ordered in this encounter       Imaging & Consults:  None    The patient indicates understanding of these issues and agrees to the plan.    KELLE Elizabeth

## 2024-03-08 NOTE — PATIENT INSTRUCTIONS
Refill policies:    Allow 2-3 business days for refills; controlled substances may take longer.  Contact your pharmacy at least 5 days prior to running out of medication and have them send an electronic request or submit request through the “request refill” option in your [employer] account.  Refills are not addressed on weekends; covering physicians do not authorize routine medications on weekends.  No narcotics or controlled substances are refilled after noon on Fridays or by on call physicians.  By law, narcotics must be electronically prescribed.  A 30 day supply with no refills is the maximum allowed.  If your prescription is due for a refill, you may be due for a follow up appointment.  To best provide you care, patients receiving routine medications need to be seen at least once a year.  Patients receiving narcotic/controlled substance medications need to be seen at least once every 3 months.  In the event that your preferred pharmacy does not have the requested medication in stock (e.g. Backordered), it is your responsibility to find another pharmacy that has the requested medication available.  We will gladly send a new prescription to that pharmacy at your request.    Scheduling Tests:    If your physician has ordered radiology tests such as MRI or CT scans, please contact Central Scheduling at [phone] right away to schedule the test.  Once scheduled, the [facility] Centralized Referral Team will work with your insurance carrier to obtain pre-certification or prior authorization.  Depending on your insurance carrier, approval may take 3-10 days.  It is highly recommended patients assure they have received an authorization before having a test performed.  If test is done without insurance authorization, patient may be responsible for the entire amount billed.      Precertification and Prior Authorizations:  If your physician has recommended that you have a procedure or additional testing performed the [facility]  Centralized Referral Team will contact your insurance carrier to obtain pre-certification or prior authorization.    You are strongly encouraged to contact your insurance carrier to verify that your procedure/test has been approved and is a COVERED benefit.  Although the [facility] Centralized Referral Team does its due diligence, the insurance carrier gives the disclaimer that \"Although the procedure is authorized, this does not guarantee payment.\"    Ultimately the patient is responsible for payment.   Thank you for your understanding in this matter.  Paperwork Completion:  If you require FMLA or disability paperwork for your recovery, please make sure to either drop it off or have it faxed to our office at [phone]. Be sure the form has your name and date of birth on it.  The form will be faxed to our Forms Department and they will complete it for you.  There is a 25$ fee for all forms that need to be filled out.  Please be aware there is a 10-14 day turnaround time.  You will need to sign a release of information (ELIAS) form if your paperwork does not come with one.  You may call the Forms Department with any questions at [phone].  Their fax number is [phone].

## 2024-03-08 NOTE — PROGRESS NOTES
Pt here for a follow up post kypho. Pt reported some improvement, 40% improvement    Here to discuss narcotic contract    Current pain level reported = 5/10  Last reported dose of Tylenol-Codine, Gabapentin this morning    Narcotic Contract renewal 10/25/24  Urine Drug screen 2/13/24

## 2024-03-21 ENCOUNTER — PATIENT MESSAGE (OUTPATIENT)
Dept: PAIN CLINIC | Facility: CLINIC | Age: 59
End: 2024-03-21

## 2024-03-21 DIAGNOSIS — F11.90 CHRONIC, CONTINUOUS USE OF OPIOIDS: ICD-10-CM

## 2024-03-21 DIAGNOSIS — G89.29 CHRONIC RIGHT-SIDED LOW BACK PAIN WITHOUT SCIATICA: ICD-10-CM

## 2024-03-21 DIAGNOSIS — M54.50 CHRONIC RIGHT-SIDED LOW BACK PAIN WITHOUT SCIATICA: ICD-10-CM

## 2024-03-21 DIAGNOSIS — M79.604 RIGHT LEG PAIN: ICD-10-CM

## 2024-03-21 RX ORDER — ACETAMINOPHEN AND CODEINE PHOSPHATE 300; 60 MG/1; MG/1
1 TABLET ORAL EVERY 12 HOURS PRN
Qty: 28 TABLET | Refills: 0 | Status: SHIPPED | OUTPATIENT
Start: 2024-03-21 | End: 2024-04-04

## 2024-03-21 RX ORDER — TIZANIDINE 2 MG/1
2 TABLET ORAL EVERY 8 HOURS PRN
Qty: 21 TABLET | Refills: 0 | Status: SHIPPED | OUTPATIENT
Start: 2024-03-21

## 2024-03-21 NOTE — TELEPHONE ENCOUNTER
From: Alesia Bland  To: Rob Arrieta  Sent: 3/21/2024 12:23 PM CDT  Subject: Ongoing pain    Dr Pelayo can you please call me at your convenience at [phone]. As we discussed as to how to reach you about my condition. I have 2 or 3 related questions. Thx in advance.

## 2024-03-21 NOTE — TELEPHONE ENCOUNTER
Has reduced her Tylenol with codeine down to 2 a day, looking for refill.  Did provide her with a 2-week supply.  Does not feel as if metaxalone has been effective, looking for a brief change to an alternative.  Finds Flexeril too sedating, did offer a 1 week supply of Zanaflex at 2 mg dosing.  Order sent.

## 2024-03-29 DIAGNOSIS — G89.29 CHRONIC RIGHT-SIDED LOW BACK PAIN WITHOUT SCIATICA: ICD-10-CM

## 2024-03-29 DIAGNOSIS — M79.604 RIGHT LEG PAIN: ICD-10-CM

## 2024-03-29 DIAGNOSIS — F11.90 CHRONIC, CONTINUOUS USE OF OPIOIDS: ICD-10-CM

## 2024-03-29 DIAGNOSIS — M54.50 CHRONIC RIGHT-SIDED LOW BACK PAIN WITHOUT SCIATICA: ICD-10-CM

## 2024-03-29 NOTE — TELEPHONE ENCOUNTER
Medication:   tiZANidine 2 MG Oral Tab   Date of last refill: 3/21/24    Medication:   acetaminophen-codeine 300-60 MG Oral Tab   Date of last refill: 3/21/24  [place] 3/21/24    Last office visit: 3/8/24  Due back to clinic per last office note:  NA  Date next office visit scheduled:  NA    Last URINE Screen: 2/13/24  Screen Results:  Active, See chart      Narcotic Contract EXPIRATION date: 10/25/24    Last OV note recommendation:   Medical Decision Making:   Diagnosis:         Encounter Diagnoses   Name Primary?    Chronic right-sided low back pain without sciatica      Right leg pain      Chronic, continuous use of opioids        Impression: patient underwent cryoablation of her hemangioma with [facility] in 11/2023, and since that time, had significant improvement in pain.  As such, she had been reducing her T#4, and had just weaned off it without adverse effects when, 4 days later, had acute onset of left lumbosacral pain.  Seen in the ER, and x-ray did reveal a new fracture of the L2.  She had returned to taking Tylenol #4 with codeine, and after MRI, returned to [facility].  3 days ago, underwent kyphoplasty with spine heena, from which, she has already begun to have significant relief of pain.    While at [place], she was given oxycodone, though did not find it to be as effective as Tylenol with codeine, and returned to T#4.  She had been using 6/day, though is ready to begin weaning again.    Plan: Status post L2 kyphoplasty with spine heena at [facility] on 3/5/2024, and is already making significant progress.  Had been using Tylenol with codeine up to 6 a day, though is ready to begin weaning again.  Give her a week supply at 4 a day, and if she is ready to continue weaning, can simply call in and we will gradually reduce her Tylenol with codeine until off.  If she is running into any issues with weaning, follow-up in office for discussion of options.  No orders of the  defined types were placed in this encounter.        Meds & Refills for this Visit:  Requested Prescriptions        No prescriptions requested or ordered in this encounter         Imaging & Consults:  None     The patient indicates understanding of these issues and agrees to the plan.     KELLE Elizabeth

## 2024-04-01 RX ORDER — ACETAMINOPHEN AND CODEINE PHOSPHATE 300; 60 MG/1; MG/1
1 TABLET ORAL EVERY 12 HOURS PRN
Qty: 28 TABLET | Refills: 0 | Status: SHIPPED | OUTPATIENT
Start: 2024-04-01 | End: 2024-04-15

## 2024-04-01 RX ORDER — TIZANIDINE 2 MG/1
2 TABLET ORAL EVERY 8 HOURS PRN
Qty: 21 TABLET | Refills: 0 | Status: SHIPPED | OUTPATIENT
Start: 2024-04-01

## 2024-04-15 DIAGNOSIS — I10 ESSENTIAL HYPERTENSION: ICD-10-CM

## 2024-04-15 RX ORDER — METOPROLOL SUCCINATE 100 MG/1
100 TABLET, EXTENDED RELEASE ORAL DAILY
Qty: 30 TABLET | Refills: 0 | Status: SHIPPED | OUTPATIENT
Start: 2024-04-15

## 2024-04-15 RX ORDER — LOSARTAN POTASSIUM 100 MG/1
100 TABLET ORAL DAILY
Qty: 30 TABLET | Refills: 0 | Status: SHIPPED | OUTPATIENT
Start: 2024-04-15

## 2024-04-23 ENCOUNTER — OFFICE VISIT (OUTPATIENT)
Dept: PAIN CLINIC | Facility: CLINIC | Age: 59
End: 2024-04-23
Payer: COMMERCIAL

## 2024-04-23 VITALS — OXYGEN SATURATION: 94 % | HEART RATE: 72 BPM | DIASTOLIC BLOOD PRESSURE: 78 MMHG | SYSTOLIC BLOOD PRESSURE: 132 MMHG

## 2024-04-23 DIAGNOSIS — M54.50 CHRONIC RIGHT-SIDED LOW BACK PAIN WITHOUT SCIATICA: ICD-10-CM

## 2024-04-23 DIAGNOSIS — G89.29 CHRONIC RIGHT-SIDED LOW BACK PAIN WITHOUT SCIATICA: ICD-10-CM

## 2024-04-23 DIAGNOSIS — M79.604 RIGHT LEG PAIN: ICD-10-CM

## 2024-04-23 PROCEDURE — 3075F SYST BP GE 130 - 139MM HG: CPT | Performed by: PHYSICIAN ASSISTANT

## 2024-04-23 PROCEDURE — 99214 OFFICE O/P EST MOD 30 MIN: CPT | Performed by: PHYSICIAN ASSISTANT

## 2024-04-23 PROCEDURE — 3078F DIAST BP <80 MM HG: CPT | Performed by: PHYSICIAN ASSISTANT

## 2024-04-23 RX ORDER — METAXALONE 800 MG/1
800 TABLET ORAL 3 TIMES DAILY PRN
Qty: 90 TABLET | Refills: 0 | Status: SHIPPED | OUTPATIENT
Start: 2024-04-23 | End: 2024-05-23

## 2024-04-23 RX ORDER — GABAPENTIN 300 MG/1
900 CAPSULE ORAL 3 TIMES DAILY
Qty: 270 CAPSULE | Refills: 0 | Status: SHIPPED | OUTPATIENT
Start: 2024-04-23

## 2024-04-23 NOTE — PROGRESS NOTES
HPI:   Alesia Bland presents with complaints of B groin and anterior thigh pain.    The pain is described as moderate aching that is intermittent.  The patient’s activity level has remained the same since last visit.  The pain is worst in the late evening.    Changes in condition/history since last visit: patient is here today for follow up to discuss medication weaning.  She had been seen at [facility] for L2 kyphoplasty with bone jack on 3/5/24.  Procedure was well tolerated, and had no adverse effects.  Overall, reports gradual improvement, and has been reducing her T#4.  She is down to 2/day, just refilled 4/19/24, and states that while well tolerated, she has had progressive increase in B anterior thigh pain.  LBP is largely eliminated.      Last procedure:  L2 kypho (at [facility]) Date:  3/5/24    % relief:  60%, though continues to slowly improve    Duration:  sustained     The following activities will increase the patient’s pain: ROM    The following activities decrease the patient’s pain: medications     Functional Assessment: Patient reports that they are able to complete all of their ADL's such as eating, bathing, using the toilet, dressing and getting up from a bed or a chair independently.    Current Medications:  Current Outpatient Medications   Medication Sig Dispense Refill    acetaminophen-codeine 300-60 MG Oral Tab TAKE ONE TABLET BY MOUTH EVERY 12 HOURS AS NEEDED FOR PAIN 30 tablet 0    losartan 100 MG Oral Tab TAKE 1 TABLET BY MOUTH EVERY DAY 30 tablet 0    metoprolol succinate  MG Oral Tablet 24 Hr TAKE 1 TABLET BY MOUTH EVERY DAY 30 tablet 0    tiZANidine 2 MG Oral Tab Take 1 tablet (2 mg total) by mouth every 8 (eight) hours as needed. 21 tablet 0    gabapentin 300 MG Oral Cap Take 3 capsules (900 mg total) by mouth 3 (three) times daily. 270 capsule 0    Solifenacin Succinate 5 MG Oral Tab Take 1 tablet (5 mg total) by mouth daily. 90 tablet 0    gabapentin 300 MG Oral Cap Take 3  capsules (900 mg total) by mouth 3 (three) times daily. 270 capsule 0    albuterol 108 (90 Base) MCG/ACT Inhalation Aero Soln Inhale 2 puffs into the lungs every 6 (six) hours as needed.      ondansetron (ZOFRAN) 4 mg tablet Take 1 tablet (4 mg total) by mouth every 8 (eight) hours as needed for Nausea. 30 tablet 2    rizatriptan (MAXALT-MLT) 10 MG Oral Tablet Dispersible Take 1 tablet (10 mg total) by mouth as needed for Migraine. 90 tablet 1    triamcinolone 0.1 % External Cream Apply topically 2 (two) times daily. To affected skin. 80 g 1    Armodafinil 250 MG Oral Tab Take 250 mg by mouth daily.      LATUDA 60 MG Oral Tab Take 1 tablet (60 mg total) by mouth daily with food.      traZODone HCl 50 MG Oral Tab Take 1 tablet (50 mg total) by mouth nightly as needed.      DULoxetine HCl 60 MG Oral Cap DR Particles Take 2 capsules (120 mg total) by mouth daily.        Side effects of medications: None    Relief obtained from medication management: good relief     Patient requires assistance with: No assistance required    Reviewed Patient History Dated: 3/8/24 no changes noted  Patient is currently on pain medications:  Yes  [place] Prescription Monitoring review: Yes-Compliant    Physical Exam:   Vitals: /78 (BP Location: Right arm, Patient Position: Sitting, Cuff Size: adult)   Pulse 72   LMP 12/23/2014 (Exact Date)   SpO2 94%   VAS Pain Score:  5/10    General Appearance: Well developed, well nourished, normal build, independent body habitus, no apparent physical disabilities, well groomed    Neurological Exam: WNL-Orientation to time, place and person, normal mood & effect, normal concentration & attention span  Inspection: non-antalgic, no acute distress   Radiology/Lab Test Reviewed: MRI:  CONCLUSION:     1. Aggressive vertebral hemangioma at the L2 level with bony expansion and extension into the epidural space is again noted and not significantly changed in the interval since the previous study.    2. Hemangioma in the T11 level is also unchanged.   3. There is multilevel degenerative disc disease in lumbar spine otherwise described in detail level by level above.  Left parasagittal disc protrusion noted previously at the L3-L4 level has decreased in size          Patient educated and verbalized understanding.  Medical Decision Making:   Diagnosis:    Encounter Diagnoses   Name Primary?    Chronic right-sided low back pain without sciatica     Right leg pain        Impression: patient underwent cryoablation of her hemangioma with [facility] in 11/2023, after which, had significant improvement in pain.  As such, she had been reducing her T#4, and had just weaned off it without adverse effects when, 4 days later, had acute onset of left lumbosacral pain.  Seen in the ER, and x-ray did reveal a new fracture of the L2.  She had returned to taking Tylenol #4 with codeine, and after MRI, returned to [facility] where she underwent kyphoplasty with spine heena, from which, she has had improvement in pain.    While at [place], she was given oxycodone, though did not find it to be as effective as Tylenol with codeine, and returned to T#4.  She had been using 6/day, though has since been slowy weaning, and is down to 2/day.  While her low back pain at this point has been largely resolved, she has unfortunately had progressive increase in bilateral lower extremity pain along the groin and anterior thighs, stopping at the knees.  This is been associated with a significant reduction in her ADL tolerance, and is asking to return to metaxalone, having been on this for a number of years prior to weaning.  Plan: Status post L2 kyphoplasty with spine heena at [facility] on 3/5/2024, and while low back pain is largely eliminated, has had progressive increasing groin and anterior thigh pain, limiting her ADLs.  Has reduced Tylenol with codeine from 6/day to 2/day, though given the increasing lower extremity complaints, wishes to  hold on further weaning at this time.  Will put her back on metaxalone, have not been on this previously for years.  In addition, asked that she let her physicians at [facility] and know of her new onset lower extremity issues, and I am sure they will ask her to update her MRI.  If they do not, contact clinic, and I will be happy to place an order for an MRI myself.  Follow up after imaging.    No orders of the defined types were placed in this encounter.      Meds & Refills for this Visit:  Requested Prescriptions      No prescriptions requested or ordered in this encounter       Imaging & Consults:  None    The patient indicates understanding of these issues and agrees to the plan.    KELLE Elizabeth

## 2024-04-23 NOTE — PATIENT INSTRUCTIONS
Refill policies:    Allow 2-3 business days for refills; controlled substances may take longer.  Contact your pharmacy at least 5 days prior to running out of medication and have them send an electronic request or submit request through the “request refill” option in your [employer] account.  Refills are not addressed on weekends; covering physicians do not authorize routine medications on weekends.  No narcotics or controlled substances are refilled after noon on Fridays or by on call physicians.  By law, narcotics must be electronically prescribed.  A 30 day supply with no refills is the maximum allowed.  If your prescription is due for a refill, you may be due for a follow up appointment.  To best provide you care, patients receiving routine medications need to be seen at least once a year.  Patients receiving narcotic/controlled substance medications need to be seen at least once every 3 months.  In the event that your preferred pharmacy does not have the requested medication in stock (e.g. Backordered), it is your responsibility to find another pharmacy that has the requested medication available.  We will gladly send a new prescription to that pharmacy at your request.    Scheduling Tests:    If your physician has ordered radiology tests such as MRI or CT scans, please contact Central Scheduling at [phone] right away to schedule the test.  Once scheduled, the [facility] Centralized Referral Team will work with your insurance carrier to obtain pre-certification or prior authorization.  Depending on your insurance carrier, approval may take 3-10 days.  It is highly recommended patients assure they have received an authorization before having a test performed.  If test is done without insurance authorization, patient may be responsible for the entire amount billed.      Precertification and Prior Authorizations:  If your physician has recommended that you have a procedure or additional testing performed the [facility]  Centralized Referral Team will contact your insurance carrier to obtain pre-certification or prior authorization.    You are strongly encouraged to contact your insurance carrier to verify that your procedure/test has been approved and is a COVERED benefit.  Although the [facility] Centralized Referral Team does its due diligence, the insurance carrier gives the disclaimer that \"Although the procedure is authorized, this does not guarantee payment.\"    Ultimately the patient is responsible for payment.   Thank you for your understanding in this matter.  Paperwork Completion:  If you require FMLA or disability paperwork for your recovery, please make sure to either drop it off or have it faxed to our office at [phone]. Be sure the form has your name and date of birth on it.  The form will be faxed to our Forms Department and they will complete it for you.  There is a 25$ fee for all forms that need to be filled out.  Please be aware there is a 10-14 day turnaround time.  You will need to sign a release of information (ELIAS) form if your paperwork does not come with one.  You may call the Forms Department with any questions at [phone].  Their fax number is [phone].

## 2024-04-23 NOTE — PROGRESS NOTES
Patient presents in office today with reported pain in bilateral groin and upper legs, slight low back pain    Current pain level reported = 8/10    Last reported dose of acetaminophen-codeine 1 hour ago      Narcotic Contract renewal 10/25/23    Urine Drug screen 4/1/23

## 2024-05-02 DIAGNOSIS — I10 ESSENTIAL HYPERTENSION: ICD-10-CM

## 2024-05-02 DIAGNOSIS — G89.29 CHRONIC RIGHT-SIDED LOW BACK PAIN WITHOUT SCIATICA: ICD-10-CM

## 2024-05-02 DIAGNOSIS — F11.90 CHRONIC, CONTINUOUS USE OF OPIOIDS: ICD-10-CM

## 2024-05-02 DIAGNOSIS — M79.604 RIGHT LEG PAIN: ICD-10-CM

## 2024-05-02 DIAGNOSIS — M54.50 CHRONIC RIGHT-SIDED LOW BACK PAIN WITHOUT SCIATICA: ICD-10-CM

## 2024-05-02 RX ORDER — ACETAMINOPHEN AND CODEINE PHOSPHATE 300; 60 MG/1; MG/1
1 TABLET ORAL EVERY 8 HOURS PRN
Qty: 90 TABLET | Refills: 0 | Status: SHIPPED | OUTPATIENT
Start: 2024-05-04 | End: 2024-06-03

## 2024-05-02 NOTE — TELEPHONE ENCOUNTER
A refill request was received for:  Requested Prescriptions     Pending Prescriptions Disp Refills    metoprolol succinate  MG Oral Tablet 24 Hr 90 tablet 0     Sig: Take 1 tablet (100 mg total) by mouth daily.       Last refill date:   4-15-24    Last office visit: 12-16-22     Follow up due:  No future appointments.

## 2024-05-03 RX ORDER — METOPROLOL SUCCINATE 100 MG/1
100 TABLET, EXTENDED RELEASE ORAL DAILY
Qty: 90 TABLET | Refills: 0 | Status: SHIPPED | OUTPATIENT
Start: 2024-05-03

## 2024-05-21 ENCOUNTER — PATIENT MESSAGE (OUTPATIENT)
Dept: FAMILY MEDICINE CLINIC | Facility: CLINIC | Age: 59
End: 2024-05-21

## 2024-05-21 RX ORDER — SULFAMETHOXAZOLE AND TRIMETHOPRIM 800; 160 MG/1; MG/1
1 TABLET ORAL 2 TIMES DAILY
Qty: 20 TABLET | Refills: 0 | Status: SHIPPED | OUTPATIENT
Start: 2024-05-21

## 2024-05-21 NOTE — TELEPHONE ENCOUNTER
From: Alesia Bland  To: Toni Callahan  Sent: 5/21/2024 12:45 PM CDT  Subject: My Friday appointment     I am coming in on Friday to have a physical. I also want to talk to you about a spider bite I got while in bed to my right mid and about 1 week ago actually. It was nothing to start but now very indurated and extremel painful, even just to  something from below my waist. I had my NP slice it open (about 1cm)yesterday because I had seen some dark thick pus come from the bite henry. There was only blood. Size of induration, 4”x2.5”x2” deep. Very hard, like a rock. Sorry, I measured in inches, I still estimate best in the English system! I have started Bactrim, but no change really. So I’m hoping we can take care of this bite on Friday when I come in. Do you think I should have a CT or anything else before my appointment to assess for abscess? Also, I will get my blood work done on the morning prior to my appt. Please advise about the bite. Like I said it started out small and yes I did see the little bastard, not a brown recluse. By the way, the second picture was taken about   day 3. Do we need to change my appointment from a physical to an acute appointment? Or can we do both? I can’t take any extra time off during the week. Work is extremely busy. Thx for your attention to this matter.    Attending Attestation (For Attendings USE Only)...

## 2024-05-22 ENCOUNTER — OFFICE VISIT (OUTPATIENT)
Dept: FAMILY MEDICINE CLINIC | Facility: CLINIC | Age: 59
End: 2024-05-22

## 2024-05-22 ENCOUNTER — HOSPITAL ENCOUNTER (OUTPATIENT)
Dept: CT IMAGING | Facility: HOSPITAL | Age: 59
Discharge: HOME | End: 2024-05-22
Attending: FAMILY MEDICINE

## 2024-05-22 VITALS
BODY MASS INDEX: 32.14 KG/M2 | DIASTOLIC BLOOD PRESSURE: 74 MMHG | HEART RATE: 70 BPM | SYSTOLIC BLOOD PRESSURE: 116 MMHG | TEMPERATURE: 98 F | OXYGEN SATURATION: 97 % | RESPIRATION RATE: 16 BRPM | WEIGHT: 200 LBS | HEIGHT: 66 IN

## 2024-05-22 DIAGNOSIS — L02.91 ABSCESS: ICD-10-CM

## 2024-05-22 DIAGNOSIS — L03.311 CELLULITIS OF ABDOMINAL WALL: ICD-10-CM

## 2024-05-22 DIAGNOSIS — L03.311 CELLULITIS OF ABDOMINAL WALL: Primary | ICD-10-CM

## 2024-05-22 PROCEDURE — 3074F SYST BP LT 130 MM HG: CPT | Performed by: FAMILY MEDICINE

## 2024-05-22 PROCEDURE — 74176 CT ABD & PELVIS W/O CONTRAST: CPT | Performed by: FAMILY MEDICINE

## 2024-05-22 PROCEDURE — 99213 OFFICE O/P EST LOW 20 MIN: CPT | Performed by: FAMILY MEDICINE

## 2024-05-22 PROCEDURE — 3078F DIAST BP <80 MM HG: CPT | Performed by: FAMILY MEDICINE

## 2024-05-22 PROCEDURE — 3008F BODY MASS INDEX DOCD: CPT | Performed by: FAMILY MEDICINE

## 2024-05-22 NOTE — PROGRESS NOTES
[facility] Progress Note    SUBJECTIVE: Alesia Bland 59 year old female is here today for   Chief Complaint   Patient presents with    Bite     Spider bite it happened a week a go. Right side of abdomen. There is redness and swelling in the area. There is pain associated with the pain.       Works in health care,    Had a spider bitea week ago, started antibiotics but not improving dramatically, though not worsening    Some drainage    PMH  Past Medical History:    Anesthesia complication    n&v    Arrhythmia    PAT    Bronchitis, not specified as acute or chronic    Depression    Extrinsic asthma, unspecified    Lipid screening    GIANLUCA (obstructive sleep apnea)    AHI 14 Supine AHI 20 non-supine AHI 9    PONV (postoperative nausea and vomiting)    Snoring    AHI 4.4 SaO2 janelle 86-.5 primary snoring    Spinal hemangioma        PSH  Past Surgical History:   Procedure Laterality Date    Appendectomy  08    Breast surgery procedure unlisted      enlargement          Colonoscopy  09    negative    Hysterectomy      total hystero.    Oophorectomy Bilateral     total hystero    Other  2021    spinal angiogram with embolization    Other surgical history Bilateral 2015    Procedure: ABDOMINAL HYSTERECTOMY, BSO;  Surgeon: Calixto Weathers MD;  Location:  MAIN OR        Social Hx:  No changes    ROS  See HPI    OBJECTIVE:  /74   Pulse 70   Temp 98.2 °F (36.8 °C) (Oral)   Resp 16   Ht 5' 6\" (1.676 m)   Wt 200 lb (90.7 kg)   LMP 2014 (Exact Date)   SpO2 97%   BMI 32.28 kg/m²     Exam  Abd: substantial induration around wound site, minimal but present drainage on exam, no clear fluctuance, but palpable mass of swelling several inches in diameter.      Labs:          Meds:   Current Outpatient Medications   Medication Sig Dispense Refill    sulfamethoxazole-trimethoprim -160 MG Oral Tab per tablet Take 1 tablet by mouth 2 (two) times daily. 20  tablet 0    metoprolol succinate  MG Oral Tablet 24 Hr Take 1 tablet (100 mg total) by mouth daily. 90 tablet 0    acetaminophen-codeine 300-60 MG Oral Tab Take 1 tablet by mouth every 8 (eight) hours as needed for Pain. 90 tablet 0    Metaxalone 800 MG Oral Tab Take 1 tablet (800 mg total) by mouth 3 (three) times daily as needed for Pain. 90 tablet 0    gabapentin 300 MG Oral Cap Take 3 capsules (900 mg total) by mouth 3 (three) times daily. 270 capsule 0    losartan 100 MG Oral Tab TAKE 1 TABLET BY MOUTH EVERY DAY 30 tablet 0    Solifenacin Succinate 5 MG Oral Tab Take 1 tablet (5 mg total) by mouth daily. 90 tablet 0    gabapentin 300 MG Oral Cap Take 3 capsules (900 mg total) by mouth 3 (three) times daily. 270 capsule 0    albuterol 108 (90 Base) MCG/ACT Inhalation Aero Soln Inhale 2 puffs into the lungs every 6 (six) hours as needed.      ondansetron (ZOFRAN) 4 mg tablet Take 1 tablet (4 mg total) by mouth every 8 (eight) hours as needed for Nausea. 30 tablet 2    rizatriptan (MAXALT-MLT) 10 MG Oral Tablet Dispersible Take 1 tablet (10 mg total) by mouth as needed for Migraine. 90 tablet 1    triamcinolone 0.1 % External Cream Apply topically 2 (two) times daily. To affected skin. 80 g 1    Armodafinil 250 MG Oral Tab Take 250 mg by mouth daily.      LATUDA 60 MG Oral Tab Take 1 tablet (60 mg total) by mouth daily with food.      traZODone HCl 50 MG Oral Tab Take 1 tablet (50 mg total) by mouth nightly as needed.      DULoxetine HCl 60 MG Oral Cap DR Particles Take 2 capsules (120 mg total) by mouth daily.           Assessment/Plan  Alesia was seen today for bite.    Diagnoses and all orders for this visit:    Cellulitis of abdominal wall  -     CT ABDOMEN+PELVIS(CPT=74176); Future    Abscess  -     CT ABDOMEN+PELVIS(CPT=74176); Future         Recommend stat CT to rule out abscess requiring more significant treatment, has planned follow up on Friday already with Dr. Callahan, should keep  this.         Total Time spent with patient and coordinating care:  20 minutes.    Follow up: pending results      Rob Fonseca MD

## 2024-05-24 ENCOUNTER — LAB ENCOUNTER (OUTPATIENT)
Dept: LAB | Age: 59
End: 2024-05-24
Attending: FAMILY MEDICINE

## 2024-05-24 ENCOUNTER — OFFICE VISIT (OUTPATIENT)
Dept: FAMILY MEDICINE CLINIC | Facility: CLINIC | Age: 59
End: 2024-05-24

## 2024-05-24 VITALS
HEART RATE: 77 BPM | HEIGHT: 66 IN | OXYGEN SATURATION: 98 % | SYSTOLIC BLOOD PRESSURE: 124 MMHG | WEIGHT: 198 LBS | BODY MASS INDEX: 31.82 KG/M2 | DIASTOLIC BLOOD PRESSURE: 72 MMHG | RESPIRATION RATE: 18 BRPM

## 2024-05-24 DIAGNOSIS — Z12.31 ENCOUNTER FOR SCREENING MAMMOGRAM FOR MALIGNANT NEOPLASM OF BREAST: ICD-10-CM

## 2024-05-24 DIAGNOSIS — Z00.00 ANNUAL PHYSICAL EXAM: ICD-10-CM

## 2024-05-24 DIAGNOSIS — R73.03 PREDIABETES: ICD-10-CM

## 2024-05-24 DIAGNOSIS — L02.91 ABSCESS: ICD-10-CM

## 2024-05-24 DIAGNOSIS — E55.9 VITAMIN D DEFICIENCY: ICD-10-CM

## 2024-05-24 DIAGNOSIS — Z00.00 ANNUAL PHYSICAL EXAM: Primary | ICD-10-CM

## 2024-05-24 DIAGNOSIS — N20.0 NEPHROLITHIASIS: ICD-10-CM

## 2024-05-24 DIAGNOSIS — Z12.11 COLON CANCER SCREENING: ICD-10-CM

## 2024-05-24 LAB
ALBUMIN SERPL-MCNC: 3.8 G/DL (ref 3.4–5)
ALBUMIN/GLOB SERPL: 1 {RATIO} (ref 1–2)
ALP LIVER SERPL-CCNC: 140 U/L
ALT SERPL-CCNC: 26 U/L
ANION GAP SERPL CALC-SCNC: 6 MMOL/L (ref 0–18)
AST SERPL-CCNC: 17 U/L (ref 15–37)
BASOPHILS # BLD AUTO: 0.04 X10(3) UL (ref 0–0.2)
BASOPHILS NFR BLD AUTO: 0.5 %
BILIRUB SERPL-MCNC: 0.3 MG/DL (ref 0.1–2)
BUN BLD-MCNC: 17 MG/DL (ref 9–23)
CALCIUM BLD-MCNC: 9.5 MG/DL (ref 8.5–10.1)
CHLORIDE SERPL-SCNC: 108 MMOL/L (ref 98–112)
CHOLEST SERPL-MCNC: 205 MG/DL (ref ?–200)
CO2 SERPL-SCNC: 25 MMOL/L (ref 21–32)
CREAT BLD-MCNC: 0.64 MG/DL
EGFRCR SERPLBLD CKD-EPI 2021: 103 ML/MIN/1.73M2 (ref 60–?)
EOSINOPHIL # BLD AUTO: 0.29 X10(3) UL (ref 0–0.7)
EOSINOPHIL NFR BLD AUTO: 3.6 %
ERYTHROCYTE [DISTWIDTH] IN BLOOD BY AUTOMATED COUNT: 14.4 %
EST. AVERAGE GLUCOSE BLD GHB EST-MCNC: 120 MG/DL (ref 68–126)
FASTING PATIENT LIPID ANSWER: YES
FASTING STATUS PATIENT QL REPORTED: YES
GLOBULIN PLAS-MCNC: 3.7 G/DL (ref 2.8–4.4)
GLUCOSE BLD-MCNC: 115 MG/DL (ref 70–99)
HBA1C MFR BLD: 5.8 % (ref ?–5.7)
HCT VFR BLD AUTO: 39 %
HDLC SERPL-MCNC: 58 MG/DL (ref 40–59)
HGB BLD-MCNC: 12.8 G/DL
IMM GRANULOCYTES # BLD AUTO: 0.02 X10(3) UL (ref 0–1)
IMM GRANULOCYTES NFR BLD: 0.2 %
LDLC SERPL CALC-MCNC: 129 MG/DL (ref ?–100)
LYMPHOCYTES # BLD AUTO: 2.25 X10(3) UL (ref 1–4)
LYMPHOCYTES NFR BLD AUTO: 27.8 %
MCH RBC QN AUTO: 31.3 PG (ref 26–34)
MCHC RBC AUTO-ENTMCNC: 32.8 G/DL (ref 31–37)
MCV RBC AUTO: 95.4 FL
MONOCYTES # BLD AUTO: 0.33 X10(3) UL (ref 0.1–1)
MONOCYTES NFR BLD AUTO: 4.1 %
NEUTROPHILS # BLD AUTO: 5.16 X10 (3) UL (ref 1.5–7.7)
NEUTROPHILS # BLD AUTO: 5.16 X10(3) UL (ref 1.5–7.7)
NEUTROPHILS NFR BLD AUTO: 63.8 %
NONHDLC SERPL-MCNC: 147 MG/DL (ref ?–130)
OSMOLALITY SERPL CALC.SUM OF ELEC: 290 MOSM/KG (ref 275–295)
PLATELET # BLD AUTO: 332 10(3)UL (ref 150–450)
POTASSIUM SERPL-SCNC: 4.1 MMOL/L (ref 3.5–5.1)
PROT SERPL-MCNC: 7.5 G/DL (ref 6.4–8.2)
RBC # BLD AUTO: 4.09 X10(6)UL
SODIUM SERPL-SCNC: 139 MMOL/L (ref 136–145)
TRIGL SERPL-MCNC: 103 MG/DL (ref 30–149)
TSI SER-ACNC: 1.46 MIU/ML (ref 0.36–3.74)
VIT D+METAB SERPL-MCNC: 30.1 NG/ML (ref 30–100)
VLDLC SERPL CALC-MCNC: 18 MG/DL (ref 0–30)
WBC # BLD AUTO: 8.1 X10(3) UL (ref 4–11)

## 2024-05-24 PROCEDURE — 80061 LIPID PANEL: CPT | Performed by: FAMILY MEDICINE

## 2024-05-24 PROCEDURE — 3008F BODY MASS INDEX DOCD: CPT | Performed by: FAMILY MEDICINE

## 2024-05-24 PROCEDURE — 85025 COMPLETE CBC W/AUTO DIFF WBC: CPT | Performed by: FAMILY MEDICINE

## 2024-05-24 PROCEDURE — 80053 COMPREHEN METABOLIC PANEL: CPT | Performed by: FAMILY MEDICINE

## 2024-05-24 PROCEDURE — 3074F SYST BP LT 130 MM HG: CPT | Performed by: FAMILY MEDICINE

## 2024-05-24 PROCEDURE — 99396 PREV VISIT EST AGE 40-64: CPT | Performed by: FAMILY MEDICINE

## 2024-05-24 PROCEDURE — 84443 ASSAY THYROID STIM HORMONE: CPT

## 2024-05-24 PROCEDURE — 3078F DIAST BP <80 MM HG: CPT | Performed by: FAMILY MEDICINE

## 2024-05-24 PROCEDURE — 83036 HEMOGLOBIN GLYCOSYLATED A1C: CPT

## 2024-05-24 PROCEDURE — 99212 OFFICE O/P EST SF 10 MIN: CPT | Performed by: FAMILY MEDICINE

## 2024-05-24 PROCEDURE — 82306 VITAMIN D 25 HYDROXY: CPT

## 2024-05-30 NOTE — H&P
HPI:   Alesia Bland is a 59 year old female who presents for a well woman exam. Symptoms: denies discharge, itching, burning or dysuria.    Abscess with minimal drainage RLQ abd.  Had cellulitis also, but better with antibiotics.  No F/C.    Patient's last menstrual period was 12/23/2014 (exact date).  Previous pap: No recommendations at this time   Performs SBEs:yes  Contraception: none.                        Current Outpatient Medications   Medication Sig Dispense Refill    metFORMIN 500 MG Oral Tab Take 1 tablet (500 mg total) by mouth 2 (two) times daily. 180 tablet 3    sulfamethoxazole-trimethoprim -160 MG Oral Tab per tablet Take 1 tablet by mouth 2 (two) times daily. 20 tablet 0    metoprolol succinate  MG Oral Tablet 24 Hr Take 1 tablet (100 mg total) by mouth daily. 90 tablet 0    acetaminophen-codeine 300-60 MG Oral Tab Take 1 tablet by mouth every 8 (eight) hours as needed for Pain. 90 tablet 0    gabapentin 300 MG Oral Cap Take 3 capsules (900 mg total) by mouth 3 (three) times daily. 270 capsule 0    losartan 100 MG Oral Tab TAKE 1 TABLET BY MOUTH EVERY DAY 30 tablet 0    Solifenacin Succinate 5 MG Oral Tab Take 1 tablet (5 mg total) by mouth daily. 90 tablet 0    gabapentin 300 MG Oral Cap Take 3 capsules (900 mg total) by mouth 3 (three) times daily. 270 capsule 0    albuterol 108 (90 Base) MCG/ACT Inhalation Aero Soln Inhale 2 puffs into the lungs every 6 (six) hours as needed.      ondansetron (ZOFRAN) 4 mg tablet Take 1 tablet (4 mg total) by mouth every 8 (eight) hours as needed for Nausea. 30 tablet 2    rizatriptan (MAXALT-MLT) 10 MG Oral Tablet Dispersible Take 1 tablet (10 mg total) by mouth as needed for Migraine. 90 tablet 1    triamcinolone 0.1 % External Cream Apply topically 2 (two) times daily. To affected skin. 80 g 1    Armodafinil 250 MG Oral Tab Take 250 mg by mouth daily.      LATUDA 60 MG Oral Tab Take 1 tablet (60 mg total) by mouth daily with food.      traZODone  HCl 50 MG Oral Tab Take 1 tablet (50 mg total) by mouth nightly as needed.      DULoxetine HCl 60 MG Oral Cap DR Particles Take 2 capsules (120 mg total) by mouth daily.        Past Medical History:    Anesthesia complication    n&v    Arrhythmia    PAT    Bronchitis, not specified as acute or chronic    Depression    Extrinsic asthma, unspecified    Lipid screening    GIANLUCA (obstructive sleep apnea)    AHI 14 Supine AHI 20 non-supine AHI 9    PONV (postoperative nausea and vomiting)    Snoring    AHI 4.4 SaO2 janelle 86-.5 primary snoring    Spinal hemangioma      Past Surgical History:   Procedure Laterality Date    Appendectomy  08    Breast surgery procedure unlisted      enlargement          Colonoscopy  09    negative    Hysterectomy      total hystero.    Oophorectomy Bilateral     total hystero    Other  2021    spinal angiogram with embolization    Other surgical history Bilateral 2015    Procedure: ABDOMINAL HYSTERECTOMY, BSO;  Surgeon: Calixto Weathers MD;  Location:  MAIN OR      Family History   Problem Relation Age of Onset    Depression Mother     Other (Other) Mother     Hypertension Father     Other (Other) Father     Melanoma Father     Heart Attack Maternal Grandmother     Breast Cancer Maternal Grandmother 50        In her early 50's.    Other (Other) Maternal Grandmother     Other (leukemia) Maternal Grandfather     Diabetes Paternal Grandmother         type 2    Other (renal failure) Paternal Grandmother     Other (lung cancer) Paternal Grandfather       Social History:   Social History     Socioeconomic History    Marital status:    Tobacco Use    Smoking status: Never    Smokeless tobacco: Never   Vaping Use    Vaping status: Never Used   Substance and Sexual Activity    Alcohol use: Not Currently     Alcohol/week: 3.3 standard drinks of alcohol     Types: 4 Standard drinks or equivalent per week     Comment: rare    Drug use: No   Other Topics  Concern    Caffeine Concern Yes     Comment: 3-4 cups    Exercise Yes     Comment: walking     Social Determinants of Health     Financial Resource Strain: Low Risk  (8/17/2023)    Received from [facility]    Overall Financial Resource Strain (CARDIA)     Difficulty of Paying Living Expenses: Not very hard   Food Insecurity: No Food Insecurity (8/17/2023)    Received from [facility]    Hunger Vital Sign     Worried About Running Out of Food in the Last Year: Never true     Ran Out of Food in the Last Year: Never true   Transportation Needs: No Transportation Needs (8/17/2023)    Received from [facility]    PRAPARE - Transportation     Lack of Transportation (Medical): No     Lack of Transportation (Non-Medical): No   Physical Activity: Inactive (8/17/2023)    Received from [facility]    Exercise Vital Sign     Days of Exercise per Week: 0 days     Minutes of Exercise per Session: 0 min   Housing Stability: Low Risk  (8/17/2023)    Received from [facility]    Housing Stability     What is your living situation today?: I have a steady place to live     Exercise: minimal.  Diet: watches fats closely     REVIEW OF SYSTEMS:   GENERAL: feels well otherwise  SKIN: denies unusual skin lesions  HEENT:no vision or hearing changes; denies nasal congestion, sinus pain or sore throat  LUNGS: denies shortness of breath, chest heaviness or cough  CV: denies chest pain, pressure or palpitations  GI: denies abdominal pain; denies heartburn, frequent diarrhea or constipation  : denies dysuria, vaginal discharge or itching; denies pelvic pain  MS: denies back pain  NEURO: denies headaches; no dizziness  PSYCH: denies depression or anxiety  HEME: denies hx of anemia  ENDOCRINE: denies thyroid history, denies excessive thirst, denies significant weight change  ALL/ASTHMA: denies hx of  allergy or asthma    EXAM:   /72   Pulse 77   Resp 18   Ht 5' 6\" (1.595  m)   Wt 198 lb (89.8 kg)   LMP 12/23/2014 (Exact Date)   SpO2 98%   BMI 31.96 kg/m²       Body mass index is 31.96 kg/m².      GENERAL: pleasant and in no acute distress  SKIN: warm and dry  HEENT: atraumatic, normocephalic; PERRLA, conjunctiva clear; ears, nose and throat are clear  NECK: supple,no adenopathy,no thyromegaly  BREASTS: Deferred.  Pt sees gynecologist.      LUNGS: clear to auscultation, easy breathing  CV: normal S1S2, RRR without murmur  GI: BSs present, no tenderness or organomegaly  :deferred  MS: Juan, no bony deformities  EXT: no cyanosis, clubbing or edema  NEURO: A&Ox3, motor and sensory are grossly intact, good coordination; no tremors    ASSESSMENT AND PLAN:   1. Annual physical exam  - Lipid Panel  - CBC With Differential With Platelet  - Comp Metabolic Panel (14)  - TSH W Reflex To Free T4; Future  - Hemoglobin A1C; Future  - Vitamin D; Future    2. Nephrolithiasis  - Urology Referral - In Network  - Gastro Referral - In Network    3. Encounter for screening mammogram for malignant neoplasm of breast  - [facility] MELONIE 2D+3D SCREENING BILAT (CPT=77067/39672); Future    4. Prediabetes  - Hemoglobin A1C; Future    5. Vitamin D deficiency  - Vitamin D; Future    6. Colon cancer screening    7. Abscess/cellulitis RLQ abd.  Debrided slightly.   No active discharge  On antbiotics.  Wound dressed.    Alesia was given an opportunity to ask questions and verbalized understanding of care. Follow up 1 week

## 2024-06-03 DIAGNOSIS — F11.90 CHRONIC, CONTINUOUS USE OF OPIOIDS: ICD-10-CM

## 2024-06-04 RX ORDER — ACETAMINOPHEN AND CODEINE PHOSPHATE 300; 60 MG/1; MG/1
1 TABLET ORAL 2 TIMES DAILY PRN
Qty: 60 TABLET | Refills: 0 | Status: ON HOLD | OUTPATIENT
Start: 2024-06-04 | End: 2024-07-04

## 2024-06-04 NOTE — TELEPHONE ENCOUNTER
Medication: acetaminophen-codeine 300-60 MG Oral Tab     Date of last refill: 5/3/24  [place] 5[place]3/24    Last office visit: 4/23/24  Due back to clinic per last office note:  post imaging  Date next office visit scheduled:  none    Last URINE Screen: 4/1/23  Screen Results:    Rosie Hernandez, APRN  4/10/2023  8:10 AM CDT Back to Top      As expected         Narcotic Contract EXPIRATION date: 10/25/24    Last OV note recommendation: Plan: Status post L2 kyphoplasty with spine heena at [facility] on 3/5/2024, and while low back pain is largely eliminated, has had progressive increasing groin and anterior thigh pain, limiting her ADLs.  Has reduced Tylenol with codeine from 6/day to 2/day, though given the increasing lower extremity complaints, wishes to hold on further weaning at this time.  Will put her back on metaxalone, have not been on this previously for years.  In addition, asked that she let her physicians at [facility] and know of her new onset lower extremity issues, and I am sure they will ask her to update her MRI.  If they do not, contact clinic, and I will be happy to place an order for an MRI myself.  Follow up after imaging.

## 2024-06-04 NOTE — TELEPHONE ENCOUNTER
Adjusted prescription for 2 tabs per day which was noted in patient's last office visit with Rob NINA

## 2024-06-09 ENCOUNTER — HOSPITAL ENCOUNTER (OUTPATIENT)
Facility: HOSPITAL | Age: 59
Setting detail: OBSERVATION
Discharge: HOME | End: 2024-06-13
Attending: EMERGENCY MEDICINE | Admitting: STUDENT IN AN ORGANIZED HEALTH CARE EDUCATION/TRAINING PROGRAM
Payer: COMMERCIAL

## 2024-06-09 DIAGNOSIS — M13.0 POLYARTICULAR ARTHRITIS: ICD-10-CM

## 2024-06-09 DIAGNOSIS — E86.0 DEHYDRATION: ICD-10-CM

## 2024-06-09 DIAGNOSIS — R79.82 ELEVATED C-REACTIVE PROTEIN (CRP): ICD-10-CM

## 2024-06-09 DIAGNOSIS — E53.8 FOLATE DEFICIENCY: ICD-10-CM

## 2024-06-09 DIAGNOSIS — E87.1 HYPONATREMIA: ICD-10-CM

## 2024-06-09 DIAGNOSIS — R26.2 UNABLE TO WALK: Primary | ICD-10-CM

## 2024-06-09 PROBLEM — M25.511 ACUTE PAIN OF RIGHT SHOULDER: Status: ACTIVE | Noted: 2024-06-09

## 2024-06-09 PROBLEM — R73.9 HYPERGLYCEMIA: Status: ACTIVE | Noted: 2024-06-09

## 2024-06-09 PROBLEM — R41.0 CONFUSION: Status: ACTIVE | Noted: 2024-06-09

## 2024-06-09 PROBLEM — D69.6 THROMBOCYTOPENIA: Status: ACTIVE | Noted: 2024-06-09

## 2024-06-09 PROBLEM — R79.89 AZOTEMIA: Status: ACTIVE | Noted: 2024-06-09

## 2024-06-09 PROBLEM — D69.6 THROMBOCYTOPENIA (HCC): Status: ACTIVE | Noted: 2024-06-09

## 2024-06-09 LAB
ALBUMIN SERPL-MCNC: 3.1 G/DL (ref 3.4–5)
ALBUMIN/GLOB SERPL: 0.9 {RATIO} (ref 1–2)
ALP LIVER SERPL-CCNC: 285 U/L
ALT SERPL-CCNC: 95 U/L
ANION GAP SERPL CALC-SCNC: 6 MMOL/L (ref 0–18)
AST SERPL-CCNC: 52 U/L (ref 15–37)
BASOPHILS # BLD AUTO: 0.03 X10(3) UL (ref 0–0.2)
BASOPHILS NFR BLD AUTO: 0.6 %
BILIRUB SERPL-MCNC: 2.4 MG/DL (ref 0.1–2)
BILIRUB UR QL CFM: NEGATIVE
BUN BLD-MCNC: 34 MG/DL (ref 9–23)
CALCIUM BLD-MCNC: 9.5 MG/DL (ref 8.5–10.1)
CHLORIDE SERPL-SCNC: 101 MMOL/L (ref 98–112)
CLARITY UR REFRACT.AUTO: CLEAR
CO2 SERPL-SCNC: 25 MMOL/L (ref 21–32)
COLOR UR AUTO: YELLOW
CREAT BLD-MCNC: 0.86 MG/DL
CRP SERPL-MCNC: 20.6 MG/DL (ref ?–0.3)
EGFRCR SERPLBLD CKD-EPI 2021: 79 ML/MIN/1.73M2 (ref 60–?)
EOSINOPHIL # BLD AUTO: 0.03 X10(3) UL (ref 0–0.7)
EOSINOPHIL NFR BLD AUTO: 0.6 %
ERYTHROCYTE [DISTWIDTH] IN BLOOD BY AUTOMATED COUNT: 14.8 %
ERYTHROCYTE [SEDIMENTATION RATE] IN BLOOD: 16 MM/HR
GLOBULIN PLAS-MCNC: 3.6 G/DL (ref 2.8–4.4)
GLUCOSE BLD-MCNC: 119 MG/DL (ref 70–99)
GLUCOSE UR STRIP.AUTO-MCNC: NEGATIVE MG/DL
HCT VFR BLD AUTO: 38.3 %
HGB BLD-MCNC: 13 G/DL
IMM GRANULOCYTES # BLD AUTO: 0.02 X10(3) UL (ref 0–1)
IMM GRANULOCYTES NFR BLD: 0.4 %
KETONES UR STRIP.AUTO-MCNC: 15 MG/DL
LYMPHOCYTES # BLD AUTO: 0.79 X10(3) UL (ref 1–4)
LYMPHOCYTES NFR BLD AUTO: 15.6 %
MCH RBC QN AUTO: 31.3 PG (ref 26–34)
MCHC RBC AUTO-ENTMCNC: 33.9 G/DL (ref 31–37)
MCV RBC AUTO: 92.1 FL
MONOCYTES # BLD AUTO: 0.1 X10(3) UL (ref 0.1–1)
MONOCYTES NFR BLD AUTO: 2 %
NEUTROPHILS # BLD AUTO: 4.08 X10 (3) UL (ref 1.5–7.7)
NEUTROPHILS # BLD AUTO: 4.08 X10(3) UL (ref 1.5–7.7)
NEUTROPHILS NFR BLD AUTO: 80.8 %
NITRITE UR QL STRIP.AUTO: NEGATIVE
OSMOLALITY SERPL CALC.SUM OF ELEC: 283 MOSM/KG (ref 275–295)
PH UR STRIP.AUTO: 5 [PH] (ref 5–8)
PLATELET # BLD AUTO: 66 10(3)UL (ref 150–450)
POTASSIUM SERPL-SCNC: 4.5 MMOL/L (ref 3.5–5.1)
PROT SERPL-MCNC: 6.7 G/DL (ref 6.4–8.2)
PROT UR STRIP.AUTO-MCNC: NEGATIVE MG/DL
RBC # BLD AUTO: 4.16 X10(6)UL
RBC #/AREA URNS AUTO: >10 /HPF
SODIUM SERPL-SCNC: 132 MMOL/L (ref 136–145)
SP GR UR STRIP.AUTO: <=1.005 (ref 1–1.03)
TROPONIN I SERPL HS-MCNC: 9 NG/L
UROBILINOGEN UR STRIP.AUTO-MCNC: 1 MG/DL
WBC # BLD AUTO: 5.1 X10(3) UL (ref 4–11)

## 2024-06-09 PROCEDURE — 99223 1ST HOSP IP/OBS HIGH 75: CPT | Performed by: STUDENT IN AN ORGANIZED HEALTH CARE EDUCATION/TRAINING PROGRAM

## 2024-06-09 RX ORDER — SENNOSIDES 8.6 MG
17.2 TABLET ORAL NIGHTLY PRN
Status: DISCONTINUED | OUTPATIENT
Start: 2024-06-09 | End: 2024-06-13

## 2024-06-09 RX ORDER — BENZONATATE 100 MG/1
200 CAPSULE ORAL 3 TIMES DAILY PRN
Status: DISCONTINUED | OUTPATIENT
Start: 2024-06-09 | End: 2024-06-13

## 2024-06-09 RX ORDER — OXYBUTYNIN CHLORIDE 5 MG/1
5 TABLET, EXTENDED RELEASE ORAL DAILY
Status: DISCONTINUED | OUTPATIENT
Start: 2024-06-09 | End: 2024-06-13

## 2024-06-09 RX ORDER — ONDANSETRON 2 MG/ML
4 INJECTION INTRAMUSCULAR; INTRAVENOUS EVERY 6 HOURS PRN
Status: DISCONTINUED | OUTPATIENT
Start: 2024-06-09 | End: 2024-06-13

## 2024-06-09 RX ORDER — ECHINACEA PURPUREA EXTRACT 125 MG
1 TABLET ORAL
Status: DISCONTINUED | OUTPATIENT
Start: 2024-06-09 | End: 2024-06-13

## 2024-06-09 RX ORDER — TRAZODONE HYDROCHLORIDE 50 MG/1
50 TABLET ORAL NIGHTLY PRN
Status: DISCONTINUED | OUTPATIENT
Start: 2024-06-09 | End: 2024-06-13

## 2024-06-09 RX ORDER — BISACODYL 10 MG
10 SUPPOSITORY, RECTAL RECTAL
Status: DISCONTINUED | OUTPATIENT
Start: 2024-06-09 | End: 2024-06-13

## 2024-06-09 RX ORDER — ENOXAPARIN SODIUM 100 MG/ML
40 INJECTION SUBCUTANEOUS DAILY
Status: DISCONTINUED | OUTPATIENT
Start: 2024-06-10 | End: 2024-06-13

## 2024-06-09 RX ORDER — SUMATRIPTAN 20 MG/1
20 SPRAY NASAL EVERY 2 HOUR PRN
Status: DISCONTINUED | OUTPATIENT
Start: 2024-06-09 | End: 2024-06-13

## 2024-06-09 RX ORDER — DULOXETIN HYDROCHLORIDE 30 MG/1
120 CAPSULE, DELAYED RELEASE ORAL DAILY
Status: DISCONTINUED | OUTPATIENT
Start: 2024-06-09 | End: 2024-06-13

## 2024-06-09 RX ORDER — POLYETHYLENE GLYCOL 3350 17 G/17G
17 POWDER, FOR SOLUTION ORAL DAILY PRN
Status: DISCONTINUED | OUTPATIENT
Start: 2024-06-09 | End: 2024-06-13

## 2024-06-09 RX ORDER — GABAPENTIN 300 MG/1
900 CAPSULE ORAL 3 TIMES DAILY
Status: DISCONTINUED | OUTPATIENT
Start: 2024-06-09 | End: 2024-06-13

## 2024-06-09 RX ORDER — LOSARTAN POTASSIUM 100 MG/1
100 TABLET ORAL DAILY
Status: DISCONTINUED | OUTPATIENT
Start: 2024-06-09 | End: 2024-06-13

## 2024-06-09 RX ORDER — MELATONIN
3 NIGHTLY PRN
Status: DISCONTINUED | OUTPATIENT
Start: 2024-06-09 | End: 2024-06-13

## 2024-06-09 RX ORDER — METOPROLOL SUCCINATE 100 MG/1
100 TABLET, EXTENDED RELEASE ORAL
Status: DISCONTINUED | OUTPATIENT
Start: 2024-06-10 | End: 2024-06-13

## 2024-06-09 RX ORDER — KETOROLAC TROMETHAMINE 15 MG/ML
15 INJECTION, SOLUTION INTRAMUSCULAR; INTRAVENOUS EVERY 6 HOURS PRN
Status: DISPENSED | OUTPATIENT
Start: 2024-06-09 | End: 2024-06-11

## 2024-06-09 RX ORDER — ACETAMINOPHEN 500 MG
1000 TABLET ORAL EVERY 8 HOURS PRN
Status: DISCONTINUED | OUTPATIENT
Start: 2024-06-09 | End: 2024-06-13

## 2024-06-09 RX ORDER — SODIUM CHLORIDE 9 MG/ML
INJECTION, SOLUTION INTRAVENOUS CONTINUOUS
Status: ACTIVE | OUTPATIENT
Start: 2024-06-09 | End: 2024-06-09

## 2024-06-09 RX ORDER — CODEINE SULFATE 30 MG/1
60 TABLET ORAL 2 TIMES DAILY PRN
Status: DISCONTINUED | OUTPATIENT
Start: 2024-06-09 | End: 2024-06-13

## 2024-06-09 RX ORDER — LURASIDONE HYDROCHLORIDE 60 MG/1
60 TABLET, FILM COATED ORAL
Status: DISCONTINUED | OUTPATIENT
Start: 2024-06-10 | End: 2024-06-13

## 2024-06-09 RX ORDER — ACETAMINOPHEN 325 MG/1
650 TABLET ORAL 2 TIMES DAILY PRN
Status: DISCONTINUED | OUTPATIENT
Start: 2024-06-09 | End: 2024-06-13

## 2024-06-09 RX ORDER — KETOROLAC TROMETHAMINE 30 MG/ML
30 INJECTION, SOLUTION INTRAMUSCULAR; INTRAVENOUS ONCE
Status: COMPLETED | OUTPATIENT
Start: 2024-06-09 | End: 2024-06-09

## 2024-06-09 RX ORDER — PROCHLORPERAZINE EDISYLATE 5 MG/ML
5 INJECTION INTRAMUSCULAR; INTRAVENOUS EVERY 8 HOURS PRN
Status: DISCONTINUED | OUTPATIENT
Start: 2024-06-09 | End: 2024-06-13

## 2024-06-09 RX ORDER — ACETAMINOPHEN AND CODEINE PHOSPHATE 300; 60 MG/1; MG/1
1 TABLET ORAL 2 TIMES DAILY PRN
Status: DISCONTINUED | OUTPATIENT
Start: 2024-06-09 | End: 2024-06-09

## 2024-06-09 NOTE — ED PROVIDER NOTES
Patient Seen in: [facility] In [place]      History     Chief Complaint   Patient presents with    Pain     Stated Complaint: states pain/body aches. reports painful to walk    Subjective:   HPI    Went home from work last Thursday- diaphoretic and upper body joint pain the patient states that it was very painful for her to walk and that she ambulated out to her car and drove herself home but since that time has been too painful to walk to the point that she cannot get herself to the restroom show she has been urinating on herself greater than a dozen times over the previous 3 days.  She states that because the pain was so bad she felt like she could not get to the bathroom she states that the pain is literally in all of her joints of her body she called her children and asked them to bring her a walker and a bedside commode but they told her that she should seek medical care.  She did not want to call an ambulance because she is concerned that they would take [facility] to [facility] than [facility] which is where all her care is.  She does have a history of chronic back pain and had a spinal hemangioma and L2 kyphoplasty in the past and states she does have very bad pain days but this pain that she is experiencing now is unlike anything she has ever had in the past.  She has a known obstructing ureteral stone for which she is following up with urology and has just finished a course of Bactrim for what she describes as an infected brown recluse spider bite to her abdomen.    She states that she started 80 mg of prednisone because she felt like she may be having some sort of arthritis flareup.  She has not eaten anything other than to oranges and 2 L bottles of water over the previous 2 days secondary to being unable to get food or water secondary to the severe pain.  She was brought into the emergency department today by her mother and father her mother is a retired  nurse.    Objective:   Past Medical History:    Anesthesia complication    n&v    Arrhythmia    PAT    Bronchitis, not specified as acute or chronic    Depression    Extrinsic asthma, unspecified    Lipid screening    GIANLUCA (obstructive sleep apnea)    AHI 14 Supine AHI 20 non-supine AHI 9    PONV (postoperative nausea and vomiting)    Snoring    AHI 4.4 SaO2 janelle 86-.5 primary snoring    Spinal hemangioma              No pertinent past surgical history.              No pertinent social history.            Review of Systems    Positive for stated complaint: states pain/body aches. reports painful to walk  Other systems are as noted in HPI.  Constitutional and vital signs reviewed.      All other systems reviewed and negative except as noted above.    Physical Exam     ED Triage Vitals [06/09/24 1718]   /85   Pulse 90   Resp 16   Temp 98 °F (36.7 °C)   Temp src Oral   SpO2 100 %   O2 Device None (Room air)       Current Vitals:   Vital Signs  BP: 131/86  Pulse: 72  Resp: 20  Temp: 98 °F (36.7 °C)  Temp src: Oral    Oxygen Therapy  SpO2: 99 %  O2 Device: None (Room air)  Pulse Oximetry Type: Continuous            Physical Exam  Vitals and nursing note reviewed. Exam conducted with a chaperone present.   Constitutional:       General: She is not in acute distress.     Appearance: She is well-developed. She is not ill-appearing, toxic-appearing or diaphoretic.      Comments: If ear pain in all of her joints but she is speaking in full and complete sentences her parents are seated at bedside.  At times she seems to have problems with word finding and states that she is feeling confused but there is no clear expressive aphasia.  The patient is mildly pale appearing   HENT:      Head: Atraumatic.      Right Ear: External ear normal.      Left Ear: External ear normal.      Nose: Nose normal.   Eyes:      General: No scleral icterus.        Right eye: No discharge.         Left eye: No discharge.       Conjunctiva/sclera: Conjunctivae normal.      Pupils: Pupils are equal, round, and reactive to light.   Neck:      Vascular: No JVD.   Cardiovascular:      Rate and Rhythm: Normal rate and regular rhythm.      Heart sounds: Normal heart sounds. No murmur heard.     No friction rub. No gallop.   Pulmonary:      Effort: Pulmonary effort is normal. No respiratory distress.      Breath sounds: Normal breath sounds. No stridor. No wheezing.   Chest:      Chest wall: No tenderness.   Abdominal:      General: Bowel sounds are normal. There is no distension.      Palpations: Abdomen is soft.      Tenderness: There is no abdominal tenderness. There is no guarding or rebound.      Comments: Patient with healed what she describes as a spider bite on her anterior abdomen no signs of surrounding cellulitis   Musculoskeletal:         General: No tenderness or deformity. Normal range of motion.      Cervical back: Normal range of motion and neck supple.   Lymphadenopathy:      Cervical: No cervical adenopathy.   Skin:     General: Skin is warm and dry.      Coloration: Skin is pale.      Findings: No erythema or rash.   Neurological:      Mental Status: She is alert and oriented to person, place, and time.      Cranial Nerves: No cranial nerve deficit.      Motor: Weakness present.      Coordination: Coordination normal.      Comments: Patient states pain is too severe to sit up    Bilateral deep tendon reflexes are intact   Psychiatric:         Behavior: Behavior normal.         Judgment: Judgment normal.               ED Course     Labs Reviewed   COMP METABOLIC PANEL (14) - Abnormal; Notable for the following components:       Result Value    Glucose 119 (*)     Sodium 132 (*)     BUN 34 (*)     AST 52 (*)     ALT 95 (*)     Alkaline Phosphatase 285 (*)     Bilirubin, Total 2.4 (*)     Albumin 3.1 (*)     A/G Ratio 0.9 (*)     All other components within normal limits   URINALYSIS WITH CULTURE REFLEX - Abnormal; Notable for the  following components:    Ketones Urine 15 (*)     Blood Urine Moderate (*)     Urobilinogen Urine 1.0 (*)     Leukocyte Esterase Urine Small (*)     All other components within normal limits   C-REACTIVE PROTEIN - Abnormal; Notable for the following components:    C-Reactive Protein 20.60 (*)     All other components within normal limits   UA MICROSCOPIC ONLY, URINE - Abnormal; Notable for the following components:    WBC Urine 6-10 (*)     RBC Urine >10 (*)     Bacteria Urine Rare (*)     Squamous Epi. Cells Few (*)     All other components within normal limits   CBC W/ DIFFERENTIAL - Abnormal; Notable for the following components:    PLT 66.0 (*)     Lymphocyte Absolute 0.79 (*)     All other components within normal limits   TROPONIN I HIGH SENSITIVITY - Normal   SED RATE, WESTERGREN (AUTOMATED) - Normal   ICTOTEST - Normal   CBC WITH DIFFERENTIAL WITH PLATELET    Narrative:     The following orders were created for panel order CBC With Differential With Platelet.  Procedure                               Abnormality         Status                     ---------                               -----------         ------                     CBC W/ DIFFERENTIAL[[id number]]          Abnormal            Final result                 Please view results for these tests on the individual orders.   SCAN SLIDE   LYME DISEASE, TOTAL AB W RFLX   COMP METABOLIC PANEL (14)   MAGNESIUM   PHOSPHORUS   CBC WITH DIFFERENTIAL WITH PLATELET   PROTHROMBIN TIME (PT)   HEPATITIS PANEL, ACUTE (4)   URINE CULTURE, ROUTINE          ED Course as of 06/10/24 0117  ------------------------------------------------------------  Time: 06/09 1927  Value: Total Bilirubin(!): 2.4  Comment: (Reviewed)  ------------------------------------------------------------  Time: 06/09 1927  Value: AST (SGOT)(!): 52  Comment: (Reviewed)  ------------------------------------------------------------  Time: 06/09 1927  Value: ALT (SGPT)(!): 95  Comment:  (Reviewed)  ------------------------------------------------------------  Time: 06/09 1927  Value: ALKALINE PHOSPHATASE(!): 285  Comment: (Reviewed)  ------------------------------------------------------------  Time: 06/09 1927  Value: Sodium(!): 132  Comment: (Reviewed)  ------------------------------------------------------------  Time: 06/09 1928  Value: C-REACTIVE PROTEIN(!): 20.60  Comment: (Reviewed)  ------------------------------------------------------------  Time: 06/09 1928  Value: Platelet Count(!): 66.0  Comment: Discussed the case with Dr. Callahan for admission.  Patient has a chronic thrombocytopenia but her CRP is elevated at 20.6.  I am unsure of the significance of that.  Patient states that she continues to have severe full body joint pain but she was able to sit up in the bed.  We are waiting on a urine sample to make sure that this is not all being precipitated by pyelonephritis.  She does have a general elevation in her LFTs and bilirubin as well as a low sodium but she has not been eating or drinking well over the previous days.  ------------------------------------------------------------  Time: 06/09 1928  Comment:   Her troponin is negative.              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  She states that beginning on Thursday she has been having terrible severe pain in all of her joints to the point that she is unable to walk to the bathroom she is been urinating on the floor and she is unable to function.  He will apparently started with diaphoresis and shoulder girdle pain that went behind her ears and her neck she does have a remote history of kyphoplasty and L2 severe chronic pain with a hemangioma of her spine however her initial pain presents more as thoracic or cervical pain and since that time she is endorsing pain in all of her joints.  She does not have loss of deep tendon reflexes to suggest cauda equina syndrome.  I did discuss with her  that I do think that there is some odd aspects to the story because this pain that became so severe that she could not walk or move around started on Thursday.  It is now Sunday evening.  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  She also started herself on 80 mg of prednisone thinking this was an arthritis and while it may be a polyarticular arthritis, this is certainly not a typical presentation especially for a patient with no history of RA or further inflammatory disorder.  Differential diagnosis includes    Differential diagnosis includes but is not limited to severe electrolyte disorder, metabolic derangement, autoimmune inflammatory disorder, severe dehydration causing with no fevers and no chills and no diarrhea I am not sure that this is would represent an infectious etiology but tickborne illness might be possible        Patient still remains unable to ambulate in the emergency department.  She has a known ureteral stone in her kidney but no signs of severe infection which is reassuring but does continue to provide more questions and answers.  Recently finished a 10-day course of Bactrim for the abdominal wall cellulitis but has not been on fluoroquinolones necessarily that could cause severe joint pain.  Considered such etiologies such as serum sickness but really does not seem to be a clear presentation of that.       Toradol given for anti-inflammatory effect IV fluids infusing patient will be admitted to the hospital pending for evaluation particularly she is not able to walk or care for self and more testing is certainly warranted            Medical Decision Making      Disposition and Plan     Clinical Impression:  1. Unable to walk    2. Polyarticular arthritis    3. Elevated C-reactive protein (CRP)    4. Hyponatremia    5. Dehydration          Disposition:  Admit  6/10/2024  1:09 am    Follow-up:  Kwabena Tovar PsyD [address]  [place] [address]  [phone]    Schedule an appointment as soon as possible for a visit in 1 week(s)  Establish care          Medications Prescribed:  Current Discharge Medication List                            Hospital Problems       Present on Admission  Date Reviewed: 5/30/2024            ICD-10-CM Noted POA    * (Principal) Unable to walk R26.2 6/9/2024 Unknown    Acute pain of right shoulder M25.511 6/9/2024 Unknown    Azotemia R79.89 6/9/2024 Yes    Confusion R41.0 6/9/2024 Unknown    Dehydration E86.0 6/9/2024 Unknown    Elevated C-reactive protein (CRP) R79.82 6/9/2024 Unknown    Hyperglycemia R73.9 6/9/2024 Yes    Hyponatremia E87.1 6/9/2024 Yes    Polyarticular arthritis M13.0 6/9/2024 Unknown    Thrombocytopenia (HCC) D69.6 6/9/2024 Yes

## 2024-06-10 ENCOUNTER — APPOINTMENT (OUTPATIENT)
Dept: ULTRASOUND IMAGING | Facility: HOSPITAL | Age: 59
End: 2024-06-10
Attending: STUDENT IN AN ORGANIZED HEALTH CARE EDUCATION/TRAINING PROGRAM
Payer: COMMERCIAL

## 2024-06-10 ENCOUNTER — APPOINTMENT (OUTPATIENT)
Dept: GENERAL RADIOLOGY | Facility: HOSPITAL | Age: 59
End: 2024-06-10
Attending: STUDENT IN AN ORGANIZED HEALTH CARE EDUCATION/TRAINING PROGRAM
Payer: COMMERCIAL

## 2024-06-10 PROBLEM — F41.9 ANXIETY DISORDER: Status: ACTIVE | Noted: 2024-06-10

## 2024-06-10 PROBLEM — F33.2 SEVERE EPISODE OF RECURRENT MAJOR DEPRESSIVE DISORDER, WITHOUT PSYCHOTIC FEATURES (HCC): Status: ACTIVE | Noted: 2024-06-10

## 2024-06-10 PROBLEM — F11.90 OPIOID USE DISORDER: Status: ACTIVE | Noted: 2022-03-01

## 2024-06-10 LAB
ALBUMIN SERPL-MCNC: 2.9 G/DL (ref 3.4–5)
ALBUMIN/GLOB SERPL: 0.9 {RATIO} (ref 1–2)
ALP LIVER SERPL-CCNC: 247 U/L
ALT SERPL-CCNC: 78 U/L
AMPHET UR QL SCN: NEGATIVE
ANION GAP SERPL CALC-SCNC: 10 MMOL/L (ref 0–18)
AST SERPL-CCNC: 29 U/L (ref 15–37)
ATRIAL RATE: 73 BPM
BASOPHILS # BLD AUTO: 0.02 X10(3) UL (ref 0–0.2)
BASOPHILS NFR BLD AUTO: 0.4 %
BENZODIAZ UR QL SCN: NEGATIVE
BILIRUB SERPL-MCNC: 1.4 MG/DL (ref 0.1–2)
BUN BLD-MCNC: 32 MG/DL (ref 9–23)
CALCIUM BLD-MCNC: 9.3 MG/DL (ref 8.5–10.1)
CANNABINOIDS UR QL SCN: NEGATIVE
CHLORIDE SERPL-SCNC: 105 MMOL/L (ref 98–112)
CO2 SERPL-SCNC: 23 MMOL/L (ref 21–32)
COCAINE UR QL: NEGATIVE
CREAT BLD-MCNC: 0.73 MG/DL
CREAT UR-SCNC: 104 MG/DL
EGFRCR SERPLBLD CKD-EPI 2021: 96 ML/MIN/1.73M2 (ref 60–?)
EOSINOPHIL # BLD AUTO: 0.01 X10(3) UL (ref 0–0.7)
EOSINOPHIL NFR BLD AUTO: 0.2 %
ERYTHROCYTE [DISTWIDTH] IN BLOOD BY AUTOMATED COUNT: 14.6 %
GLOBULIN PLAS-MCNC: 3.1 G/DL (ref 2.8–4.4)
GLUCOSE BLD-MCNC: 116 MG/DL (ref 70–99)
HAV IGM SER QL: NONREACTIVE
HBV CORE IGM SER QL: NONREACTIVE
HBV SURFACE AG SERPL QL IA: NONREACTIVE
HCT VFR BLD AUTO: 34.6 %
HCV AB SERPL QL IA: NONREACTIVE
HGB BLD-MCNC: 11.7 G/DL
IMM GRANULOCYTES # BLD AUTO: 0.02 X10(3) UL (ref 0–1)
IMM GRANULOCYTES NFR BLD: 0.4 %
INR BLD: 0.95 (ref 0.8–1.2)
LYMPHOCYTES # BLD AUTO: 1.14 X10(3) UL (ref 1–4)
LYMPHOCYTES NFR BLD AUTO: 21.1 %
MAGNESIUM SERPL-MCNC: 2.1 MG/DL (ref 1.6–2.6)
MCH RBC QN AUTO: 30.8 PG (ref 26–34)
MCHC RBC AUTO-ENTMCNC: 33.8 G/DL (ref 31–37)
MCV RBC AUTO: 91.1 FL
MDMA UR QL SCN: NEGATIVE
MONOCYTES # BLD AUTO: 0.26 X10(3) UL (ref 0.1–1)
MONOCYTES NFR BLD AUTO: 4.8 %
NEUTROPHILS # BLD AUTO: 3.96 X10 (3) UL (ref 1.5–7.7)
NEUTROPHILS # BLD AUTO: 3.96 X10(3) UL (ref 1.5–7.7)
NEUTROPHILS NFR BLD AUTO: 73.1 %
OSMOLALITY SERPL CALC.SUM OF ELEC: 294 MOSM/KG (ref 275–295)
OXYCODONE UR QL SCN: NEGATIVE
P AXIS: 24 DEGREES
P-R INTERVAL: 166 MS
PHOSPHATE SERPL-MCNC: 3.3 MG/DL (ref 2.5–4.9)
PLATELET # BLD AUTO: 50 10(3)UL (ref 150–450)
POTASSIUM SERPL-SCNC: 4 MMOL/L (ref 3.5–5.1)
PROT SERPL-MCNC: 6 G/DL (ref 6.4–8.2)
PROTHROMBIN TIME: 12.7 SECONDS (ref 11.6–14.8)
Q-T INTERVAL: 410 MS
QRS DURATION: 104 MS
QTC CALCULATION (BEZET): 451 MS
R AXIS: -36 DEGREES
RBC # BLD AUTO: 3.8 X10(6)UL
SODIUM SERPL-SCNC: 138 MMOL/L (ref 136–145)
T AXIS: 48 DEGREES
TSI SER-ACNC: 0.59 MIU/ML (ref 0.36–3.74)
VENTRICULAR RATE: 73 BPM
WBC # BLD AUTO: 5.4 X10(3) UL (ref 4–11)

## 2024-06-10 PROCEDURE — 76705 ECHO EXAM OF ABDOMEN: CPT | Performed by: STUDENT IN AN ORGANIZED HEALTH CARE EDUCATION/TRAINING PROGRAM

## 2024-06-10 PROCEDURE — 73030 X-RAY EXAM OF SHOULDER: CPT | Performed by: STUDENT IN AN ORGANIZED HEALTH CARE EDUCATION/TRAINING PROGRAM

## 2024-06-10 PROCEDURE — 99232 SBSQ HOSP IP/OBS MODERATE 35: CPT | Performed by: HOSPITALIST

## 2024-06-10 PROCEDURE — 90792 PSYCH DIAG EVAL W/MED SRVCS: CPT

## 2024-06-10 NOTE — OCCUPATIONAL THERAPY NOTE
OT orders received, chart reviewed. Patient admitted with R shoulder pain, diaphoresis, confusion; imaging pending. Will hold at this time and reattempt as able and as patient appropriate pending plan of care. RN aware and in agreement.

## 2024-06-10 NOTE — PHYSICAL THERAPY NOTE
PHYSICAL THERAPY EVALUATION - INPATIENT     Room Number: 368/368-A  Evaluation Date: 6/10/2024  Type of Evaluation: Initial  Physician Order: PT Eval and Treat    Presenting Problem: diaphoresis, right shoulder pain  Co-Morbidities : GIANLUCA, lumbar spinal hemiangioma, depression, L2 fx s/p vertebroplasty 3/2024  Reason for Therapy: Mobility Dysfunction and Discharge Planning    PHYSICAL THERAPY ASSESSMENT   Patient is currently functioning below baseline with bed mobility, transfers, gait, stair negotiation, maintaining seated position, and standing prolonged periods.  Prior to admission, patient's baseline is independent.  Patient is requiring stand-by assist, contact guard assist, and minimal assist as a result of the following impairments: pain, impaired standing balance, and decreased muscular endurance.  Physical Therapy will continue to follow for duration of hospitalization.    Patient will benefit from continued skilled PT Services at discharge to promote functional independence and safety with additional support and return home with home health PT.    PLAN  PT Treatment Plan: Bed mobility;Body mechanics;Endurance;Energy conservation;Patient education;Gait training;Strengthening;Range of motion;Stair training;Stoop training;Transfer training;Balance training  Rehab Potential : Good  Frequency (Obs): 3-5x/week  Number of Visits to Meet Established Goals: 5      CURRENT GOALS    Goal #1 Patient is able to demonstrate supine - sit EOB @ level: modified independent     Goal #2 Patient is able to demonstrate transfers Sit to/from Stand at assistance level: modified independent     Goal #3 Patient is able to ambulate 150 feet with assist device: walker - rolling at assistance level: modified independent     Goal #4 Pt is able to ascend/descend at least 2 stairs with supervision   Goal #5    Goal #6    Goal Comments: Goals established on 6/10/2024      PHYSICAL THERAPY MEDICAL/SOCIAL HISTORY  History related to  current admission: Patient is a 59 year old female admitted on 2024 from home for diaphoresis and shoulder pain.  Pt also with some confusion. Shoulder x-rays negative for acute fracture.    HOME SITUATION  Type of Home: House   Home Layout: Two level;Able to live on main level  Stairs to Enter : 2  Railing: No  Stairs to Bedroom: 13  Railing: Yes    Lives With: Alone  Drives: Yes  Patient Owned Equipment: None       Prior Level of [place]: Pt typically independent with all functional mobility. 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    SUBJECTIVE  Pt states her legs hurt \"too much.\" Pt also reports pain all over.       OBJECTIVE  Precautions: Spine;Bed/chair alarm  Fall Risk: High fall risk    WEIGHT BEARING RESTRICTION  Weight Bearing Restriction: None                PAIN ASSESSMENT  Ratin  Location: back of both legs when standing  Management Techniques: Activity promotion;Body mechanics;Repositioning;Relaxation    COGNITION  Orientation Level:  oriented x4  Following Commands:  follows multistep commands consistently  Pt appears confused at times; initially spoke of starting job in 1917 and then corrected self a little later. Pt does report some confusion and forgetfulness as well.    RANGE OF MOTION AND STRENGTH ASSESSMENT  Upper extremity ROM and strength are within functional limits     Lower extremity ROM is within functional limits     Lower extremity strength is within functional limits       BALANCE  Static Sitting: Good  Dynamic Sitting: Fair +  Static Standing: Fair -  Dynamic Standing: Poor +    ADDITIONAL TESTS                                    ACTIVITY TOLERANCE           BP: (!) 142/100 pt in severe pain (9/10) at the time; slight dizziness reported  BP Location: Left arm  BP Method: Automatic  Patient Position: Standing    O2 WALK  Oxygen Therapy  SPO2% on Room Air at Rest: 98    NEUROLOGICAL FINDINGS  Neurological Findings: Sensation           Sensation: B LEs grossly intact to light touch          AM-PAC '6-Clicks' INPATIENT SHORT FORM - BASIC MOBILITY  How much difficulty does the patient currently have...  Patient Difficulty: Turning over in bed (including adjusting bedclothes, sheets and blankets)?: A Little   Patient Difficulty: Sitting down on and standing up from a chair with arms (e.g., wheelchair, bedside commode, etc.): A Little   Patient Difficulty: Moving from lying on back to sitting on the side of the bed?: A Little   How much help from another person does the patient currently need...   Help from Another: Moving to and from a bed to a chair (including a wheelchair)?: A Little   Help from Another: Need to walk in hospital room?: A Little   Help from Another: Climbing 3-5 steps with a railing?: A Lot       AM-PAC Score:  Raw Score: 17   Approx Degree of Impairment: 50.57%   Standardized Score (AM-PAC Scale): 42.13   CMS Modifier (G-Code): CK    FUNCTIONAL ABILITY STATUS  Gait Assessment   Functional Mobility/Gait Assessment  Gait Assistance: Contact guard assist  Distance (ft): 2  Assistive Device: Rolling walker  Pattern: Shuffle    Skilled Therapy Provided     Bed Mobility:  Rolling: supervision with cues for log roll  Supine to sit: CGA   Sit to supine: NT     Transfer Mobility:  Sit to stand: min A; cues for hand placement   Stand to sit: CGA; cues for hand placement  Gait = Pt ambulated 2' with RW and CGA; ambulation limited by pain    Therapist's Comments: Pt lying in bed and agreeable to PT. Educated pt on log roll; pt demonstrated understanding with cuing. Pt reports 6/10 right shoulder at rest; 9/10 pain in both legs (initially knees and distally but then progressed more proximally up back of legs as pt continued standing); pain 7/10 once in sitting again.    Exercise/Education Provided:  Body mechanics  Functional activity tolerated  Posture  Transfer training    Patient End of Session: Up in chair;Needs met;Call light within reach;RN aware of session/findings;All patient questions  and concerns addressed;Alarm set      Patient Evaluation Complexity Level:  History Moderate - 1 or 2 personal factors and/or co-morbidities   Examination of body systems Moderate - addressing a total of 3 or more elements   Clinical Presentation Moderate - Evolving   Clinical Decision Making Moderate - Evolving       PT Session Time: 26 minutes    Therapeutic Activity: 13 minutes

## 2024-06-10 NOTE — CM/SW NOTE
06/10/24 1500   CM/SW Referral Data   Referral Source Social Work (self-referral)   Reason for Referral Discharge planning   Informant Patient;EMR;Clinical Staff Member   Patient Info   Patient's Current Mental Status at Time of Assessment Alert;Oriented   Patient's Home Environment House   Number of Levels in Home 2   Patient lives with Alone   Patient Status Prior to Admission   Independent with ADLs and Mobility Yes   Discharge Needs   Anticipated D/C needs No anticipated discharge needs       HOME SITUATION per PT eval  Type of Home: House   Home Layout: Two level;Able to live on main level  Stairs to Enter : 2  Railing: No  Stairs to Bedroom: 13  Railing: Yes     Lives With: Alone  Drives: Yes  Patient Owned Equipment: None     Prior Level of [place] per PT eval: Pt typically independent with all functional mobility. 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       Patient is a 58 y/o woman admitted with diffuse pain, weakness and concerns about confusion/altered mental status.  Pt has been recommended to have neuropsych consult as outpatient for further assessment of cognition.  PT/OT recommending HHC services at DC.    Met with pt to discuss DC planning.  Pt lives alone and is normally independent with ADLs.  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  She has 2 children who live nearby and parents who live about 2 hours away.      Discussed need for neuropsych consult and follow up as outpatient.  Pt stated that she does intend to return to work as soon as possible as they are very short staffed for physicians.  Discussed concerns about pt continuing to work due to her ongoing concerns regarding her own cognitive capacity.  Encouraged pt to pursue neuropsych consult before returning to work as a physician.  Additional resources for neurocognitive assessments to be placed on DC AVS.    Reviewed therapy recommendations for [facility] services.  Pt declined HHC at this time.  Encouraged pt to talk with her parents to  determine if they can stay with her temporarily to provide additional support at home.  Pt agreeable and will do so.    Pt shared multiple stressors in her life including, work, finances, support of her young adult children and other life demands.  Discussed impact of this on pt's mental and physical health.  Encouraged pt to seek support and counseling for ways to improve/diminish her current stressors.      / to remain available for support and/or discharge planning.     Katherine Echols, [facility]  Discharge Planner  [phone]

## 2024-06-10 NOTE — PLAN OF CARE
NURSING ADMISSION NOTE      Patient admitted via Wheelchair from  ED via private car.  Oriented to room.  Safety precautions initiated.  Bed in low position.  Call light in reach.  Accompanied by parents, belongings at bedside.  Hospitalist aware of patient's arrival. Admission matthew and med rec done at bedside.    AxO4 - can be confused and forgetful at times. VSS on RA. GIANLUCA no CPAP. On tele - NSR. SCD/to start lovenox. Denies nausea on arrival, LBM 6/9. Voids w/o issue. C/o generalized, diffused pain, worse on R shoulder - PRN meds available. Able to transfer from  to bed. MRI screening done. Pending psychiatry consult. MRI brain, US abd, XR shoulder pending. Updated on POC. Safety measures placed. Care ongoing.

## 2024-06-10 NOTE — DISCHARGE INSTRUCTIONS
Neuropsych testing referral:   Kwabena Tovar   Address: [address] , [place] [address]  Phone: [phone]    In-home neuropsychological evaluations:   Dr. Lila Gonsales - [phone] ([place])      [facility] ([place]) (previously known as [facility])  [email or web address]     [facility] ([place] and various locations)   [email or web address]     In office neuropsychological evaluation:      [facility] ([place])   [email or web address]    [facility] (several [place] locations)

## 2024-06-10 NOTE — PROGRESS NOTES
[facility]   part of [facility]     Hospitalist Progress Note     Alesia Bland Patient Status:  Observation    3/10/1965 MRN [id number]   Location [facility] 3SW-A Attending Juan Thompson DO   Hosp Day # 0 PCP Toni Callahan DO     Chief Complaint: I can't move    Subjective:     Patient complaints of pain \"everywhere\" and severe weakness preventing her to move    Objective:    Review of Systems:   A comprehensive review of systems was completed; pertinent positive and negatives stated in subjective.    Vital signs:  Temp:  [97.8 °F (36.6 °C)-98.3 °F (36.8 °C)] 97.8 °F (36.6 °C)  Pulse:  [72-90] 72  Resp:  [16-20] 16  BP: (124-142)/() 124/63  SpO2:  [95 %-100 %] 96 %    Physical Exam:    General: No acute distress  Respiratory: No wheezes, no rhonchi  Cardiovascular: S1, S2, regular rate and rhythm  Abdomen: Soft, Non-tender, non-distended, positive bowel sounds  Neuro: No new focal deficits.   Extremities: No edema      Diagnostic Data:    Labs:  Recent Labs   Lab 248 06/10/24  0505   WBC 5.1 5.4   HGB 13.0 11.7*   MCV 92.1 91.1   PLT 66.0* 50.0*   INR  --  0.95       Recent Labs   Lab 248 06/10/24  0505   * 116*   BUN 34* 32*   CREATSERUM 0.86 0.73   CA 9.5 9.3   ALB 3.1* 2.9*   * 138   K 4.5 4.0    105   CO2 25.0 23.0   ALKPHO 285* 247*   AST 52* 29   ALT 95* 78*   BILT 2.4* 1.4   TP 6.7 6.0*       Estimated Creatinine Clearance: 79.6 mL/min (based on SCr of 0.73 mg/dL).    Recent Labs   Lab 24  180   TROPHS 9       Recent Labs   Lab 06/10/24  0505   PTP 12.7   INR 0.95                  Microbiology    No results found for this visit on 24.      Imaging: Reviewed in Epic.    Medications:    DULoxetine  120 mg Oral Daily    gabapentin  900 mg Oral TID    lurasidone  60 mg Oral Daily with food    losartan  100 mg Oral Daily    metoprolol succinate ER  100 mg Oral Daily Beta Blocker    oxybutynin ER  5 mg Oral Daily    enoxaparin  40 mg  Subcutaneous Daily       Assessment & Plan:      # Confusion, c/f underlying neurocognitive disorder  # Suspected fibromyalgia   # Intermittent weakness   - MRI brain ordered to r/o CNS pathology leading to progressive confusion    - neurology to see   - continue Cymbalta, gabapentin, lurasidone   - Psych eval noted, patient likely will benefit from neurocognitive evaluation as outpatient with Dr. Tovar    # Right shoulder pain    - appears MSK pain in Right shoulder, right shoulder x-ray reviewed no acute fracture   - Pain control with tylenol, toradol. Avoid opioids d/t abuse history     # Transaminitis   - Right upper quadrant ultrasound (6/10) shows \"small volume of gallbladder sludge otherwise no sonographic evidence of acute cholecystitis or biliary obstruction\"   - labs trending down, will trend   - viral hep panel neg     # Hyponatremia, improving - s/p IVF, trend chem     # Diaphoresis?    - now resolved, trop negative   - No leukocytosis, no hx of CP/palpitations    - Monitor on tele, EKG NSR with ST elevations/depressions   - CRP elevated, though nonspecific      # Hypertension - Continue home oral antihypertensives      Juan Thompson, DO    Supplementary Documentation:     Quality:  DVT Mechanical Prophylaxis:   SCDs, Early ambuation  DVT Pharmacologic Prophylaxis   Medication    enoxaparin (Lovenox) 40 MG/0.4ML SUBQ injection 40 mg                Code Status: Prior  Phillips: No urinary catheter in place  Phillips Duration (in days):   Central line:    CELESTE:     Discharge is dependent on: clinical improvement  At this point Ms. Bland is expected to be discharge to: tbd    The 21st Century Cures Act makes medical notes like these available to patients in the interest of transparency. Please be advised this is a medical document. Medical documents are intended to carry relevant information, facts as evident, and the clinical opinion of the practitioner. The medical note is intended as peer to peer  communication and may appear blunt or direct. It is written in medical language and may contain abbreviations or verbiage that are unfamiliar.

## 2024-06-10 NOTE — ED QUICK NOTES
Pt is going to [facility] by private car. Form given and explained to the pt and family. Pt/family stated they understood the teaching; signed by the pt. IV wrapped prior to leaving the unit. Pt left the unit at this time by private car.

## 2024-06-10 NOTE — OCCUPATIONAL THERAPY NOTE
OCCUPATIONAL THERAPY EVALUATION - INPATIENT     Room Number: 368/368-A  Evaluation Date: 6/10/2024  Type of Evaluation: Initial  Presenting Problem: R shoulder pain, diaphoresis    Physician Order: IP Consult to Occupational Therapy  Reason for Therapy: ADL/IADL Dysfunction and Discharge Planning    OCCUPATIONAL THERAPY ASSESSMENT   Patient is currently functioning near baseline with toileting, lower body dressing, bed mobility, transfers, and dynamic standing balance. Prior to admission, patient's baseline is independent (until last several days when pain began).  Patient is requiring  grossly CGA to min assist for LB/standing ADLs and transfers  as a result of the following impairments: decreased functional reach, decreased endurance, pain, and impaired standing balance. Occupational Therapy will continue to follow for duration of hospitalization.    Patient will benefit from continued skilled OT Services at discharge to promote functional independence and safety with additional support and return home with home health OT      History Related to Current Admission: Patient is a 59 year old female admitted on 6/9/2024 with Presenting Problem: R shoulder pain, diaphoresis.R shoulder xray negative, abdomen xray negative.  Co-Morbidities : GIANLUCA, lumbar spinal hemiangioma, depression, L2 fx s/p vertebroplasty 3/2024    WEIGHT BEARING RESTRICTION  Weight Bearing Restriction: None                Recommendations for nursing staff:   Transfers: 1-person  Toileting location: commode (bathroom as tolerated due to pain)    EVALUATION SESSION:  Patient Start of Session: supine  FUNCTIONAL TRANSFER ASSESSMENT  Sit to Stand: Edge of Bed  Edge of Bed: Minimal Assist    BED MOBILITY  Supine to Sit : Contact Guard Assist    BALANCE ASSESSMENT     FUNCTIONAL ADL ASSESSMENT  LB Dressing Seated: Supervision (donned B slip-on shoes without AE)      ACTIVITY TOLERANCE: limited by pain                         O2 SATURATIONS  Oxygen  Therapy  SPO2% on Room Air at Rest: 100    COGNITION  Overall Cognitive Status:  WFL - within functional limits  Attention Span:  attends with cues to redirect  Orientation Level:  oriented x4  Following Commands:  follows one step commands consistently, follows multistep commands with increased time, and follows multistep commands with repetition  One instance of saying she has worked at her current job since 1917 instead of 2017; reports has been having occasional word-finding issues recently, no other instances noted this session    Upper Extremity   ROM: within functional limits except for the following:  Right Shoulder flexion approx 50% AROM with increased time  Strength: within functional limits     EDUCATION PROVIDED  Patient: Role of Occupational Therapy; Functional Transfer Techniques; Fall Prevention; Compensatory ADL Techniques  Patient's Response to Education: Verbalized Understanding; Requires Further Education    Equipment used: RW  Demonstrates functional use, Would benefit from additional trial      Therapist comments: Patient motivated and participatory after initial encouragement; patient grossly CGA for bed mobility, min assist to stand with RW, and CGA for steps to bedside chair with increased time, unable to tolerated additional activity due to reports of severe BLE pain with standing/walking. Anticipate with improved pain control, patient will progress to appropriate for return home at discharge, reports her parents are in good health and will be able to assist.     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 Reports she walks up to a mile at a time at her job between buildings at the assisted, and is usually carrying a heavy bag. Patient states that the last several days, she was so debilitated she was unable to walk to the bathroom, was voiding on herself.     SUBJECTIVE   \"My kids won't help me, buy my parents will. They're so healthy they can run circles around me!\"    PAIN ASSESSMENT  Ratin  Location: BLE in standing, beginning from knees down, them moving up thighs; 7 BLE once seated; beginning of session in supine, only 6/10 pain R shoulder  Management Techniques: Activity promotion;Relaxation;Repositioning;Nurse notified    OBJECTIVE  Precautions: Spine;Bed/chair alarm  Fall Risk: High fall risk      ASSESSMENTS    AM-PAC ‘6-Clicks’ Inpatient Daily Activity Short Form  -   Putting on and taking off regular lower body clothing?: A Little  -   Bathing (including washing, rinsing, drying)?: A Little  -   Toileting, which includes using toilet, bedpan or urinal? : A Little  -   Putting on and taking off regular upper body clothing?: None  -   Taking care of personal grooming such as brushing teeth?: None  -   Eating meals?: None    AM-PAC Score:  Score: 21  Approx Degree of Impairment: 32.79%  Standardized Score (AM-PAC Scale): 44.27    ADDITIONAL TESTS     NEUROLOGICAL FINDINGS      COGNITION ASSESSMENTS       PLAN  OT Treatment Plan: Balance activities;ADL training;Functional transfer training;Endurance training;Patient/Family education;Patient/Family training;Compensatory technique education  Rehab Potential : Good  Frequency: 3x/week  Number of Visits to Meet Established Goals: 3    ADL GOALS   Patient will perform lower body dressing with supervision  Patient will perform toileting with supervision    FUNCTIONAL TRANSFER GOALS    Patient will perform supine to sit with supervision  Patient will perform sit to supine with supervision  Patient will transfer to toilet with supervision    Patient Evaluation Complexity Level:   Occupational Profile/Medical History LOW - Brief history including review of medical or therapy records    Specific performance deficits impacting engagement in ADL/IADL LOW  1 - 3 performance deficits    Client Assessment/Performance Deficits LOW - No comorbidities nor modifications of tasks    Clinical Decision Making LOW - Analysis of occupational profile, problem-focused assessments, limited treatment options    Overall Complexity LOW     OT Session Time: 25 minutes  Therapeutic Activity: 10 minutes

## 2024-06-10 NOTE — H&P
[facility]     Alesia Bland Patient Status:  Observation    3/10/1965 MRN [id number]   Location [facility] 3SW-A Attending Steve Callahan, *   Hosp Day # 0 PCP Toni Callahan DO     Chief Complaint: Right shoulder pain, diaphoresis     History of Present Illness: Alesia Bland is a 59 year old female with past medical history of GIANLUCA, spinal hemangioma, depression who presents for diaphoresis, shoulder pain.    Patient is a poor historian, occasionally confused and tangential.  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  Patient was evaluated by nursing staff, he had EKG obtained and noted to have rates in the 140s.  Patient advised to present to the hospital for further evaluation, however attempted to treat symptoms herself at home.  States that she was feeling increasing fatigue, weakness, whole body pain, with the worst in right shoulder.  Denies any falls, trauma or injury to the shoulder.  Notes that she was otherwise ambulatory at baseline without assistance however was at home lying on the ground.  Patient called her family to bring her to the hospital.  She does note she has had issues with chronic pain for quite some time, has brought up the idea of fibromyalgia to her PCP also notes that she has chronic pain from, Lumbar spinal hemangioma, managed with gabapentin, Cymbalta, Tylenol with codeine.  Previously with pain service however fired from the clinic due to violating pain contract by prescribing herself narcotic agents.  Patient does note that she is more confused and forgetful than baseline, unclear duration of confusion.  Never had workup for dementia.    Past Medical History:  Past Medical History:    Anesthesia complication    n&v    Arrhythmia    PAT    Bronchitis, not specified as acute or chronic    Depression    Extrinsic asthma,  unspecified    Lipid screening    GIANLUCA (obstructive sleep apnea)    AHI 14 Supine AHI 20 non-supine AHI 9    PONV (postoperative nausea and vomiting)    Snoring    AHI 4.4 SaO2 janelle 86-.5 primary snoring    Spinal hemangioma        Past Surgical History:   Past Surgical History:   Procedure Laterality Date    Appendectomy  2008    Breast surgery procedure unlisted      enlargement      2003    Colonoscopy  2009    negative    Excis lumbar disk,one level      Hysterectomy      total hystero.    Oophorectomy Bilateral     total hystero    Other  2021    spinal angiogram with embolization    Other surgical history Bilateral 2015    Procedure: ABDOMINAL HYSTERECTOMY, BSO;  Surgeon: Calixto Weathers MD;  Location:  MAIN OR       Social History:  reports that she has never smoked. She has never used smokeless tobacco. She reports that she does not currently use alcohol after a past usage of about 3.3 standard drinks of alcohol per week. She reports that she does not use drugs.    Family History:   Family History   Problem Relation Age of Onset    Depression Mother     Other (Other) Mother     Hypertension Father     Other (Other) Father     Melanoma Father     Heart Attack Maternal Grandmother     Breast Cancer Maternal Grandmother 50        In her early 50's.    Other (Other) Maternal Grandmother     Other (leukemia) Maternal Grandfather     Diabetes Paternal Grandmother         type 2    Other (renal failure) Paternal Grandmother     Other (lung cancer) Paternal Grandfather        Allergies:   Allergies   Allergen Reactions    Hydrocodone NAUSEA AND VOMITING    Benzoyl Peroxide RASH     CREA    Levofloxacin UNKNOWN    Pcn [Penicillins] RASH       Medications:    No current facility-administered medications on file prior to encounter.     Current Outpatient Medications on File Prior to Encounter   Medication Sig Dispense Refill    metoprolol succinate  MG Oral Tablet  24 Hr Take 1 tablet (100 mg total) by mouth daily. 90 tablet 0    Metaxalone 800 MG Oral Tab Take 1 tablet (800 mg total) by mouth 3 (three) times daily as needed for Pain. 90 tablet 0    Solifenacin Succinate 5 MG Oral Tab Take 1 tablet (5 mg total) by mouth daily. 90 tablet 0    rizatriptan (MAXALT-MLT) 10 MG Oral Tablet Dispersible Take 1 tablet (10 mg total) by mouth as needed for Migraine. 90 tablet 1    Armodafinil 250 MG Oral Tab Take 250 mg by mouth daily.      LATUDA 60 MG Oral Tab Take 1 tablet (60 mg total) by mouth daily with food.      DULoxetine HCl 60 MG Oral Cap DR Particles Take 2 capsules (120 mg total) by mouth daily.      acetaminophen-codeine 300-60 MG Oral Tab Take 1 tablet by mouth 2 (two) times daily as needed for Pain. 60 tablet 0    metFORMIN 500 MG Oral Tab Take 1 tablet (500 mg total) by mouth 2 (two) times daily. 180 tablet 3    gabapentin 300 MG Oral Cap Take 3 capsules (900 mg total) by mouth 3 (three) times daily. 270 capsule 0    losartan 100 MG Oral Tab TAKE 1 TABLET BY MOUTH EVERY DAY 30 tablet 0    [] acetaminophen-codeine 300-60 MG Oral Tab Take 1 tablet by mouth every 12 (twelve) hours as needed for Pain. 28 tablet 0    gabapentin 300 MG Oral Cap Take 3 capsules (900 mg total) by mouth 3 (three) times daily. 270 capsule 0    albuterol 108 (90 Base) MCG/ACT Inhalation Aero Soln Inhale 2 puffs into the lungs every 6 (six) hours as needed.      ondansetron (ZOFRAN) 4 mg tablet Take 1 tablet (4 mg total) by mouth every 8 (eight) hours as needed for Nausea. 30 tablet 2    triamcinolone 0.1 % External Cream Apply topically 2 (two) times daily. To affected skin. 80 g 1    traZODone HCl 50 MG Oral Tab Take 1 tablet (50 mg total) by mouth nightly as needed.         Review of Systems:   A comprehensive 14 point review of systems was completed.    Pertinent positives and negatives noted in the HPI.    Physical Exam:    /87 (BP Location: Left arm)   Pulse 85   Temp 98.2 °F  (36.8 °C) (Oral)   Resp 20   Ht 5' 6\" (1.676 m)   Wt 200 lb (90.7 kg)   LMP 12/23/2014 (Exact Date)   SpO2 97%   BMI 32.28 kg/m²   General: No acute distress. Alert and oriented x 3.  HEENT: Normocephalic atraumatic. Moist mucous membranes. EOM-I. PERRLA. Anicteric.  Neck: No lymphadenopathy. No JVD. No carotid bruits.  Respiratory: Clear to auscultation bilaterally. No wheezes. No rhonchi.  Cardiovascular: S1, S2. Regular rate and rhythm. No murmurs, rubs or gallops. Equal pulses.   Chest and Back: No tenderness or deformity.  Abdomen: Soft, nontender, nondistended.  Positive bowel sounds. No rebound, guarding or organomegaly.  Neurologic: No focal neurological deficits. CNII-XII intact.  Strength 5 out of 5 in bilateral upper and lower extremities to flexion extension.  Musculoskeletal: crepitus and tenderness to palpation on right shoulder, flexion of right shoulder elicits pain.  Extremities: No edema or cyanosis.  Integument: No rashes or lesions.   Psychiatric: Appropriate mood and affect.    Diagnostic Data:      Labs:  Recent Labs   Lab 06/09/24  1808   WBC 5.1   HGB 13.0   MCV 92.1   PLT 66.0*       Recent Labs   Lab 06/09/24  1808   *   BUN 34*   CREATSERUM 0.86   CA 9.5   ALB 3.1*   *   K 4.5      CO2 25.0   ALKPHO 285*   AST 52*   ALT 95*   BILT 2.4*   TP 6.7       Estimated Creatinine Clearance: 67.6 mL/min (based on SCr of 0.86 mg/dL).    No results for input(s): \"PTP\", \"INR\" in the last 168 hours.    No results for input(s): \"TROP\", \"CK\" in the last 168 hours.    Imaging: Imaging data reviewed in Epic.  No results found.      ASSESSMENT / PLAN:     # Right shoulder pain    - appears MSK pain in Right shoulder, will get XR shoulder for further eval   - Pain control with tylenol, toradol. Avoid opioids d/t abuse history    # Transaminitis   - RUQ US and hepatitis panel ordered     # Hyponatremia - IVF, trend chem    # Confusion, c/f underlying neurocognitive disorder  #  Suspected fibromyalgia    - MRI brain ordered to r/o CNS pathology leading to progressive confusion    - continue Cymbalta, gabapentin, lurasidone   - Psych consulted, patient likely will benefit from neurocognitive evaluation as outpatient with Dr. Tovar    # Diaphoresis?    - now resolved, trop negative   - No leukocytosis, no hx of CP/palpitations    - Monitor on tele, EKG NSR with ST elevations/depressions   - CRP elevated, though nonspecific     # Hypertension - Continue home oral antihypertensives        Code Status: Prior    Plan of care discussed with patient, ED physician    Steve Callahan MD  6/9/2024      Supplementary Documentation:      MDM : Patient's ER labs, imaging reviewed.  A.m. labs ordered.  ER management discussed with ED physician, decision made for patient to be admitted to the hospital for further medical management.

## 2024-06-10 NOTE — ED QUICK NOTES
Orders for admission, patient is aware of plan and ready to go upstairs. Any questions, please call ED CHRISTIANO Gauthier at extension [phone].     Patient Covid vaccination status: Fully vaccinated     COVID Test Ordered in ED: None    COVID Suspicion at Admission: N/A    Running Infusions:  None    Mental Status/LOC at time of transport: AOx3    Other pertinent information:   CIWA score: N/A   NIH score:  N/A
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
## 2024-06-11 ENCOUNTER — APPOINTMENT (OUTPATIENT)
Dept: MRI IMAGING | Facility: HOSPITAL | Age: 59
End: 2024-06-11
Attending: STUDENT IN AN ORGANIZED HEALTH CARE EDUCATION/TRAINING PROGRAM
Payer: COMMERCIAL

## 2024-06-11 ENCOUNTER — APPOINTMENT (OUTPATIENT)
Dept: MRI IMAGING | Facility: HOSPITAL | Age: 59
End: 2024-06-11
Attending: Other
Payer: COMMERCIAL

## 2024-06-11 ENCOUNTER — APPOINTMENT (OUTPATIENT)
Dept: GENERAL RADIOLOGY | Facility: HOSPITAL | Age: 59
End: 2024-06-11
Attending: INTERNAL MEDICINE
Payer: COMMERCIAL

## 2024-06-11 ENCOUNTER — NURSE ONLY (OUTPATIENT)
Dept: ELECTROPHYSIOLOGY | Facility: HOSPITAL | Age: 59
End: 2024-06-11
Attending: Other
Payer: COMMERCIAL

## 2024-06-11 PROBLEM — T63.331A BROWN RECLUSE SPIDER BITE OR STING: Status: ACTIVE | Noted: 2024-06-11

## 2024-06-11 PROBLEM — M54.16 LUMBAR RADICULOPATHY: Status: ACTIVE | Noted: 2024-06-11

## 2024-06-11 LAB
ALBUMIN SERPL-MCNC: 2.7 G/DL (ref 3.4–5)
ALBUMIN/GLOB SERPL: 0.9 {RATIO} (ref 1–2)
ALP LIVER SERPL-CCNC: 209 U/L
ALT SERPL-CCNC: 62 U/L
AMMONIA PLAS-MCNC: <10 UMOL/L (ref 11–32)
ANION GAP SERPL CALC-SCNC: 7 MMOL/L (ref 0–18)
AST SERPL-CCNC: 32 U/L (ref 15–37)
B BURGDOR IGG+IGM SER QL: NEGATIVE
BASOPHILS # BLD AUTO: 0.04 X10(3) UL (ref 0–0.2)
BASOPHILS NFR BLD AUTO: 0.7 %
BILIRUB SERPL-MCNC: 0.9 MG/DL (ref 0.1–2)
BUN BLD-MCNC: 29 MG/DL (ref 9–23)
CALCIUM BLD-MCNC: 9.2 MG/DL (ref 8.5–10.1)
CHLORIDE SERPL-SCNC: 111 MMOL/L (ref 98–112)
CO2 SERPL-SCNC: 23 MMOL/L (ref 21–32)
CREAT BLD-MCNC: 0.96 MG/DL
EGFRCR SERPLBLD CKD-EPI 2021: 69 ML/MIN/1.73M2 (ref 60–?)
EOSINOPHIL # BLD AUTO: 0.63 X10(3) UL (ref 0–0.7)
EOSINOPHIL NFR BLD AUTO: 10.7 %
ERYTHROCYTE [DISTWIDTH] IN BLOOD BY AUTOMATED COUNT: 14.9 %
GLOBULIN PLAS-MCNC: 3 G/DL (ref 2.8–4.4)
GLUCOSE BLD-MCNC: 104 MG/DL (ref 70–99)
HCT VFR BLD AUTO: 35.6 %
HGB BLD-MCNC: 12.1 G/DL
IMM GRANULOCYTES # BLD AUTO: 0.04 X10(3) UL (ref 0–1)
IMM GRANULOCYTES NFR BLD: 0.7 %
LYMPHOCYTES # BLD AUTO: 1.11 X10(3) UL (ref 1–4)
LYMPHOCYTES NFR BLD AUTO: 18.8 %
MAGNESIUM SERPL-MCNC: 1.8 MG/DL (ref 1.6–2.6)
MCH RBC QN AUTO: 31.5 PG (ref 26–34)
MCHC RBC AUTO-ENTMCNC: 34 G/DL (ref 31–37)
MCV RBC AUTO: 92.7 FL
MONOCYTES # BLD AUTO: 0.47 X10(3) UL (ref 0.1–1)
MONOCYTES NFR BLD AUTO: 8 %
NEUTROPHILS # BLD AUTO: 3.62 X10 (3) UL (ref 1.5–7.7)
NEUTROPHILS # BLD AUTO: 3.62 X10(3) UL (ref 1.5–7.7)
NEUTROPHILS NFR BLD AUTO: 61.1 %
OSMOLALITY SERPL CALC.SUM OF ELEC: 298 MOSM/KG (ref 275–295)
PLATELET # BLD AUTO: 36 10(3)UL (ref 150–450)
POTASSIUM SERPL-SCNC: 4.3 MMOL/L (ref 3.5–5.1)
PROT SERPL-MCNC: 5.7 G/DL (ref 6.4–8.2)
RBC # BLD AUTO: 3.84 X10(6)UL
RHEUMATOID FACT SERPL-ACNC: <10 IU/ML (ref ?–15)
SODIUM SERPL-SCNC: 141 MMOL/L (ref 136–145)
VIT B12 SERPL-MCNC: >2000 PG/ML (ref 193–986)
WBC # BLD AUTO: 5.9 X10(3) UL (ref 4–11)

## 2024-06-11 PROCEDURE — 99233 SBSQ HOSP IP/OBS HIGH 50: CPT | Performed by: HOSPITALIST

## 2024-06-11 PROCEDURE — 70553 MRI BRAIN STEM W/O & W/DYE: CPT | Performed by: STUDENT IN AN ORGANIZED HEALTH CARE EDUCATION/TRAINING PROGRAM

## 2024-06-11 PROCEDURE — 71046 X-RAY EXAM CHEST 2 VIEWS: CPT | Performed by: INTERNAL MEDICINE

## 2024-06-11 PROCEDURE — 72148 MRI LUMBAR SPINE W/O DYE: CPT | Performed by: OTHER

## 2024-06-11 PROCEDURE — 95816 EEG AWAKE AND DROWSY: CPT | Performed by: OTHER

## 2024-06-11 PROCEDURE — 99223 1ST HOSP IP/OBS HIGH 75: CPT | Performed by: OTHER

## 2024-06-11 PROCEDURE — 99255 IP/OBS CONSLTJ NEW/EST HI 80: CPT | Performed by: INTERNAL MEDICINE

## 2024-06-11 RX ORDER — GADOTERATE MEGLUMINE 376.9 MG/ML
20 INJECTION INTRAVENOUS
Status: COMPLETED | OUTPATIENT
Start: 2024-06-11 | End: 2024-06-11

## 2024-06-11 NOTE — PLAN OF CARE
Moves all extremities.  Generalized weakness and pain.  States pain more localized to low back and moving down to bilateral thighs.  Denies numbness, tingling.  Rheumatology and spine surgeon aware of consults.  MRI of brain and lumbar spine ordered.  Instructed on plan of care, call for all asst needed, discomfort felt, call don't fall protocol.  Verbalized understanding.  Safety precautions maintained.

## 2024-06-11 NOTE — CONSULTS
Rheumatology Inpatient Consult  Note  =====================================================================================================      Date of admission: 6/9/2024  ?  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  -She notes on June 6 she developed right greater than left shoulder pain and stiffness, as well acute on chronic stiffness of the back, hips.  Cannot stand up from a seated position at that time.  Cannot raise her hands above her head.  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  Bilateral knees are painful as well  -Self-administered prednisone 80 mg send the evening.  Prescribed the medication itself.  Significant improvement of many of her symptoms.  -Initially on admission, there was issues with altered mental status, also had diaphoresis, tachycardia.  Heart rate to the 140s.    Recent brown recluse bite of the abdomen complicated by cellulitis.  Did take Bactrim for about 10 days.  Finished this about 2 weeks ago.  Cellulitis improved.  -Otherwise denies any antecedent event, travel, new medications.     No giant cell arteritis symptoms: Denies headaches, tenderness jaw claudication, vision changes (diplopia, amaurosis fugax symptoms, visual field changes), fevers, chills, night sweats.    14 point ROS negative except noted above    Medications:   DULoxetine  120 mg Oral Daily    gabapentin  900 mg Oral TID     lurasidone  60 mg Oral Daily with food    losartan  100 mg Oral Daily    metoprolol succinate ER  100 mg Oral Daily Beta Blocker    oxybutynin ER  5 mg Oral Daily    enoxaparin  40 mg Subcutaneous Daily       Current Outpatient Medications   Medication Instructions    acetaminophen-codeine 300-60 MG Oral Tab 1 tablet, Oral, 2 times daily PRN    albuterol 108 (90 Base) MCG/ACT Inhalation Aero Soln 2 puffs, Inhalation, Every 6 hours PRN    Armodafinil 250 mg, Oral, Daily    DULoxetine (CYMBALTA) 120 mg, Oral, Daily    gabapentin (NEURONTIN) 900 mg, Oral, 3 times daily    gabapentin (NEURONTIN) 900 mg, Oral, 3 times daily    Latuda 60 mg, Oral, Daily with food    losartan (COZAAR) 100 mg, Oral, Daily    metFORMIN (GLUCOPHAGE) 500 mg, Oral, 2 times daily    metoprolol succinate ER (TOPROL XL) 100 mg, Oral, Daily    ondansetron (ZOFRAN) 4 mg, Oral, Every 8 hours PRN    rizatriptan (MAXALT-MLT) 10 mg, Oral, As needed    Solifenacin Succinate (VESICARE) 5 mg, Oral, Daily    traZODone (DESYREL) 50 mg, Oral, Nightly PRN    triamcinolone 0.1 % External Cream Topical, 2 times daily, To affected skin.     Past Medical History:  Past Medical History:    Anesthesia complication    n&v    Arrhythmia    PAT    Bronchitis, not specified as acute or chronic    Depression    Extrinsic asthma, unspecified    Lipid screening    GIANLUCA (obstructive sleep apnea)    AHI 14 Supine AHI 20 non-supine AHI 9    PONV (postoperative nausea and vomiting)    Snoring    AHI 4.4 SaO2 janelle 86-.5 primary snoring    Spinal hemangioma     Past Surgical History:  Past Surgical History:   Procedure Laterality Date    Appendectomy  2008    Breast surgery procedure unlisted      enlargement      2003    Colonoscopy  2009    negative    Excis lumbar disk,one level      Hysterectomy      total hystero.    Oophorectomy Bilateral     total hystero    Other  2021    spinal angiogram with embolization    Other surgical  history Bilateral 02/20/2015    Procedure: ABDOMINAL HYSTERECTOMY, BSO;  Surgeon: Calixto Weathers MD;  Location:  MAIN OR     Family History:  Family History   Problem Relation Age of Onset    Depression Mother     Other (Other) Mother     Hypertension Father     Other (Other) Father     Melanoma Father     Heart Attack Maternal Grandmother     Breast Cancer Maternal Grandmother 50        In her early 50's.    Other (Other) Maternal Grandmother     Other (leukemia) Maternal Grandfather     Diabetes Paternal Grandmother         type 2    Other (renal failure) Paternal Grandmother     Other (lung cancer) Paternal Grandfather      Social History:  Social History     Socioeconomic History    Marital status:    Tobacco Use    Smoking status: Never    Smokeless tobacco: Never   Vaping Use    Vaping status: Never Used   Substance and Sexual Activity    Alcohol use: Not Currently     Alcohol/week: 3.3 standard drinks of alcohol     Types: 4 Standard drinks or equivalent per week     Comment: rare    Drug use: No   Other Topics Concern    Caffeine Concern Yes     Comment: 3-4 cups    Exercise Yes     Comment: walking     Social Determinants of Health     Financial Resource Strain: Low Risk  (8/17/2023)    Received from [facility]    Overall Financial Resource Strain (CARDIA)     Difficulty of Paying Living Expenses: Not very hard   Food Insecurity: No Food Insecurity (6/9/2024)    Food Insecurity     Food Insecurity: Never true   Transportation Needs: No Transportation Needs (6/9/2024)    Transportation Needs     Lack of Transportation: No   Physical Activity: Inactive (8/17/2023)    Received from [facility], [facility]    Exercise Vital Sign     Days of Exercise per Week: 0 days     Minutes of Exercise per Session: 0 min   Housing Stability: Low Risk  (6/9/2024)    Housing Stability     Housing Instability: No     ?  Allergies:  Allergies   Allergen Reactions    Hydrocodone NAUSEA AND VOMITING     Benzoyl Peroxide RASH     CREA    Levofloxacin UNKNOWN    Pcn [Penicillins] RASH         Objective    Vitals:    06/11/24 0410 06/11/24 0755 06/11/24 1200 06/11/24 1530   BP: 123/87 120/71 (!) 133/91 145/82   BP Location: Right arm Left arm Left arm Left arm   Pulse: 91 90 79 94   Resp: 16 16 16 16   Temp: 98.2 °F (36.8 °C) 98.2 °F (36.8 °C) 98.3 °F (36.8 °C) 98.1 °F (36.7 °C)   TempSrc: Oral Oral Oral Oral   SpO2: 94% 94% 95% 96%   Weight:       Height:           GEN: NAD, well-nourished.   HEENT: Head: NCAT. Face: No lesions. Eyes: Conjunctiva clear. Sclera are anicteric. PERRLA. EOMs are full. Ears: The right and left ear canals are clear.  Nose: No external or internal nasal deformities. Nasal septum is midline. Mouth: The lips are within normal limits.  No oral ulcers Tongue is midline with no lesions. The oral cavity is clear.   -2+ temporal artery pulses, no scalp tenderness  Neck: Supple. No neck masses. No thyromegaly. No LAD, parotid or submandicular gland palpated.   CV: 2/6 JUNIOR  PULM: CTAB, no wrr, easy effort  Extremities: No cyanosis, edema or deformities.   Neurologic: Strength, CN2-12 grossly intact   Psych: normal affect.   Skin: No lesions or rashes.  MSK: 28 joint count performed. No evidence of synovitis in mcp, pip, dip, wrist, elbows, shoulders, hips, knees, ankles, mtp unless otherwise noted. Full ROM of elbows, wrists, knees.     No bilateral painful arc  -Able to stand from seated position without use of hands albeit with some difficulty  -Slight PIP effusions on the second and third digits.  -No tender joints    ?  Labs:    Lab Results   Component Value Date    ESRML 16 06/09/2024    ESRML 3 08/03/2018    CRP 20.60 (H) 06/09/2024        Lab Results   Component Value Date    WBC 5.9 06/11/2024    RBC 3.84 06/11/2024    HGB 12.1 06/11/2024    HCT 35.6 06/11/2024    PLT 36.0 (L) 06/11/2024    MCV 92.7 06/11/2024    MCH 31.5 06/11/2024    MCHC 34.0 06/11/2024    RDW 14.9 06/11/2024    NEPRELIM  3.62 06/11/2024    NEPERCENT 61.1 06/11/2024    LYPERCENT 18.8 06/11/2024    MOPERCENT 8.0 06/11/2024    EOPERCENT 10.7 06/11/2024    BAPERCENT 0.7 06/11/2024    NE 3.62 06/11/2024    LYMABS 1.11 06/11/2024    MOABSO 0.47 06/11/2024    EOABSO 0.63 06/11/2024    BAABSO 0.04 06/11/2024     Lab Results   Component Value Date     (H) 06/11/2024    BUN 29 (H) 06/11/2024    BUNCREA 34.8 (H) 03/09/2021    CREATSERUM 0.96 06/11/2024    ANIONGAP 7 06/11/2024    GFR 95 03/22/2017    GFRNAA 78 03/01/2022    GFRAA 90 03/01/2022    CA 9.2 06/11/2024    OSMOCALC 298 (H) 06/11/2024    ALKPHO 209 (H) 06/11/2024    AST 32 06/11/2024    ALT 62 (H) 06/11/2024    BILT 0.9 06/11/2024    TP 5.7 (L) 06/11/2024    ALB 2.7 (L) 06/11/2024    GLOBULIN 3.0 06/11/2024    AGRATIO 2.3 (H) 12/29/2011     06/11/2024    K 4.3 06/11/2024     06/11/2024    CO2 23.0 06/11/2024         No results found for: \"ANATI\", \"KATY\", \"ANAS\", \"ANASCRN\", \"ANASCRNRFLX\", \"LUIS\"  No results found for: \"SSA\", \"SSAUR\", \"ANTISSA\", \"SSA52\", \"SSA60\", \"SSADD\", \"SSB\", \"ANTISSB\"  No results found for: \"DSDNA\", \"ANTIDSDNA\", \"SMUD\", \"ANTISM\", \"SM\", \"RNP\", \"ANTIRNP\", \"SMITHRNP\"  No results found for: \"SCL70\", \"SCL\", \"HAWRMTJ72\"  No results found for: \"C3\", \"C4\"  No results found for: \"DRVVT\", \"LAINT\", \"PTTLUPUS\", \"LUPUSINTERP\", \"LA\", \"T5NA1MVSWP\", \"Y3HL4NCGBG\", \"S4NCFVYGPR\", \"S9SNHATPZN\"  No results found for: \"CARDIOLIPIGG\", \"CARDIOLIPIGM\", \"CARDIOLIPIGA\", \"CARDIOIGA\", \"CARLIP\"      Additional Labs:    Radiology:    Radiology review:  US ABDOMEN LIMITED (CPT=76705)    Result Date: 6/10/2024  CONCLUSION:   Small volume of gallbladder sludge.  No sonographic evidence of acute cholecystitis or biliary obstruction.  Normal appearance of the liver.   LOCATION:  [facility]   Dictated by (CST): Fang Powell MD on 6/10/2024 at 12:09 PM     Finalized by (CST): Fang Powell MD on 6/10/2024 at 12:10 PM       XR SHOULDER, COMPLETE (MIN 2 VIEWS), RIGHT (CPT=73030)    Result  Date: 6/10/2024  CONCLUSION:   No acute fracture or malalignment.  The glenohumeral joint space is preserved.  Normal acromioclavicular joint with preservation of the subacromial interval.  There is a triangular radiodensity within the superficial subcutaneous tissues overlying the lateral aspect of the right trapezius muscle on the frontal view.  This may represent a retained foreign body, measuring approximately 6 x 3 mm.   LOCATION:  [facility]   Dictated by (CST): Fang Powell MD on 6/10/2024 at 10:35 AM     Finalized by (CST): Fang Powell MD on 6/10/2024 at 10:37 AM      =====================================================================================================  Assessment and Plan    Assessment:  1. Unable to walk    2. Polyarticular arthritis    3. Elevated C-reactive protein (CRP)    4. Hyponatremia    5. Dehydration      Severe acute onset hip and shoulder girdle symptoms with elevated C-reactive protein (greater than 20.0 mg/DL) concerning for polymyalgia rheumatica and this 59-year-old female.  She also notes bilateral knee/hand swelling. Could not walk due to symptoms. Did take 80 mg of prednisone that was self-administered on 6/9/2024 with significant improvement of her symptoms.  No active hip/shoulder girdle symptoms or peripheral joint synovitis noted on exam today.  No clinical features consistent with cranial GCA  -However prominent thrombocytopenia and does not fit with potential polymyalgia rheumatica.    ?  Plan:  Further evaluation of elevated C-reactive protein, need to evaluate infectious/hematologic entities as well as rheumatic disease.  -Repeat inflammatory markers, obtain ferritin, blood cultures, chest x-ray, urine culture, EBV/CMV, parvovirus, HIV (obtained verbal consent), acute hepatitis panel.  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  If still low by tomorrow, will consult hematology  -Consider echocardiogram given slight systolic murmur noted    -Given  elevated CRP, low platelets in the setting of recent brown recluse bite and dark urine: DIC labs, CK, hemolysis labs    --Hold off on any systemic steroids while diagnostic workup is ongoing    L2 compression fracture: need DEXA as outpatient     Risk: high  No follow-ups on file.      The above plan of care, diagnosis, orders, and follow-up were discussed with the patient and primary team. Questions related to this recommended plan of care were answered.    This report was performed utilizing speech recognition software technology. Despite proofreading, speech recognition errors could escape detection. If a word or phrase is confusing or out of context, please do not hesitate to call for   clarification.       Kind regards      Griselda Snider MD  [facility]

## 2024-06-11 NOTE — PROGRESS NOTES
[facility]   part of [facility]     Hospitalist Progress Note     Alesia lBand Patient Status:  Observation    3/10/1965 MRN [id number]   Location [facility] 3SW-A Attending Juan Thompson DO   Hosp Day # 0 PCP Toni Callahan DO     Chief Complaint: I can't move    Subjective:     Pt cont to complain of pain everywhere and having difficulty sleeping and getting out of bed. Does not want opiates given hx. No overt joint pain.     Objective:    Review of Systems:   A comprehensive review of systems was completed; pertinent positive and negatives stated in subjective.    Vital signs:  Temp:  [97.8 °F (36.6 °C)-98.2 °F (36.8 °C)] 98.2 °F (36.8 °C)  Pulse:  [72-91] 91  Resp:  [16] 16  BP: ()/() 123/87  SpO2:  [94 %-97 %] 94 %    Physical Exam:    General: No acute distress  Respiratory: No wheezes, no rhonchi  Cardiovascular: S1, S2, regular rate and rhythm  Abdomen: Soft, Non-tender, non-distended, positive bowel sounds  Neuro: No new focal deficits.   Extremities: No edema      Diagnostic Data:    Labs:  Recent Labs   Lab 06/09/24  1808 06/10/24  05024  0513   WBC 5.1 5.4 5.9   HGB 13.0 11.7* 12.1   MCV 92.1 91.1 92.7   PLT 66.0* 50.0* 36.0*   INR  --  0.95  --        Recent Labs   Lab 06/09/24  1808 06/10/24  0505 24  0513   * 116* 104*   BUN 34* 32* 29*   CREATSERUM 0.86 0.73 0.96   CA 9.5 9.3 9.2   ALB 3.1* 2.9* 2.7*   * 138 141   K 4.5 4.0 4.3    105 111   CO2 25.0 23.0 23.0   ALKPHO 285* 247* 209*   AST 52* 29 32   ALT 95* 78* 62*   BILT 2.4* 1.4 0.9   TP 6.7 6.0* 5.7*       Estimated Creatinine Clearance: 60.5 mL/min (based on SCr of 0.96 mg/dL).    Recent Labs   Lab 24  1808   TROPHS 9       Recent Labs   Lab 06/10/24  0505   PTP 12.7   INR 0.95                  Microbiology    Hospital Encounter on 24   1. Urine Culture, Routine     Status: None    Collection Time: 24  7:16 PM    Specimen: Urine, clean catch   Result Value Ref Range     Urine Culture No Growth at 18-24 hrs. N/A         Imaging: Reviewed in Epic.    Medications:    DULoxetine  120 mg Oral Daily    gabapentin  900 mg Oral TID    lurasidone  60 mg Oral Daily with food    losartan  100 mg Oral Daily    metoprolol succinate ER  100 mg Oral Daily Beta Blocker    oxybutynin ER  5 mg Oral Daily    enoxaparin  40 mg Subcutaneous Daily       Assessment & Plan:      # Intermittent AMS  # Intractable pain  # Intermittent weakness   - MRI brain ordered to r/o CNS pathology leading to progressive confusion    - neurology to see    - continue Cymbalta, gabapentin, lurasidone  PRN toradol    - Psych eval noted, patient likely will benefit from neurocognitive evaluation as outpatient with Dr. Tovar    # Right shoulder pain    - appears MSK pain in Right shoulder, right shoulder x-ray reviewed no acute fracture   - Pain control with tylenol, toradol. Avoid opioids d/t abuse history    # Intractable pain  # Elevated CRP  # Suspect component of fibromyalgia    - will get rheum involved   - no overt joint pain  will check labs     # Transaminitis   - Right upper quadrant ultrasound (6/10) shows \"small volume of gallbladder sludge otherwise no sonographic evidence of acute cholecystitis or biliary obstruction\"   - labs trending down, will trend   - viral hep panel neg     # Hyponatremia, improving - s/p IVF, trend chem     # Diaphoresis?    - now resolved, trop negative   - No leukocytosis, no hx of CP/palpitations    - Monitor on tele, EKG NSR with ST elevations/depressions   - CRP elevated, though nonspecific      # Hypertension - Continue home oral antihypertensives      Juan Thompson,     Supplementary Documentation:     Quality:  DVT Mechanical Prophylaxis:   SCDs, Early ambuation  DVT Pharmacologic Prophylaxis   Medication    enoxaparin (Lovenox) 40 MG/0.4ML SUBQ injection 40 mg                Code Status: Prior  Phillips: No urinary catheter in place  Phillips Duration (in days):   Central  line:    CELESTE:     Discharge is dependent on: clinical improvement  At this point Ms. Bland is expected to be discharge to: tbd    The 21st Century Cures Act makes medical notes like these available to patients in the interest of transparency. Please be advised this is a medical document. Medical documents are intended to carry relevant information, facts as evident, and the clinical opinion of the practitioner. The medical note is intended as peer to peer communication and may appear blunt or direct. It is written in medical language and may contain abbreviations or verbiage that are unfamiliar.

## 2024-06-11 NOTE — PLAN OF CARE
Patient is alert and oriented x 4. Vitals stable on room air. Denies pain. Restless at times. Denies numbness & tingling. Tolerating regular diet. Up SBA with walker. Plan MRI of brain. Plan of care discussed with patient.

## 2024-06-11 NOTE — OCCUPATIONAL THERAPY NOTE
Attempted to see patient for OT services this pm, however patient occupied with EEG. Will reattempt as able and as patient appropriate.

## 2024-06-11 NOTE — CONSULTS
[facility]   NEUROLOGY   CONSULT NOTE    Admission date: 2024  Reason for Consult: Pain/weakness.  Chief Complaint:   Chief Complaint   Patient presents with    Pain   ________________________________________________________________    History     History of Presenting Illness  59 year old female with history of lumbar spine hemangioma, status post removal by freezing at [facility] in February, as well as history of L2 fracture presented with increasing complain of pain in the back with radiation to both lower extremities, as well as paresthesias radiating to both lower extremities.  She also had limitation in movement because of pain.  Denied any bladder or bowel symptoms.  Patient was also noted to have some decreased range of motion on the right upper extremity because of shoulder pain.  Currently shoulder pain has improved.  Patient still has back pain with radiating pain to lower extremities.  No diplopia, dysarthria, dysphagia.  Patient he was apparently noted to be confused at times.  However, patient does not seem to be confused at this time.      History obtained from patient and chart review.    Past Medical History:    Anesthesia complication    n&v    Arrhythmia    PAT    Bronchitis, not specified as acute or chronic    Depression    Extrinsic asthma, unspecified    Lipid screening    GIANLUCA (obstructive sleep apnea)    AHI 14 Supine AHI 20 non-supine AHI 9    PONV (postoperative nausea and vomiting)    Snoring    AHI 4.4 SaO2 janelle 86-.5 primary snoring    Spinal hemangioma     Past Surgical History:   Procedure Laterality Date    Appendectomy  2008    Breast surgery procedure unlisted      enlargement      2003    Colonoscopy  2009    negative    Excis lumbar disk,one level      Hysterectomy      total hystero.    Oophorectomy Bilateral     total hystero    Other  2021    spinal angiogram with embolization    Other surgical history Bilateral  02/20/2015    Procedure: ABDOMINAL HYSTERECTOMY, BSO;  Surgeon: Calixto Weathers MD;  Location:  MAIN OR     Social History     Socioeconomic History    Marital status:    Tobacco Use    Smoking status: Never    Smokeless tobacco: Never   Vaping Use    Vaping status: Never Used   Substance and Sexual Activity    Alcohol use: Not Currently     Alcohol/week: 3.3 standard drinks of alcohol     Types: 4 Standard drinks or equivalent per week     Comment: rare    Drug use: No   Other Topics Concern    Caffeine Concern Yes     Comment: 3-4 cups    Exercise Yes     Comment: walking     Social Determinants of Health     Financial Resource Strain: Low Risk  (8/17/2023)    [facility]    Overall Financial Resource Strain (CARDIA)     Difficulty of Paying Living Expenses: Not very hard   Food Insecurity: No Food Insecurity (6/9/2024)    Food Insecurity     Food Insecurity: Never true   Transportation Needs: No Transportation Needs (6/9/2024)    Transportation Needs     Lack of Transportation: No   Physical Activity: Inactive (8/17/2023)    [facility]    Exercise Vital Sign     Days of Exercise per Week: 0 days     Minutes of Exercise per Session: 0 min   Housing Stability: Low Risk  (6/9/2024)    Housing Stability     Housing Instability: No     Family History   Problem Relation Age of Onset    Depression Mother     Other (Other) Mother     Hypertension Father     Other (Other) Father     Melanoma Father     Heart Attack Maternal Grandmother     Breast Cancer Maternal Grandmother 50        In her early 50's.    Other (Other) Maternal Grandmother     Other (leukemia) Maternal Grandfather     Diabetes Paternal Grandmother         type 2    Other (renal failure) Paternal Grandmother     Other (lung cancer) Paternal Grandfather      Allergies   Allergies   Allergen Reactions    Hydrocodone NAUSEA AND VOMITING    Benzoyl Peroxide RASH     CREA    Levofloxacin UNKNOWN     Pcn [Penicillins] RASH       Home Meds  Current Outpatient Medications   Medication Instructions    acetaminophen-codeine 300-60 MG Oral Tab 1 tablet, Oral, 2 times daily PRN    albuterol 108 (90 Base) MCG/ACT Inhalation Aero Soln 2 puffs, Inhalation, Every 6 hours PRN    Armodafinil 250 mg, Oral, Daily    DULoxetine (CYMBALTA) 120 mg, Oral, Daily    gabapentin (NEURONTIN) 900 mg, Oral, 3 times daily    gabapentin (NEURONTIN) 900 mg, Oral, 3 times daily    Latuda 60 mg, Oral, Daily with food    losartan (COZAAR) 100 mg, Oral, Daily    metFORMIN (GLUCOPHAGE) 500 mg, Oral, 2 times daily    metoprolol succinate ER (TOPROL XL) 100 mg, Oral, Daily    ondansetron (ZOFRAN) 4 mg, Oral, Every 8 hours PRN    rizatriptan (MAXALT-MLT) 10 mg, Oral, As needed    Solifenacin Succinate (VESICARE) 5 mg, Oral, Daily    traZODone (DESYREL) 50 mg, Oral, Nightly PRN    triamcinolone 0.1 % External Cream Topical, 2 times daily, To affected skin.     Scheduled Meds:   DULoxetine  120 mg Oral Daily    gabapentin  900 mg Oral TID    lurasidone  60 mg Oral Daily with food    losartan  100 mg Oral Daily    metoprolol succinate ER  100 mg Oral Daily Beta Blocker    oxybutynin ER  5 mg Oral Daily    enoxaparin  40 mg Subcutaneous Daily     Continuous Infusions:  PRN Meds:  SUMAtriptan    traZODone    acetaminophen    melatonin    polyethylene glycol (PEG 3350)    sennosides    bisacodyl    ondansetron    prochlorperazine    benzonatate    guaiFENesin    glycerin-hypromellose-    sodium chloride    ketorolac    acetaminophen **AND** codeine    OBJECTIVE   VITAL SIGNS:   Temp:  [97.8 °F (36.6 °C)-98.2 °F (36.8 °C)] 98.2 °F (36.8 °C)  Pulse:  [72-91] 90  Resp:  [16] 16  BP: ()/() 120/71  SpO2:  [94 %-96 %] 94 %    PHYSICAL EXAM:    NEUROLOGIC:    Mental Status:  A&O x 4, Follows simple commands, no obvious aphasia or dysarthria  Cranial nerves: PERRL.  Visual fields full.  EOMI.  Face symmetric with normal movement  bilaterally.  Hearing grossly intact. Tongue midline with normal movements.   Motor: No pronator drift was noted.  Bulk, tone and power in upper extremities were normal.  Lower extremity examination revealed generally symmetric strength with some limitation due to pain.  Reflexes are 1 out of 4.  Plantars downgoing.    Sensation: Intact to light touch bilaterally  Cerebellar: Normal Finger-To-Nose test      LABORATORY DATA:  Last 24 hour labs were reviewed in detail.  Recent Labs   Lab 06/09/24  1808 06/10/24  0505 06/11/24  0513   * 138 141   K 4.5 4.0 4.3    105 111   CO2 25.0 23.0 23.0   * 116* 104*   BUN 34* 32* 29*   CREATSERUM 0.86 0.73 0.96     Recent Labs   Lab 06/09/24  1808 06/10/24  0505 06/11/24  0513   WBC 5.1 5.4 5.9   HGB 13.0 11.7* 12.1   PLT 66.0* 50.0* 36.0*     Recent Labs   Lab 06/09/24  1808 06/10/24  0505 06/11/24  0513   ALT 95* 78* 62*   AST 52* 29 32     Recent Labs   Lab 06/10/24  0505 06/11/24  0513   MG 2.1 1.8   PHOS 3.3  --      Last A1c value was 5.8% done 5/24/2024.    ASSESSMENT/PLAN   59 year old female with:    Lumbosacral radiculopathy.  Patient has history of L2 fracture with 75% loss of vertebral height.  Findings are more consistent with L2-L3 radiculopathy.  Advise MRI of lumbosacral spine for further evaluation.  Also advised spine consultation.  Lumbar hemangioma.  Patient previously had aggressive hemangioma completely replacing the L2 vertebral body and extending into the pedicles with epidural extension, right greater than the left.  Apparently patient had a procedure done at [facility] for the whole hemangioma was frozen and removed.  Patient due to have repeat MRI in June.  Recommend MRI with and without contrast.  Marked thrombocytopenia.  Most recent platelet count 36,000.  Further workup required.  Patient also noted to have transaminitis.  PT/INR was normal.  Intermittent encephalopathy of unclear etiology.  Primary team has recommended MRI of  the brain.  Will review the result.  Also requesting EEG and ammonia level for further evaluation.        Principal Problem:    Unable to walk  Active Problems:    Opioid use disorder    Hyponatremia    Thrombocytopenia (HCC)    Azotemia    Hyperglycemia    Polyarticular arthritis    Elevated C-reactive protein (CRP)    Dehydration    Acute pain of right shoulder    Confusion    Severe episode of recurrent major depressive disorder, without psychotic features (HCC)    Anxiety disorder       Rafa Vazquez MD  Neurohospitalist  [facility]    Disclaimer: This record was dictated using Dragon software. There may be errors due to voice recognition problems that were not realized and corrected during the completion of the note.

## 2024-06-11 NOTE — PROCEDURES
EEG REPORT;    Reason for Examination: Encephalopathy    Technical Summary:   18 Channels of EEG and 1 Channel of EKG was performed utilizing internation 10/20 method.        Background Activity:   The background activity consisted of 9 Hz waveforms, reactive to eye opening/ external stimulation.    Abnormality:  Throughout the recording low to medium voltage, polymorphic, 3 to 6 Hz slow activity was noted diffusely over both hemispheres      Activation:    Hyperventilation:   Not Performed.    Photic Stimulation:  Driving response seen.No       Sleep:  Stage I sleep seen.     Impression:  This is a Abnormal EEG. No focal, lateralized or generalized epileptiform activity seen.   Mild diffuse slowing into delta and theta range was noted.  This constellation of findings can be seen in encephalopathy due to metabolic/toxic etiology, medication effects or diffuse cerebral injury.  Clinical correlation is recommended.        Rafa Vazquez MD  [facility].

## 2024-06-12 LAB
ANTI-MPO ANTIBODIES: <0.2 UNITS
ANTI-PR3 ANTIBODIES: <0.2 UNITS
APTT PPP: 25.9 SECONDS (ref 23–36)
BILIRUB UR QL STRIP.AUTO: NEGATIVE
C3 SERPL-MCNC: 134 MG/DL (ref 90–180)
C4 SERPL-MCNC: 53.2 MG/DL (ref 10–40)
CCP IGG SERPL-ACNC: 0.6 U/ML (ref 0–6.9)
CK SERPL-CCNC: 14 U/L
CLARITY UR REFRACT.AUTO: CLEAR
CRP SERPL-MCNC: 3.4 MG/DL (ref ?–0.3)
D DIMER PPP FEU-MCNC: 1.53 UG/ML FEU (ref ?–0.57)
DEPRECATED HBV CORE AB SER IA-ACNC: 134.9 NG/ML
DSDNA IGG SERPL IA-ACNC: 1.1 IU/ML
ENA AB SER QL IA: 0.1 UG/L
ENA AB SER QL IA: NEGATIVE
ERYTHROCYTE [SEDIMENTATION RATE] IN BLOOD: 22 MM/HR
FIBRINOGEN PPP-MCNC: 365 MG/DL (ref 200–480)
FOLATE SERPL-MCNC: 8 NG/ML (ref 8.7–?)
GLUCOSE UR STRIP.AUTO-MCNC: NORMAL MG/DL
HAPTOGLOB SERPL-MCNC: 193 MG/DL (ref 30–200)
HETEROPH AB SER QL: NEGATIVE
IGA SERPL-MCNC: 139 MG/DL (ref 70–312)
IGM SERPL-MCNC: 34.3 MG/DL (ref 43–279)
IMMUNOGLOBULIN PNL SER-MCNC: 505 MG/DL (ref 791–1643)
KETONES UR STRIP.AUTO-MCNC: NEGATIVE MG/DL
LEUKOCYTE ESTERASE UR QL STRIP.AUTO: 250
NITRITE UR QL STRIP.AUTO: NEGATIVE
PH UR STRIP.AUTO: 5.5 [PH] (ref 5–8)
PLATELET # BLD AUTO: 36.3 10(3)UL (ref 150–450)
PROT UR STRIP.AUTO-MCNC: NEGATIVE MG/DL
PTH-INTACT SERPL-MCNC: 18.7 PG/ML (ref 18.5–88)
SP GR UR STRIP.AUTO: 1.01 (ref 1–1.03)
URATE SERPL-MCNC: 3.1 MG/DL
UROBILINOGEN UR STRIP.AUTO-MCNC: NORMAL MG/DL
VIT B12 SERPL-MCNC: >2000 PG/ML (ref 193–986)
VIT D+METAB SERPL-MCNC: 31.4 NG/ML (ref 30–100)

## 2024-06-12 PROCEDURE — 99233 SBSQ HOSP IP/OBS HIGH 50: CPT | Performed by: OTHER

## 2024-06-12 PROCEDURE — 99253 IP/OBS CNSLTJ NEW/EST LOW 45: CPT | Performed by: NEUROLOGICAL SURGERY

## 2024-06-12 PROCEDURE — 99233 SBSQ HOSP IP/OBS HIGH 50: CPT | Performed by: HOSPITALIST

## 2024-06-12 PROCEDURE — 99232 SBSQ HOSP IP/OBS MODERATE 35: CPT | Performed by: INTERNAL MEDICINE

## 2024-06-12 PROCEDURE — 99244 OFF/OP CNSLTJ NEW/EST MOD 40: CPT | Performed by: NURSE PRACTITIONER

## 2024-06-12 RX ORDER — FOLIC ACID 1 MG/1
1 TABLET ORAL DAILY
Status: DISCONTINUED | OUTPATIENT
Start: 2024-06-12 | End: 2024-06-13

## 2024-06-12 NOTE — PHYSICAL THERAPY NOTE
PHYSICAL THERAPY TREATMENT NOTE - INPATIENT    Room Number: 368/368-A     Session: 1     Number of Visits to Meet Established Goals: 5    Presenting Problem: diaphoresis, right shoulder pain  Co-Morbidities : GIANLUCA, lumbar spinal hemiangioma, depression, L2 fx s/p vertebroplasty 3/2024  History related to current admission: Patient is a 59 year old female admitted on 6/9/2024 from home for diaphoresis and shoulder pain.  Pt also with some confusion. Shoulder x-rays negative for acute fracture.     HOME SITUATION  Type of Home: House   Home Layout: Two level;Able to live on main level  Stairs to Enter : 2  Railing: No  Stairs to Bedroom: 13  Railing: Yes     Lives With: Alone  Drives: Yes  Patient Owned Equipment: None     Prior Level of [place]: Pt typically independent with all functional mobility. 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    ASSESSMENT   Patient demonstrates good  progress this session, goals  remain in progress.    Patient continues to function below baseline with bed mobility, transfers, gait, and stair negotiation.  Contributing factors to remaining limitations include decreased functional strength, decreased endurance/aerobic capacity, pain, impaired standing balance, and decreased muscular endurance.  Next session anticipate patient to progress bed mobility, transfers, gait, and stair negotiation.  Physical Therapy will continue to follow patient for duration of hospitalization.    Patient continues to benefit from continued skilled PT services: at discharge to promote functional independence in home.  Anticipate patient will return home with home health PT.    PLAN  PT Treatment Plan: Bed mobility;Body mechanics;Endurance;Energy conservation;Patient education;Gait training;Strengthening;Range of motion;Stair training;Stoop training;Transfer training;Balance training  Rehab Potential : Good  Frequency (Obs): 3-5x/week    CURRENT GOALS     Goal #1 Patient is able to demonstrate supine - sit EOB @  level: modified independent      Goal #2 Patient is able to demonstrate transfers Sit to/from Stand at assistance level: modified independent      Goal #3 Patient is able to ambulate 150 feet with assist device: walker - rolling at assistance level: modified independent      Goal #4 Pt is able to ascend/descend at least 2 stairs with supervision   Goal #5     Goal #6     Goal Comments: Goals established on 6/10/2024  6/12/2024 all goals ongoing    SUBJECTIVE  Pt states she is very tired (after ambulating 60' with RW) but feeling better than last PT session (Monday).    OBJECTIVE  Precautions: Spine;Bed/chair alarm    WEIGHT BEARING RESTRICTION  Weight Bearing Restriction: None                PAIN ASSESSMENT   Rating: Unable to rate  Location: B thighs  Management Techniques: Activity promotion;Body mechanics;Repositioning    BALANCE                                                                                                                       Static Sitting: Good  Dynamic Sitting: Good           Static Standing: Fair +  Dynamic Standing: Fair    ACTIVITY TOLERANCE                         O2 WALK  Oxygen Therapy  SPO2% on Room Air at Rest: 98      AM-PAC '6-Clicks' INPATIENT SHORT FORM - BASIC MOBILITY  How much difficulty does the patient currently have...  Patient Difficulty: Turning over in bed (including adjusting bedclothes, sheets and blankets)?: None   Patient Difficulty: Sitting down on and standing up from a chair with arms (e.g., wheelchair, bedside commode, etc.): A Little   Patient Difficulty: Moving from lying on back to sitting on the side of the bed?: A Little   How much help from another person does the patient currently need...   Help from Another: Moving to and from a bed to a chair (including a wheelchair)?: A Little   Help from Another: Need to walk in hospital room?: A Little   Help from Another: Climbing 3-5 steps with a railing?: A Little       AM-PAC Score:  Raw Score: 19   Approx Degree  of Impairment: 41.77%   Standardized Score (AM-PAC Scale): 45.44   CMS Modifier (G-Code): CK    FUNCTIONAL ABILITY STATUS  Gait Assessment   Functional Mobility/Gait Assessment  Gait Assistance: Supervision  Distance (ft): 2 x 60  Assistive Device: Rolling walker  Pattern: Shuffle  Stairs: Stairs  How Many Stairs: 2 x 4  Device: 1 Rail;Cane (1 rail on 4 steps then cane on 4 steps)  Assist: Contact guard assist  Pattern: Ascend;Descend  Ascend: Reciprocal  Descend: Step to    Skilled Therapy Provided    Bed Mobility:  Rolling: supervision -cued for log roll    Sit<>Supine: supervision with cuing to transfer to sidelying and then logroll into supine     Transfer Mobility:  Sit<>Stand: supervision with criss for hand placement    Stand<>Sit: supervision with cues for hand placement   Gait: Pt ambulated 60' with RW and SBA/supervision; cued for more upright posture and to keep RW closer to body: pt ambulated additional 60' with RW and supervision after working on stairs    Therapist's Comments: Pt up in chair and agreeable to PT. Pt required seated rests after ambulating and after ascending/descending 4 stairs (both times) due to fatigue.  For stairs,pt initially ascended/descended 4 stairs with railing and CGA/SBA. Pt does not have railing on 2 steps to enter home so completed stairs with cane and CGA/SBA during 2nd attempt; cued for sequencing.       THERAPEUTIC EXERCISES  Lower Extremity Ankle pumps  Hip AB/AD  Heel slides     Upper Extremity      Position Supine     Repetitions   10   Sets   1     Patient End of Session: In bed;Needs met;Call light within reach;RN aware of session/findings;All patient questions and concerns addressed    PT Session Time: 25 minutes  Gait Trainin minutes  Therapeutic Activity: 8 minutes  Therapeutic Exercise: 5 minutes

## 2024-06-12 NOTE — PLAN OF CARE
States back pain and bilateral leg pain present.  Moves all extremities.  Up w/stand by assist.  Instructed to call for all asst needed, discomfort felt, call don't fall protocol.  Verbalized understanding.  LSO brace ordered from  [facility].  Spoke w/Criselle & faxed order.

## 2024-06-12 NOTE — CONSULTS
[facility]  [facility] Neurosurgery Consult    Alesia Bland Patient Status:  Observation    3/10/1965 MRN [id number]   Location [facility] 3SW-A Attending Juan Thompson DO   Hosp Day # 0 PCP Toni Callahan DO     REASON FOR CONSULTATION:  L2 compression fracture     HISTORY OF PRESENT ILLNESS     Alesia Bland is a pleasant 59 year old female with PMH of depression, GIANLUCA, spinal hemangioma, chronic opiate use disorder, and L2 compression fracture s/p kyphoplasty (3/5/24, [facility]) who presented to ED on 24 with generalized pain, malaise, confusion, and inability to ambulate. 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 Pt had reportedly been bed-ridden ever since secondary to severe, diffuse joint pain and has been relying on her children to bring her food, liquids, and assist her to a bedside commode. She was advised to seek medical attention but opted to self-treat with Prednisone 80 mg for presumed arthritic flare. She was recently treated for abdominal cellulitis secondary to a spider bite and completed a 10-day course of Bactrim. Pt was eventually brought to ED for evaluation by her parents. She continues to undergo extensive work up for numerous complaints, including a neurological and psychiatric work-up given complaints of progressive confusion. MRI lumbar spine was obtained in setting of ongoing LBP, and revealed a moderate to severe L2 compression fracture with mild retropulsion (5mm) of posterior fracture fragments. Neurosurgery is consulted for this.     On exam, patient is lying flat in bed with no acute complaints. She denies numbness, tingling, or weakness of lower extremities. Denies abdominal numbness. Denies changes in bowel/bladder habits or saddle anesthesia.     PAST MEDICAL HISTORY     Past Medical History:    Anesthesia complication    n&v    Arrhythmia    PAT     Bronchitis, not specified as acute or chronic    Depression    Extrinsic asthma, unspecified    Lipid screening    GIANLUCA (obstructive sleep apnea)    AHI 14 Supine AHI 20 non-supine AHI 9    PONV (postoperative nausea and vomiting)    Snoring    AHI 4.4 SaO2 janelle 86-.5 primary snoring    Spinal hemangioma     PAST SURGICAL HISTORY:  Past Surgical History:   Procedure Laterality Date    Appendectomy  2008    Breast surgery procedure unlisted      enlargement      2003    Colonoscopy  2009    negative    Excis lumbar disk,one level      Hysterectomy      total hystero.    Oophorectomy Bilateral     total hystero    Other  2021    spinal angiogram with embolization    Other surgical history Bilateral 2015    Procedure: ABDOMINAL HYSTERECTOMY, BSO;  Surgeon: Calixto Weathers MD;  Location:  MAIN OR     FAMILY HISTORY:  family history includes Breast Cancer (age of onset: 50) in her maternal grandmother; Depression in her mother; Diabetes in her paternal grandmother; Heart Attack in her maternal grandmother; Hypertension in her father; Melanoma in her father; Other in her father, maternal grandmother, and mother; leukemia in her maternal grandfather; lung cancer in her paternal grandfather; renal failure in her paternal grandmother.    SOCIAL HISTORY:   reports that she has never smoked. She has never used smokeless tobacco. She reports that she does not currently use alcohol after a past usage of about 3.3 standard drinks of alcohol per week. She reports that she does not use drugs.    ALLERGIES     Allergies   Allergen Reactions    Hydrocodone NAUSEA AND VOMITING    Benzoyl Peroxide RASH     CREA    Levofloxacin UNKNOWN    Pcn [Penicillins] RASH       MEDICATIONS     Medications Prior to Admission   Medication Sig Dispense Refill Last Dose    metoprolol succinate  MG Oral Tablet 24 Hr Take 1 tablet (100 mg total) by mouth daily. 90 tablet 0 2024 at 0800     [] Metaxalone 800 MG Oral Tab Take 1 tablet (800 mg total) by mouth 3 (three) times daily as needed for Pain. 90 tablet 0 2024 at 0800    Solifenacin Succinate 5 MG Oral Tab Take 1 tablet (5 mg total) by mouth daily. 90 tablet 0 2024 at 0800    rizatriptan (MAXALT-MLT) 10 MG Oral Tablet Dispersible Take 1 tablet (10 mg total) by mouth as needed for Migraine. 90 tablet 1 2024    Armodafinil 250 MG Oral Tab Take 250 mg by mouth daily.   2024 at 0800    LATUDA 60 MG Oral Tab Take 1 tablet (60 mg total) by mouth daily with food.   2024 at 2100    DULoxetine HCl 60 MG Oral Cap DR Particles Take 2 capsules (120 mg total) by mouth daily.   2024 at 0800    acetaminophen-codeine 300-60 MG Oral Tab Take 1 tablet by mouth 2 (two) times daily as needed for Pain. 60 tablet 0     metFORMIN 500 MG Oral Tab Take 1 tablet (500 mg total) by mouth 2 (two) times daily. 180 tablet 3 2024 at 0800    gabapentin 300 MG Oral Cap Take 3 capsules (900 mg total) by mouth 3 (three) times daily. 270 capsule 0     losartan 100 MG Oral Tab TAKE 1 TABLET BY MOUTH EVERY DAY 30 tablet 0 More than a month    [] acetaminophen-codeine 300-60 MG Oral Tab Take 1 tablet by mouth every 12 (twelve) hours as needed for Pain. 28 tablet 0     gabapentin 300 MG Oral Cap Take 3 capsules (900 mg total) by mouth 3 (three) times daily. 270 capsule 0 2024 at 2100    albuterol 108 (90 Base) MCG/ACT Inhalation Aero Soln Inhale 2 puffs into the lungs every 6 (six) hours as needed.       ondansetron (ZOFRAN) 4 mg tablet Take 1 tablet (4 mg total) by mouth every 8 (eight) hours as needed for Nausea. 30 tablet 2     triamcinolone 0.1 % External Cream Apply topically 2 (two) times daily. To affected skin. 80 g 1     traZODone HCl 50 MG Oral Tab Take 1 tablet (50 mg total) by mouth nightly as needed.   2024     Current Facility-Administered Medications   Medication Dose Route Frequency    folic acid (Folvite) tab 1 mg  1 mg  Oral Daily    DULoxetine (Cymbalta) DR cap 120 mg  120 mg Oral Daily    gabapentin (Neurontin) cap 900 mg  900 mg Oral TID    lurasidone (Latuda) tab 60 mg  60 mg Oral Daily with food    losartan (Cozaar) tab 100 mg  100 mg Oral Daily    metoprolol succinate ER (Toprol XL) 24 hr tab 100 mg  100 mg Oral Daily Beta Blocker    SUMAtriptan (Imitrex) 20 MG/ACT nasal solution 1 spray  20 mg Nasal Q2H PRN    oxybutynin ER (Ditropan-XL) 24 hr tab 5 mg  5 mg Oral Daily    traZODone (Desyrel) tab 50 mg  50 mg Oral Nightly PRN    acetaminophen (Tylenol Extra Strength) tab 1,000 mg  1,000 mg Oral Q8H PRN    melatonin tab 3 mg  3 mg Oral Nightly PRN    polyethylene glycol (PEG 3350) (Miralax) 17 g oral packet 17 g  17 g Oral Daily PRN    sennosides (Senokot) tab 17.2 mg  17.2 mg Oral Nightly PRN    bisacodyl (Dulcolax) 10 MG rectal suppository 10 mg  10 mg Rectal Daily PRN    ondansetron (Zofran) 4 MG/2ML injection 4 mg  4 mg Intravenous Q6H PRN    prochlorperazine (Compazine) 10 MG/2ML injection 5 mg  5 mg Intravenous Q8H PRN    benzonatate (Tessalon) cap 200 mg  200 mg Oral TID PRN    guaiFENesin (Robitussin) 100 MG/5 ML oral liquid 200 mg  200 mg Oral Q4H PRN    glycerin-hypromellose- (Artificial Tears) 0.2-0.2-1 % ophthalmic solution 1 drop  1 drop Both Eyes QID PRN    sodium chloride (Saline Mist) 0.65 % nasal solution 1 spray  1 spray Each Nare Q3H PRN    [Held by provider] enoxaparin (Lovenox) 40 MG/0.4ML SUBQ injection 40 mg  40 mg Subcutaneous Daily    acetaminophen (Tylenol) tab 650 mg  650 mg Oral BID PRN    And    codeine tab 60 mg  60 mg Oral BID PRN     REVIEW OF SYSTEMS     Comprehensive Review of Systems obtained, and is negative other than that mentioned in the History of Present Illness.      PHYSICAL EXAMINATION     VITALS: /74 (BP Location: Right arm)   Pulse 103   Temp 98.8 °F (37.1 °C) (Oral)   Resp 18   Ht 66\"   Wt 200 lb (90.7 kg)   LMP 12/23/2014 (Exact Date)   SpO2 97%   BMI 32.28  kg/m²     GENERAL:  No acute distress, non-toxic appearing, speech fluent, mood appropriate    HEENT:  Normocephalic, atraumatic    RESP: Non-labored, easy, even    CV: NSR on tele    NEUROLOGICAL:  Alert and oriented x3.  Sensation to light touch is intact bilaterally. Moving all extremities freely. Gait deferred.       DIAGNOSTIC DATA     Lab Results   Component Value Date    PTT 25.9 06/12/2024    DDIMER 1.53 06/12/2024    ESRML 22 06/12/2024    CRP 3.40 06/12/2024     IMAGING     MRI BRAIN (W+WO) (CPT=70553)    Result Date: 6/12/2024  CONCLUSION:  No acute intracranial abnormality is identified.   LOCATION:  [facility]    Dictated by (CST): Stromberg, LeRoy, MD on 6/12/2024 at 1:37 AM     Finalized by (CST): Stromberg, LeRoy, MD on 6/12/2024 at 1:43 AM       MRI SPINE LUMBAR (CPT=72148)    Result Date: 6/12/2024  CONCLUSION:   1. There is a moderate to severe chronic compression fracture of L2 status post vertebral augmentation.  There is retropulsion of the posterior fracture fragments by approximately 5 mm.  This results in mild to moderate spinal canal compromise.  No other  compression fracture is seen within the lumbar spine.  2. Mild to moderate degenerative changes in the lumbar spine as described in detail above.   LOCATION:  [facility]   Dictated by (CST): Stromberg, LeRoy, MD on 6/12/2024 at 1:26 AM     Finalized by (CST): Stromberg, LeRoy, MD on 6/12/2024 at 1:37 AM       XR CHEST PA + LAT CHEST (CPT=71046)    Result Date: 6/11/2024  CONCLUSION:  Negative exam.   LOCATION:  [id number]   Dictated by (CST): Claribel Moss DO on 6/11/2024 at 8:30 PM     Finalized by (CST): Claribel Moss DO on 6/11/2024 at 8:31 PM         ASSESSMENT & PLAN     ASSESSMENT:  1. Unable to walk    2. Polyarticular arthritis    3. Elevated C-reactive protein (CRP)    4. Hyponatremia    5. Dehydration    6. Folate deficiency      PLAN:  No acute surgical intervention is indicated, as patient remains neurologically intact  Recommend LSO  brace to be worn when OOB. RN to call  to obtain  Pain medications and medical management per hospitalist  PT/OT    The above plan was discussed with Dr. Kirkland. Thank you very much for the consult.    BRE Snider-ARMANDO  [facility]  6/12/2024    Total visit time: 10 minutes; More than 50% spent coordinating care, counseling, reviewing imaging and discussing medication therapy.   Is this a shared or split note between Advanced Practice Provider and Physician? Yes

## 2024-06-12 NOTE — CONSULTS
Hem/Onc Report of Consultation    Patient Name: Alesia Bland   YOB: 1965   Medical Record Number: [id number]   CSN: [id number]   Consulting Physician: Dr. Quan Morrissey  Referring Provider(s): Dr. Juan Thompson  Date of Consultation: 6/12/2024     The 21st Century Cures Act makes medical notes like these available to patients in the interest of transparency. Please be advised this is a medical document. Medical documents are intended to carry relevant information, facts as evident, and the clinical opinion of the practitioner. The medical note is intended as peer to peer communication and may appear blunt or direct. It is written in medical language and may contain abbreviations or verbiage that are unfamiliar.     Reason for Consultation: Thrombocytopenia    Chief Complaint:   Chief Complaint   Patient presents with    Pain       History of Present Illness    Alesia Bland is a 59 year old female with past medical history of GIANLUCA, spinal hemangioma with associated chronic pain, opiate use disorder, and depression who presented to the emergency room on June 9, 2024, with complaint of generalized pain with episodes of incontinence. 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 She also noted significant generalized pain that made it very painful for her to ambulate. Although she was advised to seek medical care at that time, she did not in favor of attempting to self-treat her symptoms at home. Despite self-prescribe 80 mg prednisone for presumed arthritic flare, she continued to experience symptoms at home prompting presentation to the emergency room. While in the emergency room, she was also to be confused and tangential in her interactions and no history of dementia. Work up in the ER revealed  elevated liver enzymes, elevated CRP, and thrombocytopenia with a platelet count of 66K. Since admission, platelet has continued to decline and hematology has been consulted for the same.     Patient was recently on a 10 day course of bactrim that ended approximately two weeks ago for cellulitis of the abdomen 2/2 to bite from a brown recluse spider.     Patient was seen resting in bed, no acute complaints at time seen. Tells me that her pain has been improving since admission but continues to be present. Denies any prior history of thrombocytopenia. Denies any unusual bleeding or bruising at present, no petechiae. No EtOH consumption at present, no tobacco or illicit drug use. Has a history of self-prescribing opiates and has been fired from pain management services in the past for violating pain contract, however current  controlled prescriptions are provided by her pcp. Still having episodes of confusion and tangential thinking. Psychiatry has evaluated her during admission, recommending neuropsych testing outpatient.     Past Medical History:  Past Medical History:    Anesthesia complication    n&v    Arrhythmia    PAT    Bronchitis, not specified as acute or chronic    Depression    Extrinsic asthma, unspecified    Lipid screening    GIANLUCA (obstructive sleep apnea)    AHI 14 Supine AHI 20 non-supine AHI 9    PONV (postoperative nausea and vomiting)    Snoring    AHI 4.4 SaO2 janelle 86-.5 primary snoring    Spinal hemangioma       Past Surgical History:  Past Surgical History:   Procedure Laterality Date    Appendectomy  2008    Breast surgery procedure unlisted      enlargement      2003    Colonoscopy  2009    negative    Excis lumbar disk,one level      Hysterectomy      total hystero.    Oophorectomy Bilateral     total hystero    Other  2021    spinal angiogram with embolization    Other surgical history Bilateral 2015    Procedure: ABDOMINAL HYSTERECTOMY, BSO;   Surgeon: Calixto Weathers MD;  Location:  MAIN OR       Family Medical History:  Family History   Problem Relation Age of Onset    Depression Mother     Other (Other) Mother     Hypertension Father     Other (Other) Father     Melanoma Father     Heart Attack Maternal Grandmother     Breast Cancer Maternal Grandmother 50        In her early 50's.    Other (Other) Maternal Grandmother     Other (leukemia) Maternal Grandfather     Diabetes Paternal Grandmother         type 2    Other (renal failure) Paternal Grandmother     Other (lung cancer) Paternal Grandfather        Psychosocial History:  Social History     Socioeconomic History    Marital status:      Spouse name: Not on file    Number of children: Not on file    Years of education: Not on file    Highest education level: Not on file   Occupational History    Not on file   Tobacco Use    Smoking status: Never    Smokeless tobacco: Never   Vaping Use    Vaping status: Never Used   Substance and Sexual Activity    Alcohol use: Not Currently     Alcohol/week: 3.3 standard drinks of alcohol     Types: 4 Standard drinks or equivalent per week     Comment: rare    Drug use: No    Sexual activity: Not on file   Other Topics Concern     Service Not Asked    Blood Transfusions Not Asked    Caffeine Concern Yes     Comment: 3-4 cups    Occupational Exposure Not Asked    Hobby Hazards Not Asked    Sleep Concern Not Asked    Stress Concern Not Asked    Weight Concern Not Asked    Special Diet Not Asked    Back Care Not Asked    Exercise Yes     Comment: walking    Bike Helmet Not Asked    Seat Belt Not Asked    Self-Exams Not Asked   Social History Narrative    Not on file     Social Determinants of Health     Financial Resource Strain: Low Risk  (8/17/2023)    [facility]    Overall Financial Resource Strain (CARDIA)     Difficulty of Paying Living Expenses: Not very hard   Food Insecurity: No Food Insecurity (6/9/2024)    Food  Insecurity     Food Insecurity: Never true   Transportation Needs: No Transportation Needs (2024)    Transportation Needs     Lack of Transportation: No     Car Seat: Not on file   Physical Activity: Inactive (2023)    [facility]    Exercise Vital Sign     Days of Exercise per Week: 0 days     Minutes of Exercise per Session: 0 min   Stress: Not on file   Social Connections: Not on file   Housing Stability: Low Risk  (2024)    Housing Stability     Housing Instability: No     Housing Instability Emergency: Not on file     Crib or Bassinette: Not on file       Allergies:   Allergies   Allergen Reactions    Hydrocodone NAUSEA AND VOMITING    Benzoyl Peroxide RASH     CREA    Levofloxacin UNKNOWN    Pcn [Penicillins] RASH       Current Medications:   [COMPLETED] gadoterate meglumine (Dotarem) 10 MMOL/20ML injection 20 mL  20 mL Intravenous ONCE PRN    [COMPLETED] sodium chloride 0.9 % IV bolus 1,000 mL  1,000 mL Intravenous Once    [COMPLETED] ketorolac (Toradol) 30 MG/ML injection 30 mg  30 mg Intravenous Once    [] sodium chloride 0.9% infusion   Intravenous Continuous    DULoxetine (Cymbalta) DR cap 120 mg  120 mg Oral Daily    gabapentin (Neurontin) cap 900 mg  900 mg Oral TID    lurasidone (Latuda) tab 60 mg  60 mg Oral Daily with food    losartan (Cozaar) tab 100 mg  100 mg Oral Daily    metoprolol succinate ER (Toprol XL) 24 hr tab 100 mg  100 mg Oral Daily Beta Blocker    SUMAtriptan (Imitrex) 20 MG/ACT nasal solution 1 spray  20 mg Nasal Q2H PRN    oxybutynin ER (Ditropan-XL) 24 hr tab 5 mg  5 mg Oral Daily    traZODone (Desyrel) tab 50 mg  50 mg Oral Nightly PRN    acetaminophen (Tylenol Extra Strength) tab 1,000 mg  1,000 mg Oral Q8H PRN    melatonin tab 3 mg  3 mg Oral Nightly PRN    polyethylene glycol (PEG 3350) (Miralax) 17 g oral packet 17 g  17 g Oral Daily PRN    sennosides (Senokot) tab 17.2 mg  17.2 mg Oral Nightly PRN    bisacodyl (Dulcolax) 10 MG  rectal suppository 10 mg  10 mg Rectal Daily PRN    ondansetron (Zofran) 4 MG/2ML injection 4 mg  4 mg Intravenous Q6H PRN    prochlorperazine (Compazine) 10 MG/2ML injection 5 mg  5 mg Intravenous Q8H PRN    benzonatate (Tessalon) cap 200 mg  200 mg Oral TID PRN    guaiFENesin (Robitussin) 100 MG/5 ML oral liquid 200 mg  200 mg Oral Q4H PRN    glycerin-hypromellose- (Artificial Tears) 0.2-0.2-1 % ophthalmic solution 1 drop  1 drop Both Eyes QID PRN    sodium chloride (Saline Mist) 0.65 % nasal solution 1 spray  1 spray Each Nare Q3H PRN    enoxaparin (Lovenox) 40 MG/0.4ML SUBQ injection 40 mg  40 mg Subcutaneous Daily    [] ketorolac (Toradol) 15 MG/ML injection 15 mg  15 mg Intravenous Q6H PRN    acetaminophen (Tylenol) tab 650 mg  650 mg Oral BID PRN    And    codeine tab 60 mg  60 mg Oral BID PRN       Home Medications:  No current facility-administered medications on file prior to encounter.     Current Outpatient Medications on File Prior to Encounter   Medication Sig    metoprolol succinate  MG Oral Tablet 24 Hr Take 1 tablet (100 mg total) by mouth daily.    [] Metaxalone 800 MG Oral Tab Take 1 tablet (800 mg total) by mouth 3 (three) times daily as needed for Pain.    Solifenacin Succinate 5 MG Oral Tab Take 1 tablet (5 mg total) by mouth daily.    rizatriptan (MAXALT-MLT) 10 MG Oral Tablet Dispersible Take 1 tablet (10 mg total) by mouth as needed for Migraine.    Armodafinil 250 MG Oral Tab Take 250 mg by mouth daily.    LATUDA 60 MG Oral Tab Take 1 tablet (60 mg total) by mouth daily with food.    DULoxetine HCl 60 MG Oral Cap DR Particles Take 2 capsules (120 mg total) by mouth daily.    acetaminophen-codeine 300-60 MG Oral Tab Take 1 tablet by mouth 2 (two) times daily as needed for Pain.    metFORMIN 500 MG Oral Tab Take 1 tablet (500 mg total) by mouth 2 (two) times daily.    gabapentin 300 MG Oral Cap Take 3 capsules (900 mg total) by mouth 3 (three) times daily.     losartan 100 MG Oral Tab TAKE 1 TABLET BY MOUTH EVERY DAY    gabapentin 300 MG Oral Cap Take 3 capsules (900 mg total) by mouth 3 (three) times daily.    albuterol 108 (90 Base) MCG/ACT Inhalation Aero Soln Inhale 2 puffs into the lungs every 6 (six) hours as needed.    ondansetron (ZOFRAN) 4 mg tablet Take 1 tablet (4 mg total) by mouth every 8 (eight) hours as needed for Nausea.    triamcinolone 0.1 % External Cream Apply topically 2 (two) times daily. To affected skin.    traZODone HCl 50 MG Oral Tab Take 1 tablet (50 mg total) by mouth nightly as needed.       Review of Systems:  A comprehensive 14 point review of systems was completed.  Pertinent positives and negatives noted in the the HPI.    Allergies:  Allergies   Allergen Reactions    Hydrocodone NAUSEA AND VOMITING    Benzoyl Peroxide RASH     CREA    Levofloxacin UNKNOWN    Pcn [Penicillins] RASH         Vital Signs:  /74 (BP Location: Right arm)   Pulse 103   Temp 98.8 °F (37.1 °C) (Oral)   Resp 18   Ht 1.676 m (5' 6\")   Wt 90.7 kg (200 lb)   LMP 12/23/2014 (Exact Date)   SpO2 97%   BMI 32.28 kg/m²     Last 3 Weights   06/09/24 1718 90.7 kg (200 lb)   06/09/24 2336 90.7 kg (199 lb 15.3 oz)   05/24/24 0954 89.8 kg (198 lb)       Physical Examination:  General: Patient is alert and oriented x 3, not in acute distress.  Vital Signs: /74 (BP Location: Right arm)   Pulse 103   Temp 98.8 °F (37.1 °C) (Oral)   Resp 18   Ht 1.676 m (5' 6\")   Wt 90.7 kg (200 lb)   LMP 12/23/2014 (Exact Date)   SpO2 97%   BMI 32.28 kg/m²   HEENT: EOMs intact. PERRL. Oropharynx is clear.   Chest: Clear to auscultation, respirations non-labored   Heart: Regular rate and rhythm.   Abdomen: Soft, non tender, non-distended with present bowel sounds.  Extremities: No lower extremity edema.  Neurological: Grossly intact.   Psych/Depression: mood and affect are appropriate    Labs:  Recent Results (from the past 24 hour(s))   Ammonia, Plasma    Collection  Time: 06/11/24  6:06 PM   Result Value Ref Range    Ammonia <10 (L) 11 - 32 umol/L   Urinalysis, Routine    Collection Time: 06/12/24  5:11 AM   Result Value Ref Range    Urine Color Light-Yellow Yellow    Clarity Urine Clear Clear    Spec Gravity 1.011 1.005 - 1.030    Glucose Urine Normal Normal mg/dL    Bilirubin Urine Negative Negative    Ketones Urine Negative Negative mg/dL    Blood Urine 1+ (A) Negative    pH Urine 5.5 5.0 - 8.0    Protein Urine Negative Negative mg/dL    Urobilinogen Urine Normal Normal mg/dL    Nitrite Urine Negative Negative    Leukocyte Esterase Urine 250 (A) Negative    WBC Urine 21-50 (A) 0 - 5 /HPF    RBC Urine 3-5 (A) 0 - 2 /HPF    Bacteria Urine 1+ (A) None Seen /HPF    Squamous Epi. Cells Few (A) None Seen /HPF    Renal Tubular Epithelial Cells None Seen None Seen /HPF    Transitional Cells None Seen None Seen /HPF    Yeast Urine None Seen None Seen /HPF   Platelet Count on Citrated Bld    Collection Time: 06/12/24  5:17 AM   Result Value Ref Range    CITRATED PLT COUNT 36.3 (L) 150.0 - 450.0 10(3)uL   C-Reactive Protein    Collection Time: 06/12/24  5:17 AM   Result Value Ref Range    C-Reactive Protein 3.40 (H) <0.30 mg/dL   Complement C3, Serum    Collection Time: 06/12/24  5:17 AM   Result Value Ref Range    Complement C3 134.0 90.0 - 180.0 mg/dL   Complement C4, Serum    Collection Time: 06/12/24  5:17 AM   Result Value Ref Range    Complement C4 53.2 (H) 10.0 - 40.0 mg/dL   Uric Acid    Collection Time: 06/12/24  5:17 AM   Result Value Ref Range    Uric Acid 3.1 2.6 - 6.0 mg/dL   Haptoglobin    Collection Time: 06/12/24  5:17 AM   Result Value Ref Range    Haptoglobin 193.0 30.0 - 200.0 mg/dL   Fibrinogen Activity    Collection Time: 06/12/24  5:17 AM   Result Value Ref Range    Fibrinogen 365 200 - 480 mg/dl   Ferritin    Collection Time: 06/12/24  5:17 AM   Result Value Ref Range    Ferritin 134.9 18.0 - 340.0 ng/mL   Mono Qual, reflex to EBV on Neg    Collection Time:  06/12/24  5:17 AM   Result Value Ref Range    Monoscreen Negative Negative   PTT, Activated    Collection Time: 06/12/24  5:17 AM   Result Value Ref Range    PTT 25.9 23.0 - 36.0 seconds   D-Dimer    Collection Time: 06/12/24  5:17 AM   Result Value Ref Range    D-Dimer 1.53 (H) <0.57 ug/mL FEU   Sed Rate, Westergren (Automated)    Collection Time: 06/12/24  5:17 AM   Result Value Ref Range    Sed Rate 22 0 - 30 mm/Hr   Folic Acid Serum (Folate)    Collection Time: 06/12/24  5:17 AM   Result Value Ref Range    Folate (Folic Acid) 8.0 (L) >=8.7 ng/mL   PTH, Intact    Collection Time: 06/12/24  5:17 AM   Result Value Ref Range    Pth Intact 18.7 18.5 - 88.0 pg/mL   Immunoglobulin A/G/M, Quant    Collection Time: 06/12/24  5:17 AM   Result Value Ref Range    Immunoglobulin A 139.00 70.00 - 312.00 mg/dL    Immunoglobulin G 505 (L) 791 - 1,643 mg/dL    Immunoglobulin M 34.3 (L) 43.0 - 279.0 mg/dL   Vitamin D, 25-Hydroxy    Collection Time: 06/12/24  5:17 AM   Result Value Ref Range    Vitamin D, 25OH, Total 31.4 30.0 - 100.0 ng/mL   HIV Ag/Ab Combo    Collection Time: 06/12/24  5:17 AM   Result Value Ref Range    HIV Antigen Antibody Combo Non-Reactive Non-Reactive   Scan slide    Collection Time: 06/12/24  5:17 AM   Result Value Ref Range    Slide Review       Slide reviewed, normal WBC, RBC, and platelet morphology.     CBC:    Lab Results   Component Value Date    WBC 5.9 06/11/2024    WBC 5.4 06/10/2024    WBC 5.1 06/09/2024     Lab Results   Component Value Date    HGB 12.1 06/11/2024    HGB 11.7 (L) 06/10/2024    HGB 13.0 06/09/2024      Lab Results   Component Value Date    PLT 36.0 (L) 06/11/2024    PLT 50.0 (L) 06/10/2024    PLT 66.0 (L) 06/09/2024       Radiology:    MRI BRAIN (W+WO) (CPT=70553)    Result Date: 6/12/2024  CONCLUSION:  No acute intracranial abnormality is identified.   LOCATION:  [facility]    Dictated by (CST): Stromberg, LeRoy, MD on 6/12/2024 at 1:37 AM     Finalized by (CST): Stromberg, LeRoy,  MD on 6/12/2024 at 1:43 AM       MRI SPINE LUMBAR (CPT=72148)    Result Date: 6/12/2024  CONCLUSION:   1. There is a moderate to severe chronic compression fracture of L2 status post vertebral augmentation.  There is retropulsion of the posterior fracture fragments by approximately 5 mm.  This results in mild to moderate spinal canal compromise.  No other  compression fracture is seen within the lumbar spine.  2. Mild to moderate degenerative changes in the lumbar spine as described in detail above.   LOCATION:  [facility]   Dictated by (CST): Stromberg, LeRoy, MD on 6/12/2024 at 1:26 AM     Finalized by (CST): Stromberg, LeRoy, MD on 6/12/2024 at 1:37 AM       XR CHEST PA + LAT CHEST (CPT=71046)    Result Date: 6/11/2024  CONCLUSION:  Negative exam.   LOCATION:  [facility]   Dictated by (CST): Claribel Moss DO on 6/11/2024 at 8:30 PM     Finalized by (CST): Claribel Moss DO on 6/11/2024 at 8:31 PM          Impression/Plan    Thrombocytopenia        - Normal platelet count 5/24/24        - Etiology is multifactorial with contributions from venom of brown recluse spider, bactrim effect, +/- rheum disorder, folate deficiency, and, lastly, component from withdrawal of self-prescribed steroids on admission. Given the finding of elevated bilirubin on admission, it very well may be the venom effect from the spider bite as hemolysis can be seen as well         - Begin folvite daily, await results of rheum work up         - Continue to monitor CBC daily to trend    Case discussed with Dr. Morrissey, who is in agreement with the plan as outlined above.         Electronically Signed by:      Vita Vazquez, IBRAHIMA, AOCNP, AGPCNP-BC  Nurse Practitioner  Hematology and Oncology

## 2024-06-12 NOTE — OCCUPATIONAL THERAPY NOTE
OCCUPATIONAL THERAPY TREATMENT NOTE - INPATIENT     Room Number: 368/368-A  Session: 1   Number of Visits to Meet Established Goals: 3    Presenting Problem: R shoulder pain, diaphoresis  Prior Level of Function: Patient reports typically independent in all I/ADL and functional mobility without device prior to admission. 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 Patient states that the last several days, she was so debilitated she was unable to walk to the bathroom, was voiding on herself.     ASSESSMENT   Patient demonstrates excellent progress this session, goals updated to reflect patient performance.    Patient functions near baseline with toileting, lower body dressing, bed mobility, and transfers.   Occupational Therapy will discharge patient at this time as all goals have been met at supervision/mod I.    Patient would benefit from home with HHOT at discharge.         OT Device Recommendations: TBD    History: Patient is a 59 year old female admitted on 6/9/2024 with Presenting Problem: R shoulder pain, diaphoresis.R shoulder xray negative, abdomen xray negative.  Co-Morbidities : GIANLUCA, lumbar spinal hemiangioma, depression, L2 fx s/p vertebroplasty 3/2024    WEIGHT BEARING RESTRICTION  Weight Bearing Restriction: None                Recommendations for nursing staff:   Transfers: 1 person  Toileting location: toilet    TREATMENT SESSION:  Patient Start of Session: supine in bed  FUNCTIONAL TRANSFER ASSESSMENT  Sit to Stand: Edge of Bed  Edge of Bed: Supervision  Toilet Transfer: Supervision    BED MOBILITY  Supine to Sit : Modified Independent  Scooting: Mod I    BALANCE ASSESSMENT     FUNCTIONAL ADL ASSESSMENT  Grooming Standing: Supervision  LB Dressing Seated: Modified Independent  LB Dressing Standing: Supervision  Toileting Seated: Modified Independent  Toileting Standing: Supervision      ACTIVITY TOLERANCE: WFL                          O2 SATURATIONS       EDUCATION PROVIDED  Patient: Role of Occupational Therapy; Plan of Care; Discharge Recommendations; Functional Transfer Techniques; Fall Prevention; Posture/Positioning; Energy Conservation; Proper Body Mechanics  Patient's Response to Education: Verbalized Understanding; Returned Demonstration      Equipment used: RW  Demonstrates functional use    Therapist comments: Pt received supine in bed, pleasant and cooperative for OT session. Pt agreeable for OOB activity and ADL re-training in preparation for home. Pt performs bed mobility at mod I to sit EOB. While sitting EOB, pt dons socks at mod I. Pt then performs ambulatory toilet transfer with RW requiring supervision. All aspects of toileting performed at mod I/supervision. Pt returns to sit EOB and demos LB dressing at mod I/supervision. Pt transfers to bedside chair with RW at supervision. All needs met. RN made aware.   Patient End of Session: Up in chair;Needs met;Call light within reach;RN aware of session/findings;All patient questions and concerns addressed;Alarm set    SUBJECTIVE  Pt shouting in her sleep upon this clinician's arrival.    PAIN ASSESSMENT  Ratin  Location: denies  Management Techniques: Activity promotion;Relaxation;Repositioning;Nurse notified     OBJECTIVE  Precautions: Spine;Bed/chair alarm    AM-PAC ‘6-Clicks’ Inpatient Daily Activity Short Form  -   Putting on and taking off regular lower body clothing?: None  -   Bathing (including washing, rinsing, drying)?: A Little  -   Toileting, which includes using toilet, bedpan or urinal? : None  -   Putting on and taking off regular upper body clothing?: None  -   Taking care of personal grooming such as brushing teeth?: None  -   Eating meals?: None    AM-PAC Score:  Score: 23  Approx Degree of Impairment: 15.86%  Standardized Score (AM-PAC Scale): 51.12    PLAN  OT Treatment Plan: Balance activities;ADL training;Functional transfer training;Endurance  training;Patient/Family education;Patient/Family training;Compensatory technique education  Rehab Potential : Good  Frequency: 3x/week    OT Goals:     All goals ongoing 06/12    ADL GOALS   Patient will perform lower body dressing with supervision- goal met 06/12  Patient will perform toileting with supervision- goal met 06/12     FUNCTIONAL TRANSFER GOALS   Patient will perform supine to sit with supervision- goal met 06/12  Patient will perform sit to supine with supervision  Patient will transfer to toilet with supervision- goal met 06/12    OT Session Time: 25 minutes  Self-Care Home Management: 25 minutes

## 2024-06-12 NOTE — PROGRESS NOTES
[facility]   part of [facility]     Hospitalist Progress Note     Alesia Bland Patient Status:  Observation    3/10/1965 MRN [id number]   Location [facility] 3SW-A Attending Juan Thompson DO   Hosp Day # 0 PCP Toni Callahan DO     Chief Complaint: I can't move    Subjective:     Pt feels less weak. Cont to have difficulty walking.     Objective:    Review of Systems:   A comprehensive review of systems was completed; pertinent positive and negatives stated in subjective.    Vital signs:  Temp:  [97.5 °F (36.4 °C)-98.9 °F (37.2 °C)] 98.8 °F (37.1 °C)  Pulse:  [] 103  Resp:  [16-18] 18  BP: ()/(62-82) 112/74  SpO2:  [94 %-98 %] 97 %    Physical Exam:    General: No acute distress  Respiratory: No wheezes, no rhonchi  Cardiovascular: S1, S2, regular rate and rhythm  Abdomen: Soft, Non-tender, non-distended, positive bowel sounds  Neuro: No new focal deficits.   Extremities: No edema      Diagnostic Data:    Labs:  Recent Labs   Lab 06/09/24  1808 06/10/24  0505 24  0513   WBC 5.1 5.4 5.9   HGB 13.0 11.7* 12.1   MCV 92.1 91.1 92.7   PLT 66.0* 50.0* 36.0*   INR  --  0.95  --        Recent Labs   Lab 06/09/24  1808 06/10/24  0505 24  0513   * 116* 104*   BUN 34* 32* 29*   CREATSERUM 0.86 0.73 0.96   CA 9.5 9.3 9.2   ALB 3.1* 2.9* 2.7*   * 138 141   K 4.5 4.0 4.3    105 111   CO2 25.0 23.0 23.0   ALKPHO 285* 247* 209*   AST 52* 29 32   ALT 95* 78* 62*   BILT 2.4* 1.4 0.9   TP 6.7 6.0* 5.7*       Estimated Creatinine Clearance: 60.5 mL/min (based on SCr of 0.96 mg/dL).    Recent Labs   Lab 06/09/24  1808   TROPHS 9       Recent Labs   Lab 06/10/24  0505   PTP 12.7   INR 0.95                  Microbiology    Hospital Encounter on 24   1. Urine Culture, Routine     Status: None    Collection Time: 24  7:16 PM    Specimen: Urine, clean catch   Result Value Ref Range    Urine Culture No Growth at 18-24 hrs. N/A         Imaging: Reviewed in  Epic.    Medications:    folic acid  1 mg Oral Daily    DULoxetine  120 mg Oral Daily    gabapentin  900 mg Oral TID    lurasidone  60 mg Oral Daily with food    losartan  100 mg Oral Daily    metoprolol succinate ER  100 mg Oral Daily Beta Blocker    oxybutynin ER  5 mg Oral Daily    [Held by provider] enoxaparin  40 mg Subcutaneous Daily       Assessment & Plan:      # Intermittent AMS  # Intractable pain  # Intermittent weakness   - MRI brain (6/11) negative for acute process   - neurology following   - continue Cymbalta, gabapentin, lurasidone  PRN toradol    - Psych eval noted, patient likely will benefit from neurocognitive evaluation as outpatient with Dr. Tovar    # Right shoulder pain    - appears MSK pain in Right shoulder, right shoulder x-ray reviewed no acute fracture   - Pain control with tylenol, toradol. Avoid opioids d/t abuse history    #Moderate to severe chronic compression fracture of L2  -Orthospine to see    # Intractable pain  # Elevated CRP  # Suspect component of fibromyalgia    - Rheumatology following, laboratory work underway     # Transaminitis   - Right upper quadrant ultrasound (6/10) shows \"small volume of gallbladder sludge otherwise no sonographic evidence of acute cholecystitis or biliary obstruction\"   - labs trending down, will trend   - viral hep panel neg     # Hyponatremia, improving - s/p IVF, trend chem     # Diaphoresis?    - now resolved, trop negative   - No leukocytosis, no hx of CP/palpitations    - Monitor on tele, EKG NSR with ST elevations/depressions   - CRP elevated, though nonspecific      # Hypertension - Continue home oral antihypertensives    #Thrombocytopenia  -heme to see  -platelets normal in feb 2024  -hold lovenox      Juan Thompson DO    Supplementary Documentation:     Quality:  DVT Mechanical Prophylaxis:   SCDs, Early ambuation  DVT Pharmacologic Prophylaxis   Medication    [Held by provider] enoxaparin (Lovenox) 40 MG/0.4ML SUBQ injection 40 mg                 Code Status: Prior  Phillips: No urinary catheter in place  Phillips Duration (in days):   Central line:    CELESTE:     Discharge is dependent on: clinical improvement  At this point Ms. Bland is expected to be discharge to: tbd    The 21st Century Cures Act makes medical notes like these available to patients in the interest of transparency. Please be advised this is a medical document. Medical documents are intended to carry relevant information, facts as evident, and the clinical opinion of the practitioner. The medical note is intended as peer to peer communication and may appear blunt or direct. It is written in medical language and may contain abbreviations or verbiage that are unfamiliar.

## 2024-06-12 NOTE — PROGRESS NOTES
Rheumatology Inpatient Progress Note    Alesia Bland Patient Status:  Observation    3/10/1965 MRN [id number]   [facility] 3SW-A Attending Juan Thompson DO   Hosp Day # 0 PCP Toni Callahan DO       S:  Doing very well today.  Less pain.  Able to raise arms above head.  Able to walk with physical therapy.  Current Facility-Administered Medications   Medication Dose Route Frequency    folic acid (Folvite) tab 1 mg  1 mg Oral Daily    DULoxetine (Cymbalta) DR cap 120 mg  120 mg Oral Daily    gabapentin (Neurontin) cap 900 mg  900 mg Oral TID    lurasidone (Latuda) tab 60 mg  60 mg Oral Daily with food    losartan (Cozaar) tab 100 mg  100 mg Oral Daily    metoprolol succinate ER (Toprol XL) 24 hr tab 100 mg  100 mg Oral Daily Beta Blocker    SUMAtriptan (Imitrex) 20 MG/ACT nasal solution 1 spray  20 mg Nasal Q2H PRN    oxybutynin ER (Ditropan-XL) 24 hr tab 5 mg  5 mg Oral Daily    traZODone (Desyrel) tab 50 mg  50 mg Oral Nightly PRN    acetaminophen (Tylenol Extra Strength) tab 1,000 mg  1,000 mg Oral Q8H PRN    melatonin tab 3 mg  3 mg Oral Nightly PRN    polyethylene glycol (PEG 3350) (Miralax) 17 g oral packet 17 g  17 g Oral Daily PRN    sennosides (Senokot) tab 17.2 mg  17.2 mg Oral Nightly PRN    bisacodyl (Dulcolax) 10 MG rectal suppository 10 mg  10 mg Rectal Daily PRN    ondansetron (Zofran) 4 MG/2ML injection 4 mg  4 mg Intravenous Q6H PRN    prochlorperazine (Compazine) 10 MG/2ML injection 5 mg  5 mg Intravenous Q8H PRN    benzonatate (Tessalon) cap 200 mg  200 mg Oral TID PRN    guaiFENesin (Robitussin) 100 MG/5 ML oral liquid 200 mg  200 mg Oral Q4H PRN    glycerin-hypromellose- (Artificial Tears) 0.2-0.2-1 % ophthalmic solution 1 drop  1 drop Both Eyes QID PRN    sodium chloride (Saline Mist) 0.65 % nasal solution 1 spray  1 spray Each Nare Q3H PRN    [Held by provider] enoxaparin (Lovenox) 40 MG/0.4ML SUBQ injection 40 mg  40 mg Subcutaneous Daily    acetaminophen (Tylenol) tab  650 mg  650 mg Oral BID PRN    And    codeine tab 60 mg  60 mg Oral BID PRN     ?    O:  Vitals:    06/12/24 0400 06/12/24 0805 06/12/24 1213 06/12/24 1605   BP: 129/73 109/62 112/74 121/69   BP Location: Right arm Left arm Right arm Right arm   Pulse: 90 87 103 84   Resp: 18 18 18 18   Temp: 98.9 °F (37.2 °C) 98.7 °F (37.1 °C) 98.8 °F (37.1 °C) 98.9 °F (37.2 °C)   TempSrc: Oral Oral Oral Oral   SpO2: 98% 96% 97% 99%   Weight:       Height:           GEN: NAD, well-nourished.   HEENT: Head: NCAT. Face: No lesions. Eyes: Conjunctiva clear. Sclera are anicteric. PERRLA. EOMs are full.   PULM:  easy effort  Extremities: No cyanosis, edema or deformities.   Neurologic: Strength, CN2-12 grossly intact   Psych: normal affect.   Skin: No lesions or rashes.  MSK: 28 joint count performed. No evidence of synovitis in mcp, pip, dip, wrist, elbows, shoulders, hips, knees, ankles, mtp unless otherwise noted. Full ROM of elbows, wrists, knees.     No peripheral joint synovitis noted  -No painful arc of the bilateral shoulders  -Able to stand from seated position without use of hands    Labs:  KATY, CCP negative  PTH, vitamin D WNL  Uric acid 3.1  C3/C4 WNL  IgG 505  Folate 8.0  Citrated platelets 36.3  Lyme negative    Lab Results   Component Value Date    ESRML 22 06/12/2024    ESRML 16 06/09/2024    ESRML 3 08/03/2018    CRP 3.40 (H) 06/12/2024    CRP 20.60 (H) 06/09/2024        Radiology:  MRI BRAIN (W+WO) (CPT=70553)    Result Date: 6/12/2024  CONCLUSION:  No acute intracranial abnormality is identified.   LOCATION:  [facility]    Dictated by (CST): Stromberg, LeRoy, MD on 6/12/2024 at 1:37 AM     Finalized by (CST): Stromberg, LeRoy, MD on 6/12/2024 at 1:43 AM       MRI SPINE LUMBAR (CPT=72148)    Result Date: 6/12/2024  CONCLUSION:   1. There is a moderate to severe chronic compression fracture of L2 status post vertebral augmentation.  There is retropulsion of the posterior fracture fragments by approximately 5 mm.  This  results in mild to moderate spinal canal compromise.  No other  compression fracture is seen within the lumbar spine.  2. Mild to moderate degenerative changes in the lumbar spine as described in detail above.   LOCATION:  [facility]   Dictated by (CST): Stromberg, LeRoy, MD on 6/12/2024 at 1:26 AM     Finalized by (CST): Stromberg, LeRoy, MD on 6/12/2024 at 1:37 AM       XR CHEST PA + LAT CHEST (CPT=71046)    Result Date: 6/11/2024  CONCLUSION:  Negative exam.   LOCATION:  [id number]   Dictated by (CST): Claribel Moss DO on 6/11/2024 at 8:30 PM     Finalized by (CST): Claribel Moss DO on 6/11/2024 at 8:31 PM       US ABDOMEN LIMITED (CPT=76705)    Result Date: 6/10/2024  CONCLUSION:   Small volume of gallbladder sludge.  No sonographic evidence of acute cholecystitis or biliary obstruction.  Normal appearance of the liver.   LOCATION:  [facility]   Dictated by (RUST): Fang Powell MD on 6/10/2024 at 12:09 PM     Finalized by (CST): Fang Powell MD on 6/10/2024 at 12:10 PM       XR SHOULDER, COMPLETE (MIN 2 VIEWS), RIGHT (CPT=73030)    Result Date: 6/10/2024  CONCLUSION:   No acute fracture or malalignment.  The glenohumeral joint space is preserved.  Normal acromioclavicular joint with preservation of the subacromial interval.  There is a triangular radiodensity within the superficial subcutaneous tissues overlying the lateral aspect of the right trapezius muscle on the frontal view.  This may represent a retained foreign body, measuring approximately 6 x 3 mm.   LOCATION:  [facility]   Dictated by (CST): Fang Powell MD on 6/10/2024 at 10:35 AM     Finalized by (CST): Fang Powell MD on 6/10/2024 at 10:37 AM       CT ABDOMEN+PELVIS(CPT=74176)    Result Date: 5/22/2024  CONCLUSION:   1. Diffuse infiltration subcutaneous fat surrounding right side abdomen along with skin thickening is noted.  Minimal fluid adjacent to the right lateral abdominal wall.  No evidence of a loculated fluid collection.  This may be due to a significant  cellulitis.  2. There is also a mild to moderately obstructing 7 mm calculus the proximal left ureter.  Multiple nonobstructing calculi in both kidneys.    LOCATION:  [id number]   Dictated by (CST): Ricardo Bower MD on 5/22/2024 at 3:34 PM     Finalized by (CST): Ricardo Bower MD on 5/22/2024 at 3:37 PM          ==============================================================================================================  A/P:      Severe acute onset hip and shoulder girdle symptoms with elevated C-reactive protein (greater than 20.0 mg/DL) concerning for polymyalgia rheumatica and this 59-year-old female.  She also notes bilateral knee/hand swelling. Could not walk due to symptoms. Did take 80 mg of prednisone that was self-administered on 6/9/2024 with significant improvement of her symptoms.    -No active hip/shoulder girdle symptoms or peripheral joint synovitis noted on exam today.  No clinical features consistent with cranial GCA  -Marked decrease in C-reactive protein noted.  -However prominent thrombocytopenia does not fit with potential polymyalgia rheumatica.  Perhaps clinical picture is consistent with sequela from recent brown recluse bite.      #Thrombocytopenia, however DIC labs otherwise normal     ?  Plan:  -Pending multiple infectious, rheumatic disease blood work.  -No need for any systemic steroids or any other immunomodulators  -L2 compression fracture: need DEXA as outpatient     Okay to discharge tomorrow morning if patient feels well and if hematology is okay with discharge in the context of thrombocytopenia.  Suspect acute musculoskeletal complaints and elevated C-reactive protein will be a monophasic course without recurrence.    Griselda Snider MD  [facility]  6/12/2024

## 2024-06-12 NOTE — PROGRESS NOTES
History of Presenting Illness:  Patient seen for follow-up.  States that she had a very bad night with significant pain and paresthesias of both lower extremities and back pain.  No bladder or bowel incontinence.  Denies any pain or paresthesias of the upper extremity.  No diplopia, dysarthria, dysphagia or weakness in the upper extremities.      Vitals:   Vitals:    06/12/24 1213   BP: 112/74   Pulse: 103   Resp: 18   Temp: 98.8 °F (37.1 °C)          Examination:    Awake , alert, non-dysarthric, expressive an receptive language normal.   Pupils round and reactive to light, extra ocular movement normal, no facial weakness, hearing normal.  Motor strength and reflexes symmetrical.  Finger to Nose testing normal.     Investigations:    MRI BRAIN (W+WO) (CPT=70553)    Result Date: 6/12/2024  CONCLUSION:  No acute intracranial abnormality is identified.   LOCATION:  [facility]    Dictated by (CST): Stromberg, LeRoy, MD on 6/12/2024 at 1:37 AM     Finalized by (CST): Stromberg, LeRoy, MD on 6/12/2024 at 1:43 AM       MRI SPINE LUMBAR (CPT=72148)    Result Date: 6/12/2024  CONCLUSION:   1. There is a moderate to severe chronic compression fracture of L2 status post vertebral augmentation.  There is retropulsion of the posterior fracture fragments by approximately 5 mm.  This results in mild to moderate spinal canal compromise.  No other  compression fracture is seen within the lumbar spine.  2. Mild to moderate degenerative changes in the lumbar spine as described in detail above.   LOCATION:  [facility]   Dictated by (CST): Stromberg, LeRoy, MD on 6/12/2024 at 1:26 AM     Finalized by (CST): Stromberg, LeRoy, MD on 6/12/2024 at 1:37 AM       XR CHEST PA + LAT CHEST (CPT=71046)    Result Date: 6/11/2024  CONCLUSION:  Negative exam.   LOCATION:  [id number]   Dictated by (CST): Claribel Moss DO on 6/11/2024 at 8:30 PM     Finalized by (CST): Claribel Moss DO on 6/11/2024 at 8:31 PM            Lab Results   Component Value Date    A1C  5.8 (H) 05/24/2024        Lab Results   Component Value Date     (H) 05/24/2024    HDL 58 05/24/2024    TRIG 103 05/24/2024        Recent Labs   Lab 06/09/24  1808 06/10/24  0505 06/11/24  0513   RBC 4.16 3.80 3.84   HGB 13.0 11.7* 12.1   HCT 38.3 34.6* 35.6   MCV 92.1 91.1 92.7   MCH 31.3 30.8 31.5   MCHC 33.9 33.8 34.0   RDW 14.8 14.6 14.9   NEPRELIM 4.08 3.96 3.62   WBC 5.1 5.4 5.9   PLT 66.0* 50.0* 36.0*       Recent Labs   Lab 06/09/24  1808 06/10/24  0505 06/11/24  0513   * 116* 104*   BUN 34* 32* 29*   CREATSERUM 0.86 0.73 0.96   EGFRCR 79 96 69   CA 9.5 9.3 9.2   * 138 141   K 4.5 4.0 4.3    105 111   CO2 25.0 23.0 23.0       Impression/MDM.    Lumbosacral radiculopathy.  Patient has history of L2 fracture with 75% loss of vertebral height.  Findings are more consistent with L2-L3 radiculopathy.  MRI reviewed.  Also advised spine consultation.  Moderate paresthesias of lower extremities.  Patient currently on gabapentin.  Discussed multiple medications including possibly using carbamazepine.  Patient has currently declined.  Wants to continue gabapentin.  Lumbar hemangioma.  Patient to follow-up with her neurologist at [facility].    Marked thrombocytopenia.  Most recent platelet count 50,000.  Further workup required.  Patient also noted to have transaminitis.  PT/INR was normal.  Intermittent encephalopathy of unclear etiology.  MRI of the brain did not show any acute intracranial abnormality.  Ammonia level was normal.  EEG reported mild slowing.  No epileptiform activity noted.  Patient clinically better.  Advised monitoring.  Opioid use disorder.  Recommend pain clinic consultation for management of chronic pain.  No further active neuro intervention recommended at this time.  Further management of radiculopathy as per orthopedic/spine.  Please feel free to call for further queries.  Patient counseled

## 2024-06-13 VITALS
RESPIRATION RATE: 18 BRPM | DIASTOLIC BLOOD PRESSURE: 81 MMHG | WEIGHT: 200 LBS | OXYGEN SATURATION: 97 % | TEMPERATURE: 98 F | HEART RATE: 81 BPM | BODY MASS INDEX: 32.14 KG/M2 | SYSTOLIC BLOOD PRESSURE: 126 MMHG | HEIGHT: 66 IN

## 2024-06-13 LAB
ALBUMIN SERPL-MCNC: 2.7 G/DL (ref 3.4–5)
ALBUMIN/GLOB SERPL: 0.9 {RATIO} (ref 1–2)
ALP LIVER SERPL-CCNC: 147 U/L
ALT SERPL-CCNC: 38 U/L
ANION GAP SERPL CALC-SCNC: 4 MMOL/L (ref 0–18)
ANTIHISTONE ABS: 0.9 UNITS
AST SERPL-CCNC: 16 U/L (ref 15–37)
BASOPHILS # BLD AUTO: 0.04 X10(3) UL (ref 0–0.2)
BASOPHILS NFR BLD AUTO: 0.8 %
BILIRUB SERPL-MCNC: 0.6 MG/DL (ref 0.1–2)
BUN BLD-MCNC: 18 MG/DL (ref 9–23)
CALCIUM BLD-MCNC: 9.4 MG/DL (ref 8.5–10.1)
CHLORIDE SERPL-SCNC: 106 MMOL/L (ref 98–112)
CMV IGG AB: >10 U/ML
CMV IGM AB: <30 AU/ML
CO2 SERPL-SCNC: 28 MMOL/L (ref 21–32)
CODEINE CONF UR: 2262 NG/ML
CODEINE CONF UR: 2262 NG/ML
CODEINE UR: POSITIVE
CREAT BLD-MCNC: 0.75 MG/DL
CREAT UR-SCNC: 45.9 MG/DL
DSDNA AB TITR SER: <10 {TITER}
EGFRCR SERPLBLD CKD-EPI 2021: 93 ML/MIN/1.73M2 (ref 60–?)
EOSINOPHIL # BLD AUTO: 0.8 X10(3) UL (ref 0–0.7)
EOSINOPHIL NFR BLD AUTO: 15.5 %
ERYTHROCYTE [DISTWIDTH] IN BLOOD BY AUTOMATED COUNT: 14.6 %
GLOBULIN PLAS-MCNC: 3 G/DL (ref 2.8–4.4)
GLUCOSE BLD-MCNC: 103 MG/DL (ref 70–99)
HCT VFR BLD AUTO: 35.8 %
HGB BLD-MCNC: 11.7 G/DL
HYDROCODONE UR: NEGATIVE
HYDROMORPH UR: NEGATIVE
IMM GRANULOCYTES # BLD AUTO: 0.08 X10(3) UL (ref 0–1)
IMM GRANULOCYTES NFR BLD: 1.6 %
LYMPHOCYTES # BLD AUTO: 1.92 X10(3) UL (ref 1–4)
LYMPHOCYTES NFR BLD AUTO: 37.3 %
MAGNESIUM SERPL-MCNC: 1.9 MG/DL (ref 1.6–2.6)
MCH RBC QN AUTO: 30.6 PG (ref 26–34)
MCHC RBC AUTO-ENTMCNC: 32.7 G/DL (ref 31–37)
MCV RBC AUTO: 93.7 FL
MONOCYTES # BLD AUTO: 0.69 X10(3) UL (ref 0.1–1)
MONOCYTES NFR BLD AUTO: 13.4 %
MORPHINE CONF UR: 612 NG/ML
MORPHINE UR: POSITIVE
NEUTROPHILS # BLD AUTO: 1.62 X10 (3) UL (ref 1.5–7.7)
NEUTROPHILS # BLD AUTO: 1.62 X10(3) UL (ref 1.5–7.7)
NEUTROPHILS NFR BLD AUTO: 31.4 %
OPIATES CLASS UR: POSITIVE NG/ML
OSMOLALITY SERPL CALC.SUM OF ELEC: 288 MOSM/KG (ref 275–295)
PLATELET # BLD AUTO: 84 10(3)UL (ref 150–450)
PLATELETS.RETICULATED NFR BLD AUTO: 14.7 % (ref 0–7)
POTASSIUM SERPL-SCNC: 3.9 MMOL/L (ref 3.5–5.1)
PROT SERPL-MCNC: 5.7 G/DL (ref 6.4–8.2)
PROT UR-MCNC: 14.5 MG/DL
PROT/CREAT UR-RTO: 0.32
RBC # BLD AUTO: 3.82 X10(6)UL
SODIUM SERPL-SCNC: 138 MMOL/L (ref 136–145)
WBC # BLD AUTO: 5.2 X10(3) UL (ref 4–11)

## 2024-06-13 PROCEDURE — 99239 HOSP IP/OBS DSCHRG MGMT >30: CPT | Performed by: HOSPITALIST

## 2024-06-13 PROCEDURE — 99231 SBSQ HOSP IP/OBS SF/LOW 25: CPT

## 2024-06-13 RX ORDER — FOLIC ACID 1 MG/1
1 TABLET ORAL DAILY
Qty: 90 TABLET | Refills: 0 | Status: SHIPPED | OUTPATIENT
Start: 2024-06-14

## 2024-06-13 NOTE — PLAN OF CARE
Patient A & O x4. VSS, on RA. C/o mild-mod pain, see MAR for PO medications given. Voiding freely. Up standby assist. Safety measures in place. Instructed to use call light.

## 2024-06-13 NOTE — PLAN OF CARE
Sitting up in chair.  Ambulates in room  Wearing LSO brace when out of bed.  States feels less pain and less weakness today.  Instructed on plan of care, call for all asst needed, discomfort felt, call don't fall protocol.  Verbalized understanding.  Safety precautions maintained.

## 2024-06-13 NOTE — PROGRESS NOTES
[facility]  Neurosurgery Progress Note    Alesia Bland Patient Status:  Observation    3/10/1965 MRN [id number]   Location [facility] 3SW-A Attending Juan Thompson DO   Hosp Day # 0 PCP Toni Callahan DO     PRINCIPLE PROBLEM:   LE weakness    SUBJECTIVE     Pt sitting more upright in bed. Feels better than yesterday. Continues to report pain to the anterior thighs bilaterally, without numbness or tingling. Weakness to BLE is improving; has been able to ambulate in halls with assistance. Voiding freely but reports ongoing urinary incontinence. Reports R shoulder pain.     OBJECTIVE/PHYSICAL EXAM     VITALS:  /79 (BP Location: Right arm)   Pulse 73   Temp 97.7 °F (36.5 °C) (Oral)   Resp 18   Ht 66\"   Wt 200 lb (90.7 kg)   LMP 2014 (Exact Date)   SpO2 100%   BMI 32.28 kg/m²     GENERAL: No acute distress, non-toxic appearing, mood appropriate    HEENT: Normocephalic, atraumatic    RESP:  Respirations non-labored, even    CV:  NSR on tele    NEURO: Alert and oriented x3. Sensation to light touch is intact bilaterally.  ELIZABETH x 4. Gait deferred.     UPPER EXTREMITY STRENGTH:    Deltoid  Biceps  Triceps        Right 5 5 5 5     Left 5 5 5 5     LOWER EXTREMITY STRENGTH:    Iliospoas  Hamstrings  Quads  D-flexion  P-flexion EHL     Right 5 5 5 5 5 5     Left 5 5 5 5 5 5     LABS     Lab Results   Component Value Date    WBC 5.2 2024    HGB 11.7 2024    HCT 35.8 2024    PLT 84.0 2024    CREATSERUM 0.75 2024    BUN 18 2024     2024    K 3.9 2024     2024    CO2 28.0 2024     2024    CA 9.4 2024    ALB 2.7 2024    ALKPHO 147 2024    BILT 0.6 2024    TP 5.7 2024    AST 16 2024    ALT 38 2024    MG 1.9 2024    B12 >2,000 2024       IMAGING     No new imaging to review.    ASSESSMENT & PLAN     ASSESSMENT:  Subacute on chronic L2 compression fracture  BLE  radiculopathy  Thrombocytopenia   R shoulder pain    PLAN:  No acute neurosurgical intervention is indicated  Medical management per hospitalist  Additional management per Heme and Rheum  PT/OT  LSO brace when OOB  Okay to DC from a Neurosurgical standpoint. F/U in 4 weeks with repeat XR lumbar spine.    Plan was discussed with Dr. Kirkland.    Marcela Alfonso, APRN-NP  [facility]  6/13/2024, 8:25 AM      A total of 10 minutes of visit time (exclusible of billable procedures) was administered.  >50 % of time spent counseling/coordinating care.  Is this a shared or split note between Advanced Practice Provider and Physician? No

## 2024-06-13 NOTE — PHYSICAL THERAPY NOTE
IP PT attempted, pt laying in bed, states she feels very tired. Pt politely refusing OOB mobility currently, however, states she's been amb to/from [facility] staff sans AD. Will continue to follow .

## 2024-06-13 NOTE — DISCHARGE SUMMARY
[facility]OSPITAL[facility]  DISCHARGE SUMMARY     Alesia Bland Patient Status:  Observation    3/10/1965 MRN [id number]   Location [facility] 3SW-A Attending Juan Thompson,    Hosp Day # 0 PCP Toni Callahan DO     Date of Admission: 2024  Date of Discharge:   2024    Discharge Disposition: Home or Self Care    Discharge Diagnosis:  # Intermittent AMS  # Intractable pain  # Intermittent weakness   - MRI brain () negative for acute process   - neurology following   - continue Cymbalta, gabapentin, lurasidone  PRN toradol    - Psych eval noted, patient likely will benefit from neurocognitive evaluation as outpatient with Dr. Tovar     # Right shoulder pain    - appears MSK pain in Right shoulder, right shoulder x-ray reviewed no acute fracture   - Pain control with tylenol, toradol. Avoid opioids d/t abuse history     #Moderate to severe chronic compression fracture of L2  -Orthospine to see     # Intractable pain  # Elevated CRP  # Suspect component of fibromyalgia    - Rheumatology following, laboratory work underway     # Transaminitis   - Right upper quadrant ultrasound (6/10) shows \"small volume of gallbladder sludge otherwise no sonographic evidence of acute cholecystitis or biliary obstruction\"   - labs trending down, will trend   - viral hep panel neg     # Hyponatremia, improving - s/p IVF, trend chem     # Diaphoresis?    - now resolved, trop negative   - No leukocytosis, no hx of CP/palpitations    - Monitor on tele, EKG NSR with ST elevations/depressions   - CRP elevated, though nonspecific      # Hypertension - Continue home oral antihypertensives    #Thrombocytopenia  -heme eval noted  -platelets normal in 2024  -hold lovenox    History of Present Illness: Alesia Bland is a 59 year old female with past medical history of GIANLUCA, spinal hemangioma, depression who presents for diaphoresis, shoulder pain.     Patient is a poor historian, occasionally confused and tangential.  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  Patient was evaluated by nursing staff, he had EKG obtained and noted to have rates in the 140s.  Patient advised to present to the hospital for further evaluation, however attempted to treat symptoms herself at home.  States that she was feeling increasing fatigue, weakness, whole body pain, with the worst in right shoulder.  Denies any falls, trauma or injury to the shoulder.  Notes that she was otherwise ambulatory at baseline without assistance however was at home lying on the ground.  Patient called her family to bring her to the hospital.  She does note she has had issues with chronic pain for quite some time, has brought up the idea of fibromyalgia to her PCP also notes that she has chronic pain from, Lumbar spinal hemangioma, managed with gabapentin, Cymbalta, Tylenol with codeine.  Previously with pain service however fired from the clinic due to violating pain contract by prescribing herself narcotic agents.  Patient does note that she is more confused and forgetful than baseline, unclear duration of confusion.  Never had workup for dementia.    Brief Synopsis: Patient admitted to the hospital for right shoulder pain and diaphoresis.  For right shoulder pain standpoint patient had x-ray done and was unremarkable.  Patient continued to have intractable pain and weakness and seen by neurology in consultation.  Patient subsequently had MRI imaging of the spine and found to have moderate to severe chronic compression fracture of L2.  Patient seen by spine team and recommendations for LSO brace to be worn.  Patient's pain eventually improved.  Given abrupt onset of pain patient also saw rheumatology in consultation.  Patient was noted to have elevated CRP.  Serology workup is still pending at this time.  Patient will follow-up with rheumatology as an outpatient.   Patient also seen by hematology for severe thrombocytopenia that is now improving.  Etiology is likely secondary to spider bite.  Patient did not receive any steroids on the inpatient side.  At this point patient medically stable for discharge.    Lace+ Score: 62  59-90 High Risk  29-58 Medium Risk  0-28   Low Risk  Patient was referred to the [facility].    TCM Follow-Up Recommendation:  LACE > 58: High Risk of readmission after discharge from the hospital.      Procedures during hospitalization:       Incidental or significant findings and recommendations (brief descriptions):      Lab/Test results pending at Discharge:   Rheum serology    Consultants:  Psych,  neurology, rheum, neuro sx, heme/onc      Discharge Medication List:     Discharge Medications        CHANGE how you take these medications        Instructions Prescription details   acetaminophen-codeine 300-60 MG Tabs  Commonly known as: TYLENOL #4  What changed: Another medication with the same name was removed. Continue taking this medication, and follow the directions you see here.      Take 1 tablet by mouth 2 (two) times daily as needed for Pain.   Stop taking on: July 4, 2024  Quantity: 60 tablet  Refills: 0            CONTINUE taking these medications        Instructions Prescription details   albuterol 108 (90 Base) MCG/ACT Aers  Commonly known as: Ventolin HFA      Inhale 2 puffs into the lungs every 6 (six) hours as needed.   Refills: 0     Armodafinil 250 MG Tabs      Take 250 mg by mouth daily.   Refills: 0     DULoxetine 60 MG Cpep  Commonly known as: Cymbalta      Take 2 capsules (120 mg total) by mouth daily.   Refills: 0     gabapentin 300 MG Caps  Commonly known as: Neurontin      Take 3 capsules (900 mg total) by mouth 3 (three) times daily.   Quantity: 270 capsule  Refills: 0     gabapentin 300 MG Caps  Commonly known as: Neurontin      Take 3 capsules (900 mg total) by mouth 3 (three) times daily.   Quantity: 270  capsule  Refills: 0     Latuda 60 MG Tabs  Generic drug: lurasidone      Take 1 tablet (60 mg total) by mouth daily with food.   Refills: 0     losartan 100 MG Tabs  Commonly known as: Cozaar      TAKE 1 TABLET BY MOUTH EVERY DAY   Quantity: 30 tablet  Refills: 0     metFORMIN 500 MG Tabs  Commonly known as: Glucophage      Take 1 tablet (500 mg total) by mouth 2 (two) times daily.   Quantity: 180 tablet  Refills: 3     metoprolol succinate  MG Tb24  Commonly known as: Toprol XL      Take 1 tablet (100 mg total) by mouth daily.   Quantity: 90 tablet  Refills: 0     ondansetron 4 mg tablet  Commonly known as: Zofran      Take 1 tablet (4 mg total) by mouth every 8 (eight) hours as needed for Nausea.   Quantity: 30 tablet  Refills: 2     rizatriptan 10 MG Tbdp  Commonly known as: Maxalt-MLT      Take 1 tablet (10 mg total) by mouth as needed for Migraine.   Quantity: 90 tablet  Refills: 1     Solifenacin Succinate 5 MG Tabs  Commonly known as: VESICARE      Take 1 tablet (5 mg total) by mouth daily.   Quantity: 90 tablet  Refills: 0     traZODone 50 MG Tabs  Commonly known as: Desyrel      Take 1 tablet (50 mg total) by mouth nightly as needed.   Refills: 0     triamcinolone 0.1 % Crea  Commonly known as: Kenalog      Apply topically 2 (two) times daily. To affected skin.   Quantity: 80 g  Refills: 1            STOP taking these medications      Metaxalone 800 MG Tabs                 [place]     Follow-up appointment:   Kwabena Tovar PsyD [address]  [place] [address] [phone]    Schedule an appointment as soon as possible for a visit in 1 week(s)  Establish care    Appointments for Next 30 Days 6/13/2024 - 7/13/2024      None            Vital signs:  Temp:  [98 °F (36.7 °C)-98.9 °F (37.2 °C)] 98 °F (36.7 °C)  Pulse:  [] 71  Resp:  [18] 18  BP: (112-128)/(61-81) 117/81  SpO2:  [97 %-100 %] 98 %    Physical Exam:    General: No acute distress   Lungs: clear to  auscultation  Cardiovascular: S1, S2  Abdomen: Soft      -----------------------------------------------------------------------------------------------  PATIENT DISCHARGE INSTRUCTIONS: See electronic chart    Juan Thompson DO    Total time spent on discharge plannin minutes     The  [employer] Act makes medical notes like these available to patients in the interest of transparency. Please be advised this is a medical document. Medical documents are intended to carry relevant information, facts as evident, and the clinical opinion of the practitioner. The medical note is intended as peer to peer communication and may appear blunt or direct. It is written in medical language and may contain abbreviations or verbiage that are unfamiliar.

## 2024-06-14 ENCOUNTER — PATIENT OUTREACH (OUTPATIENT)
Dept: CASE MANAGEMENT | Age: 59
End: 2024-06-14

## 2024-06-14 DIAGNOSIS — Z02.9 ENCOUNTERS FOR UNSPECIFIED ADMINISTRATIVE PURPOSE: Primary | ICD-10-CM

## 2024-06-14 LAB
EBV NA IGG SER QL IA: NEGATIVE
EBV VCA IGG SER QL IA: POSITIVE
EBV VCA IGM SER QL IA: NEGATIVE
PARVO B19 IGG: 5.8 INDEX
PARVO B19 IGM: 0.1 INDEX

## 2024-06-14 NOTE — PROGRESS NOTES
Attempted to contact pt for TCM however no answer. Call continued to ring and then disconnected. Unable to leave a VM at this time. NCM to try again at a later time.   Pt has [facility] appt Monday, 6/17/24, NCM to FU.

## 2024-06-17 ENCOUNTER — OFFICE VISIT (OUTPATIENT)
Dept: INTERNAL MEDICINE CLINIC | Facility: CLINIC | Age: 59
End: 2024-06-17

## 2024-06-17 VITALS
BODY MASS INDEX: 31.18 KG/M2 | HEART RATE: 82 BPM | RESPIRATION RATE: 18 BRPM | TEMPERATURE: 97 F | SYSTOLIC BLOOD PRESSURE: 115 MMHG | OXYGEN SATURATION: 99 % | WEIGHT: 194 LBS | DIASTOLIC BLOOD PRESSURE: 60 MMHG | HEIGHT: 66 IN

## 2024-06-17 DIAGNOSIS — M79.7 FIBROMYALGIA: ICD-10-CM

## 2024-06-17 DIAGNOSIS — R74.01 TRANSAMINITIS: ICD-10-CM

## 2024-06-17 DIAGNOSIS — N20.0 RENAL CALCULI: ICD-10-CM

## 2024-06-17 DIAGNOSIS — G89.29 CHRONIC RIGHT-SIDED LOW BACK PAIN WITHOUT SCIATICA: ICD-10-CM

## 2024-06-17 DIAGNOSIS — E87.1 HYPONATREMIA: ICD-10-CM

## 2024-06-17 DIAGNOSIS — G93.40 ACUTE ENCEPHALOPATHY: Primary | ICD-10-CM

## 2024-06-17 DIAGNOSIS — M54.16 LUMBAR RADICULOPATHY: ICD-10-CM

## 2024-06-17 DIAGNOSIS — F33.2 SEVERE EPISODE OF RECURRENT MAJOR DEPRESSIVE DISORDER, WITHOUT PSYCHOTIC FEATURES (HCC): ICD-10-CM

## 2024-06-17 DIAGNOSIS — R79.82 ELEVATED C-REACTIVE PROTEIN (CRP): ICD-10-CM

## 2024-06-17 DIAGNOSIS — R32 URINARY INCONTINENCE, UNSPECIFIED TYPE: ICD-10-CM

## 2024-06-17 DIAGNOSIS — G89.29 OTHER CHRONIC PAIN: ICD-10-CM

## 2024-06-17 DIAGNOSIS — F41.9 ANXIETY DISORDER, UNSPECIFIED TYPE: ICD-10-CM

## 2024-06-17 DIAGNOSIS — D64.9 ANEMIA, UNSPECIFIED TYPE: ICD-10-CM

## 2024-06-17 DIAGNOSIS — M79.604 RIGHT LEG PAIN: ICD-10-CM

## 2024-06-17 DIAGNOSIS — T63.334D: ICD-10-CM

## 2024-06-17 DIAGNOSIS — M54.50 CHRONIC RIGHT-SIDED LOW BACK PAIN WITHOUT SCIATICA: ICD-10-CM

## 2024-06-17 DIAGNOSIS — F11.90 OPIOID USE DISORDER: ICD-10-CM

## 2024-06-17 DIAGNOSIS — M25.511 ACUTE PAIN OF RIGHT SHOULDER: ICD-10-CM

## 2024-06-17 DIAGNOSIS — S32.020S COMPRESSION FRACTURE OF L2 VERTEBRA, SEQUELA: ICD-10-CM

## 2024-06-17 DIAGNOSIS — R26.2 UNABLE TO WALK: ICD-10-CM

## 2024-06-17 DIAGNOSIS — D69.6 THROMBOCYTOPENIA (HCC): ICD-10-CM

## 2024-06-17 DIAGNOSIS — M13.0 POLYARTICULAR ARTHRITIS: ICD-10-CM

## 2024-06-17 DIAGNOSIS — E53.8 FOLATE DEFICIENCY: ICD-10-CM

## 2024-06-17 PROBLEM — R73.03 PRE-DIABETES: Status: ACTIVE | Noted: 2024-06-17

## 2024-06-17 PROBLEM — S32.020A COMPRESSION FRACTURE OF L2 LUMBAR VERTEBRA (HCC): Status: ACTIVE | Noted: 2024-06-17

## 2024-06-17 LAB
ALBUMIN SERPL ELPH-MCNC: 3.15 G/DL (ref 3.75–5.21)
ALBUMIN/GLOB SERPL: 1.29 {RATIO} (ref 1–2)
ALPHA1 GLOB SERPL ELPH-MCNC: 0.47 G/DL (ref 0.19–0.46)
ALPHA2 GLOB SERPL ELPH-MCNC: 0.83 G/DL (ref 0.48–1.05)
B-GLOBULIN SERPL ELPH-MCNC: 0.63 G/DL (ref 0.68–1.23)
GAMMA GLOB SERPL ELPH-MCNC: 0.52 G/DL (ref 0.62–1.7)
KAPPA LC FREE SER-MCNC: 1.49 MG/DL (ref 0.33–1.94)
KAPPA LC FREE/LAMBDA FREE SER NEPH: 0.93 {RATIO} (ref 0.26–1.65)
LAMBDA LC FREE SERPL-MCNC: 1.59 MG/DL (ref 0.57–2.63)
PROT SERPL-MCNC: 5.6 G/DL (ref 5.7–8.2)

## 2024-06-17 RX ORDER — ONDANSETRON 4 MG/1
4 TABLET, FILM COATED ORAL EVERY 8 HOURS PRN
Qty: 30 TABLET | Refills: 0 | Status: SHIPPED | OUTPATIENT
Start: 2024-06-17

## 2024-06-17 RX ORDER — SOLIFENACIN SUCCINATE 5 MG/1
5 TABLET, FILM COATED ORAL 2 TIMES DAILY
COMMUNITY
End: 2024-06-18

## 2024-06-17 RX ORDER — METAXALONE 800 MG/1
800 TABLET ORAL 3 TIMES DAILY PRN
COMMUNITY
End: 2024-06-19

## 2024-06-17 NOTE — PATIENT INSTRUCTIONS
Medical Records [phone]  Customer Service [phone]  Dr. Snider- Rheumatology- will call you directly to assist in scheduling  [phone]

## 2024-06-17 NOTE — PROGRESS NOTES
[facility] PHARMACIST MEDICATION RECONCILIATION        Alesia Bland MRN [id number]    3/10/1965 PCP Toni Callahan DO       Comments: Medication history completed by the [facility] Pharmacist with the patient in the office.     The following medication changes occurred while patient was hospitalized:  Medications Started:  Folic Acid 1mg tabs - 1 tablet daily    Medications Stopped:  Metaxalone 800mg tabs    Medications Changed:  Acetaminophen-Codeine 300-60mg tabs - 1 tablet two times daily as needed for pain  Gabapentin 300mg caps - 3 capsules three times daily    Outpatient Encounter Medications as of 2024   Medication Sig    Solifenacin Succinate 5 MG Oral Tab Take 1 tablet (5 mg total) by mouth in the morning and 1 tablet (5 mg total) before bedtime.    Multiple Vitamins-Minerals (ALIVE MULTI-VITAMIN OR) Take 1 tablet by mouth daily.    NON FORMULARY Take 1-2 capsules by mouth daily as needed (pain). Product: Relieve-R capsules    Metaxalone 800 MG Oral Tab Take 1 tablet (800 mg total) by mouth 3 (three) times daily as needed for Pain.    acetaminophen-codeine 300-60 MG Oral Tab Take 1 tablet by mouth 2 (two) times daily as needed for Pain.    metFORMIN 500 MG Oral Tab Take 1 tablet (500 mg total) by mouth 2 (two) times daily.    metoprolol succinate  MG Oral Tablet 24 Hr Take 1 tablet (100 mg total) by mouth daily.    gabapentin 300 MG Oral Cap Take 3 capsules (900 mg total) by mouth 3 (three) times daily.    losartan 100 MG Oral Tab TAKE 1 TABLET BY MOUTH EVERY DAY    albuterol 108 (90 Base) MCG/ACT Inhalation Aero Soln Inhale 2 puffs into the lungs every 6 (six) hours as needed.    rizatriptan (MAXALT-MLT) 10 MG Oral Tablet Dispersible Take 1 tablet (10 mg total) by mouth as needed for Migraine.    triamcinolone 0.1 % External Cream Apply topically 2 (two) times daily. To affected skin.    Armodafinil 250 MG Oral Tab Take 250 mg by mouth daily.    LATUDA 60 MG Oral  Tab Take 1 tablet (60 mg total) by mouth daily with food.    traZODone HCl 50 MG Oral Tab Take 1 tablet (50 mg total) by mouth nightly as needed.    DULoxetine HCl 60 MG Oral Cap DR Particles Take 2 capsules (120 mg total) by mouth daily.    folic acid 1 MG Oral Tab Take 1 tablet (1 mg total) by mouth daily. (Patient not taking: Reported on 6/17/2024)    ondansetron (ZOFRAN) 4 mg tablet Take 1 tablet (4 mg total) by mouth every 8 (eight) hours as needed for Nausea. (Patient not taking: Reported on 6/17/2024)     Medication Adherence Assessment:   Patient reports     Reviewed all medications in detail with patient including dose, indication, timing of administration, monitoring parameters, and potential side effects of medications.   Patient confirmed understanding.     Thank you,    Joselyn Mills, PharmD, 6/17/2024, 1:53 PM  [facility]

## 2024-06-17 NOTE — PROGRESS NOTES
6  TCM VISIT  [facility]      HISTORY   CHIEF COMPLAINT: HFU-acute encephalopathy, brown recluse spider bite, elevated CRP, acute pain of right shoulder, inability to walk, compression fracture of L2 vertebra, chronic right-sided low back pain, right leg pain, chronic pain, lumbar radiculopathy, polyarticular arthritis, fibromyalgia, opioid use disorder, MDD/anxiety, thrombocytopenia, folate deficiency, anemia, hyponatremia, transaminitis, renal calculi, urinary incontinence.     HPI: Alesia Bland is a 59 year old female here today for hospital follow up for acute encephalopathy, brown recluse spider bite, elevated CRP, acute pain of right shoulder, inability to walk, compression fracture of L2 vertebra, chronic right-sided low back pain, right leg pain, chronic pain, lumbar radiculopathy, polyarticular arthritis, fibromyalgia, opioid use disorder, MDD/anxiety, thrombocytopenia, folate deficiency, anemia, hyponatremia, transaminitis, renal calculi, urinary incontinence.  Alesia Bland was discharged from [facility]  to Home or Self Care.  Admit Date: 6/9/24. Discharge Date: 6/13/24.     Hospital Discharge Diagnosis:     # Intermittent AMS  # Intractable pain  # Intermittent weakness   - MRI brain (6/11) negative for acute process   - neurology following   - continue Cymbalta, gabapentin, lurasidone  PRN toradol    - Psych eval noted, patient likely will benefit from neurocognitive evaluation as outpatient with Dr. Tovar     # Right shoulder pain    - appears MSK pain in Right shoulder, right shoulder x-ray reviewed no acute fracture   - Pain control with tylenol, toradol. Avoid opioids d/t abuse history     #Moderate to severe chronic compression fracture of L2  -Orthospine to see     # Intractable pain  # Elevated CRP  # Suspect component of fibromyalgia    - Rheumatology following, laboratory work underway     # Transaminitis   - Right upper quadrant ultrasound (6/10)  shows \"small volume of gallbladder sludge otherwise no sonographic evidence of acute cholecystitis or biliary obstruction\"   - labs trending down, will trend   - viral hep panel neg     # Hyponatremia, improving - s/p IVF, trend chem     # Diaphoresis?    - now resolved, trop negative   - No leukocytosis, no hx of CP/palpitations    - Monitor on tele, EKG NSR with ST elevations/depressions   - CRP elevated, though nonspecific      # Hypertension - Continue home oral antihypertensives    #Thrombocytopenia  -heme eval noted  -platelets normal in feb 2024  -[facility]       Hospital stay was uncomplicated.  Alesia Bland was discharge with Tylenol No. 4, folic acid, gabapentin and a referral to the [facility] for continued follow up.     Today, patient is being seen for hospital follow-up.  She reports doing much better since discharge.    She presented to ED for diaphoresis and shoulder pain.  At time of admission patient was poor historian, occasionally confused and tangential.  Notes that she was at work at correctional facility as a physician when she started having diaphoresis, tachycardia and was noted by nurses to look in distress.  Patient was evaluated by nursing staff, had EKG and noted to have HR in the 140s.  She was advised to present to the hospital for further evaluation, however attempted to treat symptoms herself at home.  States that she was feeling increased fatigue, weakness, whole body pain with the worst in the right shoulder.  Denied any trauma.  Notes that she was otherwise ambulatory at baseline without assistance however was at home laying on the ground.  She called her family to bring her to the hospital.  She does note she has had issues with chronic pain for quite some time and has brought up the idea of fibromyalgia to her PCP.  Also notes that she has chronic pain from lumbar spinal hemangioma, managed with gabapentin, Cymbalta, Tylenol with codeine.  Previously with pain service however  fired from the clinic due to violating pain contract by prescribing herself narcotic agents.  She does note that she is more confused and forgetful than baseline, unclear duration of confusion.  Has never had workup for dementia.  She was admitted to the hospital for right shoulder pain and diaphoresis.  She had x-rays done that were unremarkable.  She continued to have intractable pain and weakness and was seen by neurology in consultation.  She had MRI imaging of the spine and found to have moderate to severe chronic compression fracture of L2.  She was seen by spine team and recommendations were for LSO brace to be worn.  Patient's pain eventually improved.  Given abrupt onset of pain also saw rheumatology in consultation.  She was noted to have elevated CRP.  Serology workup is still pending at this time.  Patient will follow-up with rheumatology as an outpatient.  She was also seen by hematology for severe thrombocytopenia that is now improving.  Etiology is likely secondary to spider bite.  She did not receive any steroids in the middle.  She was cleared by consultants and felt to be medically stable for discharge.    Interactive contact within 2 business days post discharge first initiated on Date: 6/14/2024      Allergies:  Allergies   Allergen Reactions    Hydrocodone NAUSEA AND VOMITING    Benzoyl Peroxide RASH     CREA    Pcn [Penicillins] RASH    Levofloxacin RASH      Current Meds:  Current Outpatient Medications   Medication Sig Dispense Refill    folic acid 1 MG Oral Tab Take 1 tablet (1 mg total) by mouth daily. 90 tablet 0    acetaminophen-codeine 300-60 MG Oral Tab Take 1 tablet by mouth 2 (two) times daily as needed for Pain. 60 tablet 0    metFORMIN 500 MG Oral Tab Take 1 tablet (500 mg total) by mouth 2 (two) times daily. 180 tablet 3    metoprolol succinate  MG Oral Tablet 24 Hr Take 1 tablet (100 mg total) by mouth daily. 90 tablet 0    gabapentin 300 MG Oral Cap Take 3 capsules (900 mg  total) by mouth 3 (three) times daily. 270 capsule 0    losartan 100 MG Oral Tab TAKE 1 TABLET BY MOUTH EVERY DAY 30 tablet 0    Solifenacin Succinate 5 MG Oral Tab Take 1 tablet (5 mg total) by mouth daily. 90 tablet 0    albuterol 108 (90 Base) MCG/ACT Inhalation Aero Soln Inhale 2 puffs into the lungs every 6 (six) hours as needed.      ondansetron (ZOFRAN) 4 mg tablet Take 1 tablet (4 mg total) by mouth every 8 (eight) hours as needed for Nausea. 30 tablet 2    rizatriptan (MAXALT-MLT) 10 MG Oral Tablet Dispersible Take 1 tablet (10 mg total) by mouth as needed for Migraine. 90 tablet 1    triamcinolone 0.1 % External Cream Apply topically 2 (two) times daily. To affected skin. 80 g 1    Armodafinil 250 MG Oral Tab Take 250 mg by mouth daily.      LATUDA 60 MG Oral Tab Take 1 tablet (60 mg total) by mouth daily with food.      traZODone HCl 50 MG Oral Tab Take 1 tablet (50 mg total) by mouth nightly as needed.      DULoxetine HCl 60 MG Oral Cap DR Particles Take 2 capsules (120 mg total) by mouth daily.       No current facility-administered medications for this visit.       HISTORY: reconciled and reviewed with patient  Past Medical History:    Anesthesia complication    n&v    Arrhythmia    PAT    Bronchitis, not specified as acute or chronic    Depression    Extrinsic asthma, unspecified    Kidney stones    Lipid screening    GIANLUCA (obstructive sleep apnea)    AHI 14 Supine AHI 20 non-supine AHI 9    PONV (postoperative nausea and vomiting)    Snoring    AHI 4.4 SaO2 janelle 86-.5 primary snoring    Spinal hemangioma      Past Surgical History:   Procedure Laterality Date    Appendectomy  2008    Breast surgery procedure unlisted      enlargement      2003    Colonoscopy  2009    negative    Excis lumbar disk,one level      Hysterectomy      total hystero.    Oophorectomy Bilateral     total hystero    Other  2021    spinal angiogram with embolization    Other surgical  history Bilateral 02/20/2015    Procedure: ABDOMINAL HYSTERECTOMY, BSO;  Surgeon: Calixto Weathers MD;  Location:  MAIN OR      Family History   Problem Relation Age of Onset    Depression Mother     Other (Other) Mother     Hypertension Father     Other (Other) Father     Melanoma Father     Heart Attack Maternal Grandmother     Breast Cancer Maternal Grandmother 50        In her early 50's.    Other (Other) Maternal Grandmother     Other (leukemia) Maternal Grandfather     Diabetes Paternal Grandmother         type 2    Other (renal failure) Paternal Grandmother     Other (lung cancer) Paternal Grandfather       Social History     Socioeconomic History    Marital status:    Tobacco Use    Smoking status: Never    Smokeless tobacco: Never   Vaping Use    Vaping status: Never Used   Substance and Sexual Activity    Alcohol use: Not Currently     Alcohol/week: 3.3 standard drinks of alcohol     Types: 4 Standard drinks or equivalent per week     Comment: rare    Drug use: No   Other Topics Concern    Caffeine Concern Yes     Comment: 3-4 cups    Exercise Yes     Comment: walking     Social Determinants of Health     Financial Resource Strain: Low Risk  (8/17/2023)    Received from [facility]    Overall Financial Resource Strain (CARDIA)     Difficulty of Paying Living Expenses: Not very hard   Food Insecurity: No Food Insecurity (6/9/2024)    Food Insecurity     Food Insecurity: Never true   Transportation Needs: No Transportation Needs (6/9/2024)    Transportation Needs     Lack of Transportation: No   Physical Activity: Inactive (8/17/2023)    [facility]    Exercise Vital Sign     Days of Exercise per Week: 0 days     Minutes of Exercise per Session: 0 min   Housing Stability: Low Risk  (6/9/2024)    Housing Stability     Housing Instability: No        Imaging & Consults:  MRI BRAIN (W+WO) (CPT=70553)    Result Date: 6/12/2024  CONCLUSION:  No acute intracranial  abnormality is identified.   LOCATION:  [facility]    Dictated by (CST): Stromberg, LeRoy, MD on 6/12/2024 at 1:37 AM     Finalized by (CST): Stromberg, LeRoy, MD on 6/12/2024 at 1:43 AM       MRI SPINE LUMBAR (CPT=72148)    Result Date: 6/12/2024  CONCLUSION:   1. There is a moderate to severe chronic compression fracture of L2 status post vertebral augmentation.  There is retropulsion of the posterior fracture fragments by approximately 5 mm.  This results in mild to moderate spinal canal compromise.  No other  compression fracture is seen within the lumbar spine.  2. Mild to moderate degenerative changes in the lumbar spine as described in detail above.   LOCATION:  [facility]   Dictated by ([facility]): Stromberg, LeRoy, MD on 6/12/2024 at 1:26 AM     Finalized by ([facility]): Stromberg, LeRoy, MD on 6/12/2024 at 1:37 AM       XR CHEST PA + LAT CHEST (CPT=71046)    Result Date: 6/11/2024  CONCLUSION:  Negative exam.   LOCATION:  [id number]   Dictated by ([facility]): Claribel Moss DO on 6/11/2024 at 8:30 PM     Finalized by (CST): Claribel Moss DO on 6/11/2024 at 8:31 PM       US ABDOMEN LIMITED (CPT=76705)    Result Date: 6/10/2024  CONCLUSION:   Small volume of gallbladder sludge.  No sonographic evidence of acute cholecystitis or biliary obstruction.  Normal appearance of the liver.   LOCATION:  [facility]   Dictated by ([facility]): Fang Powell MD on 6/10/2024 at 12:09 PM     Finalized by (CST): Fang Powell MD on 6/10/2024 at 12:10 PM       XR SHOULDER, COMPLETE (MIN 2 VIEWS), RIGHT (CPT=73030)    Result Date: 6/10/2024  CONCLUSION:   No acute fracture or malalignment.  The glenohumeral joint space is preserved.  Normal acromioclavicular joint with preservation of the subacromial interval.  There is a triangular radiodensity within the superficial subcutaneous tissues overlying the lateral aspect of the right trapezius muscle on the frontal view.  This may represent a retained foreign body, measuring approximately 6 x 3 mm.   LOCATION:  [facility]    Dictated by (CST): Fang Powell MD on 6/10/2024 at 10:35 AM     Finalized by (CST): Fang Powell MD on 6/10/2024 at 10:37 AM        Lab Results   Component Value Date/Time    WBC 5.2 06/13/2024 05:53 AM    HGB 11.7 (L) 06/13/2024 05:53 AM    HCT 35.8 06/13/2024 05:53 AM    PLT 84.0 (L) 06/13/2024 05:53 AM     (H) 06/13/2024 05:53 AM     06/13/2024 05:53 AM    K 3.9 06/13/2024 05:53 AM     06/13/2024 05:53 AM    CO2 28.0 06/13/2024 05:53 AM    BUN 18 06/13/2024 05:53 AM    CREATSERUM 0.75 06/13/2024 05:53 AM    CA 9.4 06/13/2024 05:53 AM    MG 1.9 06/13/2024 05:53 AM    PHOS 3.3 06/10/2024 05:05 AM    ALB 2.7 (L) 06/13/2024 05:53 AM    ALT 38 06/13/2024 05:53 AM    AST 16 06/13/2024 05:53 AM    INR 0.95 06/10/2024 05:05 AM    A1C 5.8 (H) 05/24/2024 10:55 AM       Immunization History   Administered Date(s) Administered    >=3 YRS FLUZONE OR FLUARIX QUAD PRESERVE FREE SINGLE DOSE (98325) FLU CLINIC 09/28/2016    >=9 YRS AFLURIA TRI PRESERV FREE SINGLE DOSE (72986) FLU CLINIC 10/15/2015    Covid-19 Vaccine Moderna 100 mcg/0.5 ml 02/04/2021, 03/05/2021    Covid-19 Vaccine Moderna 50 Mcg/0.25 Ml 12/10/2021    HEP B, Adult 10/03/2016    Hep A, Adult 08/03/2018    Influenza 10/01/2019, 12/02/2022    Pneumococcal (Prevnar 13) 03/28/2014    Pneumovax 23 08/03/2018    TDAP 03/12/2009, 03/01/2022    Tb Intradermal Test 09/28/2016       ROS:  GENERAL: energy fair/stable, denies fever, weakness  SKIN: denies any skin changes, + resolved abdominal cellulitis  EYES: denies blurred vision, eye pain  HEENT: denies ear pain, loss of hearing, sore throat  RESPIRATORY: denies cough, denies dyspnea with exertion  CARDIOVASCULAR: denies syncope, chest pain, fatigue, palpitations   GI: denies abdominal pain, melena, constipation, diarrhea, nausea, vomiting  : + urinary leakage, + kidney stones   MUSCULOSKELETAL: + BL hand and lower back, BL thigh pain, states normal range of motion of extremities  NEUROLOGIC: denies  confusion, balance difficulty, + up walking with cane at work  PSYCHIATRIC: Hx depression or anxiety  HEMATOLOGIC: + mild anemia, denies bruising, bleeding    PHYSICAL EXAM:  Vitals:    06/17/24 1322   BP: 115/60   Pulse: 82   Resp: 18   Temp: 97.4 °F (36.3 °C)       GENERAL: well developed, well nourished, in no apparent distress, good historian  INTEGUMENTARY: warm, dry, no rashes, + resolved abdominal cellulitis  HEENT: atraumatic, normocephalic, sclera white, conjunctivae pink  NECK: supple  CHEST: no chest tenderness  LUNGS: clear to auscultation bilaterally, no wheezes, rhonchi or rales, normal respiratory effort  CARDIO: S1, S2, RRR without audible murmur  GI: + BS to all quadrants, no tenderness  MUSCULOSKELETAL: + ROM in all joints, no evidence of swelling, + chronic pain   EXTREMITIES: no cyanosis, or edema  NEURO: Oriented x3  PSYCHIATRIC: appropriate affect    ASSESSMENT/ PLAN:   1. Acute encephalopathy/Brown recluse spider bite/Elevated C-reactive protein  Acute encephalopathy likely related to spider bite  Was seen by neurology and psychiatry and had an EEG done  EEG was abnormal showing mild diffuse slowing into delta and theta range which can be seen in encephalopathy due to metabolic/toxic etiology, medication effects, or diffuse cerebral injury  Patient is back to baseline mentation  She declines follow-up with Dr. Roman for neuropsych testing  No further confusion/AMS  Spider bite to right lower abdomen is completely resolved  She is tolerating an oral diet  Appetite is fair-eating 2 meals per day/drinking well  Reports urine is no longer dark and is back to normal clear yellow color  Moving bowels/passing gas  Follow-up with neurosurgery TANYA Reddy on 7/11 at 1 PM  Follow-up with psychiatry Dr. Banks on 7/17 at 2 PM  Follow-up with psychology weekly as directed  Follow-up with pain management Rob NINA on 7/11 at 11 AM  Follow-up with rheumatology Dr. Snider on 6/19  Follow-up with  uro-GYN Dr. South on 8/14 at 9 AM  Follow-up with urology Dr. Aguero on 7/11 at 8 AM with CT on 6/27 at 9 PM  Follow-up with PCP Dr. Callahan on 7/11 at 2:30 PM  All hospital records, labs, radiology from current hospitalization reviewed    2. Unable to walk/Acute pain of right shoulder  Per notes right shoulder pain appears musculoskeletal in nature  X-ray revealed no acute fracture  Pain was controlled using Tylenol and Toradol  Reports right shoulder pain is no longer present  Patient is up and able to ambulate independently but did get a cane to assist her with walking at work  Continue  Tylenol No. 4 1 tab 2 times daily as needed  Follow-up with pain management as directed    3. Compression fracture of L2 vertebra/Chronic right-sided low back pain/ Right leg pain/Lumbar radiculopathy/Other chronic pain/Polyarticular arthritis/Opioid use disorder  Patient was found to have moderate to severe chronic compression fracture of L2  Seen by orthospine and was recommended to wear LSO brace  Patient does not have brace on at today's exam-reports brace makes back pain worse  Reports ongoing back pain that she feels is a little worse than her baseline  Also reporting pain to BL thighs that is also a little worse than her baseline  Reports whole body pain is pretty much resolved except for pain in her BL hands, chronic low back pain, chronic BL thigh pain  Order provided to patient for physical Therapy Referral - External-patient to schedule  Continue for pain  Tylenol No. 4 1 tab 2 times daily as needed  Gabapentin 900 mg 3 times daily  Duloxetine 120 mg daily  Follow-up with neurosurgery as directed  Follow-up with pain management as directed  Follow-up with rheumatology as directed    4. Fibromyalgia  Per notes intractable pain is suspected to be a component of fibromyalgia  Continue for pain  Tylenol No. 4 1 tab 2 times daily as needed  Gabapentin 900 mg 3 times daily  Duloxetine 120 mg daily  Follow-up with neurosurgery  as directed  Follow-up with pain management as directed  Follow-up with rheumatology as directed    5. Severe episode of recurrent major depressive disorder/Anxiety disorder  Stable at time of exam  Continue  Armodafinil 250 mg daily  Duloxetine 120 mg daily  Latuda 60 mg daily  Trazodone 50 mg nightly  Continue to follow-up with psychology and psychiatry as directed    6. Thrombocytopenia (HCC)  Patient noted with thrombocytopenia  Hematology evaluated  Platelets at admission were 66  Platelets dropped on 6/11-36  On 6/12 citrated platelet count performed and was 36.3  IgG positive for CMV, parvo, EBV but no active infections  Platelets did start to trend up towards discharge  Platelet level on 6/13 was noted to be 64  Per hematology etiology thought to be multifactorial with contributions from venom of brown recluse spider, Bactrim effect, possible rheumatological disorder, folate deficiency, as well as withdrawal from self prescribed steroids on admission  Started on  Folic acid 1 mg daily  Continue to monitor labs as directed    7. Folate deficiency/Anemia,    8. Hyponatremia    9. Transaminitis    10.  Renal renal calculi    11.  Urinary incontinence     ondansetron (ZOFRAN) 4 mg tablet; Take 1 tablet (4 mg total) by mouth every 8 (eight) hours as needed for Nausea.  Dispense: 30 tablet; Refill: 0    Orders Placed This Encounter    DISCONTD: Solifenacin Succinate 5 MG Oral Tab     Sig: Take 1 tablet (5 mg total) by mouth in the morning and 1 tablet (5 mg total) before bedtime.    Multiple Vitamins-Minerals (ALIVE MULTI-VITAMIN OR)     Sig: Take 1 tablet by mouth daily.    NON FORMULARY     Sig: Take 1-2 capsules by mouth daily as needed (pain). Product: Relieve-R capsules    DISCONTD: Metaxalone 800 MG Oral Tab     Sig: Take 1 tablet (800 mg total) by mouth 3 (three) times daily as needed for Pain.    ondansetron (ZOFRAN) 4 mg tablet     Sig: Take 1 tablet (4 mg total) by mouth every 8 (eight) hours as needed  for Nausea.     Dispense:  30 tablet     Refill:  0           Medications & Refills for this Visit:   [DISCONTINUED] Solifenacin Succinate 5 MG Oral Tab Take 1 tablet (5 mg total) by mouth in the morning and 1 tablet (5 mg total) before bedtime.      Multiple Vitamins-Minerals (ALIVE MULTI-VITAMIN OR) Take 1 tablet by mouth daily.      NON FORMULARY Take 1-2 capsules by mouth daily as needed (pain). Product: Relieve-R capsules      [DISCONTINUED] Metaxalone 800 MG Oral Tab Take 1 tablet (800 mg total) by mouth 3 (three) times daily as needed for Pain.      ondansetron (ZOFRAN) 4 mg tablet Take 1 tablet (4 mg total) by mouth every 8 (eight) hours as needed for Nausea. 30 tablet 0    acetaminophen-codeine 300-60 MG Oral Tab Take 1 tablet by mouth 2 (two) times daily as needed for Pain. 60 tablet 0    metFORMIN 500 MG Oral Tab Take 1 tablet (500 mg total) by mouth 2 (two) times daily. 180 tablet 3    metoprolol succinate  MG Oral Tablet 24 Hr Take 1 tablet (100 mg total) by mouth daily. 90 tablet 0    gabapentin 300 MG Oral Cap Take 3 capsules (900 mg total) by mouth 3 (three) times daily. 270 capsule 0    losartan 100 MG Oral Tab TAKE 1 TABLET BY MOUTH EVERY DAY 30 tablet 0    [DISCONTINUED] albuterol 108 (90 Base) MCG/ACT Inhalation Aero Soln Inhale 2 puffs into the lungs every 6 (six) hours as needed.      rizatriptan (MAXALT-MLT) 10 MG Oral Tablet Dispersible Take 1 tablet (10 mg total) by mouth as needed for Migraine. 90 tablet 1    triamcinolone 0.1 % External Cream Apply topically 2 (two) times daily. To affected skin. 80 g 1    Armodafinil 250 MG Oral Tab Take 250 mg by mouth daily.      LATUDA 60 MG Oral Tab Take 1 tablet (60 mg total) by mouth daily with food.      traZODone HCl 50 MG Oral Tab Take 1 tablet (50 mg total) by mouth nightly as needed.      DULoxetine HCl 60 MG Oral Cap DR Particles Take 2 capsules (120 mg total) by mouth daily.       Requested Prescriptions     Signed Prescriptions Disp  Refills    ondansetron (ZOFRAN) 4 mg tablet 30 tablet 0     Sig: Take 1 tablet (4 mg total) by mouth every 8 (eight) hours as needed for Nausea.         Health Maintenance:  Health Maintenance   Topic Date Due    Colorectal Cancer Screening  Never done    Asthma Action Plan  Never done    Zoster Vaccines (1 of 2) Never done    Asthma Control Test  06/09/2017    COVID-19 Vaccine (4 - 2023-24 season) 09/01/2023    Mammogram  12/17/2023    Influenza Vaccine (Season Ended) 10/01/2024    Annual Physical  05/24/2025    DTaP,Tdap,and Td Vaccines (3 - Td or Tdap) 03/01/2032    Pneumococcal Vaccine: Birth to 64yrs (3 of 3 - PPSV23 or PCV20) 03/10/2032    Annual Depression Screening  Completed       Chronic Care Management Referral: N/A      Transitional Care Management Certification:  During the visit, the following was completed:  Obtained and reviewed discharge summary, continuity of care documents, Hospitalist notes, Hematology/Oncology notes, neurosurgery notes, rheumatology notes, neurology notes, psychiatry notes, and discharge information   Reviewed Labs (CBC, CMP), need for follow-up on pending tests, need for follow-up on diagnostic tests, and need for follow-up on treatments    Medication Reconciliation:  I am aware of an inpatient discharge within the last 30 days.  The discharge medication list has been reconciled with the patient's current medication list and reviewed by me.  See medication list for additions of new medication, and changes to current doses of medications and discontinued medications.        Discharge Disposition/Plan:     Future Appointments   Date Time Provider Department Center   6/27/2024  9:00 PM PF CT RM1 PF CT [place]   7/6/2024  7:40 AM PF RUPA RM2 PF MAMMO [place]   7/6/2024  9:40 AM PF DEXA RM1 PF DEXA [place]   7/11/2024  8:00 AM Nitin Aguero MD [id number] [facility] [place] [address]   7/11/2024 11:00 AM Rob Arrieta PA ENIPain [facility] [place]   7/11/2024  1:00 PM Marcela Alfonso, APRN  ENINAPER2 [facility] [place]   7/11/2024  2:30 PM Toni Callahan,  EMG [address] [facility] [address]   8/14/2024  9:00 AM Noemí South MD LISURO None   6/19/2025 11:00 AM Griselda Snider MD [id number] [facility] [address]      1.  Transitional Care Clinic Visit: Next visit Discharged  2.  PCP Visit: 7/11/2024  3.  Chronic Care Management: N/A     BRE Jones, 6/17/2024  [facility] [place]  [phone]

## 2024-06-18 ENCOUNTER — OFFICE VISIT (OUTPATIENT)
Dept: SURGERY | Facility: CLINIC | Age: 59
End: 2024-06-18

## 2024-06-18 ENCOUNTER — TELEPHONE (OUTPATIENT)
Dept: RHEUMATOLOGY | Facility: CLINIC | Age: 59
End: 2024-06-18

## 2024-06-18 DIAGNOSIS — N20.0 NEPHROLITHIASIS: Primary | ICD-10-CM

## 2024-06-18 LAB
APPEARANCE: CLEAR
BILIRUBIN: NEGATIVE
GLUCOSE (URINE DIPSTICK): NEGATIVE MG/DL
KETONES (URINE DIPSTICK): NEGATIVE MG/DL
MULTISTIX LOT#: ABNORMAL NUMERIC
NITRITE, URINE: NEGATIVE
PH, URINE: 5.5 (ref 4.5–8)
PROTEIN (URINE DIPSTICK): NEGATIVE MG/DL
SPECIFIC GRAVITY: 1.01 (ref 1–1.03)
URINE-COLOR: YELLOW
UROBILINOGEN,SEMI-QN: 0.2 MG/DL (ref 0–1.9)

## 2024-06-18 PROCEDURE — 81003 URINALYSIS AUTO W/O SCOPE: CPT | Performed by: UROLOGY

## 2024-06-18 PROCEDURE — 51798 US URINE CAPACITY MEASURE: CPT | Performed by: UROLOGY

## 2024-06-18 PROCEDURE — 99244 OFF/OP CNSLTJ NEW/EST MOD 40: CPT | Performed by: UROLOGY

## 2024-06-18 RX ORDER — MIRABEGRON 25 MG/1
25 TABLET, FILM COATED, EXTENDED RELEASE ORAL DAILY
Qty: 90 TABLET | Refills: 6 | Status: SHIPPED | OUTPATIENT
Start: 2024-06-18

## 2024-06-18 NOTE — PROGRESS NOTES
Urology Clinic Note - New Patient    Referring Provider:  Toni Callahan DO  2005 [address] [place] [address]  [address]  [place] [address]     Primary Care Provider:  Toni Callahan DO     Chief Complaint:   nephrolithiasis     HPI:   Alesia Bland is a 59 year old female with history of GIANLUCA, HTN, chronic pain referred for nephrolithaisis.    Patient saw PCP 5/22 with spider bite- a CT was done to rule out abscess.   KIDNEYS:  7 mm calculus in the proximal left ureter is causing mild to moderate hydronephrosis.  Multiple nonobstructing calculi in the lower pole left kidney.  1.3 cm.  Calculus lower pole the right kidney.  ADRENALS:  No mass or enlargement.    She was given antibiotics.  No clear abscess as seen- wound care was done.     She was to see urology. She was then admitted here at  with AMS, join pain.  Was possibly related to recent spider bite.  She was treated with steroid course and was discharged home on 6/13 after 4 days. No kidney imaging was done during hospital stay. She is doing better from this standpoint,   She has no previous urologic history. Does have chronic urgency, UUI- is currently on solfenacin for this with some improvement. She has no history of stones.   She is a MD; works in a halfway.  No flank pain today or previously (incidental stone). No gross hematuria. No UTI symptoms today.  Does not think she passed stone.               Post-void residual bladder scan: 15 mL  Urinalysis (clinic dip): trace blood      History:     Past Medical History:    Anesthesia complication    n&v    Arrhythmia    PAT    Bronchitis, not specified as acute or chronic    Depression    Extrinsic asthma, unspecified    Lipid screening    GIANLUCA (obstructive sleep apnea)    AHI 14 Supine AHI 20 non-supine AHI 9    PONV (postoperative nausea and vomiting)    Snoring    AHI 4.4 SaO2 janelle 86-.5 primary snoring    Spinal hemangioma       Past Surgical History:   Procedure Laterality Date    Appendectomy  01/01/2008     Breast surgery procedure unlisted      enlargement      2003    Colonoscopy  2009    negative    Excis lumbar disk,one level      Hysterectomy      total hystero.    Oophorectomy Bilateral     total hystero    Other  2021    spinal angiogram with embolization    Other surgical history Bilateral 2015    Procedure: ABDOMINAL HYSTERECTOMY, BSO;  Surgeon: Calixto Weathers MD;  Location:  MAIN OR       Family History   Problem Relation Age of Onset    Depression Mother     Other (Other) Mother     Hypertension Father     Other (Other) Father     Melanoma Father     Heart Attack Maternal Grandmother     Breast Cancer Maternal Grandmother 50        In her early 50's.    Other (Other) Maternal Grandmother     Other (leukemia) Maternal Grandfather     Diabetes Paternal Grandmother         type 2    Other (renal failure) Paternal Grandmother     Other (lung cancer) Paternal Grandfather        Social History     Socioeconomic History    Marital status:    Tobacco Use    Smoking status: Never    Smokeless tobacco: Never   Vaping Use    Vaping status: Never Used   Substance and Sexual Activity    Alcohol use: Not Currently     Alcohol/week: 3.3 standard drinks of alcohol     Types: 4 Standard drinks or equivalent per week     Comment: rare    Drug use: No   Other Topics Concern    Caffeine Concern Yes     Comment: 3-4 cups    Exercise Yes     Comment: walking     Social Determinants of Health     Financial Resource Strain: Low Risk  (2023)    Received from [facility]    Overall Financial Resource Strain (CARDIA)     Difficulty of Paying Living Expenses: Not very hard   Food Insecurity: No Food Insecurity (2024)    Food Insecurity     Food Insecurity: Never true   Transportation Needs: No Transportation Needs (2024)    Transportation Needs     Lack of Transportation: No   Physical Activity: Inactive (2023)    Received from [facility]     Exercise Vital Sign     Days of Exercise per Week: 0 days     Minutes of Exercise per Session: 0 min   Housing Stability: Low Risk  (6/9/2024)    Housing Stability     Housing Instability: No       Medications (Active prior to today's visit):  Current Outpatient Medications   Medication Sig Dispense Refill    Solifenacin Succinate 5 MG Oral Tab Take 1 tablet (5 mg total) by mouth in the morning and 1 tablet (5 mg total) before bedtime.      Multiple Vitamins-Minerals (ALIVE MULTI-VITAMIN OR) Take 1 tablet by mouth daily.      NON FORMULARY Take 1-2 capsules by mouth daily as needed (pain). Product: Relieve-R capsules      Metaxalone 800 MG Oral Tab Take 1 tablet (800 mg total) by mouth 3 (three) times daily as needed for Pain.      ondansetron (ZOFRAN) 4 mg tablet Take 1 tablet (4 mg total) by mouth every 8 (eight) hours as needed for Nausea. 30 tablet 0    acetaminophen-codeine 300-60 MG Oral Tab Take 1 tablet by mouth 2 (two) times daily as needed for Pain. 60 tablet 0    metFORMIN 500 MG Oral Tab Take 1 tablet (500 mg total) by mouth 2 (two) times daily. 180 tablet 3    metoprolol succinate  MG Oral Tablet 24 Hr Take 1 tablet (100 mg total) by mouth daily. 90 tablet 0    gabapentin 300 MG Oral Cap Take 3 capsules (900 mg total) by mouth 3 (three) times daily. 270 capsule 0    losartan 100 MG Oral Tab TAKE 1 TABLET BY MOUTH EVERY DAY 30 tablet 0    albuterol 108 (90 Base) MCG/ACT Inhalation Aero Soln Inhale 2 puffs into the lungs every 6 (six) hours as needed.      rizatriptan (MAXALT-MLT) 10 MG Oral Tablet Dispersible Take 1 tablet (10 mg total) by mouth as needed for Migraine. 90 tablet 1    triamcinolone 0.1 % External Cream Apply topically 2 (two) times daily. To affected skin. 80 g 1    Armodafinil 250 MG Oral Tab Take 250 mg by mouth daily.      LATUDA 60 MG Oral Tab Take 1 tablet (60 mg total) by mouth daily with food.      traZODone HCl 50 MG Oral Tab Take 1 tablet (50 mg total) by mouth nightly as  needed.      DULoxetine HCl 60 MG Oral Cap DR Particles Take 2 capsules (120 mg total) by mouth daily.      folic acid 1 MG Oral Tab Take 1 tablet (1 mg total) by mouth daily. (Patient not taking: Reported on 6/17/2024) 90 tablet 0       Allergies:  Allergies   Allergen Reactions    Hydrocodone NAUSEA AND VOMITING    Benzoyl Peroxide RASH     CREA    Pcn [Penicillins] RASH    Levofloxacin RASH         Review of Systems:   A comprehensive 10-point review of systems was completed.  Pertinent positives and negatives are noted in the the HPI.    Physical Exam:   CONSTITUTIONAL: Well developed, well nourished, in no acute distress  NEUROLOGIC: Alert and oriented  HEAD: Normocephalic, atraumatic  EYES: Sclera non-icteric  ENT: Hearing intact, moist mucous membranes  NECK: No obvious goiter or masses  RESPIRATORY: Normal respiratory effort  SKIN: No evident rashes  ABDOMEN: Soft, non-tender, non-distended- NO CVAT    Assessment & Plan:   Alesia Bland is a 59 year old female with history of GIANLUCA, HTN, chronic pain referred for nephrolithaisis.      Discussed imaging and detail.  Patient with very large right lower pole stone burden, left-sided obstructing stone with additional small stone in the kidney.  No obvious infection in her urine.  Recent renal function was stable.  We discussed options for her stones, we will start by repeating imaging.  She appears to be asymptomatic at this time.  We discussed that if her ureteral stone remains, I would likely recommend ureteroscopy for management.  We also discussed interval management of her right-sided stone given the large stone burden.  We discussed both PCNL and ureteroscopy for this side, she is leaning towards ureteroscopy.  She will return for a video visit in 2 weeks to discuss results further.  Warning signs to return to ED discussed.  Healthy stone diet discussed.      For her urgency, occasional urge incontinence -we discussed trial of mirabegron 25 mg and stopping  Solifenacin.  She would like to try this.  Side effect profile discussed.  PVR today is low and her blood pressure is well-managed.  Will order.    Thank you for this consult.    I have personally reviewed all relevant medical records, labs, and imaging.       Nitin Aguero MD  Staff Urologist  [facility]  Office: [phone]

## 2024-06-18 NOTE — TELEPHONE ENCOUNTER
See if patient can see me 11:40 or 3:40 pm. In [place] To recheck her situation. Telehealth ok too.

## 2024-06-19 ENCOUNTER — VIRTUAL PHONE E/M (OUTPATIENT)
Dept: RHEUMATOLOGY | Facility: CLINIC | Age: 59
End: 2024-06-19

## 2024-06-19 VITALS — WEIGHT: 195 LBS | BODY MASS INDEX: 31 KG/M2

## 2024-06-19 DIAGNOSIS — G89.29 OTHER CHRONIC PAIN: Primary | ICD-10-CM

## 2024-06-19 DIAGNOSIS — M06.4 INFLAMMATORY POLYARTHRITIS (HCC): ICD-10-CM

## 2024-06-19 DIAGNOSIS — M80.00XD AGE-RELATED OSTEOPOROSIS WITH CURRENT PATHOLOGICAL FRACTURE WITH ROUTINE HEALING, SUBSEQUENT ENCOUNTER: Primary | ICD-10-CM

## 2024-06-19 DIAGNOSIS — D69.6 THROMBOCYTOPENIA (HCC): ICD-10-CM

## 2024-06-19 DIAGNOSIS — Z51.81 THERAPEUTIC DRUG MONITORING: ICD-10-CM

## 2024-06-19 PROCEDURE — 99442 PHONE E/M BY PHYS 11-20 MIN: CPT | Performed by: INTERNAL MEDICINE

## 2024-06-19 RX ORDER — PREDNISONE 20 MG/1
40 TABLET ORAL DAILY
COMMUNITY

## 2024-06-19 RX ORDER — METAXALONE 800 MG/1
800 TABLET ORAL 3 TIMES DAILY PRN
Qty: 90 TABLET | Refills: 0 | Status: SHIPPED | OUTPATIENT
Start: 2024-06-19

## 2024-06-19 NOTE — TELEPHONE ENCOUNTER
Medication: Metaxalone 800 MG     Date of last refill: 04/23/24      Last office visit: 04/23/24  Due back to clinic per last office note:  na  Date next office visit scheduled:  07/11/24      Last OV note recommendation:   Plan: Status post L2 kyphoplasty with spine heena at [facility] on 3/5/2024, and while low back pain is largely eliminated, has had progressive increasing groin and anterior thigh pain, limiting her ADLs.  Has reduced Tylenol with codeine from 6/day to 2/day, though given the increasing lower extremity complaints, wishes to hold on further weaning at this time.  Will put her back on metaxalone, have not been on this previously for years.  In addition, asked that she let her physicians at [facility] and know of her new onset lower extremity issues, and I am sure they will ask her to update her MRI.  If they do not, contact clinic, and I will be happy to place an order for an MRI myself.  Follow up after imaging.

## 2024-06-19 NOTE — PATIENT INSTRUCTIONS
Stop the prednisone after 2 days    Do blood work on June 27th. Repeat inflammatory markers, PTH, Mag/phos given osteoporosis.  Plan on Reclast in early July 2024. T-score -2.5 in 2021.     Repeat DEXA scan    Increase milk from 12 oz to 16 oz daily. Continue multivitamin daily    Try taking vitamin D: take 10,000 international units on the weekend.

## 2024-06-19 NOTE — PROGRESS NOTES
Rheumatology f/u Note  =====================================================================================================    Chief Complaint   Patient presents with    Hospital F/U     Pt was most recently hospitalized for elevated c-reactive protein, renal failure, dehydration and joint pain due to possible spider bite adverse reaction. Reports having no new swelling or redness at this time. Currently taking 40 mg of prednisone daily since being discharged on June 14.      PCP  Toni Callahan DO    Referring  No referring provider defined for this encounter.    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  -She notes on June 6 she developed right greater than left shoulder pain and stiffness, as well acute on chronic stiffness of the back, hips.  Cannot stand up from a seated position at that time.  Cannot raise her hands above her head.  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  Bilateral knees are painful as well  -Self-administered prednisone 80 mg send the evening.  Prescribed the medication itself.  Significant improvement of many of her symptoms.  -Initially on admission, there was issues with altered mental status, also had diaphoresis, tachycardia.  Heart rate to the 140s.  Recent brown recluse bite of the abdomen complicated by cellulitis.  Did take Bactrim for about 10 days.  Finished this about 2 weeks ago.  Cellulitis  improved.  -Otherwise denies any antecedent event, travel, new medications.   No giant cell arteritis symptoms: Denies headaches, tenderness jaw claudication, vision changes (diplopia, amaurosis fugax symptoms, visual field changes), fevers, chills, night sweats.  ==============================================================================================================  Today's Visit: 06/19/24    Admit summary:   Patient was monitored with conservative management for the patient's symptoms.  Hip and shoulder girdle symptoms resolved.  Hematology saw the patient, thrombocytopenia improved, thought to be due to brown recluse bite.  Also contributing was potential Bactrim, folate deficiency.  Now on folate supplement.    More back pain. Patient took prednisone 40 mg x 2 days for compression fracture pain.  Hip pain and shoulder pain resolved.    5 point ROS negative except noted above  I had reviewed past medical and family histories together with allergy and medication lists documented.      Medications:  Current Outpatient Medications on File Prior to Visit   Medication Sig Dispense Refill    predniSONE 20 MG Oral Tab Take 2 tablets (40 mg total) by mouth daily.      Mirabegron ER 25 MG Oral Tablet 24 Hr Take 1 tablet (25 mg total) by mouth daily. 90 tablet 6    Multiple Vitamins-Minerals (ALIVE MULTI-VITAMIN OR) Take 1 tablet by mouth daily.      NON FORMULARY Take 1-2 capsules by mouth daily as needed (pain). Product: Relieve-R capsules      Metaxalone 800 MG Oral Tab Take 1 tablet (800 mg total) by mouth 3 (three) times daily as needed for Pain.      ondansetron (ZOFRAN) 4 mg tablet Take 1 tablet (4 mg total) by mouth every 8 (eight) hours as needed for Nausea. 30 tablet 0    acetaminophen-codeine 300-60 MG Oral Tab Take 1 tablet by mouth 2 (two) times daily as needed for Pain. 60 tablet 0    metFORMIN 500 MG Oral Tab Take 1 tablet (500 mg total) by mouth 2 (two) times daily. 180 tablet 3    metoprolol  succinate  MG Oral Tablet 24 Hr Take 1 tablet (100 mg total) by mouth daily. 90 tablet 0    gabapentin 300 MG Oral Cap Take 3 capsules (900 mg total) by mouth 3 (three) times daily. 270 capsule 0    losartan 100 MG Oral Tab TAKE 1 TABLET BY MOUTH EVERY DAY 30 tablet 0    rizatriptan (MAXALT-MLT) 10 MG Oral Tablet Dispersible Take 1 tablet (10 mg total) by mouth as needed for Migraine. 90 tablet 1    triamcinolone 0.1 % External Cream Apply topically 2 (two) times daily. To affected skin. 80 g 1    Armodafinil 250 MG Oral Tab Take 250 mg by mouth daily.      LATUDA 60 MG Oral Tab Take 1 tablet (60 mg total) by mouth daily with food.      traZODone HCl 50 MG Oral Tab Take 1 tablet (50 mg total) by mouth nightly as needed.      DULoxetine HCl 60 MG Oral Cap DR Particles Take 2 capsules (120 mg total) by mouth daily.      [DISCONTINUED] ondansetron (ZOFRAN) 4 mg tablet Take 1 tablet (4 mg total) by mouth every 8 (eight) hours as needed for Nausea. (Patient not taking: Reported on 6/17/2024) 30 tablet 2     No current facility-administered medications on file prior to visit.     llergies:  Allergies   Allergen Reactions    Hydrocodone NAUSEA AND VOMITING    Benzoyl Peroxide RASH     CREA    Pcn [Penicillins] RASH    Levofloxacin RASH         Objective    Vitals:    06/19/24 1209   Weight: 195 lb (88.5 kg)       ?  Labs:  Lab Results   Component Value Date    B12 >2,000 (H) 06/12/2024    FOLIC 8.0 (L) 06/12/2024    TSH 0.591 06/10/2024     Lab Results   Component Value Date    VITD 31.4 06/12/2024    MG 1.9 06/13/2024    PHOS 3.3 06/10/2024    PTH 18.7 06/12/2024       Lab Results   Component Value Date    WBC 5.2 06/13/2024    RBC 3.82 06/13/2024    HGB 11.7 (L) 06/13/2024    HCT 35.8 06/13/2024    PLT 84.0 (L) 06/13/2024    MCV 93.7 06/13/2024    MCH 30.6 06/13/2024    MCHC 32.7 06/13/2024    RDW 14.6 06/13/2024    NEPRELIM 1.62 06/13/2024    NEPERCENT 31.4 06/13/2024    LYPERCENT 37.3 06/13/2024    MOPERCENT 13.4  06/13/2024    EOPERCENT 15.5 06/13/2024    BAPERCENT 0.8 06/13/2024    NE 1.62 06/13/2024    LYMABS 1.92 06/13/2024    MOABSO 0.69 06/13/2024    EOABSO 0.80 (H) 06/13/2024    BAABSO 0.04 06/13/2024     Lab Results   Component Value Date     (H) 06/13/2024    BUN 18 06/13/2024    BUNCREA 34.8 (H) 03/09/2021    CREATSERUM 0.75 06/13/2024    ANIONGAP 4 06/13/2024    GFR 95 03/22/2017    GFRNAA 78 03/01/2022    GFRAA 90 03/01/2022    CA 9.4 06/13/2024    OSMOCALC 288 06/13/2024    ALKPHO 147 (H) 06/13/2024    AST 16 06/13/2024    ALT 38 06/13/2024    BILT 0.6 06/13/2024    TP 5.7 (L) 06/13/2024    ALB 2.7 (L) 06/13/2024    GLOBULIN 3.0 06/13/2024    AGRATIO 2.3 (H) 12/29/2011     06/13/2024    K 3.9 06/13/2024     06/13/2024    CO2 28.0 06/13/2024         No results found for: \"ANATI\", \"KATY\", \"ANAS\", \"ANASCRN\", \"ANASCRNRFLX\", \"LUIS\"  No results found for: \"SSA\", \"SSAUR\", \"ANTISSA\", \"SSA52\", \"SSA60\", \"SSADD\", \"SSB\", \"ANTISSB\"  Lab Results   Component Value Date    DSDNA <10 06/11/2024     No results found for: \"SCL70\", \"SCL\", \"QRMHJXH03\"  Lab Results   Component Value Date    C3 134.0 06/12/2024    C4 53.2 (H) 06/12/2024     No results found for: \"DRVVT\", \"LAINT\", \"PTTLUPUS\", \"LUPUSINTERP\", \"LA\", \"L5SU1YAIAZ\", \"S8IY2JBDEE\", \"K1TXAPXKJM\", \"I1NQLMMYZS\"  No results found for: \"CARDIOLIPIGG\", \"CARDIOLIPIGM\", \"CARDIOLIPIGA\", \"CARDIOIGA\", \"CARLIP\"      Additional Labs:  Radiology review:    10/2021 DEXA  LUMBAR SPINE ANALYSIS RESULTS:      Bone mineral density (BMD) (g/cm2):  0.777    Lumbar T-Score:  -2.5      % young normals:  74      % age matched controls:  83      Change from prior spine examination:  n/a               TOTAL HIP ANALYSIS RESULTS:        Bone mineral density (BMD) (g/cm2):  0.915      Total Hip T-Score:  -0.23      % young normals:  97      % age matched controls:  106      Change from prior hip examination:  n/a               FEMORAL NECK ANALYSIS RESULTS:        Bone mineral density  (BMD) (g/cm2):  0.710      Femoral neck T-Score:  -1.2      % young normals:  84      % age matched controls:  97      Change from prior hip examination:  n/a           MRI BRAIN (W+WO) (CPT=70553)    Result Date: 6/12/2024  CONCLUSION:  No acute intracranial abnormality is identified.   LOCATION:  [facility]    Dictated by (CST): Stromberg, LeRoy, MD on 6/12/2024 at 1:37 AM     Finalized by (CST): Stromberg, LeRoy, MD on 6/12/2024 at 1:43 AM       MRI SPINE LUMBAR (CPT=72148)    Result Date: 6/12/2024  CONCLUSION:   1. There is a moderate to severe chronic compression fracture of L2 status post vertebral augmentation.  There is retropulsion of the posterior fracture fragments by approximately 5 mm.  This results in mild to moderate spinal canal compromise.  No other  compression fracture is seen within the lumbar spine.  2. Mild to moderate degenerative changes in the lumbar spine as described in detail above.   LOCATION:  [facility]   Dictated by (CST): Stromberg, LeRoy, MD on 6/12/2024 at 1:26 AM     Finalized by (CST): Stromberg, LeRoy, MD on 6/12/2024 at 1:37 AM       XR CHEST PA + LAT CHEST (CPT=71046)    Result Date: 6/11/2024  CONCLUSION:  Negative exam.   LOCATION:  [id number]   Dictated by (CST): Claribel Moss DO on 6/11/2024 at 8:30 PM     Finalized by (CST): Claribel Moss DO on 6/11/2024 at 8:31 PM       US ABDOMEN LIMITED (CPT=76705)    Result Date: 6/10/2024  CONCLUSION:   Small volume of gallbladder sludge.  No sonographic evidence of acute cholecystitis or biliary obstruction.  Normal appearance of the liver.   LOCATION:  [facility]   Dictated by (CST): Fang Powell MD on 6/10/2024 at 12:09 PM     Finalized by (CST): Fang Powell MD on 6/10/2024 at 12:10 PM       XR SHOULDER, COMPLETE (MIN 2 VIEWS), RIGHT (CPT=73030)    Result Date: 6/10/2024  CONCLUSION:   No acute fracture or malalignment.  The glenohumeral joint space is preserved.  Normal acromioclavicular joint with preservation of the subacromial interval.   There is a triangular radiodensity within the superficial subcutaneous tissues overlying the lateral aspect of the right trapezius muscle on the frontal view.  This may represent a retained foreign body, measuring approximately 6 x 3 mm.   LOCATION:  [facility]   Dictated by (CST): Fang Powell MD on 6/10/2024 at 10:35 AM     Finalized by (CST): Fang Powell MD on 6/10/2024 at 10:37 AM        =====================================================================================================  Assessment and Plan    Assessment:  1. Age-related osteoporosis without current pathological fracture    2. Thrombocytopenia (HCC)    3. Inflammatory arthritis        ?  Severe acute onset hip and shoulder girdle symptoms with elevated C-reactive protein (greater than 20.0 mg/DL) concerning for polymyalgia rheumatica and this 59-year-old female.  She also notes bilateral knee/hand swelling. Could not walk due to symptoms. Did take 80 mg of prednisone that was self-administered on 6/9/2024 with significant improvement of her symptoms.    -No active hip/shoulder girdle symptoms or peripheral joint synovitis noted on exam during my evaluation during hospitalization.  No clinical features consistent with cranial GCA  -Marked decrease in C-reactive protein noted during hospitalization  -However prominent thrombocytopenia does not fit with potential polymyalgia rheumatica.  Perhaps clinical picture is consistent with sequela from recent brown recluse bite.  -Patient in clinical remission off prednisone    #L2 compression fracture: Significant pain noted.  -Patient self started on prednisone 40 mg x 2 days which prescribed for self.     #Thrombocytopenia, however DIC labs otherwise normal  -Improving during recent admission     Plan:  -Repeat inflammatory markers.  If inflammatory markers are low in 1 week, I suspect the acute inflammatory polyarthritis would be a monophasic course that does not require chronic maintenance  therapy    -Recommend stopping prednisone 40 mg.  This not a good way to treat osteoporotic compression fracture pain.  Prednisone can worsen osteoporosis  -2021 DEXA with osteoporosis.  Recommend Reclast x 3 years  Patient without any upcoming dental extractions.  Patient declines Fosamax given she believes she will not be adherent with this weekly medication.  She already has polypharmacy.  -L2 compression fracture: need DEXA     Do blood work on June 27th. Repeat inflammatory markers, PTH, Mag/phos given osteoporosis.  Plan on Reclast in early July 2024. T-score -2.5 in 2021.     Increase milk from 12 oz to 16 oz daily. Continue multivitamin daily    Try taking more vitamin D: take 10,000 international units on the weekend.   Goal vit D 40    Bisphosphophanate Side effects: Risks and benefits discusssed including but not limited to the following:gastrointestinal esophageal irritation (oral bisphosphonates), back pain, headaches, arrhythmias (abnormal heart rhythm) dizziness, skin rash, abdominal pain, nausea, vomiting, constipation, or injection site reaction. Other rare side effects such as atypical femoral fractures and osteonecrosis of the jaw was also discussed with the patient.  Pt  reported understanding.    Called patient via number listed in chart today    Original appnt date: Today's Visit: 06/19/24      This telephone visit was conducted in lieu of a previously scheduled clinic visit because of the COVID-19 pandemic. The patient or patient guardian verbally consented to virtual/remote treatment. All medical decision making was made in conjunction with the patient.  Risks and benefits of therapy recommended, and potential side effects of all medications prescribed were discussed.  Patient was counseled on symptoms that would necessitate more emergency follow up. Patient was provided with our clinic phone number to reach us should any other symptoms occur.     The patient was within the [facility]  during our visit.     Patient understands and accepts financial responsibility for any deductible, coinsurance and/or co-pays associated with the service. The patient understands that the physical exam will be limited.  I spent the following number of minutes in direct communication with the patient or patient guardian: 12 min      This telehealth visit was performed while I was physically located in a:  Medical office at [address] [place] [address], [address], [place], [place]      Plan:  Diagnoses and all orders for this visit:    Age-related osteoporosis without current pathological fracture  -     XR DEXA BONE DENSITOMETRY (CPT=77080); Future  -     Phosphorus; Future  -     Magnesium; Future  -     PTH, Intact; Future  -     Comp Metabolic Panel (14)  -     CBC With Differential With Platelet  -     C-Reactive Protein  -     Sed Rate, Westergren (Automated)  -     Uric Acid; Future    Thrombocytopenia (HCC)  -     XR DEXA BONE DENSITOMETRY (CPT=77080); Future  -     Phosphorus; Future  -     Magnesium; Future  -     PTH, Intact; Future  -     Comp Metabolic Panel (14)  -     CBC With Differential With Platelet  -     C-Reactive Protein  -     Sed Rate, Westergren (Automated)  -     Uric Acid; Future    Inflammatory arthritis  -     Uric Acid; Future        No follow-ups on file.      The above plan of care, diagnosis, orders, and follow-up were discussed with the patient. Questions related to this recommended plan of care were answered.    Communication to the referring provider, PCP, and/or the patient's other physicians relevant to this encounter was sent.    Thank you for referring this delightful patient to me. Please feel free to contact me with any questions.     This report was performed utilizing speech recognition software technology. Despite proofreading, speech recognition errors could escape detection. If a word or phrase is confusing or out of context, please do not hesitate to call for   clarification.     Kind  regards      Griselda Snider MD  [facility]

## 2024-06-20 NOTE — PROGRESS NOTES
Phoned [employer] not able to locate pt/ address does not match what they have on file. Spoke to pt, pt will call our office back to clarify if the card we have on file for her correct. We may to call [employer].

## 2024-06-21 ENCOUNTER — TELEPHONE (OUTPATIENT)
Dept: RHEUMATOLOGY | Facility: CLINIC | Age: 59
End: 2024-06-21

## 2024-06-21 NOTE — PROGRESS NOTES
Called pt, notified that Reclast orders faxed to [facility], reminded pt to have blood work one wk prior to to her Reclast infusion .

## 2024-06-21 NOTE — TELEPHONE ENCOUNTER
KELLE De La Torre at [facility] started with [employer] PA completed and faxed to [phone]

## 2024-06-23 PROBLEM — M54.50 CHRONIC RIGHT-SIDED LOW BACK PAIN WITHOUT SCIATICA: Status: ACTIVE | Noted: 2024-06-23

## 2024-06-23 PROBLEM — M79.604 RIGHT LEG PAIN: Status: ACTIVE | Noted: 2024-06-23

## 2024-06-23 PROBLEM — G89.29 CHRONIC RIGHT-SIDED LOW BACK PAIN WITHOUT SCIATICA: Status: ACTIVE | Noted: 2024-06-23

## 2024-06-24 PROBLEM — R32 URINARY INCONTINENCE: Status: ACTIVE | Noted: 2024-06-24

## 2024-06-24 PROBLEM — N20.0 RENAL CALCULI: Status: ACTIVE | Noted: 2024-06-24

## 2024-06-24 RX ORDER — METAXALONE 800 MG/1
800 TABLET ORAL 3 TIMES DAILY PRN
Qty: 90 TABLET | Refills: 0 | OUTPATIENT
Start: 2024-06-24

## 2024-06-29 ENCOUNTER — LAB ENCOUNTER (OUTPATIENT)
Dept: LAB | Age: 59
End: 2024-06-29
Attending: INTERNAL MEDICINE
Payer: COMMERCIAL

## 2024-06-29 DIAGNOSIS — D69.6 THROMBOCYTOPENIA (HCC): ICD-10-CM

## 2024-06-29 DIAGNOSIS — M06.4 INFLAMMATORY POLYARTHRITIS (HCC): ICD-10-CM

## 2024-06-29 DIAGNOSIS — M80.00XD AGE-RELATED OSTEOPOROSIS WITH CURRENT PATHOLOGICAL FRACTURE WITH ROUTINE HEALING, SUBSEQUENT ENCOUNTER: ICD-10-CM

## 2024-06-29 DIAGNOSIS — F11.90 CHRONIC, CONTINUOUS USE OF OPIOIDS: ICD-10-CM

## 2024-06-29 LAB
ALBUMIN SERPL-MCNC: 3.9 G/DL (ref 3.4–5)
ALBUMIN/GLOB SERPL: 1.2 {RATIO} (ref 1–2)
ALP LIVER SERPL-CCNC: 117 U/L
ALT SERPL-CCNC: 31 U/L
ANION GAP SERPL CALC-SCNC: 4 MMOL/L (ref 0–18)
AST SERPL-CCNC: 13 U/L (ref 15–37)
BASOPHILS # BLD AUTO: 0.07 X10(3) UL (ref 0–0.2)
BASOPHILS NFR BLD AUTO: 0.8 %
BILIRUB SERPL-MCNC: 0.4 MG/DL (ref 0.1–2)
BUN BLD-MCNC: 21 MG/DL (ref 9–23)
CALCIUM BLD-MCNC: 9.7 MG/DL (ref 8.5–10.1)
CHLORIDE SERPL-SCNC: 106 MMOL/L (ref 98–112)
CO2 SERPL-SCNC: 27 MMOL/L (ref 21–32)
CREAT BLD-MCNC: 0.76 MG/DL
CRP SERPL-MCNC: 1.12 MG/DL (ref ?–0.3)
EGFRCR SERPLBLD CKD-EPI 2021: 91 ML/MIN/1.73M2 (ref 60–?)
EOSINOPHIL # BLD AUTO: 0.69 X10(3) UL (ref 0–0.7)
EOSINOPHIL NFR BLD AUTO: 7.8 %
ERYTHROCYTE [DISTWIDTH] IN BLOOD BY AUTOMATED COUNT: 13.9 %
ERYTHROCYTE [SEDIMENTATION RATE] IN BLOOD: 25 MM/HR
FASTING STATUS PATIENT QL REPORTED: YES
GLOBULIN PLAS-MCNC: 3.2 G/DL (ref 2.8–4.4)
GLUCOSE BLD-MCNC: 124 MG/DL (ref 70–99)
HCT VFR BLD AUTO: 38.4 %
HGB BLD-MCNC: 12.3 G/DL
IMM GRANULOCYTES # BLD AUTO: 0.02 X10(3) UL (ref 0–1)
IMM GRANULOCYTES NFR BLD: 0.2 %
LYMPHOCYTES # BLD AUTO: 2.37 X10(3) UL (ref 1–4)
LYMPHOCYTES NFR BLD AUTO: 26.7 %
MAGNESIUM SERPL-MCNC: 2.1 MG/DL (ref 1.6–2.6)
MCH RBC QN AUTO: 31.2 PG (ref 26–34)
MCHC RBC AUTO-ENTMCNC: 32 G/DL (ref 31–37)
MCV RBC AUTO: 97.5 FL
MONOCYTES # BLD AUTO: 0.6 X10(3) UL (ref 0.1–1)
MONOCYTES NFR BLD AUTO: 6.8 %
NEUTROPHILS # BLD AUTO: 5.12 X10 (3) UL (ref 1.5–7.7)
NEUTROPHILS # BLD AUTO: 5.12 X10(3) UL (ref 1.5–7.7)
NEUTROPHILS NFR BLD AUTO: 57.7 %
OSMOLALITY SERPL CALC.SUM OF ELEC: 288 MOSM/KG (ref 275–295)
PHOSPHATE SERPL-MCNC: 3.2 MG/DL (ref 2.5–4.9)
PLATELET # BLD AUTO: 413 10(3)UL (ref 150–450)
POTASSIUM SERPL-SCNC: 5 MMOL/L (ref 3.5–5.1)
PROT SERPL-MCNC: 7.1 G/DL (ref 6.4–8.2)
PTH-INTACT SERPL-MCNC: 35.4 PG/ML (ref 18.5–88)
RBC # BLD AUTO: 3.94 X10(6)UL
SODIUM SERPL-SCNC: 137 MMOL/L (ref 136–145)
URATE SERPL-MCNC: 2.9 MG/DL
WBC # BLD AUTO: 8.9 X10(3) UL (ref 4–11)

## 2024-06-29 PROCEDURE — 83735 ASSAY OF MAGNESIUM: CPT

## 2024-06-29 PROCEDURE — 86140 C-REACTIVE PROTEIN: CPT | Performed by: INTERNAL MEDICINE

## 2024-06-29 PROCEDURE — 36415 COLL VENOUS BLD VENIPUNCTURE: CPT | Performed by: INTERNAL MEDICINE

## 2024-06-29 PROCEDURE — 80053 COMPREHEN METABOLIC PANEL: CPT | Performed by: INTERNAL MEDICINE

## 2024-06-29 PROCEDURE — 83970 ASSAY OF PARATHORMONE: CPT

## 2024-06-29 PROCEDURE — 84100 ASSAY OF PHOSPHORUS: CPT

## 2024-06-29 PROCEDURE — 85652 RBC SED RATE AUTOMATED: CPT | Performed by: INTERNAL MEDICINE

## 2024-06-29 PROCEDURE — 84550 ASSAY OF BLOOD/URIC ACID: CPT

## 2024-06-29 PROCEDURE — 80307 DRUG TEST PRSMV CHEM ANLYZR: CPT | Performed by: NURSE PRACTITIONER

## 2024-06-29 PROCEDURE — 85025 COMPLETE CBC W/AUTO DIFF WBC: CPT | Performed by: INTERNAL MEDICINE

## 2024-07-01 ENCOUNTER — OFFICE VISIT (OUTPATIENT)
Dept: RHEUMATOLOGY | Facility: CLINIC | Age: 59
End: 2024-07-01
Payer: COMMERCIAL

## 2024-07-01 VITALS
OXYGEN SATURATION: 97 % | HEART RATE: 94 BPM | DIASTOLIC BLOOD PRESSURE: 68 MMHG | RESPIRATION RATE: 16 BRPM | BODY MASS INDEX: 31.31 KG/M2 | SYSTOLIC BLOOD PRESSURE: 100 MMHG | HEIGHT: 66 IN | WEIGHT: 194.81 LBS | TEMPERATURE: 98 F

## 2024-07-01 DIAGNOSIS — M06.4 INFLAMMATORY POLYARTHRITIS (HCC): ICD-10-CM

## 2024-07-01 DIAGNOSIS — M54.6 ACUTE BILATERAL THORACIC BACK PAIN: ICD-10-CM

## 2024-07-01 DIAGNOSIS — M80.00XD AGE-RELATED OSTEOPOROSIS WITH CURRENT PATHOLOGICAL FRACTURE WITH ROUTINE HEALING, SUBSEQUENT ENCOUNTER: ICD-10-CM

## 2024-07-01 DIAGNOSIS — Z51.81 THERAPEUTIC DRUG MONITORING: ICD-10-CM

## 2024-07-01 DIAGNOSIS — M12.30 PALINDROMIC RHEUMATISM: Primary | ICD-10-CM

## 2024-07-01 RX ORDER — METHYLPREDNISOLONE ACETATE 80 MG/ML
80 INJECTION, SUSPENSION INTRA-ARTICULAR; INTRALESIONAL; INTRAMUSCULAR; SOFT TISSUE ONCE
Status: COMPLETED | OUTPATIENT
Start: 2024-07-01 | End: 2024-07-01

## 2024-07-01 RX ORDER — ACETAMINOPHEN AND CODEINE PHOSPHATE 300; 60 MG/1; MG/1
1 TABLET ORAL 2 TIMES DAILY PRN
Qty: 60 TABLET | Refills: 0 | Status: SHIPPED | OUTPATIENT
Start: 2024-07-04 | End: 2024-08-03

## 2024-07-01 RX ADMIN — METHYLPREDNISOLONE ACETATE 80 MG: 80 INJECTION, SUSPENSION INTRA-ARTICULAR; INTRALESIONAL; INTRAMUSCULAR; SOFT TISSUE at 15:54:00

## 2024-07-01 RX ADMIN — METHYLPREDNISOLONE ACETATE 80 MG: 80 INJECTION, SUSPENSION INTRA-ARTICULAR; INTRALESIONAL; INTRAMUSCULAR; SOFT TISSUE at 16:00:00

## 2024-07-01 NOTE — PROGRESS NOTES
Rheumatology f/u Note  =====================================================================================================    Chief Complaint   Patient presents with    Follow - Up     LOV 6/19/2024.     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  -She notes on June 6 she developed right greater than left shoulder pain and stiffness, as well acute on chronic stiffness of the back, hips.  Cannot stand up from a seated position at that time.  Cannot raise her hands above her head.  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  Bilateral knees are painful as well  -Self-administered prednisone 80 mg send the evening.  Prescribed the medication itself.  Significant improvement of many of her symptoms.  -Initially on admission, there was issues with altered mental status, also had diaphoresis, tachycardia.  Heart rate to the 140s.  Recent brown recluse bite of the abdomen complicated by cellulitis.  Did take Bactrim for about 10 days.  Finished this about 2 weeks ago.  Cellulitis improved.  -Otherwise denies any antecedent event, travel, new medications.   No giant cell arteritis symptoms: Denies headaches, tenderness jaw claudication, vision changes (diplopia, amaurosis fugax symptoms, visual field changes), fevers, chills, night  sweats.  ==============================================================================================================  Visit: 06/19/24  Admit summary:   Patient was monitored with conservative management for the patient's symptoms.  Hip and shoulder girdle symptoms resolved.  Hematology saw the patient, thrombocytopenia improved, thought to be due to brown recluse bite.  Also contributing was potential Bactrim, folate deficiency.  Now on folate supplement.  More back pain. Patient took prednisone 40 mg x 2 days for compression fracture pain.  Hip pain and shoulder pain resolved.  ==============================================================================================================  Today's Visit: 07/01/24    More migratory arthralgia, would move around every 4 hours.   Back at work, struggling immensely.  Hands will become swollen here and there.  SC joint pain on and off.  Denies any IV drug use.  Can't play the piano.   Mother with psoriatic arthritis and is on Otezla  -Has fallen a few times, last was 2 weeks ago.  Fell on thoracic back.  More thoracic back pain recently.  Also dealing with kidney stone.  Has CT of the abdomen and pelvis with CT stone protocol.  This is scheduled for tomorrow    Lab Results   Component Value Date    ESRML 25 06/29/2024    ESRML 22 06/12/2024    ESRML 16 06/09/2024    ESRML 3 08/03/2018    CRP 1.12 (H) 06/29/2024    CRP 3.40 (H) 06/12/2024    CRP 20.60 (H) 06/09/2024          5 point ROS negative except noted above  I had reviewed past medical and family histories together with allergy and medication lists documented.      Medications:  Current Outpatient Medications on File Prior to Visit   Medication Sig Dispense Refill    [START ON 7/4/2024] acetaminophen-codeine 300-60 MG Oral Tab Take 1 tablet by mouth 2 (two) times daily as needed for Pain. 60 tablet 0    METAXALONE 800 MG Oral Tab TAKE 1 TABLET BY MOUTH THREE TIMES A DAY AS NEEDED FOR PAIN 90 tablet 0    Multiple  Vitamins-Minerals (ALIVE MULTI-VITAMIN OR) Take 1 tablet by mouth daily.      NON FORMULARY Take 1-2 capsules by mouth daily as needed (pain). Product: Relieve-R capsules      ondansetron (ZOFRAN) 4 mg tablet Take 1 tablet (4 mg total) by mouth every 8 (eight) hours as needed for Nausea. 30 tablet 0    metoprolol succinate  MG Oral Tablet 24 Hr Take 1 tablet (100 mg total) by mouth daily. 90 tablet 0    gabapentin 300 MG Oral Cap Take 3 capsules (900 mg total) by mouth 3 (three) times daily. 270 capsule 0    losartan 100 MG Oral Tab TAKE 1 TABLET BY MOUTH EVERY DAY 30 tablet 0    rizatriptan (MAXALT-MLT) 10 MG Oral Tablet Dispersible Take 1 tablet (10 mg total) by mouth as needed for Migraine. 90 tablet 1    triamcinolone 0.1 % External Cream Apply topically 2 (two) times daily. To affected skin. 80 g 1    Armodafinil 250 MG Oral Tab Take 250 mg by mouth daily.      LATUDA 60 MG Oral Tab Take 1 tablet (60 mg total) by mouth daily with food.      traZODone HCl 50 MG Oral Tab Take 1 tablet (50 mg total) by mouth nightly as needed.      DULoxetine HCl 60 MG Oral Cap DR Particles Take 2 capsules (120 mg total) by mouth daily.      Mirabegron ER 25 MG Oral Tablet 24 Hr Take 1 tablet (25 mg total) by mouth daily. (Patient not taking: Reported on 7/1/2024) 90 tablet 6    metFORMIN 500 MG Oral Tab Take 1 tablet (500 mg total) by mouth 2 (two) times daily. (Patient not taking: Reported on 7/1/2024) 180 tablet 3     No current facility-administered medications on file prior to visit.     llergies:  Allergies   Allergen Reactions    Hydrocodone NAUSEA AND VOMITING    Benzoyl Peroxide RASH     CREA    Pcn [Penicillins] RASH    Levofloxacin RASH         Objective    Vitals:    07/01/24 1524   BP: 100/68   Pulse: 94   Resp: 16   Temp: 97.5 °F (36.4 °C)   SpO2: 97%   Weight: 194 lb 12.8 oz (88.4 kg)   Height: 5' 6\" (1.676 m)     GEN: NAD, well-nourished.   HEENT: Head: NCAT. Face: No lesions. Eyes: Conjunctiva clear.   PULM:   easy effort  Extremities: No cyanosis, edema or deformities.   Neurologic: Strength, CN2-12 grossly intact   Skin: No lesions or rashes.  MSK: 28 joint count performed. No evidence of synovitis in mcp, pip, dip, wrist, elbows, shoulders, hips, knees, ankles, mtp unless otherwise noted. Full ROM of elbows, wrists, knees.     Able to stand from seated position without use of hands.  No painful arc of the bilateral shoulders  -Tender to palpation in the bilateral SC joints without any SC joint effusion  -No peripheral joint synovitis noted currently  -Tender palpation in the bilateral greater trochanteric bursa    ?  Labs:  Lab Results   Component Value Date    B12 >2,000 (H) 06/12/2024    FOLIC 8.0 (L) 06/12/2024    TSH 0.591 06/10/2024     Lab Results   Component Value Date    VITD 31.4 06/12/2024    MG 2.1 06/29/2024    PHOS 3.2 06/29/2024    PTH 35.4 06/29/2024       Lab Results   Component Value Date    WBC 8.9 06/29/2024    RBC 3.94 06/29/2024    HGB 12.3 06/29/2024    HCT 38.4 06/29/2024    .0 06/29/2024    MCV 97.5 06/29/2024    MCH 31.2 06/29/2024    MCHC 32.0 06/29/2024    RDW 13.9 06/29/2024    NEPRELIM 5.12 06/29/2024    NEPERCENT 57.7 06/29/2024    LYPERCENT 26.7 06/29/2024    MOPERCENT 6.8 06/29/2024    EOPERCENT 7.8 06/29/2024    BAPERCENT 0.8 06/29/2024    NE 5.12 06/29/2024    LYMABS 2.37 06/29/2024    MOABSO 0.60 06/29/2024    EOABSO 0.69 06/29/2024    BAABSO 0.07 06/29/2024     Lab Results   Component Value Date     (H) 06/29/2024    BUN 21 06/29/2024    BUNCREA 34.8 (H) 03/09/2021    CREATSERUM 0.76 06/29/2024    ANIONGAP 4 06/29/2024    GFR 95 03/22/2017    GFRNAA 78 03/01/2022    GFRAA 90 03/01/2022    CA 9.7 06/29/2024    OSMOCALC 288 06/29/2024    ALKPHO 117 06/29/2024    AST 13 (L) 06/29/2024    ALT 31 06/29/2024    BILT 0.4 06/29/2024    TP 7.1 06/29/2024    ALB 3.9 06/29/2024    GLOBULIN 3.2 06/29/2024    AGRATIO 2.3 (H) 12/29/2011     06/29/2024    K 5.0 06/29/2024    CL  106 06/29/2024    CO2 27.0 06/29/2024         No results found for: \"ANATI\", \"KATY\", \"ANAS\", \"ANASCRN\", \"ANASCRNRFLX\", \"LUIS\"  No results found for: \"SSA\", \"SSAUR\", \"ANTISSA\", \"SSA52\", \"SSA60\", \"SSADD\", \"SSB\", \"ANTISSB\"  Lab Results   Component Value Date    DSDNA <10 06/11/2024     No results found for: \"SCL70\", \"SCL\", \"CUZTAFC87\"  Lab Results   Component Value Date    C3 134.0 06/12/2024    C4 53.2 (H) 06/12/2024     No results found for: \"DRVVT\", \"LAINT\", \"PTTLUPUS\", \"LUPUSINTERP\", \"LA\", \"H5WB8OPJTZ\", \"Y8NE6AVVLR\", \"P6EZTZCUCA\", \"O8QHMSORID\"  No results found for: \"CARDIOLIPIGG\", \"CARDIOLIPIGM\", \"CARDIOLIPIGA\", \"CARDIOIGA\", \"CARLIP\"      Additional Labs:  Radiology review:    10/2021 DEXA  LUMBAR SPINE ANALYSIS RESULTS:      Bone mineral density (BMD) (g/cm2):  0.777    Lumbar T-Score:  -2.5      % young normals:  74      % age matched controls:  83      Change from prior spine examination:  n/a               TOTAL HIP ANALYSIS RESULTS:        Bone mineral density (BMD) (g/cm2):  0.915      Total Hip T-Score:  -0.23      % young normals:  97      % age matched controls:  106      Change from prior hip examination:  n/a               FEMORAL NECK ANALYSIS RESULTS:        Bone mineral density (BMD) (g/cm2):  0.710      Femoral neck T-Score:  -1.2      % young normals:  84      % age matched controls:  97      Change from prior hip examination:  n/a                 =====================================================================================================  Assessment and Plan    Assessment:  1. Palindromic rheumatism    2. Acute bilateral thoracic back pain    3. Inflammatory polyarthritis (HCC)    4. Age-related osteoporosis with current pathological fracture with routine healing, subsequent encounter    5. Therapeutic drug monitoring        Severe acute onset hip and shoulder girdle symptoms with elevated C-reactive protein (greater than 20.0 mg/DL) concerning for polymyalgia rheumatica in this  59-year-old female.  She also notes bilateral knee/hand swelling. Could not walk due to symptoms. Did take 80 mg of prednisone that was self-administered on 6/9/2024 with significant improvement of her symptoms.    -No active hip/shoulder girdle symptoms or peripheral joint synovitis noted on exam during my evaluation during hospitalization.  No clinical features consistent with cranial GCA  -Marked decrease in C-reactive protein noted during hospitalization  -However prominent thrombocytopenia does not fit with potential polymyalgia rheumatica.  Perhaps clinical picture is consistent with sequela from recent brown recluse bite.  -Patient was asymptomatic until recently now with more of palindromic rheumatism picture with migratory peripheral joint arthralgia.  Also has SC joint tenderness without effusions.  Lower suspicion for septic joint  -Inflammatory markers have essentially normalized at this point, hip/shoulder girdle symptoms have resolved.  -Overall my concern is that she has some sort of post infectious inflammatory arthritis that I suspect will be a monophasic course.    #L2 compression fracture: Significant pain noted.  #Thoracic back pain: Started after fall 2 weeks ago in this osteoporotic patient  #Osteoporosis: 2021 DEXA with T-score -2.5.  Also with prior L2 compression fracture related to spinal hemangioma s/p cryo therapy     #Thrombocytopenia: Likely due to brown recluse bite.  Now resolved.    #Fhx of psoriatic arthritis: mother with psoriatic arthritis and is on Otezla.      Plan:  -Repeat inflammatory markers in early August, get HLA-B27 given sternoclavicular joint arthritis.  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  If symptoms continue after that point then I suspect she may have some sort of undifferentiated peripheral spondylarthritis in the context of her mother with psoriatic arthritis on  Otezla.  -Would consider methotrexate in that case in the future  -Pending approval for Reclast for osteoporosis  --Recommend Reclast x 3 years  Patient without any upcoming dental extractions.  Patient declines Fosamax given she believes she will not be adherent with this weekly medication.  She already has polypharmacy.  --increased milk from 12 oz to 16 oz daily. Continue multivitamin daily  --increased Try taking more vitamin D: take 10,000 international units on the weekend.   Goal vit D 40    X-ray of the thoracic spine to rule out compression fracture    -Depo-Medrol 80 mg intramuscular asked to for active inflammatory arthritis    Hip exercises for bilateral greater trochanter bursitis     Plan:  Diagnoses and all orders for this visit:    Palindromic rheumatism    Acute bilateral thoracic back pain  -     XR THORACIC SPINE (3 VIEWS) (CPT=72072); Future  -     Sed Rate, Yosephergren (Automated); Future  -     C-Reactive Protein; Future  -     HLA B 27 Disease Association; Future  -     methylPREDNISolone acetate (DEPO-medrol) 80 MG/ML injection 80 mg  -     methylPREDNISolone acetate (DEPO-medrol) 80 MG/ML injection 80 mg    Inflammatory polyarthritis (HCC)  -     Sed Rate, Westergren (Automated); Future  -     C-Reactive Protein; Future  -     HLA B 27 Disease Association; Future  -     methylPREDNISolone acetate (DEPO-medrol) 80 MG/ML injection 80 mg  -     methylPREDNISolone acetate (DEPO-medrol) 80 MG/ML injection 80 mg    Age-related osteoporosis with current pathological fracture with routine healing, subsequent encounter    Therapeutic drug monitoring          No follow-ups on file.      The above plan of care, diagnosis, orders, and follow-up were discussed with the patient. Questions related to this recommended plan of care were answered.    Communication to the referring provider, PCP, and/or the patient's other physicians relevant to this encounter was sent.    Thank you for referring this  delightful patient to me. Please feel free to contact me with any questions.     This report was performed utilizing speech recognition software technology. Despite proofreading, speech recognition errors could escape detection. If a word or phrase is confusing or out of context, please do not hesitate to call for   clarification.     Kind regards      Griselda Snider MD  [facility]

## 2024-07-01 NOTE — PATIENT INSTRUCTIONS
-Repeat inflammatory markers in early August, get HLA-B27 given sternoclavicular joint arthritis    Lab Results   Component Value Date    ESRML 25 06/29/2024    ESRML 22 06/12/2024    ESRML 16 06/09/2024    ESRML 3 08/03/2018    CRP 1.12 (H) 06/29/2024    CRP 3.40 (H) 06/12/2024    CRP 20.60 (H) 06/09/2024

## 2024-07-01 NOTE — TELEPHONE ENCOUNTER
Patient Comment: Was in hospital w/ polyatthralgias and a lot of incorrect info is in my record. Nickie dissatisfied w/ Edward this time. Only took extra dose the one day as i notified you. I will be out of meds 7/4. Thx.       Medication: acetaminophen-codeine 300-60 MG Oral Tab     Date of last refill: 6/4/24  [place] 6[place]4/24    Last office visit: 4/23/24  Due back to clinic per last office note:  post imaging  Date next office visit scheduled:  7/11/24    Last URINE Screen: 4/1/23, new screening completed 6/29/24- active in process  Screen Results:    Rosie Hernandez, APRN  4/10/2023  8:10 AM CDT Back to Top      As expected         Narcotic Contract EXPIRATION date: 10/25/24    Last OV note recommendation: Plan: Status post L2 kyphoplasty with spine heena at [facility] on 3/5/2024, and while low back pain is largely eliminated, has had progressive increasing groin and anterior thigh pain, limiting her ADLs.  Has reduced Tylenol with codeine from 6/day to 2/day, though given the increasing lower extremity complaints, wishes to hold on further weaning at this time.  Will put her back on metaxalone, have not been on this previously for years.  In addition, asked that she let her physicians at [facility] and know of her new onset lower extremity issues, and I am sure they will ask her to update her MRI.  If they do not, contact clinic, and I will be happy to place an order for an MRI myself.  Follow up after imaging.

## 2024-07-02 ENCOUNTER — HOSPITAL ENCOUNTER (OUTPATIENT)
Dept: GENERAL RADIOLOGY | Age: 59
Discharge: HOME | End: 2024-07-02
Attending: INTERNAL MEDICINE
Payer: COMMERCIAL

## 2024-07-02 ENCOUNTER — HOSPITAL ENCOUNTER (OUTPATIENT)
Dept: CT IMAGING | Age: 59
Discharge: HOME | End: 2024-07-02
Attending: UROLOGY
Payer: COMMERCIAL

## 2024-07-02 DIAGNOSIS — N20.0 NEPHROLITHIASIS: ICD-10-CM

## 2024-07-02 DIAGNOSIS — M54.6 ACUTE BILATERAL THORACIC BACK PAIN: ICD-10-CM

## 2024-07-02 PROCEDURE — 74176 CT ABD & PELVIS W/O CONTRAST: CPT | Performed by: UROLOGY

## 2024-07-02 PROCEDURE — 72072 X-RAY EXAM THORAC SPINE 3VWS: CPT | Performed by: INTERNAL MEDICINE

## 2024-07-03 ENCOUNTER — VIRTUAL PHONE E/M (OUTPATIENT)
Dept: SURGERY | Facility: CLINIC | Age: 59
End: 2024-07-03
Payer: COMMERCIAL

## 2024-07-03 ENCOUNTER — TELEPHONE (OUTPATIENT)
Dept: SURGERY | Facility: CLINIC | Age: 59
End: 2024-07-03

## 2024-07-03 DIAGNOSIS — N20.0 NEPHROLITHIASIS: Primary | ICD-10-CM

## 2024-07-03 PROCEDURE — 99442 PHONE E/M BY PHYS 11-20 MIN: CPT | Performed by: UROLOGY

## 2024-07-03 NOTE — PROGRESS NOTES
Urology Clinic Note  Telemedicine Visit  Audio Only  The patient consented to performing this visit virtually.     Primary Care Provider:  Toni Callahan DO     Chief Complaint:   nephrolithaisis    HPI:     Alesia Bland is a 59 year old female with history of GIANLUCA, HTN, chronic pain referred for nephrolithaisis.    Patient saw PCP 5/22 with spider bite- a CT was done to rule out abscess.   KIDNEYS:  7 mm calculus in the proximal left ureter is causing mild to moderate hydronephrosis.  Multiple nonobstructing calculi in the lower pole left kidney.  1.3 cm.  Calculus lower pole the right kidney.  ADRENALS:  No mass or enlargement.    She was given antibiotics.  No clear abscess as seen- wound care was done.     She was to see urology. She was then admitted here at  with AMS, join pain.  Was possibly related to recent spider bite.  She was treated with steroid course and was discharged home on 6/13 after 4 days. No kidney imaging was done during hospital stay. She is doing better from this standpoint,   She has no previous urologic history. Does have chronic urgency, UUI- is currently on solfenacin for this with some improvement. She has no history of stones.   She is a MD; works in a [employer].  No flank pain she saw me last month or previously (incidental stone). No gross hematuria. No UTI symptoms today.  Does not think she passed stone.  Last visit we discussed repeating imaging.  This was done.  She returns now to discuss.  Her CT scan shows that the previous lower pole left-sided stone is now fallen into the ureter.  She now has 2 obstructing stones in the left side.  She has a additional large stone on the right side that is nonobstructing-has staghorn appearance.    She is doing well. Remains asymptomatic. Her other CT scan findings were sent to PCP.       Impression  CONCLUSION:    1. There are now 2 proximal left ureteral calculi present.  The to the calculi measure approximately 7 mm in diameter.  On the  CT from 2024 there was only 1 calculus in the ureter and another calculus was seen in the lower pole of the left kidney.    There is mild right hydronephrosis and mild right proximal hydroureter.  2. There is a 19 x 10 mm staghorn calculus in the lower pole of the right kidney with 3 additional small 2 mm calculi in the lower pole the right kidney also seen.    3. Previous cement augmentation of a severe compression fracture deformity of L2.  There is retropulsion into the spinal canal narrowing the canal to 9 mm.  4. Mild infiltration in the subcutaneous fat of the anterior abdominal wall in the right is again noted and moderately improved.  Differential includes cellulitis.  There has been interval improvement in the degree of skin thickening also seen in the  right anterior abdominal wall.  Correlate with clinical history and exam.            PSA:  No results found for: \"PSA\", \"PERCENTPSA\", \"PSAS\", \"PSAULTRA\"     History:     Past Medical History:    Anesthesia complication    n&v    Arrhythmia    PAT    Bronchitis, not specified as acute or chronic    Depression    Extrinsic asthma, unspecified    Kidney stones    Lipid screening    GIANLUCA (obstructive sleep apnea)    AHI 14 Supine AHI 20 non-supine AHI 9    PONV (postoperative nausea and vomiting)    Snoring    AHI 4.4 SaO2 janelle 86-.5 primary snoring    Spinal hemangioma       Past Surgical History:   Procedure Laterality Date    Appendectomy  2008    Breast surgery procedure unlisted      enlargement      2003    Colonoscopy  2009    negative    Excis lumbar disk,one level      Hysterectomy      total hystero.    Oophorectomy Bilateral     total hystero    Other  2021    spinal angiogram with embolization    Other surgical history Bilateral 2015    Procedure: ABDOMINAL HYSTERECTOMY, BSO;  Surgeon: Calixto Weathers MD;  Location:  MAIN OR       Family History   Problem Relation Age of Onset    Depression Mother      Other (Other) Mother     Hypertension Father     Other (Other) Father     Melanoma Father     Heart Attack Maternal Grandmother     Breast Cancer Maternal Grandmother 50        In her early 50's.    Other (Other) Maternal Grandmother     Other (leukemia) Maternal Grandfather     Diabetes Paternal Grandmother         type 2    Other (renal failure) Paternal Grandmother     Other (lung cancer) Paternal Grandfather        Social History     Socioeconomic History    Marital status:    Tobacco Use    Smoking status: Never    Smokeless tobacco: Never   Vaping Use    Vaping status: Never Used   Substance and Sexual Activity    Alcohol use: Not Currently     Alcohol/week: 3.3 standard drinks of alcohol     Types: 4 Standard drinks or equivalent per week     Comment: rare    Drug use: No   Other Topics Concern    Caffeine Concern Yes     Comment: 3-4 cups    Exercise Yes     Comment: walking     Social Determinants of Health     Financial Resource Strain: Low Risk  (8/17/2023)    [facility]    Overall Financial Resource Strain (CARDIA)     Difficulty of Paying Living Expenses: Not very hard   Food Insecurity: No Food Insecurity (6/9/2024)    Food Insecurity     Food Insecurity: Never true   Transportation Needs: No Transportation Needs (6/9/2024)    Transportation Needs     Lack of Transportation: No   Physical Activity: Inactive (8/17/2023)    [facility]    Exercise Vital Sign     Days of Exercise per Week: 0 days     Minutes of Exercise per Session: 0 min   Housing Stability: Low Risk  (6/9/2024)    Housing Stability     Housing Instability: No       Medications (Active prior to today's visit):  Current Outpatient Medications   Medication Sig Dispense Refill    [START ON 7/4/2024] acetaminophen-codeine 300-60 MG Oral Tab Take 1 tablet by mouth 2 (two) times daily as needed for Pain. 60 tablet 0    valACYclovir 1 G Oral Tab Take 1 tablet (1,000 mg total) by  mouth daily.      folic acid 1 MG Oral Tab Take 1 tablet (1 mg total) by mouth daily.      METAXALONE 800 MG Oral Tab TAKE 1 TABLET BY MOUTH THREE TIMES A DAY AS NEEDED FOR PAIN 90 tablet 0    Mirabegron ER 25 MG Oral Tablet 24 Hr Take 1 tablet (25 mg total) by mouth daily. (Patient not taking: Reported on 7/1/2024) 90 tablet 6    Multiple Vitamins-Minerals (ALIVE MULTI-VITAMIN OR) Take 1 tablet by mouth daily.      NON FORMULARY Take 1-2 capsules by mouth daily as needed (pain). Product: Relieve-R capsules      ondansetron (ZOFRAN) 4 mg tablet Take 1 tablet (4 mg total) by mouth every 8 (eight) hours as needed for Nausea. 30 tablet 0    metFORMIN 500 MG Oral Tab Take 1 tablet (500 mg total) by mouth 2 (two) times daily. (Patient not taking: Reported on 7/1/2024) 180 tablet 3    metoprolol succinate  MG Oral Tablet 24 Hr Take 1 tablet (100 mg total) by mouth daily. 90 tablet 0    gabapentin 300 MG Oral Cap Take 3 capsules (900 mg total) by mouth 3 (three) times daily. 270 capsule 0    losartan 100 MG Oral Tab TAKE 1 TABLET BY MOUTH EVERY DAY 30 tablet 0    rizatriptan (MAXALT-MLT) 10 MG Oral Tablet Dispersible Take 1 tablet (10 mg total) by mouth as needed for Migraine. 90 tablet 1    triamcinolone 0.1 % External Cream Apply topically 2 (two) times daily. To affected skin. 80 g 1    Armodafinil 250 MG Oral Tab Take 250 mg by mouth daily.      LATUDA 60 MG Oral Tab Take 1 tablet (60 mg total) by mouth daily with food.      traZODone HCl 50 MG Oral Tab Take 1 tablet (50 mg total) by mouth nightly as needed.      DULoxetine HCl 60 MG Oral Cap DR Particles Take 2 capsules (120 mg total) by mouth daily.         Allergies:  Allergies   Allergen Reactions    Hydrocodone NAUSEA AND VOMITING    Benzoyl Peroxide RASH     CREA    Pcn [Penicillins] RASH    Levofloxacin RASH       Review of Systems:   A comprehensive 10-point review of systems was completed.  Pertinent positives and negatives are noted in the the  HPI.    Physical Exam:   No physical exam performed during this telephone encounter.      XR DEXA BONE DENSITOMETRY (CPT=77080)    Result Date: 7/6/2024  CONCLUSION:  Osteoporosis lumbar spine, osteopenia femoral neck  Recommendation:  The National Osteoporosis Foundation (NOF) recommends pharmacological treatment for patients with a FRAX 10-year risk score of 3% or higher for a hip fracture or 20% or higher for a major osteoporotic fracture, to prevent osteoporosis and reduce fracture risk.  The World Health Organization has defined the following categories based on bone density: Normal bone:  T-score greater than or equal to -1 Osteopenia: T-score  less than -1 and greater  than -2.5 Osteoporosis:  T-score less than or equal -2.5   LOCATION:  [facility]     Dictated by (CST): Ed Garcia MD on 7/06/2024 at 9:47 AM     Finalized by (CST): Ed Garcia MD on 7/06/2024 at 9:47 AM       CT ABDOMEN+PELVIS KIDNEYSTONE 2D RNDR(NO IV,NO ORAL)(CPT=74176)    Result Date: 7/2/2024  CONCLUSION:  1. There are now 2 proximal left ureteral calculi present.  The to the calculi measure approximately 7 mm in diameter.  On the CT from 5/22/2024 there was only 1 calculus in the ureter and another calculus was seen in the lower pole of the left kidney.  There is mild right hydronephrosis and mild right proximal hydroureter. 2. There is a 19 x 10 mm staghorn calculus in the lower pole of the right kidney with 3 additional small 2 mm calculi in the lower pole the right kidney also seen.  3. Previous cement augmentation of a severe compression fracture deformity of L2.  There is retropulsion into the spinal canal narrowing the canal to 9 mm. 4. Mild infiltration in the subcutaneous fat of the anterior abdominal wall in the right is again noted and moderately improved.  Differential includes cellulitis.  There has been interval improvement in the degree of skin thickening also seen in the right anterior abdominal wall.  Correlate with  clinical history and exam.    LOCATION:  [facility]   Dictated by (CST): Kuldeep Ruby MD on 7/02/2024 at 3:54 PM     Finalized by (CST): Kuldeep Ruby MD on 7/02/2024 at 4:04 PM       XR THORACIC SPINE (3 VIEWS) (CPT=72072)    Result Date: 7/2/2024  CONCLUSION:     No findings of acute thoracic spine fracture.  Hemangioma present within the T11 vertebral body.  Relatively modest degenerative disc changes are present.  No paraspinal hematoma identified.  Partially visualized vertebral augmentation of the L2 vertebra  seen only on the frontal view.  In this patient with history of vertebral fracture, if there is continued strong suspicion for injury or other abnormality involving the thoracic spine that may not be detectable by x-ray, consider MRI or CT follow-up.  LOCATION:  [facility]    Dictated by ([facility]): Ed Garcia MD on 7/02/2024 at 2:03 PM     Finalized by (CST): Ed Garcia MD on 7/02/2024 at 2:05 PM       MRI BRAIN (W+WO) (CPT=70553)    Result Date: 6/12/2024  CONCLUSION:  No acute intracranial abnormality is identified.   LOCATION:  [facility]    Dictated by ([facility]): Stromberg, LeRoy, MD on 6/12/2024 at 1:37 AM     Finalized by (CST): Stromberg, LeRoy, MD on 6/12/2024 at 1:43 AM       MRI SPINE LUMBAR (CPT=72148)    Result Date: 6/12/2024  CONCLUSION:   1. There is a moderate to severe chronic compression fracture of L2 status post vertebral augmentation.  There is retropulsion of the posterior fracture fragments by approximately 5 mm.  This results in mild to moderate spinal canal compromise.  No other  compression fracture is seen within the lumbar spine.  2. Mild to moderate degenerative changes in the lumbar spine as described in detail above.   LOCATION:  [facility]   Dictated by ([facility]): Stromberg, LeRoy, MD on 6/12/2024 at 1:26 AM     Finalized by (CST): Stromberg, LeRoy, MD on 6/12/2024 at 1:37 AM       XR CHEST PA + LAT CHEST (CPT=71046)    Result Date: 6/11/2024  CONCLUSION:  Negative exam.    LOCATION:  [id number]   Dictated by (CST): Claribel Moss DO on 6/11/2024 at 8:30 PM     Finalized by (CST): Claribel Moss DO on 6/11/2024 at 8:31 PM       US ABDOMEN LIMITED (CPT=76705)    Result Date: 6/10/2024  CONCLUSION:   Small volume of gallbladder sludge.  No sonographic evidence of acute cholecystitis or biliary obstruction.  Normal appearance of the liver.   LOCATION:  [facility]   Dictated by (CST): Fang Powell MD on 6/10/2024 at 12:09 PM     Finalized by (CST): Fang Powell MD on 6/10/2024 at 12:10 PM       XR SHOULDER, COMPLETE (MIN 2 VIEWS), RIGHT (CPT=73030)    Result Date: 6/10/2024  CONCLUSION:   No acute fracture or malalignment.  The glenohumeral joint space is preserved.  Normal acromioclavicular joint with preservation of the subacromial interval.  There is a triangular radiodensity within the superficial subcutaneous tissues overlying the lateral aspect of the right trapezius muscle on the frontal view.  This may represent a retained foreign body, measuring approximately 6 x 3 mm.   LOCATION:  [facility]   Dictated by (CST): Fang Powell MD on 6/10/2024 at 10:35 AM     Finalized by (CST): Fang Powell MD on 6/10/2024 at 10:37 AM          Assessment & Plan:   Alesia Bland is a 59 year old female with history of GIANLUCA, HTN, chronic pain referred for nephrolithaisis.    Detailed discussed had regarding her stone burden. For her obstructing stones on the LEFT; we discussed options for management and patient is most interested in ureteroscopic management for the 2 adjacent obstructing stones.   For her RIGHT sided larger stone burden with lower pole partial staghorn; we discussed several options including observation, URS (either scheduling for after her LEFT side is addressed or stenting at time of LEFT sided intervention and then proceeding with RIGHT at later date) and PCNL. We had an extensive discussion of the pros and cons of each and for now patient would like to address the LEFT side only with ureteroscopy  and discuss there RIGHT side management once she has recovered.     For URS; The risks discussed included, but were not limited to, urinary tract infection, sepsis, bleeding, injury to urethra/bladder/ureter, urethra stricture, ureteral stricture, inability to treat stone.     She will return for cysto, left urs/hll, stent soon    Preop urine culture ordered; not on any blood thinners.       In total, 12 minutes were spent on this patient encounter for medical discussion.          Nitin Aguero MD  Staff Urologist  [facility]  Office: [phone]

## 2024-07-03 NOTE — TELEPHONE ENCOUNTER
Hi,    Can you please schedule this patient for surgery.  Either next Tuesday to follow or swap with my Friday case (Friday case moved to Tuesday, this case Friday)    Patient is dropping urine later this week.        Thanks,  Nitin       Urology Surgery Request  Surgeon: Nitin Aguero  Location (if known): [facility]  Procedure: Cystoscopy, LEFT ureteroscopy, laser lithotripsy, stent placement  Anesthesia: General   Time Frame: Next available  Time required: 45 minutes  Diagnosis: Nephrolithiasis  Special Equipment: C-arm    Antibiotics: per hospital protocol unless checked below   ___ Levaquin 500 mg IV   ___ Gemcitabine 2 g/100 mL NS bladder instillation to be given in OR    Estimated Post Op/Follow Up Appt:   10 days for cystoscopy/stent removal in clinic with me. Please schedule appointment at time of surgery scheduling.

## 2024-07-05 NOTE — TELEPHONE ENCOUNTER
Spoke with the patient.  Scheduling the surgery for 7/12/24. Also scheduled a  week f/u 7/22/24 @ 3pm   Reviewed the pre-op instructions and will send via My Chart or mail to patient once confirmed.  Patient can contact me at [phone]

## 2024-07-06 ENCOUNTER — HOSPITAL ENCOUNTER (OUTPATIENT)
Dept: BONE DENSITY | Age: 59
Discharge: HOME | End: 2024-07-06
Attending: INTERNAL MEDICINE
Payer: COMMERCIAL

## 2024-07-06 ENCOUNTER — HOSPITAL ENCOUNTER (OUTPATIENT)
Dept: MAMMOGRAPHY | Age: 59
Discharge: HOME | End: 2024-07-06
Attending: FAMILY MEDICINE
Payer: COMMERCIAL

## 2024-07-06 DIAGNOSIS — M80.00XD AGE-RELATED OSTEOPOROSIS WITH CURRENT PATHOLOGICAL FRACTURE WITH ROUTINE HEALING, SUBSEQUENT ENCOUNTER: ICD-10-CM

## 2024-07-06 DIAGNOSIS — D69.6 THROMBOCYTOPENIA (HCC): ICD-10-CM

## 2024-07-06 DIAGNOSIS — Z12.31 ENCOUNTER FOR SCREENING MAMMOGRAM FOR MALIGNANT NEOPLASM OF BREAST: ICD-10-CM

## 2024-07-06 PROCEDURE — 77063 BREAST TOMOSYNTHESIS BI: CPT | Performed by: FAMILY MEDICINE

## 2024-07-06 PROCEDURE — 77080 DXA BONE DENSITY AXIAL: CPT | Performed by: INTERNAL MEDICINE

## 2024-07-06 PROCEDURE — 77067 SCR MAMMO BI INCL CAD: CPT | Performed by: FAMILY MEDICINE

## 2024-07-08 NOTE — TELEPHONE ENCOUNTER
Spoke with the patient.  Rescheduling the surgery for 7/18/24. Also scheduled a  week f/u 7/29/24 @ 2pm   Reviewed the pre-op instructions and will send via My Chart or mail to patient once confirmed.  Patient can contact me at [phone]

## 2024-07-10 RX ORDER — CEPHALEXIN 500 MG/1
500 CAPSULE ORAL 3 TIMES DAILY
Qty: 21 CAPSULE | Refills: 0 | Status: SHIPPED | OUTPATIENT
Start: 2024-07-10 | End: 2024-07-17

## 2024-07-11 ENCOUNTER — TELEPHONE (OUTPATIENT)
Dept: SURGERY | Facility: CLINIC | Age: 59
End: 2024-07-11

## 2024-07-11 ENCOUNTER — OFFICE VISIT (OUTPATIENT)
Dept: FAMILY MEDICINE CLINIC | Facility: CLINIC | Age: 59
End: 2024-07-11
Payer: COMMERCIAL

## 2024-07-11 ENCOUNTER — OFFICE VISIT (OUTPATIENT)
Dept: PAIN CLINIC | Facility: CLINIC | Age: 59
End: 2024-07-11
Payer: COMMERCIAL

## 2024-07-11 VITALS
RESPIRATION RATE: 16 BRPM | SYSTOLIC BLOOD PRESSURE: 128 MMHG | WEIGHT: 188 LBS | DIASTOLIC BLOOD PRESSURE: 78 MMHG | HEART RATE: 91 BPM | BODY MASS INDEX: 30.95 KG/M2 | OXYGEN SATURATION: 98 % | HEIGHT: 65.5 IN

## 2024-07-11 VITALS
HEART RATE: 68 BPM | DIASTOLIC BLOOD PRESSURE: 84 MMHG | WEIGHT: 190 LBS | OXYGEN SATURATION: 98 % | BODY MASS INDEX: 31 KG/M2 | SYSTOLIC BLOOD PRESSURE: 136 MMHG

## 2024-07-11 DIAGNOSIS — G89.29 OTHER CHRONIC PAIN: Primary | ICD-10-CM

## 2024-07-11 DIAGNOSIS — R73.03 PREDIABETES: ICD-10-CM

## 2024-07-11 DIAGNOSIS — S32.020D CLOSED COMPRESSION FRACTURE OF L2 LUMBAR VERTEBRA WITH ROUTINE HEALING, SUBSEQUENT ENCOUNTER: Primary | ICD-10-CM

## 2024-07-11 DIAGNOSIS — Z79.891 CHRONIC PRESCRIPTION OPIATE USE: ICD-10-CM

## 2024-07-11 DIAGNOSIS — G93.40 ENCEPHALOPATHY: ICD-10-CM

## 2024-07-11 DIAGNOSIS — N20.0 MULTIPLE KIDNEY STONES: ICD-10-CM

## 2024-07-11 DIAGNOSIS — T63.301S SPIDER BITE WOUND, ACCIDENTAL OR UNINTENTIONAL, SEQUELA: Primary | ICD-10-CM

## 2024-07-11 PROCEDURE — 3074F SYST BP LT 130 MM HG: CPT | Performed by: FAMILY MEDICINE

## 2024-07-11 PROCEDURE — 3078F DIAST BP <80 MM HG: CPT | Performed by: FAMILY MEDICINE

## 2024-07-11 PROCEDURE — 99214 OFFICE O/P EST MOD 30 MIN: CPT | Performed by: PHYSICIAN ASSISTANT

## 2024-07-11 PROCEDURE — 3079F DIAST BP 80-89 MM HG: CPT | Performed by: PHYSICIAN ASSISTANT

## 2024-07-11 PROCEDURE — 3075F SYST BP GE 130 - 139MM HG: CPT | Performed by: PHYSICIAN ASSISTANT

## 2024-07-11 PROCEDURE — 3008F BODY MASS INDEX DOCD: CPT | Performed by: FAMILY MEDICINE

## 2024-07-11 PROCEDURE — 99214 OFFICE O/P EST MOD 30 MIN: CPT | Performed by: FAMILY MEDICINE

## 2024-07-11 NOTE — PROGRESS NOTES
HPI:   Alesia Bland presents with complaints of R groin and anterior thigh pain.    The pain is described as moderate aching that is intermittent.  The patient’s activity level has remained the same since last visit.  The pain is worst in the late evening.    Changes in condition/history since last visit: patient is here today for follow up to discuss medication (T#4).  She is stable on 2 a day of T #4, and is using gabapentin and metaxalone.  No side effects reported, and is pleased with her response.      She had been seen at [facility] for L2 kyphoplasty with bone jack on 3/5/24.  Procedure was well tolerated, and had no adverse effects.  Overall, reports gradual improvement, and had reduced T#4 down to 2/day.  Her left groin and radiating anterior thigh pain has largely resolved, though the right continues, unchanged.  She has been discussing with [facility], and the possibility of a corpectomy was raised.  She has recently updated her MRI, though is yet to discuss this with her specialist at [facility].    Last procedure:  L2 kypho (at [facility]) Date:  3/5/24    % relief:  60%, though continues to slowly improve    Duration:  sustained     The following activities will increase the patient’s pain: ROM    The following activities decrease the patient’s pain: medications     Functional Assessment: Patient reports that they are able to complete all of their ADL's such as eating, bathing, using the toilet, dressing and getting up from a bed or a chair independently.    Current Medications:  Current Outpatient Medications   Medication Sig Dispense Refill    cephalexin 500 MG Oral Cap Take 1 capsule (500 mg total) by mouth 3 (three) times daily for 7 days. 21 capsule 0    acetaminophen-codeine 300-60 MG Oral Tab Take 1 tablet by mouth 2 (two) times daily as needed for Pain. 60 tablet 0    valACYclovir 1 G Oral Tab Take 1 tablet (1,000 mg total) by mouth daily.      folic acid 1 MG Oral Tab Take 1 tablet (1  mg total) by mouth daily.      METAXALONE 800 MG Oral Tab TAKE 1 TABLET BY MOUTH THREE TIMES A DAY AS NEEDED FOR PAIN 90 tablet 0    Mirabegron ER 25 MG Oral Tablet 24 Hr Take 1 tablet (25 mg total) by mouth daily. 90 tablet 6    Multiple Vitamins-Minerals (ALIVE MULTI-VITAMIN OR) Take 1 tablet by mouth daily.      NON FORMULARY Take 1-2 capsules by mouth daily as needed (pain). Product: Relieve-R capsules      ondansetron (ZOFRAN) 4 mg tablet Take 1 tablet (4 mg total) by mouth every 8 (eight) hours as needed for Nausea. 30 tablet 0    metFORMIN 500 MG Oral Tab Take 1 tablet (500 mg total) by mouth 2 (two) times daily. 180 tablet 3    metoprolol succinate  MG Oral Tablet 24 Hr Take 1 tablet (100 mg total) by mouth daily. 90 tablet 0    gabapentin 300 MG Oral Cap Take 3 capsules (900 mg total) by mouth 3 (three) times daily. 270 capsule 0    losartan 100 MG Oral Tab TAKE 1 TABLET BY MOUTH EVERY DAY 30 tablet 0    rizatriptan (MAXALT-MLT) 10 MG Oral Tablet Dispersible Take 1 tablet (10 mg total) by mouth as needed for Migraine. 90 tablet 1    triamcinolone 0.1 % External Cream Apply topically 2 (two) times daily. To affected skin. 80 g 1    Armodafinil 250 MG Oral Tab Take 250 mg by mouth daily.      LATUDA 60 MG Oral Tab Take 1 tablet (60 mg total) by mouth daily with food.      traZODone HCl 50 MG Oral Tab Take 1 tablet (50 mg total) by mouth nightly as needed.      DULoxetine HCl 60 MG Oral Cap DR Particles Take 2 capsules (120 mg total) by mouth daily.          Side effects of medications: None    Relief obtained from medication management: good relief     Patient requires assistance with: No assistance required    Reviewed Patient History Dated: 4/23/24 no changes noted  Patient is currently on pain medications:  Yes  [place] Prescription Monitoring review: Yes-Compliant    Physical Exam:   Vitals: /84   Pulse 68   Wt 190 lb (86.2 kg)   LMP 12/23/2014 (Exact Date)   SpO2 98%   BMI 30.67 kg/m²    VAS Pain Score:  5/10    General Appearance: Well developed, well nourished, normal build, independent body habitus, no apparent physical disabilities, well groomed    Neurological Exam: WNL-Orientation to time, place and person, normal mood & effect, normal concentration & attention span  Inspection: non-antalgic, no acute distress   Radiology/Lab Test Reviewed: MRI:  CONCLUSION:       1. There is a moderate to severe chronic compression fracture of L2 status post vertebral augmentation.  There is retropulsion of the posterior fracture fragments by approximately 5 mm.  This results in mild to moderate spinal canal compromise.  No other    compression fracture is seen within the lumbar spine.      2. Mild to moderate degenerative changes in the lumbar spine as described in detail above.       Patient educated and verbalized understanding.  Medical Decision Making:   Diagnosis:    Encounter Diagnoses   Name Primary?    Other chronic pain Yes    Chronic prescription opiate use          Impression: patient underwent cryoablation of her hemangioma with [facility] in 11/2023, after which, had significant improvement in pain.  As such, she had been reducing her T#4, and had just weaned off it without adverse effects when, 4 days later, had acute onset of left lumbosacral pain.  Seen in the ER, and x-ray did reveal a new fracture of the L2.  She had returned to taking Tylenol #4 with codeine, and after MRI, returned to [facility] where she underwent kyphoplasty with spine heena, from which, she has had gradual improvement in pain.    While at [place], she was given oxycodone, though did not find it to be as effective as Tylenol with codeine, and returned to T#4.  She had been using 6/day, though has since weaned down to 2/day.  While her low back pain at this point has been largely resolved, she has unfortunately had increase in bilateral lower extremity pain along the groin and anterior thighs, stopping at the knees.   This is been associated with a significant reduction in her ADL tolerance, and had returned to metaxalone, having been on this for a number of years prior to weaning.  Updated MRI does show significant bony retropulsion resulting and subarticular stenosis, in keeping with these complaints.  She has been in contact with [facility], and while they have not seen the most recent MRI findings, there has been some discussion of a possible corpectomy.  Will defer to [facility].  Plan: Status post L2 kyphoplasty with spine heena at [facility] on 3/5/2024, and while low back pain is largely eliminated, has had ongoing groin and anterior thigh pain, limiting her ADLs.  Has reduced Tylenol with codeine from 6/day to 2/day, and was recently refilled.  Can call for refill when needed.  In addition, continues with Neurontin and metaxalone, without adverse effects, call for refills when needed.  Follow with [facility] regarding options for her bony retropulsion.  No orders of the defined types were placed in this encounter.      Meds & Refills for this Visit:  Requested Prescriptions      No prescriptions requested or ordered in this encounter       Imaging & Consults:  None    The patient indicates understanding of these issues and agrees to the plan.    KELLE Elizabeth

## 2024-07-11 NOTE — TELEPHONE ENCOUNTER
Order placed for XR lumbar spine.   staff to call patient and reschedule appointment once XR has been completed, so imaging can be reviewed during her follow-up visit.

## 2024-07-11 NOTE — PATIENT INSTRUCTIONS
Refill policies:    Allow 2-3 business days for refills; controlled substances may take longer.  Contact your pharmacy at least 5 days prior to running out of medication and have them send an electronic request or submit request through the “request refill” option in your [employer] account.  Refills are not addressed on weekends; covering physicians do not authorize routine medications on weekends.  No narcotics or controlled substances are refilled after noon on Fridays or by on call physicians.  By law, narcotics must be electronically prescribed.  A 30 day supply with no refills is the maximum allowed.  If your prescription is due for a refill, you may be due for a follow up appointment.  To best provide you care, patients receiving routine medications need to be seen at least once a year.  Patients receiving narcotic/controlled substance medications need to be seen at least once every 3 months.  In the event that your preferred pharmacy does not have the requested medication in stock (e.g. Backordered), it is your responsibility to find another pharmacy that has the requested medication available.  We will gladly send a new prescription to that pharmacy at your request.    Scheduling Tests:    If your physician has ordered radiology tests such as MRI or CT scans, please contact Central Scheduling at [phone] right away to schedule the test.  Once scheduled, the [facility] Centralized Referral Team will work with your insurance carrier to obtain pre-certification or prior authorization.  Depending on your insurance carrier, approval may take 3-10 days.  It is highly recommended patients assure they have received an authorization before having a test performed.  If test is done without insurance authorization, patient may be responsible for the entire amount billed.      Precertification and Prior Authorizations:  If your physician has recommended that you have a procedure or additional testing performed the [facility]  Centralized Referral Team will contact your insurance carrier to obtain pre-certification or prior authorization.    You are strongly encouraged to contact your insurance carrier to verify that your procedure/test has been approved and is a COVERED benefit.  Although the [facility] Centralized Referral Team does its due diligence, the insurance carrier gives the disclaimer that \"Although the procedure is authorized, this does not guarantee payment.\"    Ultimately the patient is responsible for payment.   Thank you for your understanding in this matter.  Paperwork Completion:  If you require FMLA or disability paperwork for your recovery, please make sure to either drop it off or have it faxed to our office at [phone]. Be sure the form has your name and date of birth on it.  The form will be faxed to our Forms Department and they will complete it for you.  There is a 25$ fee for all forms that need to be filled out.  Please be aware there is a 10-14 day turnaround time.  You will need to sign a release of information (ELIAS) form if your paperwork does not come with one.  You may call the Forms Department with any questions at [phone].  Their fax number is [phone].

## 2024-07-11 NOTE — PROGRESS NOTES
Patient presents in office today with reported pain in lumbar spine    Current pain level reported = 5/10    Last reported dose of T3 this morning      Narcotic Contract renewal 10/2023    Urine Drug screen 07/2024

## 2024-07-11 NOTE — PROGRESS NOTES
Subjective:   Patient ID: Alesia Bland is a 59 year old female.    Spider bite almost 2 months ago.  Area got infected.    Then she got encephalopathy from it.  Since then traveling arthritis.  While investigating all of that it was found she had kidney stones up to 7 mm on left and staghorn on right.    History/Other:   Review of Systems   All other systems reviewed and are negative.    Current Outpatient Medications   Medication Sig Dispense Refill    cephalexin 500 MG Oral Cap Take 1 capsule (500 mg total) by mouth 3 (three) times daily for 7 days. 21 capsule 0    acetaminophen-codeine 300-60 MG Oral Tab Take 1 tablet by mouth 2 (two) times daily as needed for Pain. 60 tablet 0    valACYclovir 1 G Oral Tab Take 1 tablet (1,000 mg total) by mouth daily.      folic acid 1 MG Oral Tab Take 1 tablet (1 mg total) by mouth daily.      METAXALONE 800 MG Oral Tab TAKE 1 TABLET BY MOUTH THREE TIMES A DAY AS NEEDED FOR PAIN 90 tablet 0    Mirabegron ER 25 MG Oral Tablet 24 Hr Take 1 tablet (25 mg total) by mouth daily. 90 tablet 6    Multiple Vitamins-Minerals (ALIVE MULTI-VITAMIN OR) Take 1 tablet by mouth daily.      NON FORMULARY Take 1-2 capsules by mouth daily as needed (pain). Product: Relieve-R capsules      ondansetron (ZOFRAN) 4 mg tablet Take 1 tablet (4 mg total) by mouth every 8 (eight) hours as needed for Nausea. 30 tablet 0    metFORMIN 500 MG Oral Tab Take 1 tablet (500 mg total) by mouth 2 (two) times daily. 180 tablet 3    metoprolol succinate  MG Oral Tablet 24 Hr Take 1 tablet (100 mg total) by mouth daily. 90 tablet 0    gabapentin 300 MG Oral Cap Take 3 capsules (900 mg total) by mouth 3 (three) times daily. 270 capsule 0    losartan 100 MG Oral Tab TAKE 1 TABLET BY MOUTH EVERY DAY 30 tablet 0    triamcinolone 0.1 % External Cream Apply topically 2 (two) times daily. To affected skin. 80 g 1    Armodafinil 250 MG Oral Tab Take 250 mg by mouth daily.      traZODone HCl 50 MG Oral Tab Take 1  tablet (50 mg total) by mouth nightly as needed.      DULoxetine HCl 60 MG Oral Cap DR Particles Take 2 capsules (120 mg total) by mouth daily.      rizatriptan (MAXALT-MLT) 10 MG Oral Tablet Dispersible Take 1 tablet (10 mg total) by mouth as needed for Migraine. 90 tablet 1    LATUDA 60 MG Oral Tab Take 1 tablet (60 mg total) by mouth daily with food.       Allergies:  Allergies   Allergen Reactions    Hydrocodone NAUSEA AND VOMITING    Benzoyl Peroxide RASH     CREA    Levofloxacin RASH    Pcn [Penicillins] RASH     As a child - simple rash, no emergency treatment needed. States has had cephalosporin without reactions       Objective:   Physical Exam  Vitals reviewed.   Constitutional:       General: She is not in acute distress.     Appearance: She is well-developed. She is not diaphoretic.   Eyes:      General: No scleral icterus.        Right eye: No discharge.         Left eye: No discharge.      Conjunctiva/sclera: Conjunctivae normal.   Cardiovascular:      Rate and Rhythm: Normal rate and regular rhythm.      Heart sounds: Normal heart sounds. No murmur heard.     No friction rub. No gallop.   Pulmonary:      Effort: Pulmonary effort is normal. No respiratory distress.      Breath sounds: Normal breath sounds. No wheezing or rales.   Abdominal:      Palpations: Abdomen is soft.      Tenderness: There is no abdominal tenderness.   Skin:     Comments: Wound is healed.  Scar is showing.        Assessment & Plan:   1. Spider bite wound, accidental or unintentional, sequela    2. Encephalopathy    3. Multiple kidney stones    4. Prediabetes      1. Spider bite wound, accidental or unintentional, sequela  Resolved.    2. Encephalopathy  Resolved.    3. Multiple kidney stones  Following up with nephrologist.    4. Prediabetes  Has stopped metformin.  Keep working on diet.  - Hemoglobin A1C; Future    Orders Placed This Encounter   Procedures    Hemoglobin A1C       Meds This Visit:  Requested Prescriptions       No prescriptions requested or ordered in this encounter       Imaging & Referrals:  None

## 2024-07-16 ENCOUNTER — TELEPHONE (OUTPATIENT)
Dept: RHEUMATOLOGY | Facility: CLINIC | Age: 59
End: 2024-07-16

## 2024-07-17 ENCOUNTER — TELEPHONE (OUTPATIENT)
Dept: HEMATOLOGY/ONCOLOGY | Age: 59
End: 2024-07-17

## 2024-07-17 NOTE — TELEPHONE ENCOUNTER
CHRISTIANO Rose from Dr. Snider's office is calling states they sent orders for pt to have a reclast and she still doesn't have it     Please give the pt a call back at [phone]. Thank you

## 2024-07-17 NOTE — TELEPHONE ENCOUNTER
Phoned [facility] to schedule pt for Reclast in [facility]. Message left with Celia to have scheduling team call pt to schedule

## 2024-07-17 NOTE — TELEPHONE ENCOUNTER
Can you reach out to [facility]. Can they schedule pt for Reclast? I do not see this scheduled yet.

## 2024-07-18 ENCOUNTER — ANESTHESIA EVENT (OUTPATIENT)
Dept: SURGERY | Facility: HOSPITAL | Age: 59
End: 2024-07-18
Payer: COMMERCIAL

## 2024-07-18 ENCOUNTER — TELEPHONE (OUTPATIENT)
Dept: HEMATOLOGY/ONCOLOGY | Facility: HOSPITAL | Age: 59
End: 2024-07-18

## 2024-07-18 ENCOUNTER — ANESTHESIA (OUTPATIENT)
Dept: SURGERY | Facility: HOSPITAL | Age: 59
End: 2024-07-18
Payer: COMMERCIAL

## 2024-07-18 ENCOUNTER — APPOINTMENT (OUTPATIENT)
Dept: GENERAL RADIOLOGY | Facility: HOSPITAL | Age: 59
End: 2024-07-18
Attending: UROLOGY
Payer: COMMERCIAL

## 2024-07-18 ENCOUNTER — HOSPITAL ENCOUNTER (OUTPATIENT)
Facility: HOSPITAL | Age: 59
Setting detail: HOSPITAL OUTPATIENT SURGERY
Discharge: HOME | End: 2024-07-18
Attending: UROLOGY | Admitting: UROLOGY
Payer: COMMERCIAL

## 2024-07-18 VITALS
SYSTOLIC BLOOD PRESSURE: 130 MMHG | OXYGEN SATURATION: 99 % | WEIGHT: 180 LBS | HEIGHT: 66 IN | DIASTOLIC BLOOD PRESSURE: 65 MMHG | BODY MASS INDEX: 28.93 KG/M2 | RESPIRATION RATE: 18 BRPM | TEMPERATURE: 97 F | HEART RATE: 69 BPM

## 2024-07-18 DIAGNOSIS — N20.0 NEPHROLITHIASIS: ICD-10-CM

## 2024-07-18 PROBLEM — M81.0 SENILE OSTEOPOROSIS: Status: ACTIVE | Noted: 2024-07-18

## 2024-07-18 PROCEDURE — 0TC78ZZ EXTIRPATION OF MATTER FROM LEFT URETER, VIA NATURAL OR ARTIFICIAL OPENING ENDOSCOPIC: ICD-10-PCS | Performed by: UROLOGY

## 2024-07-18 PROCEDURE — BT1F1ZZ FLUOROSCOPY OF LEFT KIDNEY, URETER AND BLADDER USING LOW OSMOLAR CONTRAST: ICD-10-PCS | Performed by: UROLOGY

## 2024-07-18 PROCEDURE — 52356 CYSTO/URETERO W/LITHOTRIPSY: CPT | Performed by: UROLOGY

## 2024-07-18 PROCEDURE — 0T778DZ DILATION OF LEFT URETER WITH INTRALUMINAL DEVICE, VIA NATURAL OR ARTIFICIAL OPENING ENDOSCOPIC: ICD-10-PCS | Performed by: UROLOGY

## 2024-07-18 PROCEDURE — 74420 UROGRAPHY RTRGR +-KUB: CPT | Performed by: UROLOGY

## 2024-07-18 DEVICE — URETERAL STENT
Type: IMPLANTABLE DEVICE | Site: URETER | Status: FUNCTIONAL
Brand: ASCERTA™

## 2024-07-18 RX ORDER — ONDANSETRON 2 MG/ML
4 INJECTION INTRAMUSCULAR; INTRAVENOUS EVERY 6 HOURS PRN
Status: DISCONTINUED | OUTPATIENT
Start: 2024-07-18 | End: 2024-07-18

## 2024-07-18 RX ORDER — NALOXONE HYDROCHLORIDE 0.4 MG/ML
0.08 INJECTION, SOLUTION INTRAMUSCULAR; INTRAVENOUS; SUBCUTANEOUS AS NEEDED
Status: DISCONTINUED | OUTPATIENT
Start: 2024-07-18 | End: 2024-07-18

## 2024-07-18 RX ORDER — SCOLOPAMINE TRANSDERMAL SYSTEM 1 MG/1
1 PATCH, EXTENDED RELEASE TRANSDERMAL ONCE
Status: DISCONTINUED | OUTPATIENT
Start: 2024-07-18 | End: 2024-07-18 | Stop reason: HOSPADM

## 2024-07-18 RX ORDER — HYDROMORPHONE HYDROCHLORIDE 1 MG/ML
0.6 INJECTION, SOLUTION INTRAMUSCULAR; INTRAVENOUS; SUBCUTANEOUS EVERY 5 MIN PRN
Status: DISCONTINUED | OUTPATIENT
Start: 2024-07-18 | End: 2024-07-18

## 2024-07-18 RX ORDER — SODIUM CHLORIDE, SODIUM LACTATE, POTASSIUM CHLORIDE, CALCIUM CHLORIDE 600; 310; 30; 20 MG/100ML; MG/100ML; MG/100ML; MG/100ML
INJECTION, SOLUTION INTRAVENOUS CONTINUOUS
Status: DISCONTINUED | OUTPATIENT
Start: 2024-07-18 | End: 2024-07-18

## 2024-07-18 RX ORDER — ROCURONIUM BROMIDE 10 MG/ML
INJECTION, SOLUTION INTRAVENOUS AS NEEDED
Status: DISCONTINUED | OUTPATIENT
Start: 2024-07-18 | End: 2024-07-18 | Stop reason: SURG

## 2024-07-18 RX ORDER — LIDOCAINE HYDROCHLORIDE 10 MG/ML
INJECTION, SOLUTION EPIDURAL; INFILTRATION; INTRACAUDAL; PERINEURAL AS NEEDED
Status: DISCONTINUED | OUTPATIENT
Start: 2024-07-18 | End: 2024-07-18 | Stop reason: SURG

## 2024-07-18 RX ORDER — OXYBUTYNIN CHLORIDE 5 MG/1
5 TABLET ORAL 3 TIMES DAILY PRN
Qty: 15 TABLET | Refills: 0 | Status: SHIPPED | OUTPATIENT
Start: 2024-07-18

## 2024-07-18 RX ORDER — PHENAZOPYRIDINE HYDROCHLORIDE 100 MG/1
100 TABLET, FILM COATED ORAL 3 TIMES DAILY PRN
Qty: 10 TABLET | Refills: 0 | Status: SHIPPED | OUTPATIENT
Start: 2024-07-18

## 2024-07-18 RX ORDER — IBUPROFEN 600 MG/1
600 TABLET ORAL ONCE AS NEEDED
Status: DISCONTINUED | OUTPATIENT
Start: 2024-07-18 | End: 2024-07-18

## 2024-07-18 RX ORDER — ACETAMINOPHEN 500 MG
1000 TABLET ORAL ONCE AS NEEDED
Status: DISCONTINUED | OUTPATIENT
Start: 2024-07-18 | End: 2024-07-18

## 2024-07-18 RX ORDER — CEFAZOLIN SODIUM 1 G/3ML
INJECTION, POWDER, FOR SOLUTION INTRAMUSCULAR; INTRAVENOUS AS NEEDED
Status: DISCONTINUED | OUTPATIENT
Start: 2024-07-18 | End: 2024-07-18 | Stop reason: SURG

## 2024-07-18 RX ORDER — MEPERIDINE HYDROCHLORIDE 25 MG/ML
12.5 INJECTION INTRAMUSCULAR; INTRAVENOUS; SUBCUTANEOUS AS NEEDED
Status: DISCONTINUED | OUTPATIENT
Start: 2024-07-18 | End: 2024-07-18

## 2024-07-18 RX ORDER — ACETAMINOPHEN 500 MG
1000 TABLET ORAL ONCE
Status: DISCONTINUED | OUTPATIENT
Start: 2024-07-18 | End: 2024-07-18 | Stop reason: HOSPADM

## 2024-07-18 RX ORDER — EPHEDRINE SULFATE 50 MG/ML
INJECTION INTRAVENOUS AS NEEDED
Status: DISCONTINUED | OUTPATIENT
Start: 2024-07-18 | End: 2024-07-18 | Stop reason: SURG

## 2024-07-18 RX ORDER — LABETALOL HYDROCHLORIDE 5 MG/ML
5 INJECTION, SOLUTION INTRAVENOUS EVERY 5 MIN PRN
Status: DISCONTINUED | OUTPATIENT
Start: 2024-07-18 | End: 2024-07-18

## 2024-07-18 RX ORDER — HYDROMORPHONE HYDROCHLORIDE 1 MG/ML
0.2 INJECTION, SOLUTION INTRAMUSCULAR; INTRAVENOUS; SUBCUTANEOUS EVERY 5 MIN PRN
Status: DISCONTINUED | OUTPATIENT
Start: 2024-07-18 | End: 2024-07-18

## 2024-07-18 RX ORDER — ONDANSETRON 2 MG/ML
INJECTION INTRAMUSCULAR; INTRAVENOUS AS NEEDED
Status: DISCONTINUED | OUTPATIENT
Start: 2024-07-18 | End: 2024-07-18 | Stop reason: SURG

## 2024-07-18 RX ORDER — MIDAZOLAM HYDROCHLORIDE 1 MG/ML
1 INJECTION INTRAMUSCULAR; INTRAVENOUS EVERY 5 MIN PRN
Status: DISCONTINUED | OUTPATIENT
Start: 2024-07-18 | End: 2024-07-18

## 2024-07-18 RX ORDER — MIDAZOLAM HYDROCHLORIDE 1 MG/ML
INJECTION INTRAMUSCULAR; INTRAVENOUS AS NEEDED
Status: DISCONTINUED | OUTPATIENT
Start: 2024-07-18 | End: 2024-07-18 | Stop reason: SURG

## 2024-07-18 RX ORDER — TAMSULOSIN HYDROCHLORIDE 0.4 MG/1
0.4 CAPSULE ORAL EVERY EVENING
Qty: 14 CAPSULE | Refills: 0 | Status: SHIPPED | OUTPATIENT
Start: 2024-07-18 | End: 2024-08-01

## 2024-07-18 RX ORDER — PROCHLORPERAZINE EDISYLATE 5 MG/ML
5 INJECTION INTRAMUSCULAR; INTRAVENOUS EVERY 8 HOURS PRN
Status: DISCONTINUED | OUTPATIENT
Start: 2024-07-18 | End: 2024-07-18

## 2024-07-18 RX ORDER — PHENYLEPHRINE HCL 10 MG/ML
VIAL (ML) INJECTION AS NEEDED
Status: DISCONTINUED | OUTPATIENT
Start: 2024-07-18 | End: 2024-07-18 | Stop reason: SURG

## 2024-07-18 RX ORDER — DEXAMETHASONE SODIUM PHOSPHATE 4 MG/ML
VIAL (ML) INJECTION AS NEEDED
Status: DISCONTINUED | OUTPATIENT
Start: 2024-07-18 | End: 2024-07-18 | Stop reason: SURG

## 2024-07-18 RX ORDER — HYDROMORPHONE HYDROCHLORIDE 1 MG/ML
0.4 INJECTION, SOLUTION INTRAMUSCULAR; INTRAVENOUS; SUBCUTANEOUS EVERY 5 MIN PRN
Status: DISCONTINUED | OUTPATIENT
Start: 2024-07-18 | End: 2024-07-18

## 2024-07-18 RX ADMIN — DEXAMETHASONE SODIUM PHOSPHATE 8 MG: 4 MG/ML VIAL (ML) INJECTION at 15:11:00

## 2024-07-18 RX ADMIN — EPHEDRINE SULFATE 5 MG: 50 INJECTION INTRAVENOUS at 15:43:00

## 2024-07-18 RX ADMIN — ONDANSETRON 4 MG: 2 INJECTION INTRAMUSCULAR; INTRAVENOUS at 15:12:00

## 2024-07-18 RX ADMIN — ROCURONIUM BROMIDE 50 MG: 10 INJECTION, SOLUTION INTRAVENOUS at 15:06:00

## 2024-07-18 RX ADMIN — PHENYLEPHRINE HCL 100 MCG: 10 MG/ML VIAL (ML) INJECTION at 15:43:00

## 2024-07-18 RX ADMIN — MIDAZOLAM HYDROCHLORIDE 2 MG: 1 INJECTION INTRAMUSCULAR; INTRAVENOUS at 14:57:00

## 2024-07-18 RX ADMIN — SODIUM CHLORIDE, SODIUM LACTATE, POTASSIUM CHLORIDE, CALCIUM CHLORIDE: 600; 310; 30; 20 INJECTION, SOLUTION INTRAVENOUS at 14:54:00

## 2024-07-18 RX ADMIN — LIDOCAINE HYDROCHLORIDE 50 MG: 10 INJECTION, SOLUTION EPIDURAL; INFILTRATION; INTRACAUDAL; PERINEURAL at 15:05:00

## 2024-07-18 RX ADMIN — EPHEDRINE SULFATE 5 MG: 50 INJECTION INTRAVENOUS at 15:31:00

## 2024-07-18 RX ADMIN — CEFAZOLIN SODIUM 2 G: 1 INJECTION, POWDER, FOR SOLUTION INTRAMUSCULAR; INTRAVENOUS at 15:14:00

## 2024-07-18 RX ADMIN — PHENYLEPHRINE HCL 100 MCG: 10 MG/ML VIAL (ML) INJECTION at 15:31:00

## 2024-07-18 RX ADMIN — SODIUM CHLORIDE, SODIUM LACTATE, POTASSIUM CHLORIDE, CALCIUM CHLORIDE: 600; 310; 30; 20 INJECTION, SOLUTION INTRAVENOUS at 16:15:00

## 2024-07-18 NOTE — ANESTHESIA POSTPROCEDURE EVALUATION
[facility]    Alesia Bland Patient Status:  Hospital Outpatient Surgery   Age/Gender 59 year old female MRN [id number]   Location [facility] SURGERY Attending Nitin Aguero MD   Hosp Day # 0 PCP Toni Callahan DO       Anesthesia Post-op Note    CYSTOSCOPY, LEFT URETEROSCOPY, LASER LITHOTRIPSY, STENT PLACEMENT    Procedure Summary       Date: 07/18/24 Room / Location:  MAIN OR 07 /  MAIN OR    Anesthesia Start: 1454 Anesthesia Stop: 1621    Procedure: CYSTOSCOPY, LEFT URETEROSCOPY, LASER LITHOTRIPSY, STENT PLACEMENT (Left) Diagnosis:       Nephrolithiasis      (Nephrolithiasis [N20.0])    Surgeons: Nitin Aguero MD Anesthesiologist: Kiran Baker MD    Anesthesia Type: general ASA Status: 2            Anesthesia Type: general    Vitals Value Taken Time   /81 07/18/24 1621   Temp 97.5 07/18/24 1621   Pulse 79 07/18/24 1621   Resp 10 07/18/24 1621   SpO2 96% 07/18/24 1621       Patient Location: PACU    Anesthesia Type: general    Airway Patency: patent and extubated    Postop Pain Control: adequate    Mental Status: preanesthetic baseline    Nausea/Vomiting: none    Cardiopulmonary/Hydration status: stable euvolemic    Complications: no apparent anesthesia related complications    Postop vital signs: stable    Comments: Report to Re PACU RN.    Dental Exam: Unchanged from Preop    Patient to be discharged from PACU when criteria met.

## 2024-07-18 NOTE — DISCHARGE INSTRUCTIONS
You had cystoscopy, ureteroscopy, and stent placement in the operating room today.    Instructions:    - No heavy lifting or strenuous activity for 1 day. You may resume regular activity tomorrow.       - Your follow-up appointment is listed below. Please contact us at [phone] if you need to change your appointment.     Your appointments       Date & Time Appointment Department (Center)    Jul 20, 2024 9:00 AM CDT XRAY LUMBAR SPINE with PF XR RM1 [facility] X-ray [facility] ([facility])    Your appointment is in the X-ray department on the MAIN FLOOR of the building.  After entering the main door, proceed straight ahead to Registration where you will check in for your appointment.      Jul 24, 2024 9:00 AM CDT Follow Up Visit with Marcela Alfonso APRN [facility] ([facility])        Jul 29, 2024 2:00 PM CDT Procedure with Nitin Aguero MD [facility] ([place] [address])        Aug 14, 2024 9:00 AM CDT Urogynecology Evaluation with Noemí South MD [facility] (--)        Oct 11, 2024 10:00 AM CDT Follow Up Visit with Rob Arrieta PA [facility] ([facility])        Jun 19, 2025 11:00 AM CDT Exam - Established with Griselda Snider MD [address] ([address])              [facility]  [address] [place] [address]  [phone] [address]  [address] [place] [id number] [phone] [address]  [facility]  [id number] [phone]    [facility]  [address]  [facility] [address] [phone] [facility] 4  [address]  [facility] [address] [phone] [facility]  [address]  [facility] [id number] [phone]             -  You have a stent (small plastic tube) inside your kidney and ureter to allow the swelling from surgery to resolve. This is only temporary and must be removed, so you should not forget about it. We will remove your stent via a brief cystoscopy in clinic when you return. If you have to miss this appointment for some reason please make sure you re-schedule it.     - Complete the antibiotics as ordered       - With a ureteral stent in place you may have some discomfort on your side/flank. You can feel pain worsen when you urinate, so try to avoid holding urine and void every 2-3 hours. You also may notice increased blood in the urine as you increase your activity. If this happens increase your hydration and take it easy for a day. Warm baths/hot packs can help with the discomfort as well as your prescribed medications and Advil/Motrin (if you are able to take these).     - You may experience mild pain after the procedure for a few days.  If the pain becomes intolerable please contact our office or go to the nearest Emergency Room or Urgent Care. You should take over the counter ibuprofen (AKA motrin, advil) for mild pain (provided you do not have a medical condition such as stomach ulcers or kidney disease which prohibits you from taking these). You may alternate this with tylenol as well. If pain is still not relieved by tylenol and/or ibuprofen, you may take narcotic pain medication if prescribed (typically oxycodone or tramadol). If you are taking narcotic pain medication this  can make you constipated, so you should take over the counter stool softeners or miralax if prescribed.    - Warm pack or hot baths often help with discomfort after cystoscopy.    - If you take blood thinners (such as aspirin or plavix) please hold these medications until 3 days after surgery.     - You may experience burning and frequency of urination over the next few days. This will improve after a few days if you stay well hydrated. If you were prescribed phenazopyridine (Pyridium) this may relieve urinary discomfort but you can only take this for 3 days. Pyridium will make your urine orange.     - You are likely to see some blood in your urine (pink or light red urine) that should clear up within a few days. Staying well hydrated should help this clear up. If you notice the urine stays dark red or there are multiple large blood clots despite good hydration, please call the urology clinic ([phone]).     - Try to abstain from alcohol, coffee, tea, artificial sweeteners, and spicy food for the next 48 hours as these can irritate the bladder.     - If you develop fevers / chills, difficulty urinating, or abdominal pain that does not improve with pain medications, please call the office.     - Drink 1.5 to 2 liters of fluid today (water is preferable). If you are on a fluid restriction due to other medical reasons then you need to adhere to your fluid restriction recommendations.      Nitin Aguero MD  Staff Urologist  [facility]  Office: [phone]

## 2024-07-18 NOTE — ANESTHESIA PROCEDURE NOTES
Airway  Date/Time: 7/18/2024 3:08 PM  Urgency: elective    Airway not difficult    General Information and Staff    Patient location during procedure: OR  Anesthesiologist: Kiran Baker MD  Resident/CRNA: Petra Martell CRNA  Performed: CRNA   Performed by: Petra Martell CRNA  Authorized by: Kiran Baker MD      Indications and Patient Condition  Indications for airway management: anesthesia  Sedation level: deep  Preoxygenated: yes  Patient position: sniffing  Mask difficulty assessment: 1 - vent by mask    Final Airway Details  Final airway type: endotracheal airway      Successful airway: ETT  Cuffed: yes   Successful intubation technique: direct laryngoscopy  Endotracheal tube insertion site: oral  Blade: Ade  Blade size: #3  ETT size (mm): 7.0    Cormack-Lehane Classification: grade I - full view of glottis  Placement verified by: capnometry   Measured from: lips  ETT to lips (cm): 21  Number of attempts at approach: 1    Additional Comments  Atraumatic intubation. Dentition and OP as per preop.

## 2024-07-18 NOTE — H&P
UROLOGY PRE-OPERATIVE HISTORY & PHYSICAL      Alesia Bland Patient Status:  Hospital Outpatient Surgery    3/10/1965 MRN [id number]   Formerly [facility] SURGERY Attending Nitin Aguero MD   Hosp Day # 0 PCP Toni Callahan DO     Primary Care Provider: Toni Callahan DO     Procedure      CYSTOSCOPY, LEFT URETEROSCOPY, LASER LITHOTRIPSY, STENT PLACEMENT:     History of Present Illness:       Alesia Bland is a 59 year old female with history of GIANLUCA, HTN, chronic pain referred for nephrolithaisis.    Patient saw PCP  with spider bite- a CT was done to rule out abscess.   KIDNEYS:  7 mm calculus in the proximal left ureter is causing mild to moderate hydronephrosis.  Multiple nonobstructing calculi in the lower pole left kidney.  1.3 cm.  Calculus lower pole the right kidney.  ADRENALS:  No mass or enlargement.    She was given antibiotics.  No clear abscess as seen- wound care was done.     She was to see urology. She was then admitted here at  with AMS, join pain.  Was possibly related to recent spider bite.  She was treated with steroid course and was discharged home on  after 4 days. No kidney imaging was done during hospital stay. She is doing better from this standpoint,   She has no previous urologic history. Does have chronic urgency, UUI- is currently on solfenacin for this with some improvement. She has no history of stones.   She is a MD; works in a detention.  No flank pain she saw me last month or previously (incidental stone). No gross hematuria. No UTI symptoms today.  Does not think she passed stone.  Last visit we discussed repeating imaging.  This was done.  She returns now to discuss.  Her CT scan shows that the previous lower pole left-sided stone is now fallen into the ureter.  She now has 2 obstructing stones in the left side.  She has a additional large stone on the right side that is nonobstructing-has staghorn appearance.     She is doing well. Remains asymptomatic. Her  other CT scan findings were sent to PCP.   Detailed discussed had regarding her stone burden. For her obstructing stones on the LEFT; we discussed options for management and patient is most interested in ureteroscopic management for the 2 adjacent obstructing stones.   For her RIGHT sided larger stone burden with lower pole partial staghorn; we discussed several options including observation, URS (either scheduling for after her LEFT side is addressed or stenting at time of LEFT sided intervention and then proceeding with RIGHT at later date) and PCNL. We had an extensive discussion of the pros and cons of each and for now patient would like to address the LEFT side only with ureteroscopy and discuss there RIGHT side management once she has recovered.    For URS; The risks discussed included, but were not limited to, urinary tract infection, sepsis, bleeding, injury to urethra/bladder/ureter, urethra stricture, ureteral stricture, inability to treat stone.           Ucx: lactobacillus- keflex      Stent size 6x26   5' 6\" (1.676 m)    History:     Past Medical History:    Anesthesia complication    n&v    Arrhythmia    idiopathic tachycardia    Back problem    Bronchitis, not specified as acute or chronic    Bronchospasm    Brown recluse spider bite    Calculus of kidney    Depression    Esophageal reflux    Extrinsic asthma, unspecified    Hemangioma of vertebral column    High blood pressure    Incontinence    Kidney stones    Lipid screening    Migraines    GIANLUCA (obstructive sleep apnea)    AHI 14 Supine AHI 20 non-supine AHI 9    PONV (postoperative nausea and vomiting)    Prediabetes    Sleep apnea    prior to losing weight - no device    Snoring    AHI 4.4 SaO2 janelle 86-.5 primary snoring    Spinal hemangioma       Past Surgical History:   Procedure Laterality Date    Appendectomy  2008    Breast surgery procedure unlisted      enlargement      2003    Colonoscopy  2009    negative     Excis lumbar disk,one level      Hysterectomy  2015    total hystero.    Oophorectomy Bilateral 2015    total hystero    Other  03/08/2021    spinal angiogram with embolization    Other surgical history Bilateral 02/20/2015    Procedure: ABDOMINAL HYSTERECTOMY, BSO;  Surgeon: aClixto Weathers MD;  Location:  MAIN OR       Family History   Problem Relation Age of Onset    Depression Mother     Other (Other) Mother     Hypertension Father     Other (Other) Father     Melanoma Father     Heart Attack Maternal Grandmother     Breast Cancer Maternal Grandmother 50        In her early 50's.    Other (Other) Maternal Grandmother     Other (leukemia) Maternal Grandfather     Diabetes Paternal Grandmother         type 2    Other (renal failure) Paternal Grandmother     Other (lung cancer) Paternal Grandfather        Social History     Socioeconomic History    Marital status:    Tobacco Use    Smoking status: Never    Smokeless tobacco: Never   Vaping Use    Vaping status: Never Used   Substance and Sexual Activity    Alcohol use: Not Currently     Comment: rare    Drug use: No   Other Topics Concern    Caffeine Concern Yes     Comment: 3-4 cups    Exercise Yes     Comment: walking     Social Determinants of Health     Financial Resource Strain: Low Risk  (8/17/2023)    Received from [facility]    Overall Financial Resource Strain (CARDIA)     Difficulty of Paying Living Expenses: Not very hard   Food Insecurity: No Food Insecurity (6/9/2024)    Food Insecurity     Food Insecurity: Never true   Transportation Needs: No Transportation Needs (6/9/2024)    Transportation Needs     Lack of Transportation: No   Physical Activity: Inactive (8/17/2023)    Received from [facility]    Exercise Vital Sign     Days of Exercise per Week: 0 days     Minutes of Exercise per Session: 0 min   Housing Stability: Low Risk  (6/9/2024)    Housing Stability     Housing Instability: No        Medications:  Current Outpatient Medications   Medication Sig Dispense Refill    acetaminophen-codeine 300-60 MG Oral Tab Take 1 tablet by mouth 2 (two) times daily as needed for Pain. 60 tablet 0    valACYclovir 1 G Oral Tab Take 1 tablet (1,000 mg total) by mouth daily.      folic acid 1 MG Oral Tab Take 1 tablet (1 mg total) by mouth daily.      METAXALONE 800 MG Oral Tab TAKE 1 TABLET BY MOUTH THREE TIMES A DAY AS NEEDED FOR PAIN 90 tablet 0    Multiple Vitamins-Minerals (ALIVE MULTI-VITAMIN OR) Take 1 tablet by mouth daily.      NON FORMULARY Take 1-2 capsules by mouth daily as needed (pain). Product: Relieve-R capsules      ondansetron (ZOFRAN) 4 mg tablet Take 1 tablet (4 mg total) by mouth every 8 (eight) hours as needed for Nausea. 30 tablet 0    metoprolol succinate  MG Oral Tablet 24 Hr Take 1 tablet (100 mg total) by mouth daily. 90 tablet 0    gabapentin 300 MG Oral Cap Take 3 capsules (900 mg total) by mouth 3 (three) times daily. 270 capsule 0    rizatriptan (MAXALT-MLT) 10 MG Oral Tablet Dispersible Take 1 tablet (10 mg total) by mouth as needed for Migraine. 90 tablet 1    triamcinolone 0.1 % External Cream Apply topically 2 (two) times daily. To affected skin. 80 g 1    Armodafinil 250 MG Oral Tab Take 250 mg by mouth daily.      LATUDA 60 MG Oral Tab Take 1 tablet (60 mg total) by mouth daily with food.      traZODone HCl 50 MG Oral Tab Take 1 tablet (50 mg total) by mouth nightly as needed.      DULoxetine HCl 60 MG Oral Cap DR Particles Take 2 capsules (120 mg total) by mouth daily.      cephalexin 500 MG Oral Cap Take 1 capsule (500 mg total) by mouth 3 (three) times daily for 7 days. 21 capsule 0    Mirabegron ER 25 MG Oral Tablet 24 Hr Take 1 tablet (25 mg total) by mouth daily. 90 tablet 6    metFORMIN 500 MG Oral Tab Take 1 tablet (500 mg total) by mouth 2 (two) times daily. 180 tablet 3    losartan 100 MG Oral Tab TAKE 1 TABLET BY MOUTH EVERY DAY 30 tablet 0        Allergies:  Allergies   Allergen Reactions    Hydrocodone NAUSEA AND VOMITING    Benzoyl Peroxide RASH     CREA    Levofloxacin RASH    Pcn [Penicillins] RASH     As a child - simple rash, no emergency treatment needed. States has had cephalosporin without reactions       Review of Systems:   A comprehensive 10-point review of systems was completed.  Pertinent positives and negatives are noted in the the HPI.    Physical Exam:   Vital Signs:  Height 5' 6\" (1.676 m), weight 190 lb (86.2 kg), last menstrual period 12/23/2014, not currently breastfeeding.     CONSTITUTIONAL: Well developed, well nourished, in no acute distress   RESPIRATORY: Normal respiratory effort  ABDOMEN: Soft, non-tender, non-distended      Laboratory Data:  Lab Results   Component Value Date    WBC 8.9 06/29/2024    HGB 12.3 06/29/2024    .0 06/29/2024     Lab Results   Component Value Date     06/29/2024    K 5.0 06/29/2024     06/29/2024    CO2 27.0 06/29/2024    BUN 21 06/29/2024     (H) 06/29/2024    GFRAA 90 03/01/2022    AST 13 (L) 06/29/2024    ALT 31 06/29/2024    TP 7.1 06/29/2024    ALB 3.9 06/29/2024    PHOS 3.2 06/29/2024    CA 9.7 06/29/2024    MG 2.1 06/29/2024       Urinalysis Results (last three years):  Recent Labs     06/09/24  1916 06/12/24  0511 06/18/24  1151   COLORUR Yellow Light-Yellow  --    CLARITY Clear Clear  --    SPECGRAVITY <=1.005 1.011 1.015   PHURINE 5.0 5.5 5.5   PROUR Negative Negative  --    GLUUR Negative Normal  --    KETUR 15* Negative  --    BILUR  --  Negative  --    BLOODURINE Moderate* 1+*  --    NITRITE Negative Negative Negative   UROBILINOGEN 1.0* Normal  --    LEUUR Small* 250*  --    WBCUR 6-10* 21-50*  --    RBCUR >10* 3-5*  --    BACUR Rare* 1+*  --        Urine Culture Results (last three years):  Lab Results   Component Value Date    URINECUL 50,000-99,000 CFU/ML Lactobacillus species 07/06/2024    URINECUL No Growth at 18-24 hrs. 06/09/2024       PSA:  No  results found for: \"PSA\", \"PERCENTPSA\", \"PSAS\", \"PSAULTRA\"     Imaging (last three days):  No results found.     Assessment:   Alesia Bland is a 59 year old y/o female who presents for the above stated procedure.       Plan:     - OR for CYSTOSCOPY, LEFT URETEROSCOPY, LASER LITHOTRIPSY, STENT PLACEMENT:   - NPO since midnight   - Antibiotics ordered for OR   - Informed consent obtained - risks and benefits explained, and all questions answered  - Marked appropriately     I have personally reviewed all relevant medical records, labs, and imaging.       Nitin Aguero MD  Staff Urologist  [facility]  Office: [phone]

## 2024-07-18 NOTE — OPERATIVE REPORT
Urology Operative Note    Attending Surgeon: Nitin Aguero MD    Assistant Surgeon: None    Patient Name: Alesia Bland    Date of Surgery: 7/18/2024    Preoperative Diagnosis: LEFT nephrolithiasis    Postoperative Diagnosis: Same    Procedure Performed:   Cystoscopy, LEFT ureteroscopy, laser lithotripsy, basket stone extraction, retrograde pyelogram, ureteral stent placement    Indication:    Alesia Bland is a 59 year old female with history of GIANLUCA, HTN, chronic pain referred for nephrolithaisis.    Patient saw PCP 5/22 with spider bite- a CT was done to rule out abscess.   KIDNEYS:  7 mm calculus in the proximal left ureter is causing mild to moderate hydronephrosis.  Multiple nonobstructing calculi in the lower pole left kidney.  1.3 cm.  Calculus lower pole the right kidney.  ADRENALS:  No mass or enlargement.    She was given antibiotics.  No clear abscess as seen- wound care was done.     She was to see urology. She was then admitted here at  with AMS, join pain.  Was possibly related to recent spider bite.  She was treated with steroid course and was discharged home on 6/13 after 4 days. No kidney imaging was done during hospital stay. She is doing better from this standpoint,   She has no previous urologic history. Does have chronic urgency, UUI- is currently on solfenacin for this with some improvement. She has no history of stones.   She is a MD; works in a group home.  No flank pain she saw me last month or previously (incidental stone). No gross hematuria. No UTI symptoms today.  Does not think she passed stone.  Last visit we discussed repeating imaging.  This was done.  She returns now to discuss.  Her CT scan shows that the previous lower pole left-sided stone is now fallen into the ureter.  She now has 2 obstructing stones in the left side.  She has a additional large stone on the right side that is nonobstructing-has staghorn appearance.     She is doing well. Remains asymptomatic. Her other CT  scan findings were sent to PCP.   Detailed discussed had regarding her stone burden. For her obstructing stones on the LEFT; we discussed options for management and patient is most interested in ureteroscopic management for the 2 adjacent obstructing stones.   For her RIGHT sided larger stone burden with lower pole partial staghorn; we discussed several options including observation, URS (either scheduling for after her LEFT side is addressed or stenting at time of LEFT sided intervention and then proceeding with RIGHT at later date) and PCNL. We had an extensive discussion of the pros and cons of each and for now patient would like to address the LEFT side only with ureteroscopy and discuss there RIGHT side management once she has recovered.    For URS; The risks discussed included, but were not limited to, urinary tract infection, sepsis, bleeding, injury to urethra/bladder/ureter, urethra stricture, ureteral stricture, inability to treat stone.         Findings:  Cystoscopy - normal urethra without strictures, normal bladder.    Left RPG with mild hydronephrosis. Distal URS with large ~8mm stone slightly embedded to surrounding tissue; laser fragmented and basket extracted. No further stones seen in ureter; suspected second stone pushed into kidney. URS of kidney with ~6mm stone in renal pelvis; laser fragmented and extracted. No other stones. 6x26 JJ stent placed without issue.     Procedure:  The patient was taken to the operating room and a timeout was performed confirming the correct patient and procedure. The patient was prepped and draped in lithotomy position after undergoing general anesthesia. Pre-operative prophylactic antibiotics were given in the form of Ancef.    The cystoscope was inserted per urethra and the bladder was inspected and drained. The LEFT ureter was cannulated with a tigertail 5fr ureteral catheter. A gentle retrograde pyelogram showed mild hydroureteronephrosis. A sensor wire was then  passed through the catheter and into the renal pelvis. The cystoscope and catheter were then removed. I then passed a 8x10fr coaxial dilator over the wire to dilate the distal ureter. This was then removed leaving the wire in place.     I inserted a semirigid ureteroscope alongside the wire and into the ureter. There was a ~8mm stone slightly embedded in the distal ureter with surrounding edema. The stone was fragmented using a laser, and then the fragments were extracted using a zero tip basket. The stones were dropped in the bladder and later removed through the cystoscope.    The scope was advanced up the ureter to the proximal ureter but no other stones were seen; suspected that second stone may have been pushed into kidney. I therefore placed a  second Sensor wire through the scope into the renal pelvis, and confirmed fluoroscopically. The scope was removed.     A  11/13Fr x28cm  ureteral access sheath was inserted over one of the wires. It was advanced without resistance to the proximal ureter. The inner cannula and wire was then removed leaving the safety wire in place alongside the access sheath.     The flexible ureteroscope was advanced into the sheath and up into the renal pelvis. A ~6mm stone was seen in the renal pelvis  pole of the kidney. The stone was dusted and fragmented using a laser, and then the fragments were extracted using a zero tip basket.   All renal calyces were surveyed once more, and no large fragments were seen. A retrograde pyelogram was performed through the scope and showed no extravasation. The ureter was inspected while slowly removing the scope and access sheath, and it appeared healthy without stone fragments seen.    A 6-Cambodian x 26 cm JJ ureteral stent was inserted over the remaining wire under cystoscopic and fluoroscopic guidance. The proximal coil was formed in the kidney under fluoroscopic guidance. The distal coil was formed within the bladder under direct cystoscopic  visualization. The stent string was removed prior to stent placement.  All stone fragments were drained from the bladder.   The cystoscope was removed. A 20fr duff was placed into the bladder with 10cc of sterile water in the balloon.      The patient was awoken from anesthesia and transferred to PACU in stable condition. The patient tolerated the procedure well. All instrument/supply counts were correct at the end of the case.    Specimens:   Stone fragments for chemical analysis    Estimated Blood Loss:  2 mL    Tubes/Drains:  6-Bengali x 26 cm JJ LEFT ureteral stent    Complications:   None immediate    Condition from OR:  Stable    Plan:   VT in 1 hour  Return for stent removal as scheduled  Complete abx         Nitin Aguero MD  Staff Urologist  [facility]  Office: [phone]

## 2024-07-18 NOTE — ANESTHESIA PREPROCEDURE EVALUATION
PRE-OP EVALUATION    Patient Name: Alesia Bland    Admit Diagnosis: Nephrolithiasis [N20.0]    Pre-op Diagnosis: Nephrolithiasis [N20.0]    CYSTOSCOPY, LEFT URETEROSCOPY, LASER LITHOTRIPSY, STENT PLACEMENT    Anesthesia Procedure: CYSTOSCOPY, LEFT URETEROSCOPY, LASER LITHOTRIPSY, STENT PLACEMENT (Left)    Surgeons and Role:     * Nitin Aguero MD - Primary    Pre-op vitals reviewed.  Temp: 98.2 °F (36.8 °C)  Pulse: 65  Resp: 16  BP: 136/83  SpO2: 98 %  Body mass index is 29.5 kg/m².    Current medications reviewed.  Hospital Medications:   acetaminophen (Tylenol Extra Strength) tab 1,000 mg  1,000 mg Oral Once    scopolamine (Transderm-Scop) 1 MG/3DAYS patch 1 patch  1 patch Transdermal Once    lactated ringers infusion   Intravenous Continuous    ceFAZolin (Ancef) 2g in 10mL IV syringe premix  2 g Intravenous Once       Outpatient Medications:     Facility-Administered Medications Prior to Admission   Medication Dose Route Frequency Provider Last Rate Last Admin    [COMPLETED] methylPREDNISolone acetate (DEPO-medrol) 80 MG/ML injection 80 mg  80 mg Intramuscular Once    80 mg at 07/01/24 1600    [COMPLETED] methylPREDNISolone acetate (DEPO-medrol) 80 MG/ML injection 80 mg  80 mg Intramuscular Once    80 mg at 07/01/24 1554     Medications Prior to Admission   Medication Sig Dispense Refill Last Dose    cephalexin 500 MG Oral Cap Take 1 capsule (500 mg total) by mouth 3 (three) times daily for 7 days. 21 capsule 0 7/18/2024 at 0900    acetaminophen-codeine 300-60 MG Oral Tab Take 1 tablet by mouth 2 (two) times daily as needed for Pain. 60 tablet 0 7/18/2024 at 1100    valACYclovir 1 G Oral Tab Take 1 tablet (1,000 mg total) by mouth daily.   7/17/2024 at 0900    folic acid 1 MG Oral Tab Take 1 tablet (1 mg total) by mouth daily.   7/18/2024    METAXALONE 800 MG Oral Tab TAKE 1 TABLET BY MOUTH THREE TIMES A DAY AS NEEDED FOR PAIN 90 tablet 0 7/18/2024 at 0900    Multiple Vitamins-Minerals (ALIVE MULTI-VITAMIN  OR) Take 1 tablet by mouth daily.   Past Week    ondansetron (ZOFRAN) 4 mg tablet Take 1 tablet (4 mg total) by mouth every 8 (eight) hours as needed for Nausea. 30 tablet 0 7/17/2024    metoprolol succinate  MG Oral Tablet 24 Hr Take 1 tablet (100 mg total) by mouth daily. 90 tablet 0 7/18/2024 at 0900    gabapentin 300 MG Oral Cap Take 3 capsules (900 mg total) by mouth 3 (three) times daily. 270 capsule 0 7/17/2024 at 0800    losartan 100 MG Oral Tab TAKE 1 TABLET BY MOUTH EVERY DAY 30 tablet 0 7/8/2024    rizatriptan (MAXALT-MLT) 10 MG Oral Tablet Dispersible Take 1 tablet (10 mg total) by mouth as needed for Migraine. 90 tablet 1 Past Month    Armodafinil 250 MG Oral Tab Take 250 mg by mouth daily.   7/17/2024    LATUDA 60 MG Oral Tab Take 1 tablet (60 mg total) by mouth daily with food.   7/17/2024    traZODone HCl 50 MG Oral Tab Take 1 tablet (50 mg total) by mouth nightly as needed.   Past Week    DULoxetine HCl 60 MG Oral Cap DR Particles Take 2 capsules (120 mg total) by mouth daily.   7/18/2024 at 0800    Mirabegron ER 25 MG Oral Tablet 24 Hr Take 1 tablet (25 mg total) by mouth daily. 90 tablet 6 Not Taking    NON FORMULARY Take 1-2 capsules by mouth daily as needed (pain). Product: Relieve-R capsules   7/8/2024    metFORMIN 500 MG Oral Tab Take 1 tablet (500 mg total) by mouth 2 (two) times daily. 180 tablet 3 More than a month    triamcinolone 0.1 % External Cream Apply topically 2 (two) times daily. To affected skin. 80 g 1 More than a month       Allergies: Hydrocodone, Benzoyl peroxide, Levofloxacin, and Pcn [penicillins]      Anesthesia Evaluation    Patient summary reviewed.    Anesthetic Complications  (-) history of anesthetic complications         GI/Hepatic/Renal      (+) GERD                           Cardiovascular    Negative cardiovascular ROS.    Exercise tolerance: good     MET: >4      (+) hypertension and well controlled                                    Endo/Other                                   Pulmonary      (+) asthma              (+) sleep apnea and no treatment      Neuro/Psych      (+) depression                        As per Epic:  Patient Active Problem List:     Nonspecific abnormal electrocardiogram (ECG) (EKG)     Laboratory examination ordered as part of a routine general medical examination     Screening for malignant neoplasm of the cervix     Pain in joint, site unspecified     Screening for thyroid disorder     Screening for lipoid disorders     Other chest pain     Other screening mammogram     Headache(784.0)     Other specified psychophysiological malfunction     Depressive disorder, not elsewhere classified     Unspecified disease of the jaws     Near syncope     Asthma (HCC)     Impaired glucose tolerance     Incontinence in female     Anemia     Iron deficiency     Folate deficiency     H/O: hysterectomy     Abdominal wound dehiscence     Trochanteric bursitis of right hip     Hemangioma of vertebral body     Opioid use disorder     Chronic migraine     Fibromyalgia     Other chronic pain     Nephrolithiasis     Hyponatremia     Thrombocytopenia (HCC)     Azotemia     Hyperglycemia     Unable to walk     Polyarticular arthritis     Elevated C-reactive protein (CRP)     Dehydration     Acute pain of right shoulder     Confusion     Severe episode of recurrent major depressive disorder, without psychotic features (HCC)     Anxiety disorder     Lumbar radiculopathy     Brown recluse spider bite or sting     Acute encephalopathy     Transaminitis     Compression fracture of L2 lumbar vertebra (HCC)     Pre-diabetes     Chronic right-sided low back pain without sciatica     Right leg pain     Renal calculi     Urinary incontinence     Senile osteoporosis          Past Surgical History:   Procedure Laterality Date    Appendectomy  2008    Breast surgery procedure unlisted      enlargement      2003    Colonoscopy  2009    negative    Excis  lumbar disk,one level      Hysterectomy  2015    total hystero.    Oophorectomy Bilateral 2015    total hystero    Other  03/08/2021    spinal angiogram with embolization    Other surgical history Bilateral 02/20/2015    Procedure: ABDOMINAL HYSTERECTOMY, BSO;  Surgeon: Calixto Weathers MD;  Location:  MAIN OR     Social History     Socioeconomic History    Marital status:    Tobacco Use    Smoking status: Never    Smokeless tobacco: Never   Vaping Use    Vaping status: Never Used   Substance and Sexual Activity    Alcohol use: Not Currently     Comment: rare    Drug use: No   Other Topics Concern    Caffeine Concern Yes     Comment: 3-4 cups    Exercise Yes     Comment: walking     History   Drug Use No     Available pre-op labs reviewed.  Lab Results   Component Value Date    WBC 8.9 06/29/2024    RBC 3.94 06/29/2024    HGB 12.3 06/29/2024    HCT 38.4 06/29/2024    MCV 97.5 06/29/2024    MCH 31.2 06/29/2024    MCHC 32.0 06/29/2024    RDW 13.9 06/29/2024    .0 06/29/2024     Lab Results   Component Value Date     06/29/2024    K 5.0 06/29/2024     06/29/2024    CO2 27.0 06/29/2024    BUN 21 06/29/2024    CREATSERUM 0.76 06/29/2024     (H) 06/29/2024    CA 9.7 06/29/2024            Airway      Mallampati: II  Mouth opening: >3 FB  TM distance: > 6 cm  Neck ROM: full Cardiovascular      Rhythm: regular  Rate: normal  (-) murmur   Dental  Comment: Dentition is grossly intact;  Patient does not demonstrate loose teeth to inspection.           Pulmonary  Comment: Unlabored ventilatory effort, no retractions.  Pulmonary exam normal.  Breath sounds clear to auscultation bilaterally.               Other findings              ASA: 2   Plan: general  NPO status verified and patient meets guidelines.        Comment: I explained intrinsic risks of general anesthesia, including nausea, dental damage, sore throat, mouth injury,and hoarseness from airway management.  All questions were  answered and understanding was demonstrated of risks.  Informed permission was obtained to proceed as documented in the signed consent form.     Plan/risks discussed with: patient and child/children  Use of blood product(s) discussed with: patient and child/children    Consented to blood products.          Present on Admission:  **None**

## 2024-07-29 ENCOUNTER — TELEPHONE (OUTPATIENT)
Dept: SURGERY | Facility: CLINIC | Age: 59
End: 2024-07-29

## 2024-07-29 ENCOUNTER — PROCEDURE (OUTPATIENT)
Dept: SURGERY | Facility: CLINIC | Age: 59
End: 2024-07-29

## 2024-07-29 DIAGNOSIS — R82.90 URINE FINDING: Primary | ICD-10-CM

## 2024-07-29 DIAGNOSIS — N20.0 KIDNEY STONE: Primary | ICD-10-CM

## 2024-07-29 LAB
APPEARANCE: CLEAR
GLUCOSE (URINE DIPSTICK): NEGATIVE MG/DL
MULTISTIX LOT#: ABNORMAL NUMERIC
NITRITE, URINE: NEGATIVE
PH, URINE: 6 (ref 4.5–8)
PROTEIN (URINE DIPSTICK): >=300 MG/DL
SPECIFIC GRAVITY: 1.02 (ref 1–1.03)
UROBILINOGEN,SEMI-QN: 0.2 MG/DL (ref 0–1.9)

## 2024-07-29 PROCEDURE — 52310 CYSTOSCOPY AND TREATMENT: CPT | Performed by: UROLOGY

## 2024-07-29 PROCEDURE — 99213 OFFICE O/P EST LOW 20 MIN: CPT | Performed by: UROLOGY

## 2024-07-29 PROCEDURE — 81003 URINALYSIS AUTO W/O SCOPE: CPT | Performed by: UROLOGY

## 2024-07-29 RX ORDER — METOPROLOL SUCCINATE 100 MG/1
100 TABLET, EXTENDED RELEASE ORAL DAILY
Qty: 90 TABLET | Refills: 0 | Status: SHIPPED | OUTPATIENT
Start: 2024-07-29

## 2024-07-29 RX ORDER — SULFAMETHOXAZOLE AND TRIMETHOPRIM 800; 160 MG/1; MG/1
1 TABLET ORAL ONCE
Status: COMPLETED | OUTPATIENT
Start: 2024-07-29 | End: 2024-07-29

## 2024-07-29 RX ADMIN — SULFAMETHOXAZOLE AND TRIMETHOPRIM 1 TABLET: 800; 160 TABLET ORAL at 14:00:00

## 2024-07-29 NOTE — TELEPHONE ENCOUNTER
Hi,    Can you please schedule this patient for surgery.    Also, can you arrange for the patient to drop a urine sample for urine culture 1-2 weeks prior to the scheduled surgery date? I placed the order.       Thanks,  Nitin       Urology Surgery Request  Surgeon: Nitin Aguero  Location (if known): [facility]  Procedure: Cystoscopy, RIGHT ureteroscopy, laser lithotripsy, stent placement  Anesthesia: General   Time Frame: Next available  Time required: 75 minutes  Diagnosis: Nephrolithiasis  Special Equipment: C-arm    Antibiotics: per hospital protocol unless checked below   ___ Levaquin 500 mg IV   ___ Gemcitabine 2 g/100 mL NS bladder instillation to be given in OR    Estimated Post Op/Follow Up Appt:   1 week for cystoscopy/stent removal in clinic with me. Please schedule appointment at time of surgery scheduling.

## 2024-07-29 NOTE — PROGRESS NOTES
Clinic Procedure Note    INDICATIONS:     Alesia Bland is a 59 year old female with history of GIANLUCA, HTN, chronic pain referred for nephrolithaisis.  Status post left ureteroscopy, here for stent removal.    Patient saw PCP 5/22 with spider bite- a CT was done to rule out abscess.   KIDNEYS:  7 mm calculus in the proximal left ureter is causing mild to moderate hydronephrosis.  Multiple nonobstructing calculi in the lower pole left kidney.  1.3 cm.  Calculus lower pole the right kidney.  ADRENALS:  No mass or enlargement.    She was given antibiotics.  No clear abscess as seen- wound care was done.     She was to see urology. She was then admitted here at  with AMS, join pain.  Was possibly related to recent spider bite.  She was treated with steroid course and was discharged home on 6/13 after 4 days. No kidney imaging was done during hospital stay. She is doing better from this standpoint,   She has no previous urologic history. Does have chronic urgency, UUI- is currently on solfenacin for this with some improvement. She has no history of stones.   She is a MD; works in a jail.  No flank pain she saw me last month or previously (incidental stone).    We repeated a CT Scan;   Her CT scan shows that the previous lower pole left-sided stone is now fallen into the ureter.  She now has 2 obstructing stones in the left side.  She has a additional large stone on the right side that is nonobstructing-has staghorn appearance.     She opted for LEFT URS; and observation for right side.     Findings:     7/18/24  Cystoscopy - normal urethra without strictures, normal bladder.    Left RPG with mild hydronephrosis. Distal URS with large ~8mm stone slightly embedded to surrounding tissue; laser fragmented and basket extracted. No further stones seen in ureter; suspected second stone pushed into kidney. URS of kidney with ~6mm stone in renal pelvis; laser fragmented and extracted. No other stones. 6x26 JJ stent placed  without issue.      Here for stent removal; to discuss right sided surgery.   He is doing well today.  No signs or symptoms of infection.    PROCEDURE:       1. Flexible cystoscopy, removal of LEFT ureteral stent ([id number])    DATE OF PROCEDURE: 2024     PRE-PROCEDURE DIAGNOSIS: Nephrolithiasis    POST-PROCEDURE DIAGNOSIS: Same     SURGEON: Nitin Aguero MD    FINDINGS:  Stent successfully removed in its entirety.     PROCEDURE:   Patient was brought to the procedure suite and a time-out was performed identifiying the patient,  and procedure to be performed. The risks and benefits of the procedure were once again discussed with the patient including bleeding, infection, and dysuria. The patient agreed to proceed. The patient did not have any signs or symptoms of active UTI. Prophylactic PO antibiotics were given in clinic.    The patient was placed in supine position on the table and the genital area was prepped and draped in the standard sterile fashion. Urojet was used prior for local anesthesia effect. A flexible cystoscope was inserted per urethra. There were no urethral strictures present. The bladder was entered and the distal coil of the stent was seen protruding from the ureteral orifice. The stent was grasped with the flexible stent grasper, and then the stent and cystoscope were both removed. The stent was visualized outside the body and confirmed to be intact and fully removed.     There were no complications and the patient tolerated the procedure well.    IMPRESSION:  Successful stent removal after ureteroscopy today.        I discussed general fluid and dietary guidelines to help prevent further stone formation including:    - Fluid consumption of preferably water to make 2-2.5  L/day of urine  - Low sodium consumption  - Adequate calcium consumption (approximately 1200 mg/day)  - Low fat and moderate animal protein consumption  - Limit consumption  of oxalate rich foods.  A list of oxalate rich  foods was provided             - I encouraged increased dietary intake of citrate with lemon juice (4 oz day)         Discussed her history in detail.  We discussed her large right-sided stone burden.  Previously discussed observation, ureteroscopy, PCNL.  We had opted to manage her left side first given the obstructing stones.  She does not want intervention on the right side.  We discussed the above options in detail.  After thorough conversation she would like to proceed with ureteroscopy as she prefers the less invasive procedure without hospital admission.  We did discuss the potential for multiple procedures given size of stone or inability to reach stone given size and lower pole position. She understands that she will need a PCNL if this occurs.   We will schedule her for right-sided ureteroscopy soon.   The risks discussed included, but were not limited to, urinary tract infection, sepsis, bleeding, injury to urethra/bladder/ureter, urethra stricture, ureteral stricture, inability to treat stone.       In total, 20 minutes were spent on this patient encounter (including chart review, patient history, physical, and counseling, documentation, and communication) in discussing next surgery and above history.         Nitin Aguero MD  Staff Urologist  [facility]  Office: [phone]

## 2024-07-31 LAB
CAOX DIHYDRATE: 80 %
CAOX MONOHYDRATE: 20 %
WEIGHT-STONE: 53 MG

## 2024-08-02 DIAGNOSIS — G89.29 OTHER CHRONIC PAIN: ICD-10-CM

## 2024-08-02 DIAGNOSIS — F11.90 CHRONIC, CONTINUOUS USE OF OPIOIDS: ICD-10-CM

## 2024-08-02 RX ORDER — GABAPENTIN 300 MG/1
900 CAPSULE ORAL 3 TIMES DAILY
Qty: 270 CAPSULE | Refills: 0 | Status: SHIPPED | OUTPATIENT
Start: 2024-08-02

## 2024-08-02 RX ORDER — ACETAMINOPHEN AND CODEINE PHOSPHATE 300; 60 MG/1; MG/1
1 TABLET ORAL 2 TIMES DAILY PRN
Qty: 60 TABLET | Refills: 0 | Status: SHIPPED | OUTPATIENT
Start: 2024-08-02 | End: 2024-09-01

## 2024-08-02 RX ORDER — METAXALONE 800 MG/1
800 TABLET ORAL 3 TIMES DAILY PRN
Qty: 90 TABLET | Refills: 0 | Status: SHIPPED | OUTPATIENT
Start: 2024-08-02

## 2024-08-02 NOTE — TELEPHONE ENCOUNTER
Medication: Metalaxone 800mg oral tab    Date of last refill: 6/19/24    Last office visit: 7/11/24  Due back to clinic per last office note:  Not indicated  Date next office visit scheduled:  10/11/24      Last OV note recommendation:   Impression: patient underwent cryoablation of her hemangioma with [facility] in 11/2023, after which, had significant improvement in pain.  As such, she had been reducing her T#4, and had just weaned off it without adverse effects when, 4 days later, had acute onset of left lumbosacral pain.  Seen in the ER, and x-ray did reveal a new fracture of the L2.  She had returned to taking Tylenol #4 with codeine, and after MRI, returned to [facility] where she underwent kyphoplasty with spine heena, from which, she has had gradual improvement in pain.    While at [place], she was given oxycodone, though did not find it to be as effective as Tylenol with codeine, and returned to T#4.  She had been using 6/day, though has since weaned down to 2/day.  While her low back pain at this point has been largely resolved, she has unfortunately had increase in bilateral lower extremity pain along the groin and anterior thighs, stopping at the knees.  This is been associated with a significant reduction in her ADL tolerance, and had returned to metaxalone, having been on this for a number of years prior to weaning.  Updated MRI does show significant bony retropulsion resulting and subarticular stenosis, in keeping with these complaints.  She has been in contact with [facility], and while they have not seen the most recent MRI findings, there has been some discussion of a possible corpectomy.  Will defer to [facility].  Plan: Status post L2 kyphoplasty with spine heena at [facility] on 3/5/2024, and while low back pain is largely eliminated, has had ongoing groin and anterior thigh pain, limiting her ADLs.  Has reduced Tylenol with codeine from 6/day to 2/day, and was recently refilled.  Can call for  refill when needed.  In addition, continues with Neurontin and metaxalone, without adverse effects, call for refills when needed.  Follow with [facility] regarding options for her bony retropulsion.  No orders of the defined types were placed in this encounter.

## 2024-08-02 NOTE — TELEPHONE ENCOUNTER
Medication: Gabapentin 300mg Oral Cap    Date of last refill: 4/23/24    Last office visit: 7/11/24  Due back to clinic per last office note:  Not indicated  Date next office visit scheduled:  10/11/24    Last OV note recommendation:   Impression: patient underwent cryoablation of her hemangioma with [facility] in 11/2023, after which, had significant improvement in pain.  As such, she had been reducing her T#4, and had just weaned off it without adverse effects when, 4 days later, had acute onset of left lumbosacral pain.  Seen in the ER, and x-ray did reveal a new fracture of the L2.  She had returned to taking Tylenol #4 with codeine, and after MRI, returned to [facility] where she underwent kyphoplasty with spine heena, from which, she has had gradual improvement in pain.    While at [place], she was given oxycodone, though did not find it to be as effective as Tylenol with codeine, and returned to T#4.  She had been using 6/day, though has since weaned down to 2/day.  While her low back pain at this point has been largely resolved, she has unfortunately had increase in bilateral lower extremity pain along the groin and anterior thighs, stopping at the knees.  This is been associated with a significant reduction in her ADL tolerance, and had returned to metaxalone, having been on this for a number of years prior to weaning.  Updated MRI does show significant bony retropulsion resulting and subarticular stenosis, in keeping with these complaints.  She has been in contact with [facility], and while they have not seen the most recent MRI findings, there has been some discussion of a possible corpectomy.  Will defer to [facility].  Plan: Status post L2 kyphoplasty with spine heena at [facility] on 3/5/2024, and while low back pain is largely eliminated, has had ongoing groin and anterior thigh pain, limiting her ADLs.  Has reduced Tylenol with codeine from 6/day to 2/day, and was recently refilled.  Can call for  refill when needed.  In addition, continues with Neurontin and metaxalone, without adverse effects, call for refills when needed.  Follow with [facility] regarding options for her bony retropulsion.

## 2024-08-02 NOTE — TELEPHONE ENCOUNTER
Medication: Acetaminophen-Codeine 300-60mg Oral Tab    Date of last refill: 07/04/24  [place] 7[place]2/24    Last office visit: 7/11/24  Due back to clinic per last office note:  Not indicated   Date next office visit scheduled:  10/11/24    Last URINE Screen: 6/29/24  Screen Results:    Rosie Hernandez, APRN  7/8/2024  7:55 AM CDT Back to Top      Results as expected based on medication prescribed         Narcotic Contract EXPIRATION date: 10/25/24    Last OV note recommendation:   Impression: patient underwent cryoablation of her hemangioma with [facility] in 11/2023, after which, had significant improvement in pain.  As such, she had been reducing her T#4, and had just weaned off it without adverse effects when, 4 days later, had acute onset of left lumbosacral pain.  Seen in the ER, and x-ray did reveal a new fracture of the L2.  She had returned to taking Tylenol #4 with codeine, and after MRI, returned to [facility] where she underwent kyphoplasty with spine heena, from which, she has had gradual improvement in pain.    While at [place], she was given oxycodone, though did not find it to be as effective as Tylenol with codeine, and returned to T#4.  She had been using 6/day, though has since weaned down to 2/day.  While her low back pain at this point has been largely resolved, she has unfortunately had increase in bilateral lower extremity pain along the groin and anterior thighs, stopping at the knees.  This is been associated with a significant reduction in her ADL tolerance, and had returned to metaxalone, having been on this for a number of years prior to weaning.  Updated MRI does show significant bony retropulsion resulting and subarticular stenosis, in keeping with these complaints.  She has been in contact with [facility], and while they have not seen the most recent MRI findings, there has been some discussion of a possible corpectomy.  Will defer to [place]  [facility].  Plan: Status post L2 kyphoplasty with spine heena at [facility] on 3/5/2024, and while low back pain is largely eliminated, has had ongoing groin and anterior thigh pain, limiting her ADLs.  Has reduced Tylenol with codeine from 6/day to 2/day, and was recently refilled.  Can call for refill when needed.  In addition, continues with Neurontin and metaxalone, without adverse effects, call for refills when needed.  Follow with [facility] regarding options for her bony retropulsion.

## 2024-08-08 ENCOUNTER — TELEPHONE (OUTPATIENT)
Dept: UROLOGY | Facility: CLINIC | Age: 59
End: 2024-08-08

## 2024-08-08 NOTE — TELEPHONE ENCOUNTER
Left voice message for new patient reminding her of her upcoming appointment with Dr South on 8/14/24. Reminded patient to bring with filled out paperwork, photo id, and insurance card(s). Also reminded patient of our [place] address and left our office phone number.

## 2024-08-15 DIAGNOSIS — G89.29 OTHER CHRONIC PAIN: ICD-10-CM

## 2024-08-16 RX ORDER — METAXALONE 800 MG/1
800 TABLET ORAL 3 TIMES DAILY PRN
Qty: 90 TABLET | Refills: 0 | Status: SHIPPED | OUTPATIENT
Start: 2024-09-01 | End: 2024-10-01

## 2024-08-16 NOTE — TELEPHONE ENCOUNTER
Medication:   Metaxalone 800 MG Oral Tab     Date of last refill: 8/2/24  [place] 6/24/24    Last office visit: 7/11/24  Due back to clinic per last office note:  NA  Date next office visit scheduled:  10/11/24    Last URINE Screen: 6/29/24  Screen Results:  Result Care Coordination  Result Notes     Rosie Hernandez, APRN  7/8/2024  7:55 AM CDT Back to Top      Results as expected based on medication prescribed         Narcotic Contract EXPIRATION date: 10/25/24    Last OV note recommendation:   Medical Decision Making:   Diagnosis:         Encounter Diagnoses   Name Primary?    Other chronic pain Yes    Chronic prescription opiate use              Impression: patient underwent cryoablation of her hemangioma with [facility] in 11/2023, after which, had significant improvement in pain.  As such, she had been reducing her T#4, and had just weaned off it without adverse effects when, 4 days later, had acute onset of left lumbosacral pain.  Seen in the ER, and x-ray did reveal a new fracture of the L2.  She had returned to taking Tylenol #4 with codeine, and after MRI, returned to [facility] where she underwent kyphoplasty with spine heena, from which, she has had gradual improvement in pain.    While at [facility], she was given oxycodone, though did not find it to be as effective as Tylenol with codeine, and returned to T#4.  She had been using 6/day, though has since weaned down to 2/day.  While her low back pain at this point has been largely resolved, she has unfortunately had increase in bilateral lower extremity pain along the groin and anterior thighs, stopping at the knees.  This is been associated with a significant reduction in her ADL tolerance, and had returned to metaxalone, having been on this for a number of years prior to weaning.  Updated MRI does show significant bony retropulsion resulting and subarticular stenosis, in keeping with these complaints.  She has been in  contact with [facility], and while they have not seen the most recent MRI findings, there has been some discussion of a possible corpectomy.  Will defer to [facility].  Plan: Status post L2 kyphoplasty with spine heena at [facility] on 3/5/2024, and while low back pain is largely eliminated, has had ongoing groin and anterior thigh pain, limiting her ADLs.  Has reduced Tylenol with codeine from 6/day to 2/day, and was recently refilled.  Can call for refill when needed.  In addition, continues with Neurontin and metaxalone, without adverse effects, call for refills when needed.  Follow with [facility] regarding options for her bony retropulsion.  No orders of the defined types were placed in this encounter.        Meds & Refills for this Visit:  Requested Prescriptions        No prescriptions requested or ordered in this encounter         Imaging & Consults:  None     The patient indicates understanding of these issues and agrees to the plan.     KELLE Elizabeth

## 2024-08-21 ENCOUNTER — MOBILE ENCOUNTER (OUTPATIENT)
Dept: SURGERY | Facility: CLINIC | Age: 59
End: 2024-08-21

## 2024-08-21 DIAGNOSIS — N20.0 KIDNEY STONE: Primary | ICD-10-CM

## 2024-08-22 ENCOUNTER — LABORATORY ENCOUNTER (OUTPATIENT)
Dept: LAB | Age: 59
End: 2024-08-22
Payer: COMMERCIAL

## 2024-08-22 DIAGNOSIS — Z01.818 PRE-OP TESTING: ICD-10-CM

## 2024-08-22 DIAGNOSIS — M06.4 INFLAMMATORY POLYARTHRITIS (HCC): ICD-10-CM

## 2024-08-22 DIAGNOSIS — N20.0 KIDNEY STONE: ICD-10-CM

## 2024-08-22 DIAGNOSIS — M54.6 ACUTE BILATERAL THORACIC BACK PAIN: ICD-10-CM

## 2024-08-22 LAB
CRP SERPL-MCNC: 1 MG/DL (ref ?–0.5)
ERYTHROCYTE [DISTWIDTH] IN BLOOD BY AUTOMATED COUNT: 14.9 %
ERYTHROCYTE [SEDIMENTATION RATE] IN BLOOD: 12 MM/HR
HCT VFR BLD AUTO: 40.8 %
HGB BLD-MCNC: 13.2 G/DL
MCH RBC QN AUTO: 31.9 PG (ref 26–34)
MCHC RBC AUTO-ENTMCNC: 32.4 G/DL (ref 31–37)
MCV RBC AUTO: 98.6 FL
PLATELET # BLD AUTO: 288 10(3)UL (ref 150–450)
RBC # BLD AUTO: 4.14 X10(6)UL
WBC # BLD AUTO: 9.5 X10(3) UL (ref 4–11)

## 2024-08-22 PROCEDURE — 36415 COLL VENOUS BLD VENIPUNCTURE: CPT | Performed by: INTERNAL MEDICINE

## 2024-08-22 PROCEDURE — 85652 RBC SED RATE AUTOMATED: CPT | Performed by: INTERNAL MEDICINE

## 2024-08-22 PROCEDURE — 85027 COMPLETE CBC AUTOMATED: CPT

## 2024-08-22 PROCEDURE — 87086 URINE CULTURE/COLONY COUNT: CPT

## 2024-08-22 PROCEDURE — 86140 C-REACTIVE PROTEIN: CPT | Performed by: INTERNAL MEDICINE

## 2024-08-22 PROCEDURE — 81374 HLA I TYPING 1 ANTIGEN LR: CPT

## 2024-08-25 ENCOUNTER — MOBILE ENCOUNTER (OUTPATIENT)
Dept: SURGERY | Facility: CLINIC | Age: 59
End: 2024-08-25

## 2024-08-25 RX ORDER — CEPHALEXIN 500 MG/1
500 CAPSULE ORAL 3 TIMES DAILY
Qty: 15 CAPSULE | Refills: 0 | Status: SHIPPED | OUTPATIENT
Start: 2024-08-25 | End: 2024-08-30

## 2024-08-26 NOTE — H&P
UROLOGY PRE-OPERATIVE HISTORY & PHYSICAL      Alesia Bland Patient Status:  Hospital Outpatient Surgery    3/10/1965 MRN [id number]   Location [facility] SURGERY Attending Nitin Aguero MD   Hosp Day # 0 PCP Toni Callahan DO     Primary Care Provider: Toni Callahan DO     Procedure      CYSTOSCOPY, RIGHT URETEROSCOPY, RIGHT LASER LITHOTRIPSY, RIGHT STENT PLACEMENT:     History of Present Illness:     Alesia Bland is a 59 year old female with history of GIANLUCA, HTN, chronic pain referred for nephrolithaisis.  Status post left ureteroscopy on  and now here for right sided URS.      Patient saw PCP  with spider bite- a CT was done to rule out abscess.   KIDNEYS:  7 mm calculus in the proximal left ureter is causing mild to moderate hydronephrosis.  Multiple nonobstructing calculi in the lower pole left kidney.  1.3 cm.  Calculus lower pole the right kidney.  ADRENALS:  No mass or enlargement.    She was given antibiotics.  No clear abscess as seen- wound care was done.      She was to see urology. She was then admitted here at  with AMS, join pain.  Was possibly related to recent spider bite.  She was treated with steroid course and was discharged home on  after 4 days. No kidney imaging was done during hospital stay. She is doing better from this standpoint,   She has no previous urologic history. Does have chronic urgency, UUI- is currently on solfenacin for this with some improvement. She has no history of stones.   She is a MD; works in a intermediate.  No flank pain she saw me last month or previously (incidental stone).     We repeated a CT Scan;   Her CT scan shows that the previous lower pole left-sided stone is now fallen into the ureter.  She now has 2 obstructing stones in the left side.  She has a additional large stone on the right side that is nonobstructing-has staghorn appearance.     She opted for LEFT URS;staged RIGHT URS.      Findings:   Left side   24  Cystoscopy - normal  urethra without strictures, normal bladder.    Left RPG with mild hydronephrosis. Distal URS with large ~8mm stone slightly embedded to surrounding tissue; laser fragmented and basket extracted. No further stones seen in ureter; suspected second stone pushed into kidney. URS of kidney with ~6mm stone in renal pelvis; laser fragmented and extracted. No other stones. 6x26 JJ stent placed without issue.      Stent removed on  without issue.   Discussed options ofr RIGHt sided stone bruden; she wanted to proceed with URS. We did discuss other options such as PCNL. She wanted to avoid more invasive/inpatient procedure.   She presents for this now.   Plan for metabolic workup after.   The risks discussed included, but were not limited to, urinary tract infection, sepsis, bleeding, injury to urethra/bladder/ureter, urethra stricture, ureteral stricture, inability to treat stone.   Discussed potential staged procedure given large stone size.        Ucx: Comtamination, keflex before     Stent size 6x26  5' 5.5\" (1.664 m)    History:     Past Medical History:    Anesthesia complication    n&v    Arrhythmia    idiopathic tachycardia    Back problem    Bronchitis, not specified as acute or chronic    Bronchospasm    Brown recluse spider bite    Calculus of kidney    Depression    Esophageal reflux    Extrinsic asthma, unspecified    Hemangioma of vertebral column    High blood pressure    Incontinence    Kidney stones    Lipid screening    Migraines    GIANLUCA (obstructive sleep apnea)    AHI 14 Supine AHI 20 non-supine AHI 9    PONV (postoperative nausea and vomiting)    TAKES A LONG TIME WAKE UP    Sleep apnea    prior to losing weight - no device    Snoring    AHI 4.4 SaO2 janelle 86-.5 primary snoring    Spinal hemangioma       Past Surgical History:   Procedure Laterality Date    Appendectomy  2008    Appendectomy      Breast surgery procedure unlisted      enlargement      2003    Colonoscopy  2009     negative    Excis lumbar disk,one level      Hysterectomy  2015    total hystero.    Implant left      Implant right      Oophorectomy Bilateral 2015    total hystero    Other  03/08/2021    spinal angiogram with embolization    Other surgical history Bilateral 02/20/2015    Procedure: ABDOMINAL HYSTERECTOMY, BSO;  Surgeon: Calixto Weathers MD;  Location:  MAIN OR       Family History   Problem Relation Age of Onset    Depression Mother     Other (Other) Mother     Hypertension Father     Other (Other) Father     Melanoma Father     Heart Attack Maternal Grandmother     Breast Cancer Maternal Grandmother 50        In her early 50's.    Other (Other) Maternal Grandmother     Other (leukemia) Maternal Grandfather     Diabetes Paternal Grandmother         type 2    Other (renal failure) Paternal Grandmother     Other (lung cancer) Paternal Grandfather        Social History     Socioeconomic History    Marital status:    Tobacco Use    Smoking status: Never    Smokeless tobacco: Never   Vaping Use    Vaping status: Never Used   Substance and Sexual Activity    Alcohol use: Not Currently    Drug use: No   Other Topics Concern    Caffeine Concern Yes     Comment: 3-4 cups    Exercise Yes     Comment: walking     Social Determinants of Health     Financial Resource Strain: Low Risk  (8/17/2023)    Received from [facility]    Overall Financial Resource Strain (CARDIA)     Difficulty of Paying Living Expenses: Not very hard   Food Insecurity: No Food Insecurity (6/9/2024)    Food Insecurity     Food Insecurity: Never true   Transportation Needs: No Transportation Needs (6/9/2024)    Transportation Needs     Lack of Transportation: No   Physical Activity: Inactive (8/17/2023)    Received from [facility]    Exercise Vital Sign     Days of Exercise per Week: 0 days     Minutes of Exercise per Session: 0 min   Housing Stability: Low Risk  (6/9/2024)    Housing Stability     Housing  Instability: No       Medications:  Current Outpatient Medications   Medication Sig Dispense Refill    [START ON 9/1/2024] Metaxalone 800 MG Oral Tab Take 1 tablet (800 mg total) by mouth 3 (three) times daily as needed for Pain. 90 tablet 0    GABAPENTIN 300 MG Oral Cap TAKE THREE CAPSULES BY MOUTH THREE TIMES A DAY (Patient taking differently: Take 3 capsules (900 mg total) by mouth daily as needed.) 270 capsule 0    acetaminophen-codeine 300-60 MG Oral Tab Take 1 tablet by mouth 2 (two) times daily as needed for Pain. 60 tablet 0    METOPROLOL SUCCINATE  MG Oral Tablet 24 Hr TAKE 1 TABLET BY MOUTH DAILY 90 tablet 0    phenazopyridine (PYRIDIUM) 100 MG Oral Tab Take 1 tablet (100 mg total) by mouth 3 (three) times daily as needed for Pain. This will turn your urine orange. 10 tablet 0    oxybutynin 5 MG Oral Tab Take 1 tablet (5 mg total) by mouth 3 (three) times daily as needed (Bladder spasms). Stop 12 hours before Phillips catheter removal in clinic (if applicable) 15 tablet 0    folic acid 1 MG Oral Tab Take 1 tablet (1 mg total) by mouth daily.      Mirabegron ER 25 MG Oral Tablet 24 Hr Take 1 tablet (25 mg total) by mouth daily. 90 tablet 6    Multiple Vitamins-Minerals (ALIVE MULTI-VITAMIN OR) Take 1 tablet by mouth daily.      NON FORMULARY Take 1-2 capsules by mouth daily as needed (pain). Product: Relieve-R capsules      ondansetron (ZOFRAN) 4 mg tablet Take 1 tablet (4 mg total) by mouth every 8 (eight) hours as needed for Nausea. 30 tablet 0    losartan 100 MG Oral Tab TAKE 1 TABLET BY MOUTH EVERY DAY 30 tablet 0    rizatriptan (MAXALT-MLT) 10 MG Oral Tablet Dispersible Take 1 tablet (10 mg total) by mouth as needed for Migraine. 90 tablet 1    triamcinolone 0.1 % External Cream Apply topically 2 (two) times daily. To affected skin. 80 g 1    Armodafinil 250 MG Oral Tab Take 250 mg by mouth daily.      LATUDA 60 MG Oral Tab Take 1 tablet (60 mg total) by mouth daily with food.      traZODone HCl 50  MG Oral Tab Take 1 tablet (50 mg total) by mouth nightly as needed.      DULoxetine HCl 60 MG Oral Cap DR Particles Take 2 capsules (120 mg total) by mouth daily.      cephalexin 500 MG Oral Cap Take 1 capsule (500 mg total) by mouth 3 (three) times daily for 5 days. Take 2 days before and 3 days after surgery 15 capsule 0       Allergies:  Allergies   Allergen Reactions    Hydrocodone NAUSEA AND VOMITING    Benzoyl Peroxide RASH     CREA    Levofloxacin RASH    Pcn [Penicillins] RASH     As a child - simple rash, no emergency treatment needed. States has had cephalosporin without reactions       Review of Systems:   A comprehensive 10-point review of systems was completed.  Pertinent positives and negatives are noted in the the HPI.    Physical Exam:   Vital Signs:  Height 5' 5.5\" (1.664 m), weight 185 lb (83.9 kg), last menstrual period 12/23/2014, not currently breastfeeding.     CONSTITUTIONAL: Well developed, well nourished, in no acute distress   RESPIRATORY: Normal respiratory effort  ABDOMEN: Soft, non-tender, non-distended      Laboratory Data:  Lab Results   Component Value Date    WBC 9.5 08/22/2024    HGB 13.2 08/22/2024    .0 08/22/2024     Lab Results   Component Value Date     06/29/2024    K 5.0 06/29/2024     06/29/2024    CO2 27.0 06/29/2024    BUN 21 06/29/2024     (H) 06/29/2024    GFRAA 90 03/01/2022    AST 13 (L) 06/29/2024    ALT 31 06/29/2024    TP 7.1 06/29/2024    ALB 3.9 06/29/2024    PHOS 3.2 06/29/2024    CA 9.7 06/29/2024    MG 2.1 06/29/2024       Urinalysis Results (last three years):  Recent Labs     06/09/24  1916 06/12/24  0511 06/18/24  1151 07/29/24  1359   COLORUR Yellow Light-Yellow  --   --    CLARITY Clear Clear  --   --    SPECGRAVITY <=1.005 1.011 1.015 1.025   PHURINE 5.0 5.5 5.5 6.0   PROUR Negative Negative  --   --    GLUUR Negative Normal  --   --    KETUR 15* Negative  --   --    BILUR  --  Negative  --   --    BLOODURINE Moderate* 1+*  --    --    NITRITE Negative Negative Negative Negative   UROBILINOGEN 1.0* Normal  --   --    LEUUR Small* 250*  --   --    WBCUR 6-10* 21-50*  --   --    RBCUR >10* 3-5*  --   --    BACUR Rare* 1+*  --   --        Urine Culture Results (last three years):  Lab Results   Component Value Date    URINECUL  08/22/2024     10-50,000 cfu/ml Multiple species present-probable contamination.    URINECUL 50,000-99,000 CFU/ML Lactobacillus species 07/06/2024    URINECUL No Growth at 18-24 hrs. 06/09/2024       PSA:  No results found for: \"PSA\", \"PERCENTPSA\", \"PSAS\", \"PSAULTRA\"     Imaging (last three days):  No results found.     Assessment:   Alesia Bland is a 59 year old y/o female who presents for the above stated procedure.       Plan:     - OR for CYSTOSCOPY, RIGHT URETEROSCOPY, RIGHT LASER LITHOTRIPSY, RIGHT STENT PLACEMENT:   - NPO since midnight   - Antibiotics ordered for OR   - Informed consent obtained - risks and benefits explained, and all questions answered  - Marked appropriately     I have personally reviewed all relevant medical records, labs, and imaging.       Nitin Aguero MD  Staff Urologist  [facility]  Office: [phone]

## 2024-08-27 ENCOUNTER — HOSPITAL ENCOUNTER (OUTPATIENT)
Facility: HOSPITAL | Age: 59
Setting detail: HOSPITAL OUTPATIENT SURGERY
Discharge: HOME | End: 2024-08-27
Attending: UROLOGY | Admitting: UROLOGY
Payer: COMMERCIAL

## 2024-08-27 ENCOUNTER — ANESTHESIA EVENT (OUTPATIENT)
Dept: SURGERY | Facility: HOSPITAL | Age: 59
End: 2024-08-27
Payer: COMMERCIAL

## 2024-08-27 ENCOUNTER — ANESTHESIA (OUTPATIENT)
Dept: SURGERY | Facility: HOSPITAL | Age: 59
End: 2024-08-27
Payer: COMMERCIAL

## 2024-08-27 ENCOUNTER — APPOINTMENT (OUTPATIENT)
Dept: GENERAL RADIOLOGY | Facility: HOSPITAL | Age: 59
End: 2024-08-27
Attending: UROLOGY
Payer: COMMERCIAL

## 2024-08-27 ENCOUNTER — TELEPHONE (OUTPATIENT)
Dept: SURGERY | Facility: CLINIC | Age: 59
End: 2024-08-27

## 2024-08-27 VITALS
WEIGHT: 189 LBS | HEART RATE: 59 BPM | DIASTOLIC BLOOD PRESSURE: 72 MMHG | TEMPERATURE: 98 F | OXYGEN SATURATION: 94 % | RESPIRATION RATE: 16 BRPM | BODY MASS INDEX: 31.11 KG/M2 | SYSTOLIC BLOOD PRESSURE: 128 MMHG | HEIGHT: 65.5 IN

## 2024-08-27 DIAGNOSIS — N20.0 NEPHROLITHIASIS: ICD-10-CM

## 2024-08-27 DIAGNOSIS — N20.0 KIDNEY STONE: Primary | ICD-10-CM

## 2024-08-27 DIAGNOSIS — Z01.818 PRE-OP TESTING: Primary | ICD-10-CM

## 2024-08-27 PROCEDURE — 52356 CYSTO/URETERO W/LITHOTRIPSY: CPT | Performed by: UROLOGY

## 2024-08-27 PROCEDURE — 74420 UROGRAPHY RTRGR +-KUB: CPT | Performed by: UROLOGY

## 2024-08-27 PROCEDURE — 0TC08ZZ EXTIRPATION OF MATTER FROM RIGHT KIDNEY, VIA NATURAL OR ARTIFICIAL OPENING ENDOSCOPIC: ICD-10-PCS | Performed by: UROLOGY

## 2024-08-27 PROCEDURE — 0T768DZ DILATION OF RIGHT URETER WITH INTRALUMINAL DEVICE, VIA NATURAL OR ARTIFICIAL OPENING ENDOSCOPIC: ICD-10-PCS | Performed by: UROLOGY

## 2024-08-27 PROCEDURE — BT1D1ZZ FLUOROSCOPY OF RIGHT KIDNEY, URETER AND BLADDER USING LOW OSMOLAR CONTRAST: ICD-10-PCS | Performed by: UROLOGY

## 2024-08-27 DEVICE — URETERAL STENT
Type: IMPLANTABLE DEVICE | Status: FUNCTIONAL
Brand: ASCERTA™

## 2024-08-27 RX ORDER — KETOROLAC TROMETHAMINE 30 MG/ML
INJECTION, SOLUTION INTRAMUSCULAR; INTRAVENOUS AS NEEDED
Status: DISCONTINUED | OUTPATIENT
Start: 2024-08-27 | End: 2024-08-27 | Stop reason: SURG

## 2024-08-27 RX ORDER — ONDANSETRON 2 MG/ML
4 INJECTION INTRAMUSCULAR; INTRAVENOUS EVERY 6 HOURS PRN
Status: DISCONTINUED | OUTPATIENT
Start: 2024-08-27 | End: 2024-08-27

## 2024-08-27 RX ORDER — ROCURONIUM BROMIDE 10 MG/ML
INJECTION, SOLUTION INTRAVENOUS AS NEEDED
Status: DISCONTINUED | OUTPATIENT
Start: 2024-08-27 | End: 2024-08-27 | Stop reason: SURG

## 2024-08-27 RX ORDER — DEXAMETHASONE SODIUM PHOSPHATE 4 MG/ML
VIAL (ML) INJECTION AS NEEDED
Status: DISCONTINUED | OUTPATIENT
Start: 2024-08-27 | End: 2024-08-27 | Stop reason: SURG

## 2024-08-27 RX ORDER — LIDOCAINE HYDROCHLORIDE 10 MG/ML
INJECTION, SOLUTION EPIDURAL; INFILTRATION; INTRACAUDAL; PERINEURAL AS NEEDED
Status: DISCONTINUED | OUTPATIENT
Start: 2024-08-27 | End: 2024-08-27 | Stop reason: SURG

## 2024-08-27 RX ORDER — PHENAZOPYRIDINE HYDROCHLORIDE 100 MG/1
100 TABLET, FILM COATED ORAL 3 TIMES DAILY PRN
Qty: 10 TABLET | Refills: 0 | Status: SHIPPED | OUTPATIENT
Start: 2024-08-27

## 2024-08-27 RX ORDER — POLYETHYLENE GLYCOL 3350 17 G/17G
17 POWDER, FOR SOLUTION ORAL DAILY PRN
Qty: 20 PACKET | Refills: 1 | Status: SHIPPED | OUTPATIENT
Start: 2024-08-27

## 2024-08-27 RX ORDER — SCOLOPAMINE TRANSDERMAL SYSTEM 1 MG/1
1 PATCH, EXTENDED RELEASE TRANSDERMAL ONCE
Status: DISCONTINUED | OUTPATIENT
Start: 2024-08-27 | End: 2024-08-27 | Stop reason: HOSPADM

## 2024-08-27 RX ORDER — NEOSTIGMINE METHYLSULFATE 1 MG/ML
INJECTION INTRAVENOUS AS NEEDED
Status: DISCONTINUED | OUTPATIENT
Start: 2024-08-27 | End: 2024-08-27 | Stop reason: SURG

## 2024-08-27 RX ORDER — LIDOCAINE HYDROCHLORIDE 40 MG/ML
SOLUTION TOPICAL AS NEEDED
Status: DISCONTINUED | OUTPATIENT
Start: 2024-08-27 | End: 2024-08-27 | Stop reason: SURG

## 2024-08-27 RX ORDER — ACETAMINOPHEN 500 MG
1000 TABLET ORAL ONCE AS NEEDED
Status: DISCONTINUED | OUTPATIENT
Start: 2024-08-27 | End: 2024-08-27

## 2024-08-27 RX ORDER — HYDROCODONE BITARTRATE AND ACETAMINOPHEN 5; 325 MG/1; MG/1
1 TABLET ORAL ONCE AS NEEDED
Status: DISCONTINUED | OUTPATIENT
Start: 2024-08-27 | End: 2024-08-27

## 2024-08-27 RX ORDER — SODIUM CHLORIDE, SODIUM LACTATE, POTASSIUM CHLORIDE, CALCIUM CHLORIDE 600; 310; 30; 20 MG/100ML; MG/100ML; MG/100ML; MG/100ML
INJECTION, SOLUTION INTRAVENOUS CONTINUOUS
Status: DISCONTINUED | OUTPATIENT
Start: 2024-08-27 | End: 2024-08-27

## 2024-08-27 RX ORDER — OXYBUTYNIN CHLORIDE 5 MG/1
5 TABLET ORAL 3 TIMES DAILY PRN
Qty: 15 TABLET | Refills: 0 | Status: SHIPPED | OUTPATIENT
Start: 2024-08-27

## 2024-08-27 RX ORDER — HYDROCODONE BITARTRATE AND ACETAMINOPHEN 5; 325 MG/1; MG/1
2 TABLET ORAL ONCE AS NEEDED
Status: DISCONTINUED | OUTPATIENT
Start: 2024-08-27 | End: 2024-08-27

## 2024-08-27 RX ORDER — ACETAMINOPHEN 500 MG
1000 TABLET ORAL ONCE
Status: DISCONTINUED | OUTPATIENT
Start: 2024-08-27 | End: 2024-08-27 | Stop reason: HOSPADM

## 2024-08-27 RX ORDER — PROCHLORPERAZINE EDISYLATE 5 MG/ML
5 INJECTION INTRAMUSCULAR; INTRAVENOUS EVERY 8 HOURS PRN
Status: DISCONTINUED | OUTPATIENT
Start: 2024-08-27 | End: 2024-08-27

## 2024-08-27 RX ORDER — GLYCOPYRROLATE 0.2 MG/ML
INJECTION, SOLUTION INTRAMUSCULAR; INTRAVENOUS AS NEEDED
Status: DISCONTINUED | OUTPATIENT
Start: 2024-08-27 | End: 2024-08-27 | Stop reason: SURG

## 2024-08-27 RX ORDER — ONDANSETRON 2 MG/ML
INJECTION INTRAMUSCULAR; INTRAVENOUS AS NEEDED
Status: DISCONTINUED | OUTPATIENT
Start: 2024-08-27 | End: 2024-08-27 | Stop reason: SURG

## 2024-08-27 RX ORDER — MIDAZOLAM HYDROCHLORIDE 1 MG/ML
INJECTION INTRAMUSCULAR; INTRAVENOUS AS NEEDED
Status: DISCONTINUED | OUTPATIENT
Start: 2024-08-27 | End: 2024-08-27 | Stop reason: SURG

## 2024-08-27 RX ORDER — NALOXONE HYDROCHLORIDE 0.4 MG/ML
0.08 INJECTION, SOLUTION INTRAMUSCULAR; INTRAVENOUS; SUBCUTANEOUS AS NEEDED
Status: DISCONTINUED | OUTPATIENT
Start: 2024-08-27 | End: 2024-08-27

## 2024-08-27 RX ORDER — TAMSULOSIN HYDROCHLORIDE 0.4 MG/1
0.4 CAPSULE ORAL EVERY EVENING
Qty: 14 CAPSULE | Refills: 0 | Status: SHIPPED | OUTPATIENT
Start: 2024-08-27 | End: 2024-09-10

## 2024-08-27 RX ADMIN — SODIUM CHLORIDE, SODIUM LACTATE, POTASSIUM CHLORIDE, CALCIUM CHLORIDE: 600; 310; 30; 20 INJECTION, SOLUTION INTRAVENOUS at 12:15:00

## 2024-08-27 RX ADMIN — ONDANSETRON 4 MG: 2 INJECTION INTRAMUSCULAR; INTRAVENOUS at 11:52:00

## 2024-08-27 RX ADMIN — DEXAMETHASONE SODIUM PHOSPHATE 4 MG: 4 MG/ML VIAL (ML) INJECTION at 10:27:00

## 2024-08-27 RX ADMIN — MIDAZOLAM HYDROCHLORIDE 2 MG: 1 INJECTION INTRAMUSCULAR; INTRAVENOUS at 10:23:00

## 2024-08-27 RX ADMIN — GLYCOPYRROLATE 0.8 MG: 0.2 INJECTION, SOLUTION INTRAMUSCULAR; INTRAVENOUS at 11:57:00

## 2024-08-27 RX ADMIN — LIDOCAINE HYDROCHLORIDE 2 ML: 40 SOLUTION TOPICAL at 10:25:00

## 2024-08-27 RX ADMIN — KETOROLAC TROMETHAMINE 15 MG: 30 INJECTION, SOLUTION INTRAMUSCULAR; INTRAVENOUS at 11:52:00

## 2024-08-27 RX ADMIN — NEOSTIGMINE METHYLSULFATE 4 MG: 1 INJECTION INTRAVENOUS at 11:57:00

## 2024-08-27 RX ADMIN — ROCURONIUM BROMIDE 100 MG: 10 INJECTION, SOLUTION INTRAVENOUS at 10:24:00

## 2024-08-27 RX ADMIN — LIDOCAINE HYDROCHLORIDE 50 MG: 10 INJECTION, SOLUTION EPIDURAL; INFILTRATION; INTRACAUDAL; PERINEURAL at 10:24:00

## 2024-08-27 NOTE — OPERATIVE REPORT
Urology Operative Note    Attending Surgeon: Nitin Aguero MD    Assistant Surgeon: None    Patient Name: Alesia Bland    Date of Surgery: 8/27/2024    Preoperative Diagnosis: RIGHT nephrolithiasis    Postoperative Diagnosis: Same    Procedure Performed:   Cystoscopy, RIGHT ureteroscopy, laser lithotripsy, basket stone extraction, retrograde pyelogram, ureteral stent placement    Indication:    Alesia Bland is a 59 year old female with history of GIANLUCA, HTN, chronic pain referred for nephrolithaisis.  Status post left ureteroscopy on 7/26 and now here for right sided URS.      Patient saw PCP 5/22 with spider bite- a CT was done to rule out abscess.   KIDNEYS:  7 mm calculus in the proximal left ureter is causing mild to moderate hydronephrosis.  Multiple nonobstructing calculi in the lower pole left kidney.  1.3 cm.  Calculus lower pole the right kidney.  ADRENALS:  No mass or enlargement.    She was given antibiotics.  No clear abscess as seen- wound care was done.      She was to see urology. She was then admitted here at  with AMS, join pain.  Was possibly related to recent spider bite.  She was treated with steroid course and was discharged home on 6/13 after 4 days. No kidney imaging was done during hospital stay. She is doing better from this standpoint,   She has no previous urologic history. Does have chronic urgency, UUI- is currently on solfenacin for this with some improvement. She has no history of stones.   She is a MD; works in a long-term.  No flank pain she saw me last month or previously (incidental stone).     We repeated a CT Scan;   Her CT scan shows that the previous lower pole left-sided stone is now fallen into the ureter.  She now has 2 obstructing stones in the left side.  She has a additional large stone on the right side that is nonobstructing-has staghorn appearance.     She opted for LEFT URS;staged RIGHT URS.      Findings:   Left side   7/18/24  Cystoscopy - normal urethra without  strictures, normal bladder.    Left RPG with mild hydronephrosis. Distal URS with large ~8mm stone slightly embedded to surrounding tissue; laser fragmented and basket extracted. No further stones seen in ureter; suspected second stone pushed into kidney. URS of kidney with ~6mm stone in renal pelvis; laser fragmented and extracted. No other stones. 6x26 JJ stent placed without issue.      Stent removed on 7/29 without issue.   Discussed options ofr RIGHt sided stone bruden; she wanted to proceed with URS. We did discuss other options such as PCNL. She wanted to avoid more invasive/inpatient procedure.   She presents for this now.   Plan for metabolic workup after.   The risks discussed included, but were not limited to, urinary tract infection, sepsis, bleeding, injury to urethra/bladder/ureter, urethra stricture, ureteral stricture, inability to treat stone.   Discussed potential staged procedure given large stone size.       Findings:  Normal urethra. No bladder lesions. Right RPG without hydronephrosis; large lower pole radiopaque stone.   Ureteroscopy with narrow/tortuous proximal ureter; large lower pole ~2cm stone with adjcent ~0.4cm stone and additional punctate lower pole stones. Calyx very challenigng to access given angle; able to fragment stone and move large fragments to upper pole. All stone then dusted/fragmented and basket extracted. Poor visualization at conclusion and challenging to remove larger fragments given ureter anatomy; decision made to place stent and plan for second look URS to ensure clearance of stone. Majority of stone treated.   Right 6x26 JJ stent placed without issue.     Modifier 22 for very large stone in dependent and difficult to access calyx making treatment challenging and adding an additional 30mins of operative time     Procedure:  The patient was taken to the operating room and a timeout was performed confirming the correct patient and procedure. The patient was prepped and  draped in lithotomy position after undergoing general anesthesia. Pre-operative prophylactic antibiotics were given in the form of Ancef.    The cystoscope was inserted per urethra and the bladder was inspected and drained. The RIGHT ureter was cannulated with a Tigertail catheter and advanced to the mid- ureter. A retrograde pyelogram was obtained which demonstrated a large lower pole radiopaque stone, no hydronephrosis. A Sensor wire was then passed through the catheter and into the renal pelvis which I confirmed using fluoroscopy. The catheter was removed.      Then, an 8x10fr dilator was used to dilate the distal ureter under fluoroscopy over the Sensor wire.  I then placed a second Sensor wire through the dilator and into the renal pelvis under fluoroscopy.   Once both wires were confirmed to be in the kidney I removed the dilator. One wire was then secured to the drapes as a safety wire while the other was our working wire.     Over our working wire we gently introduced a 11/13F x 28cm ureteral access sheath under fluoroscopic visualization to the level of the proximal ureter. This passed easily. Once that was done, we then removed the inner sheath and wire.  The flexible ureteroscope was advanced into the sheath and up into the right renal pelvis.  A systematic inspection of the kidney revealed: tortuous/narrow proximal ureter; large lower pole ~2cm stone with adjcent ~0.4cm stone and additional punctate lower pole stones. Calyx very challenigng to access given angle. The stones were fragmented using a laser, large fragments then moved to upper pole using zero tip basket and then further fragmented and dusted.  Poor visualization at conclusion from stone dust; challenging to remove larger fragments due to narrow ureter. Given very large stone burden, decision made to place stent and plan for second look ureteroscopy to ensure complete clearance.  The ureter was inspected while slowly removing the scope and  access sheath, and it appeared healthy without stone fragments seen.    We then placed a 6x26 JJ double-J ureteral stent over our safety wire under direct cystoscopic and fluoroscopic guidance. The proximal and distal curls formed appropriately. Stent left without strings. The bladder was then drained and the cystoscope was removed. The procedure was then terminated.    The patient was awoken from anesthesia and transferred to PACU in stable condition. The patient tolerated the procedure well. All instrument/supply counts were correct at the end of the case.    Specimens:   Stone fragments for chemical analysis    Estimated Blood Loss:  2 mL    Tubes/Drains:  6-Stateless x 26 cm JJ RIGHT ureteral stent    Complications:   None immediate    Condition from OR:  Stable    Plan:   Abx x3 days   Return for second look URS in a few weeks         Nitin Aguero MD  Staff Urologist  [facility]  Office: [phone]

## 2024-08-27 NOTE — ANESTHESIA PREPROCEDURE EVALUATION
PRE-OP EVALUATION    Patient Name: Alesia Bland    Admit Diagnosis: Nephrolithiasis [N20.0]    Pre-op Diagnosis: Nephrolithiasis [N20.0]    CYSTOSCOPY, RIGHT URETEROSCOPY, RIGHT LASER LITHOTRIPSY, RIGHT STENT PLACEMENT      Anesthesia Procedure: CYSTOSCOPY, RIGHT URETEROSCOPY, RIGHT LASER LITHOTRIPSY, RIGHT STENT PLACEMENT (Right: Ureter)    Surgeons and Role:     * Nitin Aguero MD - Primary    Pre-op vitals reviewed.  Temp: 96.9 °F (36.1 °C)  Pulse: 84  Resp: 16  BP: 140/87  SpO2: 99 %  Body mass index is 30.97 kg/m².    Current medications reviewed.  Hospital Medications:   [Transfer Hold] acetaminophen (Tylenol Extra Strength) tab 1,000 mg  1,000 mg Oral Once    [Transfer Hold] scopolamine (Transderm-Scop) 1 MG/3DAYS patch 1 patch  1 patch Transdermal Once    lactated ringers infusion   Intravenous Continuous    ceFAZolin (Ancef) 2g in 10mL IV syringe premix  2 g Intravenous Once    lactated ringers infusion   Intravenous Continuous    lactated ringers IV bolus 500 mL  500 mL Intravenous Once PRN    atropine 0.1 MG/ML injection 0.5 mg  0.5 mg Intravenous PRN    naloxone (Narcan) 0.4 MG/ML injection 0.08 mg  0.08 mg Intravenous PRN    fentaNYL (Sublimaze) 50 mcg/mL injection 25 mcg  25 mcg Intravenous Q5 Min PRN    Or    fentaNYL (Sublimaze) 50 mcg/mL injection 50 mcg  50 mcg Intravenous Q5 Min PRN    acetaminophen (Tylenol Extra Strength) tab 1,000 mg  1,000 mg Oral Once PRN    Or    HYDROcodone-acetaminophen (Norco) 5-325 MG per tab 1 tablet  1 tablet Oral Once PRN    Or    HYDROcodone-acetaminophen (Norco) 5-325 MG per tab 2 tablet  2 tablet Oral Once PRN    ondansetron (Zofran) 4 MG/2ML injection 4 mg  4 mg Intravenous Q6H PRN    prochlorperazine (Compazine) 10 MG/2ML injection 5 mg  5 mg Intravenous Q8H PRN    fentaNYL (Sublimaze) 50 mcg/mL injection 25 mcg  25 mcg Intravenous Q5 Min PRN    Or    fentaNYL (Sublimaze) 50 mcg/mL injection 50 mcg  50 mcg Intravenous Q5 Min PRN       Outpatient  Medications:     Facility-Administered Medications Prior to Admission   Medication Dose Route Frequency Provider Last Rate Last Admin    [COMPLETED] sulfamethoxazole-trimethoprim DS (Bactrim DS) 800-160 MG per tab 1 tablet  1 tablet Oral Once Nitin Aguero MD   1 tablet at 07/29/24 1400    [COMPLETED] methylPREDNISolone acetate (DEPO-medrol) 80 MG/ML injection 80 mg  80 mg Intramuscular Once    80 mg at 07/01/24 1600    [COMPLETED] methylPREDNISolone acetate (DEPO-medrol) 80 MG/ML injection 80 mg  80 mg Intramuscular Once    80 mg at 07/01/24 1554     Medications Prior to Admission   Medication Sig Dispense Refill Last Dose    cephalexin 500 MG Oral Cap Take 1 capsule (500 mg total) by mouth 3 (three) times daily for 5 days. Take 2 days before and 3 days after surgery 15 capsule 0 8/26/2024 at 2000    [START ON 9/1/2024] Metaxalone 800 MG Oral Tab Take 1 tablet (800 mg total) by mouth 3 (three) times daily as needed for Pain. 90 tablet 0 8/26/2024 at 0900    GABAPENTIN 300 MG Oral Cap TAKE THREE CAPSULES BY MOUTH THREE TIMES A DAY (Patient taking differently: Take 3 capsules (900 mg total) by mouth daily as needed.) 270 capsule 0 8/27/2024 at 0500    acetaminophen-codeine 300-60 MG Oral Tab Take 1 tablet by mouth 2 (two) times daily as needed for Pain. 60 tablet 0 8/27/2024 at 0500    METOPROLOL SUCCINATE  MG Oral Tablet 24 Hr TAKE 1 TABLET BY MOUTH DAILY 90 tablet 0     phenazopyridine (PYRIDIUM) 100 MG Oral Tab Take 1 tablet (100 mg total) by mouth 3 (three) times daily as needed for Pain. This will turn your urine orange. 10 tablet 0 Past Month    oxybutynin 5 MG Oral Tab Take 1 tablet (5 mg total) by mouth 3 (three) times daily as needed (Bladder spasms). Stop 12 hours before Phillips catheter removal in clinic (if applicable) 15 tablet 0 Past Month    folic acid 1 MG Oral Tab Take 1 tablet (1 mg total) by mouth daily.   8/27/2024 at 0500    Multiple Vitamins-Minerals (ALIVE MULTI-VITAMIN OR) Take 1 tablet  by mouth daily.   Past Month    NON FORMULARY Take 1-2 capsules by mouth daily as needed (pain). Product: Relieve-R capsules       losartan 100 MG Oral Tab TAKE 1 TABLET BY MOUTH EVERY DAY 30 tablet 0 Past Month    triamcinolone 0.1 % External Cream Apply topically 2 (two) times daily. To affected skin. 80 g 1     Armodafinil 250 MG Oral Tab Take 250 mg by mouth daily.   2024 at 0800    LATUDA 60 MG Oral Tab Take 1 tablet (60 mg total) by mouth daily with food.   2024 at 2200    traZODone HCl 50 MG Oral Tab Take 1 tablet (50 mg total) by mouth nightly as needed.   2024 at 2200    DULoxetine HCl 60 MG Oral Cap DR Particles Take 2 capsules (120 mg total) by mouth daily.   2024 at 0500    Mirabegron ER 25 MG Oral Tablet 24 Hr Take 1 tablet (25 mg total) by mouth daily. 90 tablet 6     ondansetron (ZOFRAN) 4 mg tablet Take 1 tablet (4 mg total) by mouth every 8 (eight) hours as needed for Nausea. 30 tablet 0 More than a month    rizatriptan (MAXALT-MLT) 10 MG Oral Tablet Dispersible Take 1 tablet (10 mg total) by mouth as needed for Migraine. 90 tablet 1 More than a month       Allergies: Hydrocodone, Benzoyl peroxide, Levofloxacin, and Pcn [penicillins]      Anesthesia Evaluation    Patient summary reviewed.    Anesthetic Complications  (+) history of anesthetic complications  History of: PONV       GI/Hepatic/Renal      (+) GERD                           Cardiovascular            MET: >4      (+) hypertension                                     Endo/Other               (+) anemia                   Pulmonary      (+) asthma              (+) sleep apnea       Neuro/Psych      (+) depression  (+) anxiety           (+) headaches           +Fibromyalgia        Past Surgical History:   Procedure Laterality Date    Appendectomy  2008    Appendectomy      Breast surgery procedure unlisted      enlargement      2003    Colonoscopy  2009    negative    Excis lumbar disk,one level       Hysterectomy  2015    total hystero.    Implant left      Implant right      Oophorectomy Bilateral 2015    total hystero    Other  03/08/2021    spinal angiogram with embolization    Other surgical history Bilateral 02/20/2015    Procedure: ABDOMINAL HYSTERECTOMY, BSO;  Surgeon: Calixto Weathers MD;  Location:  MAIN OR     Social History     Socioeconomic History    Marital status:    Tobacco Use    Smoking status: Never    Smokeless tobacco: Never   Vaping Use    Vaping status: Never Used   Substance and Sexual Activity    Alcohol use: Not Currently    Drug use: No   Other Topics Concern    Caffeine Concern Yes     Comment: 3-4 cups    Exercise Yes     Comment: walking     History   Drug Use No     Available pre-op labs reviewed.  Lab Results   Component Value Date    WBC 9.5 08/22/2024    RBC 4.14 08/22/2024    HGB 13.2 08/22/2024    HCT 40.8 08/22/2024    MCV 98.6 08/22/2024    MCH 31.9 08/22/2024    MCHC 32.4 08/22/2024    RDW 14.9 08/22/2024    .0 08/22/2024     Lab Results   Component Value Date     06/29/2024    K 5.0 06/29/2024     06/29/2024    CO2 27.0 06/29/2024    BUN 21 06/29/2024    CREATSERUM 0.76 06/29/2024     (H) 06/29/2024    CA 9.7 06/29/2024            Airway      Mallampati: II  Mouth opening: >3 FB  TM distance: > 6 cm   Cardiovascular    Cardiovascular exam normal.         Dental             Pulmonary    Pulmonary exam normal.                 Other findings              ASA: 3   Plan: general  NPO status verified and     Post-procedure pain management plan discussed with surgeon and patient.    Comment:    I explained intrinsic risks of general anesthesia, including nausea, dental damage, sore throat, mouth injury,and hoarseness from airway management.  All questions were answered and understanding was demonstrated of risks.  Informed permission was obtained to proceed as documented in the signed consent form.      Plan/risks discussed with:  patient      Consented to blood products.          Present on Admission:  **None**

## 2024-08-27 NOTE — ANESTHESIA POSTPROCEDURE EVALUATION
[facility]    Alesia Bland Patient Status:  Hospital Outpatient Surgery   Age/Gender 59 year old female MRN [id number]   Location [facility] SURGERY Attending Nitin Aguero MD   Hosp Day # 0 PCP Toni Callahan DO       Anesthesia Post-op Note    CYSTOSCOPY, RIGHT URETEROSCOPY, RIGHT LASER LITHOTRIPSY, RIGHT STENT PLACEMENT    Procedure Summary       Date: 08/27/24 Room / Location:  MAIN OR 07 /  MAIN OR    Anesthesia Start: 1016 Anesthesia Stop: 1215    Procedure: CYSTOSCOPY, RIGHT URETEROSCOPY, RIGHT LASER LITHOTRIPSY, RIGHT STENT PLACEMENT (Right: Ureter) Diagnosis:       Nephrolithiasis      (Nephrolithiasis [N20.0])    Surgeons: Nitin Aguero MD Anesthesiologist: Jeremias Arriaga DO    Anesthesia Type: general ASA Status: 3            Anesthesia Type: general    Vitals Value Taken Time   /80 08/27/24 1215   Temp 97.7 F 08/27/24 1215   Pulse 83 08/27/24 1215   Resp 16 08/27/24 1215   SpO2 100 on 3 L NC 08/27/24 1215       Patient Location: PACU    Anesthesia Type: general    Airway Patency: patent    Postop Pain Control: adequate    Mental Status: mildly sedated but able to meaningfully participate in the post-anesthesia evaluation    Nausea/Vomiting: none    Cardiopulmonary/Hydration status: stable euvolemic    Complications: no apparent anesthesia related complications    Postop vital signs: stable    Dental Exam: Unchanged from Preop    Patient to be discharged from PACU when criteria met.

## 2024-08-27 NOTE — ANESTHESIA PROCEDURE NOTES
Airway  Date/Time: 8/27/2024 10:26 AM  Urgency: elective      General Information and Staff    Patient location during procedure: OR  Anesthesiologist: Jeremias Arriaga DO  Performed: anesthesiologist   Performed by: Jeremias Arriaga DO  Authorized by: Jeremias Arriaga DO      Indications and Patient Condition  Indications for airway management: anesthesia  Sedation level: deep  Preoxygenated: yes  Patient position: sniffing  Mask difficulty assessment: 1 - vent by mask    Final Airway Details  Final airway type: endotracheal airway      Successful airway: ETT  Cuffed: yes   Successful intubation technique: direct laryngoscopy  Endotracheal tube insertion site: oral  Blade: Ade  Blade size: #3  ETT size (mm): 7.5    Placement verified by: capnometry   Measured from: lips  ETT to lips (cm): 22  Number of attempts at approach: 1    Additional Comments  Oral mucosa remain in preoperative state following airway instrumentation.

## 2024-08-27 NOTE — TELEPHONE ENCOUNTER
; Please cancel upcoming stent removal     OR schedulers;     Can you please schedule this patient for surgery.    Also, can you arrange for the patient to drop a urine sample for urine culture 1-2 weeks prior to the scheduled surgery date? I placed the order.     Thanks,  Nitin       Urology Surgery Request  Surgeon: Nitin Aguero  Location (if known): [facility]  Procedure: Cystoscopy, RIGHT ureteroscopy, laser lithotripsy, stent exchange  Anesthesia: General   Time Frame: Next available- 3/4 weeks   Time required: 60 minutes  Diagnosis: Nephrolithiasis  Special Equipment: C-arm    Antibiotics: per hospital protocol unless checked below   ___ Levaquin 500 mg IV   ___ Gemcitabine 2 g/100 mL NS bladder instillation to be given in OR    Estimated Post Op/Follow Up Appt:   1 week for cystoscopy/stent removal in clinic with me. Please schedule appointment at time of surgery scheduling.

## 2024-08-27 NOTE — DISCHARGE INSTRUCTIONS
You had cystoscopy, ureteroscopy, and stent placement in the operating room today.    Instructions:    - No heavy lifting or strenuous activity for 1 day. You may resume regular activity tomorrow.    - The office will contact you to make your next surgery date in the next couple days.    If you do not hear from the clinic after a few days or you need to change your appointment time or date, please contact us at [phone].       - You have a stent (small plastic tube) inside your kidney and ureter to allow the swelling from surgery to resolve. This is only temporary and must be removed, so you should not forget about it.  This will be replaced at your next surgery and then removed 1-2 weeks after.     - Finish antibiotics course     - With a ureteral stent in place you may have some discomfort on your side/flank. You can feel pain worsen when you urinate, so try to avoid holding urine and void every 2-3 hours. You also may notice increased blood in the urine as you increase your activity. If this happens increase your hydration and take it easy for a day. Warm baths/hot packs can help with the discomfort as well as your prescribed medications and Advil/Motrin (if you are able to take these).     - You may experience mild pain after the procedure for a few days.  If the pain becomes intolerable please contact our office or go to the nearest Emergency Room or Urgent Care. You should take over the counter ibuprofen (AKA motrin, advil) for mild pain (provided you do not have a medical condition such as stomach ulcers or kidney disease which prohibits you from taking these). You may alternate this with tylenol as well. If pain is still not relieved by tylenol and/or ibuprofen, you may take narcotic pain medication if prescribed (typically oxycodone or tramadol). If you are taking narcotic pain medication this can make you constipated, so you should take over the counter stool softeners or miralax if prescribed.    -  Warm pack or hot baths often help with discomfort after cystoscopy.    - If you take blood thinners (such as aspirin or plavix) please hold these medications until 3 days after surgery.     - You may experience burning and frequency of urination over the next few days. This will improve after a few days if you stay well hydrated. If you were prescribed phenazopyridine (Pyridium) this may relieve urinary discomfort but you can only take this for 3 days. Pyridium will make your urine orange.     - You are likely to see some blood in your urine (pink or light red urine) that should clear up within a few days. Staying well hydrated should help this clear up. If you notice the urine stays dark red or there are multiple large blood clots despite good hydration, please call the urology clinic ([phone]).     - Try to abstain from alcohol, coffee, tea, artificial sweeteners, and spicy food for the next 48 hours as these can irritate the bladder.     - If you develop fevers / chills, difficulty urinating, or abdominal pain that does not improve with pain medications, please call the office.     - Drink 1.5 to 2 liters of fluid today (water is preferable). If you are on a fluid restriction due to other medical reasons then you need to adhere to your fluid restriction recommendations.      Nitin Aguero MD  Staff Urologist  [facility]  Office: [phone]      You received a drug called Toradol which is an Anti Inflammatory at: 11:52 AM  If you are allowed to take Anti inflammatories:    Do not take any Anti Inflammatory like Motrin, Aleve or Ibuprophen until after: 5:52 PM  Please report any suspected allergic reactions or bleeding issues to your doctor

## 2024-08-28 LAB — HLA B27 SCREENING: NEGATIVE

## 2024-08-29 NOTE — TELEPHONE ENCOUNTER
Spoke with the patient.  Scheduling the surgery for 9/24/24. Also scheduled a  week f/u 10/3/24 @ 11:30am   Reviewed the pre-op instructions and will send via My Chart or mail to patient once confirmed.  Patient can contact me at [phone]

## 2024-09-03 ENCOUNTER — PATIENT MESSAGE (OUTPATIENT)
Dept: PAIN CLINIC | Facility: CLINIC | Age: 59
End: 2024-09-03

## 2024-09-03 DIAGNOSIS — F11.90 CHRONIC, CONTINUOUS USE OF OPIOIDS: ICD-10-CM

## 2024-09-04 ENCOUNTER — TELEPHONE (OUTPATIENT)
Dept: SURGERY | Facility: CLINIC | Age: 59
End: 2024-09-04

## 2024-09-04 RX ORDER — PHENAZOPYRIDINE HYDROCHLORIDE 100 MG/1
100 TABLET, FILM COATED ORAL 3 TIMES DAILY PRN
Qty: 10 TABLET | Refills: 1 | Status: SHIPPED | OUTPATIENT
Start: 2024-09-04

## 2024-09-04 RX ORDER — ACETAMINOPHEN AND CODEINE PHOSPHATE 300; 60 MG/1; MG/1
1 TABLET ORAL 2 TIMES DAILY PRN
Qty: 60 TABLET | Refills: 0 | Status: SHIPPED | OUTPATIENT
Start: 2024-09-04 | End: 2024-10-04

## 2024-09-04 RX ORDER — OXYBUTYNIN CHLORIDE 5 MG/1
5 TABLET ORAL 3 TIMES DAILY PRN
Qty: 30 TABLET | Refills: 1 | Status: SHIPPED | OUTPATIENT
Start: 2024-09-04

## 2024-09-04 NOTE — TELEPHONE ENCOUNTER
From: Alesia Bland  To: Rob Arrieta  Sent: 9/3/2024 9:10 PM CDT  Subject: Med refill    I need refill of T4’s. They are no longer on my med list so could not request that way. I am out as of tomorrow. Sorry for the last minute request. Would have done earlier today, but got crazy busy at work. Is helping so much with continued right leg pain and low back pain.     Thank you.

## 2024-09-04 NOTE — TELEPHONE ENCOUNTER
Medication:   acetaminophen-codeine 300-60 MG Oral Tab ()     Date of last refill: 24  [place] 24    Last office visit: 24  Due back to clinic per last office note:  NA  Date next office visit scheduled:  10/11/24    Last URINE Screen: 24  Screen Results:     Rosie Hernandez, APRN  2024  7:55 AM CDT Back to Top      Results as expected based on medication prescribed         Narcotic Contract EXPIRATION date: 10/25/24    Last OV note recommendation:   Medical Decision Making:   Diagnosis:         Encounter Diagnoses   Name Primary?    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  As such, she had been reducing her T#4, and had just weaned off it without adverse effects when, 4 days later, had acute onset of left lumbosacral pain.  Seen in the ER, and x-ray did reveal a new fracture of the L2.  She had returned to taking Tylenol #4 with codeine, and after MRI, returned to [facility] where she underwent kyphoplasty with spine heena, from which, she has had gradual improvement in pain.    While at [facility], she was given oxycodone, though did not find it to be as effective as Tylenol with codeine, and returned to T#4.  She had been using 6/day, though has since weaned down to 2/day.  While her low back pain at this point has been largely resolved, she has unfortunately had increase in bilateral lower extremity pain along the groin and anterior thighs, stopping at the knees.  This is been associated with a significant reduction in her ADL tolerance, and had returned to metaxalone, having been on this for a number of years prior to weaning.  Updated MRI does show significant bony retropulsion resulting and subarticular stenosis, in keeping with these complaints.  She has been in contact with  [facility], and while they have not seen the most recent MRI findings, there has been some discussion of a possible corpectomy.  Will defer to [facility].  Plan: Status post L2 kyphoplasty with spine heena at [facility] on 3/5/2024, and while low back pain is largely eliminated, has had ongoing groin and anterior thigh pain, limiting her ADLs.  Has reduced Tylenol with codeine from 6/day to 2/day, and was recently refilled.  Can call for refill when needed.  In addition, continues with Neurontin and metaxalone, without adverse effects, call for refills when needed.  Follow with [facility] regarding options for her bony retropulsion.  No orders of the defined types were placed in this encounter.        Meds & Refills for this Visit:  Requested Prescriptions        No prescriptions requested or ordered in this encounter         Imaging & Consults:  None     The patient indicates understanding of these issues and agrees to the plan.     KELLE Elizabeth

## 2024-09-04 NOTE — TELEPHONE ENCOUNTER
Spoke with patient and informed her that she needs to keep her stent in due to upcoming surgery.  Verbalized understanding.  Per Dr Laci cardenas to refill oxybutynin and pyridium. Refills sent to [facility]. Patient aware.

## 2024-09-04 NOTE — TELEPHONE ENCOUNTER
Emailed received requesting for PA for Tylenol#4. Pa initiated via cmm and has been approved.    Outcome  Approved today by [employer]  Meets Pharmacy Policy  Authorization Expiration Date: 3/2/2025

## 2024-09-07 LAB
CAOX DIHYDRATE: 70 %
CAOX MONOHYDRATE: 25 %
HYDROXYAPATITE: 5 %
WEIGHT-STONE: 56 MG

## 2024-09-10 DIAGNOSIS — M54.16 LUMBAR RADICULITIS: Primary | ICD-10-CM

## 2024-09-11 ENCOUNTER — ANESTHESIA EVENT (OUTPATIENT)
Dept: SURGERY | Facility: HOSPITAL | Age: 59
End: 2024-09-11
Payer: COMMERCIAL

## 2024-09-11 ENCOUNTER — PATIENT MESSAGE (OUTPATIENT)
Dept: FAMILY MEDICINE CLINIC | Facility: CLINIC | Age: 59
End: 2024-09-11

## 2024-09-11 RX ORDER — MIRABEGRON 25 MG/1
25 TABLET, FILM COATED, EXTENDED RELEASE ORAL DAILY
Qty: 90 TABLET | Refills: 6 | OUTPATIENT
Start: 2024-09-11

## 2024-09-11 RX ORDER — PHENAZOPYRIDINE HYDROCHLORIDE 100 MG/1
100 TABLET, FILM COATED ORAL 3 TIMES DAILY PRN
Qty: 10 TABLET | Refills: 1 | OUTPATIENT
Start: 2024-09-11

## 2024-09-11 RX ORDER — OXYBUTYNIN CHLORIDE 5 MG/1
5 TABLET ORAL 3 TIMES DAILY PRN
Qty: 30 TABLET | Refills: 1 | OUTPATIENT
Start: 2024-09-11

## 2024-09-11 NOTE — TELEPHONE ENCOUNTER
From: Alesia Bland  To: Toni Callahan  Sent: 9/11/2024 10:05 AM CDT  Subject: Urgency and frequency     I am having severe symptoms of urgency more than frequency with the ureteral stent still in place. I know I just have to suffer so to speak until my procedure. I am taking meds like crazy without much benefit. I just requested refills again. I’ve gone through all of the pyridium. Any advise? Can we possibly move the procedure to 9/17? Just asking. Thx in advance for your time.

## 2024-09-11 NOTE — TELEPHONE ENCOUNTER
Medication: GABAPENTIN 300 MG     Date of last refill: 08/02/24      Last office visit: 07/11/24  Due back to clinic per last office note:  na  Date next office visit scheduled:  10/11/24        Last OV note recommendation:   Plan: Status post L2 kyphoplasty with spine heena at [facility] on 3/5/2024, and while low back pain is largely eliminated, has had ongoing groin and anterior thigh pain, limiting her ADLs.  Has reduced Tylenol with codeine from 6/day to 2/day, and was recently refilled.  Can call for refill when needed.  In addition, continues with Neurontin and metaxalone, without adverse effects, call for refills when needed.  Follow with [facility] regarding options for her bony retropulsion.

## 2024-09-11 NOTE — TELEPHONE ENCOUNTER
Can we call patient and clarify how she is taking this?  It is written for 900 mg 3 times daily (#270 a month) but, per pharmacy records, she is taking 300 mg 3 times a day (#90 a month).  If that is the case, she was just given #270 on 8-24, and would therefore not need a refill at this time.

## 2024-09-12 RX ORDER — GABAPENTIN 300 MG/1
900 CAPSULE ORAL 3 TIMES DAILY
Qty: 270 CAPSULE | Refills: 0 | Status: SHIPPED | OUTPATIENT
Start: 2024-09-12

## 2024-09-12 NOTE — TELEPHONE ENCOUNTER
Contacted pt at this time and per pt she's taking 3 capsules TID.No further questions nor concerns at this time.

## 2024-09-14 ENCOUNTER — LAB ENCOUNTER (OUTPATIENT)
Dept: LAB | Age: 59
End: 2024-09-14
Attending: UROLOGY
Payer: COMMERCIAL

## 2024-09-14 DIAGNOSIS — N20.0 KIDNEY STONE: ICD-10-CM

## 2024-09-14 PROCEDURE — 87086 URINE CULTURE/COLONY COUNT: CPT

## 2024-09-16 ENCOUNTER — PATIENT MESSAGE (OUTPATIENT)
Dept: SURGERY | Facility: CLINIC | Age: 59
End: 2024-09-16

## 2024-09-17 ENCOUNTER — TELEPHONE (OUTPATIENT)
Dept: SURGERY | Facility: CLINIC | Age: 59
End: 2024-09-17

## 2024-09-17 NOTE — TELEPHONE ENCOUNTER
This encounter is now closed.     RN replied to patient via Mychart.   Last read by Alesia Bland at  3:52 PM on 9/17/2024.     RN also called pharmacy to inquire status of refills. Pharmacist will refill both medications today. Patient made aware. See Mychart.       Outpatient Medication Detail   Disp Refills Start End    phenazopyridine (PYRIDIUM) 100 MG Oral Tab 10 tablet 1 9/4/2024 --    Sig - Route: Take 1 tablet (100 mg total) by mouth 3 (three) times daily as needed for Pain. This will turn your urine orange. - Oral    Sent to pharmacy as: Phenazopyridine HCl 100 MG Oral Tablet (Pyridium)    E-Prescribing Status: Receipt confirmed by pharmacy (9/4/2024  8:23 AM CDT)       Disp Refills Start End    oxybutynin 5 MG Oral Tab 30 tablet 1 9/4/2024 --    Sig - Route: Take 1 tablet (5 mg total) by mouth 3 (three) times daily as needed (Bladder spasms). - Oral    Sent to pharmacy as: oxyBUTYnin Chloride 5 MG Oral Tablet (Ditropan)    E-Prescribing Status: Receipt confirmed by pharmacy (9/4/2024  8:23 AM CDT)      Pharmacy    [facility]Y #[id number] [address] [phone], [phone]

## 2024-09-17 NOTE — TELEPHONE ENCOUNTER
----- Message from [employer]  sent at 9/16/2024 11:29 AM CDT -----  Regarding: Refills  Contact: [phone]  It says in the message center that the oxybutynin and pyridium have not been refilled. I am out of pyridium and have only a few oxy left. As long as I am perfectly still I have no issues, but I work, so I can’t be still. I walk a ton with my job. I need these meds so I am not peeing constantly or accidentally!  I’m miserable this go round!

## 2024-09-23 NOTE — H&P
UROLOGY PRE-OPERATIVE HISTORY & PHYSICAL      Alesia Bland Patient Status:  Hospital Outpatient Surgery    3/10/1965 MRN [id number]   Formerly [facility] SURGERY Attending Nitin Aguero MD   Hosp Day # 0 PCP Toni Callahan DO     Primary Care Provider: Toni Callahan DO     Procedure      CYSTOSCOPY, RIGHT URETEROSCOPY, LASER LITHOTRIPSY, STENT EXCHANGE:     History of Present Illness:     Alesia Bland is a 59 year old female with history of GIANLUCA, HTN, chronic pain referred for nephrolithaisis.  Status post left ureteroscopy on  and now right sided URS 24; here for second look URS for residual stone.       Patient saw PCP  with spider bite- a CT was done to rule out abscess.   KIDNEYS:  7 mm calculus in the proximal left ureter is causing mild to moderate hydronephrosis.  Multiple nonobstructing calculi in the lower pole left kidney.  1.3 cm.  Calculus lower pole the right kidney.  ADRENALS:  No mass or enlargement.    She was given antibiotics.  No clear abscess as seen- wound care was done.      She was to see urology. She was then admitted here at  with AMS, join pain.  Was possibly related to recent spider bite.  She was treated with steroid course and was discharged home on  after 4 days. No kidney imaging was done during hospital stay. She is doing better from this standpoint,   She has no previous urologic history. Does have chronic urgency, UUI- is currently on solfenacin for this with some improvement. She has no history of stones.   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  No flank pain she saw me last month or previously (incidental stone).     We repeated a CT Scan;   Her CT scan shows that the previous lower pole left-sided stone is now fallen into the ureter.  She now has 2 obstructing stones in the left side.  She has a additional large stone on the right side that is nonobstructing-has staghorn appearance.     She opted for LEFT URS;staged RIGHT URS.      Findings:   Left side    7/18/24  Cystoscopy - normal urethra without strictures, normal bladder.    Left RPG with mild hydronephrosis. Distal URS with large ~8mm stone slightly embedded to surrounding tissue; laser fragmented and basket extracted. No further stones seen in ureter; suspected second stone pushed into kidney. URS of kidney with ~6mm stone in renal pelvis; laser fragmented and extracted. No other stones. 6x26 JJ stent placed without issue.      Stent removed on 7/29 without issue.       Discussed options for RIGHt sided stone bruden; she wanted to proceed with URS. We did discuss other options such as PCNL. She wanted to avoid more invasive/inpatient procedure.   Discussed potential staged procedure given large stone size.      8/27/24  URS;   Findings:  Normal urethra. No bladder lesions. Right RPG without hydronephrosis; large lower pole radiopaque stone.   Ureteroscopy with narrow/tortuous proximal ureter; large lower pole ~2cm stone with adjcent ~0.4cm stone and additional punctate lower pole stones. Calyx very challenigng to access given angle; able to fragment stone and move large fragments to upper pole. All stone then dusted/fragmented and basket extracted. Poor visualization at conclusion and challenging to remove larger fragments given ureter anatomy; decision made to place stent and plan for second look URS to ensure clearance of stone. Majority of stone treated.   Right 6x26 JJ stent placed without issue.     Here now for second look URS. The risks discussed included, but were not limited to, urinary tract infection, sepsis, bleeding, injury to urethra/bladder/ureter, urethra stricture, ureteral stricture, inability to treat stone. Patient agreeable to proceed and informed consent obtained.      Ucx: Negative     Stent size 6x26  5' 5.5\" (1.664 m)    History:     Past Medical History:    Anesthesia complication    n&v    Arrhythmia    idiopathic tachycardia    Back problem    Bronchitis, not specified as acute or  chronic    Bronchospasm    Brown recluse spider bite    Calculus of kidney    Depression    Esophageal reflux    Extrinsic asthma, unspecified    Hemangioma of vertebral column    High blood pressure    Hx of motion sickness    Incontinence    Kidney stones    Lipid screening    Migraines    Neuropathy    GIANLUCA (obstructive sleep apnea)    AHI 14 Supine AHI 20 non-supine AHI 9    PONV (postoperative nausea and vomiting)    TAKES A LONG TIME WAKE UP    Sleep apnea    prior to losing weight - no device    Snoring    AHI 4.4 SaO2 janelle 86-.5 primary snoring    Spinal hemangioma    Visual impairment       Past Surgical History:   Procedure Laterality Date    Appendectomy  2008    Appendectomy      Breast surgery procedure unlisted      enlargement      2003    Colonoscopy  2009    negative    Excis lumbar disk,one level      Hysterectomy      total hystero.    Implant left      Implant right      Oophorectomy Bilateral     total hystero    Other  2021    spinal angiogram with embolization    Other surgical history Bilateral 2015    Procedure: ABDOMINAL HYSTERECTOMY, BSO;  Surgeon: Calixto Weathers MD;  Location: [facility] MAIN OR    Other surgical history      spinal kyphoplasty and cryo spine surgery       Family History   Problem Relation Age of Onset    Depression Mother     Other (Other) Mother     Hypertension Father     Other (Other) Father     Melanoma Father     Heart Attack Maternal Grandmother     Breast Cancer Maternal Grandmother 50        In her early 50's.    Other (Other) Maternal Grandmother     Other (leukemia) Maternal Grandfather     Diabetes Paternal Grandmother         type 2    Other (renal failure) Paternal Grandmother     Other (lung cancer) Paternal Grandfather        Social History     Socioeconomic History    Marital status:    Tobacco Use    Smoking status: Never    Smokeless tobacco: Never   Vaping Use    Vaping status: Never Used   Substance and  Sexual Activity    Alcohol use: Not Currently     Alcohol/week: 0.0 - 1.0 standard drinks of alcohol    Drug use: No   Other Topics Concern    Caffeine Concern Yes     Comment: 3-4 cups    Exercise Yes     Comment: walking     Social Determinants of Health     Financial Resource Strain: Low Risk  (8/17/2023)    [facility]    Overall Financial Resource Strain (CARDIA)     Difficulty of Paying Living Expenses: Not very hard   Food Insecurity: No Food Insecurity (6/9/2024)    Food Insecurity     Food Insecurity: Never true   Transportation Needs: No Transportation Needs (6/9/2024)    Transportation Needs     Lack of Transportation: No   Physical Activity: Inactive (8/17/2023)    [facility], [facility]    Exercise Vital Sign     Days of Exercise per Week: 0 days     Minutes of Exercise per Session: 0 min   Housing Stability: Low Risk  (6/9/2024)    Housing Stability     Housing Instability: No       Medications:  Current Outpatient Medications   Medication Sig Dispense Refill    acetaminophen-codeine 300-60 MG Oral Tab Take 1 tablet by mouth 2 (two) times daily as needed for Pain. 60 tablet 0    oxybutynin 5 MG Oral Tab Take 1 tablet (5 mg total) by mouth 3 (three) times daily as needed (Bladder spasms). 30 tablet 1    phenazopyridine (PYRIDIUM) 100 MG Oral Tab Take 1 tablet (100 mg total) by mouth 3 (three) times daily as needed for Pain. This will turn your urine orange. 10 tablet 1    Metaxalone 800 MG Oral Tab Take 1 tablet (800 mg total) by mouth 3 (three) times daily as needed for Pain. 90 tablet 0    METOPROLOL SUCCINATE  MG Oral Tablet 24 Hr TAKE 1 TABLET BY MOUTH DAILY 90 tablet 0    folic acid 1 MG Oral Tab Take 1 tablet (1 mg total) by mouth daily.      Mirabegron ER 25 MG Oral Tablet 24 Hr Take 1 tablet (25 mg total) by mouth daily. 90 tablet 6    Multiple Vitamins-Minerals (ALIVE MULTI-VITAMIN OR) Take 1 tablet by mouth daily.      rizatriptan (MAXALT-MLT)  10 MG Oral Tablet Dispersible Take 1 tablet (10 mg total) by mouth as needed for Migraine. 90 tablet 1    Armodafinil 250 MG Oral Tab Take 250 mg by mouth daily.      LATUDA 60 MG Oral Tab Take 1 tablet (60 mg total) by mouth daily with food.      traZODone HCl 50 MG Oral Tab Take 1 tablet (50 mg total) by mouth nightly as needed.      DULoxetine HCl 60 MG Oral Cap DR Particles Take 2 capsules (120 mg total) by mouth daily.      gabapentin 300 MG Oral Cap Take 3 capsules (900 mg total) by mouth 3 (three) times daily. 270 capsule 0    Polyethylene Glycol 3350 (MIRALAX) 17 g Oral Powd Pack Take 17 g by mouth daily as needed (Take to avoid constipation, especially if taking narcotic pain medications.). 20 packet 1    NON FORMULARY Take 1-2 capsules by mouth daily as needed (pain). Product: Relieve-R capsules      ondansetron (ZOFRAN) 4 mg tablet Take 1 tablet (4 mg total) by mouth every 8 (eight) hours as needed for Nausea. 30 tablet 0    losartan 100 MG Oral Tab TAKE 1 TABLET BY MOUTH EVERY DAY 30 tablet 0    triamcinolone 0.1 % External Cream Apply topically 2 (two) times daily. To affected skin. 80 g 1       Allergies:  Allergies   Allergen Reactions    Hydrocodone NAUSEA AND VOMITING    Benzoyl Peroxide RASH     CREA    Levofloxacin RASH    Pcn [Penicillins] RASH     As a child - simple rash, no emergency treatment needed. States has had cephalosporin without reactions       Review of Systems:   A comprehensive 10-point review of systems was completed.  Pertinent positives and negatives are noted in the the HPI.    Physical Exam:   Vital Signs:  Height 5' 5.5\" (1.664 m), weight 189 lb (85.7 kg), last menstrual period 12/23/2014, not currently breastfeeding.     CONSTITUTIONAL: Well developed, well nourished, in no acute distress   RESPIRATORY: Normal respiratory effort  ABDOMEN: Soft, non-tender, non-distended      Laboratory Data:  Lab Results   Component Value Date    WBC 9.5 08/22/2024    HGB 13.2 08/22/2024     .0 08/22/2024     Lab Results   Component Value Date     06/29/2024    K 5.0 06/29/2024     06/29/2024    CO2 27.0 06/29/2024    BUN 21 06/29/2024     (H) 06/29/2024    GFRAA 90 03/01/2022    AST 13 (L) 06/29/2024    ALT 31 06/29/2024    TP 7.1 06/29/2024    ALB 3.9 06/29/2024    PHOS 3.2 06/29/2024    CA 9.7 06/29/2024    MG 2.1 06/29/2024       Urinalysis Results (last three years):  Recent Labs     06/09/24  1916 06/12/24  0511 06/18/24  1151 07/29/24  1359   COLORUR Yellow Light-Yellow  --   --    CLARITY Clear Clear  --   --    SPECGRAVITY <=1.005 1.011 1.015 1.025   PHURINE 5.0 5.5 5.5 6.0   PROUR Negative Negative  --   --    GLUUR Negative Normal  --   --    KETUR 15* Negative  --   --    BILUR  --  Negative  --   --    BLOODURINE Moderate* 1+*  --   --    NITRITE Negative Negative Negative Negative   UROBILINOGEN 1.0* Normal  --   --    LEUUR Small* 250*  --   --    WBCUR 6-10* 21-50*  --   --    RBCUR >10* 3-5*  --   --    BACUR Rare* 1+*  --   --        Urine Culture Results (last three years):  Lab Results   Component Value Date    URINECUL No Growth 2 Days 09/14/2024    URINECUL  08/22/2024     10-50,000 cfu/ml Multiple species present-probable contamination.    URINECUL 50,000-99,000 CFU/ML Lactobacillus species 07/06/2024    URINECUL No Growth at 18-24 hrs. 06/09/2024       PSA:  No results found for: \"PSA\", \"PERCENTPSA\", \"PSAS\", \"PSAULTRA\"     Imaging (last three days):  No results found.     Assessment:   Alesia Bland is a 59 year old y/o female who presents for the above stated procedure.       Plan:     - OR for CYSTOSCOPY, RIGHT URETEROSCOPY, LASER LITHOTRIPSY, STENT EXCHANGE:   - NPO since midnight   - Antibiotics ordered for OR   - Informed consent obtained - risks and benefits explained, and all questions answered  - Marked appropriately     I have personally reviewed all relevant medical records, labs, and imaging.       Nitin Aguero MD  Staff  Urologist  [facility]  Office: [phone]

## 2024-09-24 ENCOUNTER — APPOINTMENT (OUTPATIENT)
Dept: GENERAL RADIOLOGY | Facility: HOSPITAL | Age: 59
End: 2024-09-24
Attending: UROLOGY
Payer: COMMERCIAL

## 2024-09-24 ENCOUNTER — ANESTHESIA (OUTPATIENT)
Dept: SURGERY | Facility: HOSPITAL | Age: 59
End: 2024-09-24
Payer: COMMERCIAL

## 2024-09-24 ENCOUNTER — HOSPITAL ENCOUNTER (OUTPATIENT)
Facility: HOSPITAL | Age: 59
Setting detail: HOSPITAL OUTPATIENT SURGERY
Discharge: HOME | End: 2024-09-24
Attending: UROLOGY | Admitting: UROLOGY
Payer: COMMERCIAL

## 2024-09-24 VITALS
DIASTOLIC BLOOD PRESSURE: 71 MMHG | BODY MASS INDEX: 30.95 KG/M2 | TEMPERATURE: 98 F | HEIGHT: 65.5 IN | SYSTOLIC BLOOD PRESSURE: 134 MMHG | WEIGHT: 188 LBS | OXYGEN SATURATION: 96 % | HEART RATE: 63 BPM | RESPIRATION RATE: 16 BRPM

## 2024-09-24 DIAGNOSIS — N20.0 NEPHROLITHIASIS: ICD-10-CM

## 2024-09-24 PROCEDURE — 0TC08ZZ EXTIRPATION OF MATTER FROM RIGHT KIDNEY, VIA NATURAL OR ARTIFICIAL OPENING ENDOSCOPIC: ICD-10-PCS | Performed by: UROLOGY

## 2024-09-24 PROCEDURE — BT1D1ZZ FLUOROSCOPY OF RIGHT KIDNEY, URETER AND BLADDER USING LOW OSMOLAR CONTRAST: ICD-10-PCS | Performed by: UROLOGY

## 2024-09-24 PROCEDURE — 0T768DZ DILATION OF RIGHT URETER WITH INTRALUMINAL DEVICE, VIA NATURAL OR ARTIFICIAL OPENING ENDOSCOPIC: ICD-10-PCS | Performed by: UROLOGY

## 2024-09-24 PROCEDURE — 74420 UROGRAPHY RTRGR +-KUB: CPT | Performed by: UROLOGY

## 2024-09-24 PROCEDURE — 52356 CYSTO/URETERO W/LITHOTRIPSY: CPT | Performed by: UROLOGY

## 2024-09-24 DEVICE — URETERAL STENT
Type: IMPLANTABLE DEVICE | Site: URETER | Status: FUNCTIONAL
Brand: ASCERTA™

## 2024-09-24 RX ORDER — PROCHLORPERAZINE EDISYLATE 5 MG/ML
5 INJECTION INTRAMUSCULAR; INTRAVENOUS EVERY 8 HOURS PRN
Status: DISCONTINUED | OUTPATIENT
Start: 2024-09-24 | End: 2024-09-24

## 2024-09-24 RX ORDER — ONDANSETRON 2 MG/ML
4 INJECTION INTRAMUSCULAR; INTRAVENOUS EVERY 6 HOURS PRN
Status: DISCONTINUED | OUTPATIENT
Start: 2024-09-24 | End: 2024-09-24

## 2024-09-24 RX ORDER — HYDROMORPHONE HYDROCHLORIDE 1 MG/ML
0.2 INJECTION, SOLUTION INTRAMUSCULAR; INTRAVENOUS; SUBCUTANEOUS EVERY 5 MIN PRN
Status: DISCONTINUED | OUTPATIENT
Start: 2024-09-24 | End: 2024-09-24

## 2024-09-24 RX ORDER — NALOXONE HYDROCHLORIDE 0.4 MG/ML
80 INJECTION, SOLUTION INTRAMUSCULAR; INTRAVENOUS; SUBCUTANEOUS AS NEEDED
Status: DISCONTINUED | OUTPATIENT
Start: 2024-09-24 | End: 2024-09-24

## 2024-09-24 RX ORDER — POLYETHYLENE GLYCOL 3350 17 G/17G
17 POWDER, FOR SOLUTION ORAL DAILY PRN
Qty: 20 PACKET | Refills: 1 | Status: SHIPPED | OUTPATIENT
Start: 2024-09-24

## 2024-09-24 RX ORDER — HYDROMORPHONE HYDROCHLORIDE 1 MG/ML
0.4 INJECTION, SOLUTION INTRAMUSCULAR; INTRAVENOUS; SUBCUTANEOUS EVERY 5 MIN PRN
Status: DISCONTINUED | OUTPATIENT
Start: 2024-09-24 | End: 2024-09-24

## 2024-09-24 RX ORDER — PHENAZOPYRIDINE HYDROCHLORIDE 100 MG/1
100 TABLET, FILM COATED ORAL 3 TIMES DAILY PRN
Qty: 10 TABLET | Refills: 0 | Status: SHIPPED | OUTPATIENT
Start: 2024-09-24

## 2024-09-24 RX ORDER — MEPERIDINE HYDROCHLORIDE 25 MG/ML
12.5 INJECTION INTRAMUSCULAR; INTRAVENOUS; SUBCUTANEOUS AS NEEDED
Status: DISCONTINUED | OUTPATIENT
Start: 2024-09-24 | End: 2024-09-24

## 2024-09-24 RX ORDER — LIDOCAINE HYDROCHLORIDE 20 MG/ML
JELLY TOPICAL AS NEEDED
Status: DISCONTINUED | OUTPATIENT
Start: 2024-09-24 | End: 2024-09-24 | Stop reason: HOSPADM

## 2024-09-24 RX ORDER — KETOROLAC TROMETHAMINE 30 MG/ML
INJECTION, SOLUTION INTRAMUSCULAR; INTRAVENOUS AS NEEDED
Status: DISCONTINUED | OUTPATIENT
Start: 2024-09-24 | End: 2024-09-24 | Stop reason: SURG

## 2024-09-24 RX ORDER — OXYBUTYNIN CHLORIDE 5 MG/1
5 TABLET ORAL 3 TIMES DAILY PRN
Qty: 30 TABLET | Refills: 0 | Status: SHIPPED | OUTPATIENT
Start: 2024-09-24

## 2024-09-24 RX ORDER — LABETALOL HYDROCHLORIDE 5 MG/ML
5 INJECTION, SOLUTION INTRAVENOUS EVERY 5 MIN PRN
Status: DISCONTINUED | OUTPATIENT
Start: 2024-09-24 | End: 2024-09-24

## 2024-09-24 RX ORDER — CEPHALEXIN 500 MG/1
500 CAPSULE ORAL 3 TIMES DAILY
Qty: 9 CAPSULE | Refills: 0 | Status: SHIPPED | OUTPATIENT
Start: 2024-09-24 | End: 2024-09-27

## 2024-09-24 RX ORDER — SODIUM CHLORIDE, SODIUM LACTATE, POTASSIUM CHLORIDE, CALCIUM CHLORIDE 600; 310; 30; 20 MG/100ML; MG/100ML; MG/100ML; MG/100ML
INJECTION, SOLUTION INTRAVENOUS CONTINUOUS
Status: DISCONTINUED | OUTPATIENT
Start: 2024-09-24 | End: 2024-09-24

## 2024-09-24 RX ORDER — ACETAMINOPHEN 500 MG
1000 TABLET ORAL ONCE
Status: DISCONTINUED | OUTPATIENT
Start: 2024-09-24 | End: 2024-09-24 | Stop reason: HOSPADM

## 2024-09-24 RX ORDER — HYDROMORPHONE HYDROCHLORIDE 1 MG/ML
0.6 INJECTION, SOLUTION INTRAMUSCULAR; INTRAVENOUS; SUBCUTANEOUS EVERY 5 MIN PRN
Status: DISCONTINUED | OUTPATIENT
Start: 2024-09-24 | End: 2024-09-24

## 2024-09-24 RX ORDER — PHENYLEPHRINE HCL 10 MG/ML
VIAL (ML) INJECTION AS NEEDED
Status: DISCONTINUED | OUTPATIENT
Start: 2024-09-24 | End: 2024-09-24 | Stop reason: SURG

## 2024-09-24 RX ORDER — MIDAZOLAM HYDROCHLORIDE 1 MG/ML
INJECTION INTRAMUSCULAR; INTRAVENOUS AS NEEDED
Status: DISCONTINUED | OUTPATIENT
Start: 2024-09-24 | End: 2024-09-24 | Stop reason: SURG

## 2024-09-24 RX ORDER — SCOLOPAMINE TRANSDERMAL SYSTEM 1 MG/1
1 PATCH, EXTENDED RELEASE TRANSDERMAL ONCE
Status: DISCONTINUED | OUTPATIENT
Start: 2024-09-24 | End: 2024-09-24 | Stop reason: HOSPADM

## 2024-09-24 RX ORDER — LIDOCAINE HYDROCHLORIDE 10 MG/ML
INJECTION, SOLUTION EPIDURAL; INFILTRATION; INTRACAUDAL; PERINEURAL AS NEEDED
Status: DISCONTINUED | OUTPATIENT
Start: 2024-09-24 | End: 2024-09-24 | Stop reason: SURG

## 2024-09-24 RX ORDER — HYDROMORPHONE HYDROCHLORIDE 1 MG/ML
INJECTION, SOLUTION INTRAMUSCULAR; INTRAVENOUS; SUBCUTANEOUS
Status: COMPLETED
Start: 2024-09-24 | End: 2024-09-24

## 2024-09-24 RX ORDER — DEXAMETHASONE SODIUM PHOSPHATE 4 MG/ML
VIAL (ML) INJECTION AS NEEDED
Status: DISCONTINUED | OUTPATIENT
Start: 2024-09-24 | End: 2024-09-24 | Stop reason: SURG

## 2024-09-24 RX ORDER — DIPHENHYDRAMINE HYDROCHLORIDE 50 MG/ML
12.5 INJECTION INTRAMUSCULAR; INTRAVENOUS AS NEEDED
Status: DISCONTINUED | OUTPATIENT
Start: 2024-09-24 | End: 2024-09-24

## 2024-09-24 RX ORDER — ONDANSETRON 2 MG/ML
INJECTION INTRAMUSCULAR; INTRAVENOUS AS NEEDED
Status: DISCONTINUED | OUTPATIENT
Start: 2024-09-24 | End: 2024-09-24 | Stop reason: SURG

## 2024-09-24 RX ADMIN — PHENYLEPHRINE HCL 100 MCG: 10 MG/ML VIAL (ML) INJECTION at 15:40:00

## 2024-09-24 RX ADMIN — SODIUM CHLORIDE, SODIUM LACTATE, POTASSIUM CHLORIDE, CALCIUM CHLORIDE: 600; 310; 30; 20 INJECTION, SOLUTION INTRAVENOUS at 14:42:00

## 2024-09-24 RX ADMIN — KETOROLAC TROMETHAMINE 30 MG: 30 INJECTION, SOLUTION INTRAMUSCULAR; INTRAVENOUS at 15:40:00

## 2024-09-24 RX ADMIN — MIDAZOLAM HYDROCHLORIDE 2 MG: 1 INJECTION INTRAMUSCULAR; INTRAVENOUS at 14:42:00

## 2024-09-24 RX ADMIN — DEXAMETHASONE SODIUM PHOSPHATE 8 MG: 4 MG/ML VIAL (ML) INJECTION at 14:52:00

## 2024-09-24 RX ADMIN — LIDOCAINE HYDROCHLORIDE 50 MG: 10 INJECTION, SOLUTION EPIDURAL; INFILTRATION; INTRACAUDAL; PERINEURAL at 14:45:00

## 2024-09-24 RX ADMIN — PHENYLEPHRINE HCL 100 MCG: 10 MG/ML VIAL (ML) INJECTION at 15:10:00

## 2024-09-24 RX ADMIN — ONDANSETRON 4 MG: 2 INJECTION INTRAMUSCULAR; INTRAVENOUS at 14:42:00

## 2024-09-24 NOTE — OPERATIVE REPORT
Urology Operative Note    Attending Surgeon: Nitin Aguero MD    Assistant Surgeon: None    Patient Name: Alesia Bland    Date of Surgery: 9/24/2024    Preoperative Diagnosis: Right nephrolithiasis    Postoperative Diagnosis: Same    Procedure Performed:   Cystoscopy, right ureteroscopy, laser lithotripsy, basket stone extraction, retrograde pyelogram, ureteral stent placement    Indication:     Aleisa Bland is a 59 year old female with history of GIANLUCA, HTN, chronic pain referred for nephrolithaisis.  Status post left ureteroscopy on 7/26 and now right sided URS 8/27/24; here for second look URS for residual stone.       Patient saw PCP 5/22 with spider bite- a CT was done to rule out abscess.   KIDNEYS:  7 mm calculus in the proximal left ureter is causing mild to moderate hydronephrosis.  Multiple nonobstructing calculi in the lower pole left kidney.  1.3 cm.  Calculus lower pole the right kidney.  ADRENALS:  No mass or enlargement.    She was given antibiotics.  No clear abscess as seen- wound care was done.      She was to see urology. She was then admitted here at  with AMS, join pain.  Was possibly related to recent spider bite.  She was treated with steroid course and was discharged home on 6/13 after 4 days. No kidney imaging was done during hospital stay. She is doing better from this standpoint,   She has no previous urologic history. Does have chronic urgency, UUI- is currently on solfenacin for this with some improvement. She has no history of stones.   She is a MD; works in a group home.  No flank pain she saw me last month or previously (incidental stone).     We repeated a CT Scan;   Her CT scan shows that the previous lower pole left-sided stone is now fallen into the ureter.  She now has 2 obstructing stones in the left side.  She has a additional large stone on the right side that is nonobstructing-has staghorn appearance.     She opted for LEFT URS;staged RIGHT URS.      Findings:   Left side    7/18/24  Cystoscopy - normal urethra without strictures, normal bladder.    Left RPG with mild hydronephrosis. Distal URS with large ~8mm stone slightly embedded to surrounding tissue; laser fragmented and basket extracted. No further stones seen in ureter; suspected second stone pushed into kidney. URS of kidney with ~6mm stone in renal pelvis; laser fragmented and extracted. No other stones. 6x26 JJ stent placed without issue.      Stent removed on 7/29 without issue.         Discussed options for RIGHt sided stone bruden; she wanted to proceed with URS. We did discuss other options such as PCNL. She wanted to avoid more invasive/inpatient procedure.   Discussed potential staged procedure given large stone size.      8/27/24  URS;   Findings:  Normal urethra. No bladder lesions. Right RPG without hydronephrosis; large lower pole radiopaque stone.   Ureteroscopy with narrow/tortuous proximal ureter; large lower pole ~2cm stone with adjcent ~0.4cm stone and additional punctate lower pole stones. Calyx very challenigng to access given angle; able to fragment stone and move large fragments to upper pole. All stone then dusted/fragmented and basket extracted. Poor visualization at conclusion and challenging to remove larger fragments given ureter anatomy; decision made to place stent and plan for second look URS to ensure clearance of stone. Majority of stone treated.   Right 6x26 JJ stent placed without issue.      Here now for second look URS. The risks discussed included, but were not limited to, urinary tract infection, sepsis, bleeding, injury to urethra/bladder/ureter, urethra stricture, ureteral stricture, inability to treat stone. Patient agreeable to proceed and informed consent obtained.        Findings:  Normal urethra and bladder. Mildly encrusted stent. Right URS with residual cluster of lower pole stone fragments; largest stone ~5mm. All stone cleared with combination of dusting and basket extracting.  Lower pole challenging to access due to angle; no obvious remaining stone.   6x26 JJ stent placed without issue.          Procedure:  The patient was taken to the operating room and a timeout was performed confirming the correct patient and procedure. The patient was prepped and draped in lithotomy position after undergoing general anesthesia. Pre-operative prophylactic antibiotics were given in the form of Ancef.    The cystoscope was inserted per urethra and the bladder was inspected and drained. The RIGHT ureteral stent was grasped and pulled to the urethral meatus with a cystoscopic grasper. The stent was cannulated with a Sensor wire, and the wire was passed into the renal pelvis which I confirmed using fluoroscopy. The stent was fully removed.     Then a dual lumen catheter was passed over the wire and into the distal ureter. A retrograde pyelogram was obtained demonstrating no hydronephrosis. I then placed a second sensor wire through the dual lumen catheter under fluoroscopy. Once both wires were confirmed to be in the kidney I removed the dual lumen catheter. One wire was then secured to the drapes as a safety wire while the other was our working wire.     Over our working wire we gently introduced a 12/14F x 28cm ureteral access sheath under fluoroscopic visualization to the level of the proximal ureter. This passed easily. Once that was done, we then removed the inner sheath and wire.  The flexible ureteroscope was advanced into the sheath and up into the right renal pelvis.  A systematic inspection of the kidney revealed: cluster of stone fragments; largest stone ~5mm in size. Stone fragments were removed using a zero tip basket. Any smaller remaining stone fragments were lasered to dust, and thoroughly irrigated. All renal calyces were surveyed once more, and no large fragments were seen. A retrograde pyelogram was performed through the scope and showed no extravasation. The ureter was inspected while  slowly removing the scope and access sheath, and it appeared healthy without stone fragments seen.    We then placed a 6x26 JJ double-J ureteral stent over our safety wire under direct cystoscopic and fluoroscopic guidance. The proximal and distal curls formed appropriately. Stent left without strings. The bladder was then drained and the cystoscope was removed. The procedure was then terminated.    The patient was awoken from anesthesia and transferred to PACU in stable condition. The patient tolerated the procedure well. All instrument/supply counts were correct at the end of the case.    Specimens:   Stone fragments for chemical analysis    Estimated Blood Loss:  2 mL    Tubes/Drains:  6-[place] x 26 cm JJ Right ureteral stent    Complications:   None immediate    Condition from OR:  Stable    Plan:   Abx x3 days   Return as scheduled for stent removal       Nitin Aguero MD  Staff Urologist  [facility]  Office: [phone]

## 2024-09-24 NOTE — ANESTHESIA POSTPROCEDURE EVALUATION
[facility]    Alesia Bland Patient Status:  Hospital Outpatient Surgery   Age/Gender 59 year old female MRN [id number]   Location [facility] SURGERY Attending Nitin Aguero MD   Hosp Day # 0 PCP Toni Callahan DO       Anesthesia Post-op Note    CYSTOSCOPY, RIGHT URETEROSCOPY, LASER LITHOTRIPSY, RIGHT STENT EXCHANGE    Procedure Summary       Date: 09/24/24 Room / Location:  MAIN OR 07 / [facility] MAIN OR    Anesthesia Start: 1442 Anesthesia Stop: 1556    Procedure: CYSTOSCOPY, RIGHT URETEROSCOPY, LASER LITHOTRIPSY, RIGHT STENT EXCHANGE (Right: Ureter) Diagnosis:       Nephrolithiasis      (Nephrolithiasis [N20.0])    Surgeons: Nitin Aguero MD Responsible Provider: Solitario Solis MD    Anesthesia Type: general ASA Status: 2            Anesthesia Type: general    Vitals Value Taken Time   /81 09/24/24 1644   Temp 97.1 °F (36.2 °C) 09/24/24 1644   Pulse 65 09/24/24 1646   Resp 18 09/24/24 1646   SpO2 94 % 09/24/24 1648   Vitals shown include unfiled device data.    Patient Location: PACU    Anesthesia Type: general    Airway Patency: patent    Postop Pain Control: adequate    Mental Status: preanesthetic baseline    Nausea/Vomiting: none    Cardiopulmonary/Hydration status: stable euvolemic    Complications: no apparent anesthesia related complications    Postop vital signs: stable    Dental Exam: Unchanged from Preop    Patient to be discharged from PACU when criteria met.

## 2024-09-24 NOTE — ANESTHESIA PREPROCEDURE EVALUATION
PRE-OP EVALUATION    Patient Name: Alesia Bland    Admit Diagnosis: Nephrolithiasis [N20.0]    Pre-op Diagnosis: Nephrolithiasis [N20.0]    CYSTOSCOPY, RIGHT URETEROSCOPY, LASER LITHOTRIPSY, RIGHT STENT EXCHANGE    Anesthesia Procedure: CYSTOSCOPY, RIGHT URETEROSCOPY, LASER LITHOTRIPSY, RIGHT STENT EXCHANGE (Right: Ureter)    Surgeons and Role:     * Nitin Aguero MD - Primary    Pre-op vitals reviewed.  Temp: 98.4 °F (36.9 °C)  Pulse: 65  Resp: 16  SpO2: 97 %  Body mass index is 30.81 kg/m².    Current medications reviewed.  Hospital Medications:   [Transfer Hold] acetaminophen (Tylenol Extra Strength) tab 1,000 mg  1,000 mg Oral Once    [Transfer Hold] scopolamine (Transderm-Scop) 1 MG/3DAYS patch 1 patch  1 patch Transdermal Once    lactated ringers infusion   Intravenous Continuous    ceFAZolin (Ancef) 2g in 10mL IV syringe premix  2 g Intravenous Once       Outpatient Medications:     Facility-Administered Medications Prior to Admission   Medication Dose Route Frequency Provider Last Rate Last Admin    [COMPLETED] sulfamethoxazole-trimethoprim DS (Bactrim DS) 800-160 MG per tab 1 tablet  1 tablet Oral Once Nitin Aguero MD   1 tablet at 07/29/24 1400    [COMPLETED] methylPREDNISolone acetate (DEPO-medrol) 80 MG/ML injection 80 mg  80 mg Intramuscular Once    80 mg at 07/01/24 1600    [COMPLETED] methylPREDNISolone acetate (DEPO-medrol) 80 MG/ML injection 80 mg  80 mg Intramuscular Once    80 mg at 07/01/24 1554     Medications Prior to Admission   Medication Sig Dispense Refill Last Dose    gabapentin 300 MG Oral Cap Take 3 capsules (900 mg total) by mouth 3 (three) times daily. 270 capsule 0 9/24/2024 at 0800    acetaminophen-codeine 300-60 MG Oral Tab Take 1 tablet by mouth 2 (two) times daily as needed for Pain. 60 tablet 0 9/23/2024 at 0800    oxybutynin 5 MG Oral Tab Take 1 tablet (5 mg total) by mouth 3 (three) times daily as needed (Bladder spasms). 30 tablet 1 9/23/2024 at 2000    phenazopyridine  (PYRIDIUM) 100 MG Oral Tab Take 1 tablet (100 mg total) by mouth 3 (three) times daily as needed for Pain. This will turn your urine orange. 10 tablet 1 9/23/2024 at 0800    Metaxalone 800 MG Oral Tab Take 1 tablet (800 mg total) by mouth 3 (three) times daily as needed for Pain. 90 tablet 0 9/24/2024 at 0800    METOPROLOL SUCCINATE  MG Oral Tablet 24 Hr TAKE 1 TABLET BY MOUTH DAILY 90 tablet 0 9/24/2024 at 0800    Multiple Vitamins-Minerals (ALIVE MULTI-VITAMIN OR) Take 1 tablet by mouth daily.   9/23/2024 at 0800    Armodafinil 250 MG Oral Tab Take 250 mg by mouth daily.   Past Month    LATUDA 60 MG Oral Tab Take 1 tablet (60 mg total) by mouth daily with food.   9/23/2024 at 2000    traZODone HCl 50 MG Oral Tab Take 1 tablet (50 mg total) by mouth nightly as needed.   9/23/2024 at 2000    DULoxetine HCl 60 MG Oral Cap DR Particles Take 2 capsules (120 mg total) by mouth daily.   9/24/2024 at 0800    Polyethylene Glycol 3350 (MIRALAX) 17 g Oral Powd Pack Take 17 g by mouth daily as needed (Take to avoid constipation, especially if taking narcotic pain medications.). 20 packet 1 Unknown    folic acid 1 MG Oral Tab Take 1 tablet (1 mg total) by mouth daily.   More than a month    Mirabegron ER 25 MG Oral Tablet 24 Hr Take 1 tablet (25 mg total) by mouth daily. 90 tablet 6 Unknown    NON FORMULARY Take 1-2 capsules by mouth daily as needed (pain). Product: Relieve-R capsules   9/10/2024    ondansetron (ZOFRAN) 4 mg tablet Take 1 tablet (4 mg total) by mouth every 8 (eight) hours as needed for Nausea. 30 tablet 0 Unknown    losartan 100 MG Oral Tab TAKE 1 TABLET BY MOUTH EVERY DAY 30 tablet 0 Unknown    rizatriptan (MAXALT-MLT) 10 MG Oral Tablet Dispersible Take 1 tablet (10 mg total) by mouth as needed for Migraine. 90 tablet 1 More than a month    triamcinolone 0.1 % External Cream Apply topically 2 (two) times daily. To affected skin. 80 g 1 Unknown       Allergies: Hydrocodone, Benzoyl peroxide,  Levofloxacin, and Pcn [penicillins]      Anesthesia Evaluation    Patient summary reviewed.    Anesthetic Complications  (+) history of anesthetic complications  History of: PONV       GI/Hepatic/Renal      (+) GERD                           Cardiovascular      ECG reviewed.  Exercise tolerance: good     MET: >4      (+) hypertension                       (-) angina     (-) HERNANDEZ  (-) orthopnea  (-) PND     Endo/Other               (+) anemia                   Pulmonary      (+) asthma         (-) shortness of breath  (-) recent URI   (+) sleep apnea       Neuro/Psych      (+) depression  (+) anxiety           (+) headaches                   Past Surgical History:   Procedure Laterality Date    Appendectomy  2008      2003    Colonoscopy  2009    negative    Excis lumbar disk,one level      Implant left      Implant right      Other  2021    spinal angiogram with embolization    Other surgical history Bilateral 2015    Procedure: ABDOMINAL HYSTERECTOMY, BSO;  Surgeon: Calixto Weathers MD;  Location:  [facility] OR    Other surgical history      spinal kyphoplasty and cryo spine surgery     Social History     Socioeconomic History    Marital status:    Tobacco Use    Smoking status: Never    Smokeless tobacco: Never   Vaping Use    Vaping status: Never Used   Substance and Sexual Activity    Alcohol use: Not Currently     Alcohol/week: 0.0 - 1.0 standard drinks of alcohol    Drug use: No   Other Topics Concern    Caffeine Concern Yes     Comment: 3-4 cups    Exercise Yes     Comment: walking     History   Drug Use No     Available pre-op labs reviewed.  Lab Results   Component Value Date    WBC 9.5 2024    RBC 4.14 2024    HGB 13.2 2024    HCT 40.8 2024    MCV 98.6 2024    MCH 31.9 2024    MCHC 32.4 2024    RDW 14.9 2024    .0 2024     Lab Results   Component Value Date     2024    K 5.0 2024    CL  106 06/29/2024    CO2 27.0 06/29/2024    BUN 21 06/29/2024    CREATSERUM 0.76 06/29/2024     (H) 06/29/2024    CA 9.7 06/29/2024            Airway      Mallampati: II  Mouth opening: 3 FB  TM distance: 4 - 6 cm  Neck ROM: full Cardiovascular    Cardiovascular exam normal.  Rhythm: regular  Rate: normal  (-) murmur   Dental    Dentition appears grossly intact         Pulmonary    Pulmonary exam normal.  Breath sounds clear to auscultation bilaterally.        (-) wheezes       Other findings              ASA: 2   Plan: general  NPO status verified and patient meets guidelines.  Patient has taken beta blockers in last 24 hours.        Plan/risks discussed with: patient              We discussed GA w/LMA or ETT and possible scratchy throat and rarely dental damage.  We discussed analgesic plan and PONV prophylaxis.  The patient's questions were answered and consent was attained.

## 2024-09-24 NOTE — ANESTHESIA PROCEDURE NOTES
Airway  Date/Time: 9/24/2024 2:45 PM  Urgency: elective      General Information and Staff    Patient location during procedure: OR  Anesthesiologist: Solitario Solis MD  Performed: anesthesiologist   Performed by: Solitario Solis MD  Authorized by: Solitario Solis MD      Indications and Patient Condition  Indications for airway management: anesthesia  Spontaneous Ventilation: absent  Sedation level: deep  Preoxygenated: yes  Patient position: sniffing  Mask difficulty assessment: 1 - vent by mask    Final Airway Details  Final airway type: supraglottic airway      Successful airway: classic  Size 3       Number of attempts at approach: 1  Number of other approaches attempted: 0

## 2024-09-24 NOTE — DISCHARGE INSTRUCTIONS
You had cystoscopy, ureteroscopy, and stent placement in the operating room today.    Instructions:    - No heavy lifting or strenuous activity for 1 day. You may resume regular activity tomorrow.       - Your follow-up appointment is listed below. Please contact us at [phone] if you need to change your appointment.     Your appointments       Date & Time Appointment Department ([place])    Oct 03, 2024 11:30 AM CDT Procedure with Nitin Aguero MD [facility] ([place] [address])        Oct 11, 2024 10:00 AM CDT Follow Up Visit with Rob Arrieta PA [facility] ([facility])        Jun 19, 2025 11:00 AM CDT Exam - Established with Griselda Snider MD [address] ([address])              [address]  [address] [address] [place] [address] [address]  [facility] [id number] [phone] [facility]  [address]  [facility] [id number] [phone] [facility]  [address]  [facility] [id number] [phone]             -  You have a stent (small plastic tube) inside your kidney and ureter to allow the swelling from surgery to resolve. This is only temporary and must be removed, so you should not forget about it. We will remove your stent via a brief cystoscopy in clinic when you return. If you have to miss this appointment for some reason please make sure you re-schedule it.        - With a ureteral stent in place you may have some discomfort on your side/flank. You can feel pain worsen when you urinate, so try to avoid holding urine and void every 2-3 hours. You also may notice increased blood in  the urine as you increase your activity. If this happens increase your hydration and take it easy for a day. Warm baths/hot packs can help with the discomfort as well as your prescribed medications and Advil/Motrin (if you are able to take these).     - You may experience mild pain after the procedure for a few days.  If the pain becomes intolerable please contact our office or go to the nearest Emergency Room or Urgent Care. You should take over the counter ibuprofen (AKA motrin, advil) for mild pain (provided you do not have a medical condition such as stomach ulcers or kidney disease which prohibits you from taking these). You may alternate this with tylenol as well. If pain is still not relieved by tylenol and/or ibuprofen, you may take narcotic pain medication if prescribed (typically oxycodone or tramadol). If you are taking narcotic pain medication this can make you constipated, so you should take over the counter stool softeners or miralax if prescribed.    - take the antibiotics as scheduled for 3 days    - Warm pack or hot baths often help with discomfort after cystoscopy.    - If you take blood thinners (such as aspirin or plavix) please hold these medications until 3 days after surgery.     - You may experience burning and frequency of urination over the next few days. This will improve after a few days if you stay well hydrated. If you were prescribed phenazopyridine (Pyridium) this may relieve urinary discomfort but you can only take this for 3 days. Pyridium will make your urine orange.     - You are likely to see some blood in your urine (pink or light red urine) that should clear up within a few days. Staying well hydrated should help this clear up. If you notice the urine stays dark red or there are multiple large blood clots despite good hydration, please call the urology clinic ([phone]).     - Try to abstain from alcohol, coffee, tea, artificial sweeteners, and spicy food for the next 48  hours as these can irritate the bladder.     - If you develop fevers / chills, difficulty urinating, or abdominal pain that does not improve with pain medications, please call the office.     - Drink 1.5 to 2 liters of fluid today (water is preferable). If you are on a fluid restriction due to other medical reasons then you need to adhere to your fluid restriction recommendations.      Nitin Aguero MD  Staff Urologist  [facility]  Office: [phone]      You received a drug called Toradol which is an Anti Inflammatory at: 3:40pm  If you are allowed to take Anti inflammatories:    Do not take any Anti Inflammatory like Motrin, Aleve or Ibuprophen until after: 9:40pm  Please report any suspected allergic reactions or bleeding issues to your doctor

## 2024-10-03 ENCOUNTER — PROCEDURE (OUTPATIENT)
Dept: SURGERY | Facility: CLINIC | Age: 59
End: 2024-10-03
Payer: COMMERCIAL

## 2024-10-03 DIAGNOSIS — N20.0 KIDNEY STONE: Primary | ICD-10-CM

## 2024-10-03 LAB
APPEARANCE: CLEAR
BILIRUBIN: NEGATIVE
CAOX DIHYDRATE: 40 %
CAOX MONOHYDRATE: 55 %
GLUCOSE (URINE DIPSTICK): NEGATIVE MG/DL
HYDROXYAPATITE: 5 %
KETONES (URINE DIPSTICK): NEGATIVE MG/DL
MULTISTIX LOT#: ABNORMAL NUMERIC
NITRITE, URINE: NEGATIVE
PH, URINE: 6.5 (ref 4.5–8)
PROTEIN (URINE DIPSTICK): >=300 MG/DL
SPECIFIC GRAVITY: 1.02 (ref 1–1.03)
UROBILINOGEN,SEMI-QN: 0.2 MG/DL (ref 0–1.9)
WEIGHT-STONE: 52 MG

## 2024-10-03 PROCEDURE — 81003 URINALYSIS AUTO W/O SCOPE: CPT | Performed by: UROLOGY

## 2024-10-03 PROCEDURE — 52310 CYSTOSCOPY AND TREATMENT: CPT | Performed by: UROLOGY

## 2024-10-03 RX ORDER — SULFAMETHOXAZOLE/TRIMETHOPRIM 800-160 MG
1 TABLET ORAL ONCE
Status: SHIPPED | OUTPATIENT
Start: 2024-10-03

## 2024-10-03 NOTE — PROGRESS NOTES
Clinic Procedure Note    INDICATIONS:      Alesia Bland is a 59 year old female with history of GIANLUCA, HTN, chronic pain referred for nephrolithaisis.  Status post left ureteroscopy on  and now right sided URS 24 and second look URS On 24.     Saw PCP 24 with spider bite- imaging was done and showed 7mm proximal left ureteral stone and other non obstructing stones bilaterally.   She was admitted to hospital shortly after with AMS- was traeted with steroid course.     She then saw me for follow up. No flank pain or  issues at that time- no previous stones history.     We repeated a CT Scan 24;   Her CT scan shows that the previous lower pole left-sided stone is now fallen into the ureter.  She now has 2 obstructing stones in the left side.   She has a additional large stone on the right side that is nonobstructing-has staghorn appearance.    Proceeded with   L URS on 24- ureteral stones removed.   Stent removed on     R URS 24- large stone burden mostly cleared; second look procedure 24 with clearance of remaining fragments.     Here for stent removal.   Stones; 70% COD, 35% COM    She is doing well.       PROCEDURE:       1. Flexible cystoscopy, removal of RIGHT ureteral stent ([id number])    DATE OF PROCEDURE: 10/2/2024     PRE-PROCEDURE DIAGNOSIS: Nephrolithiasis    POST-PROCEDURE DIAGNOSIS: Same     SURGEON: Nitin Aguero MD    FINDINGS:  Stent successfully removed in its entirety.     PROCEDURE:   Patient was brought to the procedure suite and a time-out was performed identifiying the patient,  and procedure to be performed. The risks and benefits of the procedure were once again discussed with the patient including bleeding, infection, and dysuria. The patient agreed to proceed. The patient did not have any signs or symptoms of active UTI. Prophylactic PO antibiotics were given in clinic.    The patient was placed in supine position on the table and the genital area  was prepped and draped in the standard sterile fashion. Urojet was used prior for local anesthesia effect. A flexible cystoscope was inserted per urethra. There were no urethral strictures present. The bladder was entered and the distal coil of the stent was seen protruding from the ureteral orifice. The stent was grasped with the flexible stent grasper, and then the stent and cystoscope were both removed. The stent was visualized outside the body and confirmed to be intact and fully removed.     There were no complications and the patient tolerated the procedure well.    IMPRESSION:  Successful stent removal after ureteroscopy today.    PLAN:   -Renal/bladder ultrasound  in 2-3 mo to ensure no silent hydronephrosis  -24 hour urine study and blood work (uric acid, pth, calcium) for metabolic workup        I discussed general fluid and dietary guidelines to help prevent further stone formation including:    - Fluid consumption of preferably water to make 2-2.5  L/day of urine  - Low sodium consumption  - Adequate calcium consumption (approximately 1200 mg/day)  - Low fat and moderate animal protein consumption  - Limit consumption  of oxalate rich foods.              - I encouraged increased dietary intake of citrate with lemon juice (4 oz day)         Return in 3/4 months       Nitin Aguero MD  Staff Urologist  [facility]  Office: [phone]

## 2024-10-04 ENCOUNTER — PATIENT MESSAGE (OUTPATIENT)
Dept: PAIN CLINIC | Facility: CLINIC | Age: 59
End: 2024-10-04

## 2024-10-04 DIAGNOSIS — M54.16 LUMBAR RADICULITIS: ICD-10-CM

## 2024-10-04 DIAGNOSIS — F11.90 CHRONIC, CONTINUOUS USE OF OPIOIDS: ICD-10-CM

## 2024-10-04 DIAGNOSIS — G89.29 OTHER CHRONIC PAIN: ICD-10-CM

## 2024-10-04 NOTE — TELEPHONE ENCOUNTER
From: Alesia Bland  To: Rob Arrieta  Sent: 10/4/2024 2:47 PM CDT  Subject: Med refill    My Skelaxin is not on my med list, but I need a refill of that med too. I requested the gabapentin and T4 also. Thank you.

## 2024-10-07 RX ORDER — ACETAMINOPHEN AND CODEINE PHOSPHATE 300; 60 MG/1; MG/1
1 TABLET ORAL 2 TIMES DAILY PRN
Qty: 60 TABLET | Refills: 0 | Status: SHIPPED | OUTPATIENT
Start: 2024-10-07 | End: 2024-11-06

## 2024-10-07 RX ORDER — METAXALONE 800 MG/1
800 TABLET ORAL 3 TIMES DAILY PRN
Qty: 90 TABLET | Refills: 0 | Status: SHIPPED | OUTPATIENT
Start: 2024-10-07 | End: 2024-11-06

## 2024-10-07 RX ORDER — GABAPENTIN 300 MG/1
900 CAPSULE ORAL 3 TIMES DAILY
Qty: 270 CAPSULE | Refills: 0 | Status: SHIPPED | OUTPATIENT
Start: 2024-10-07

## 2024-10-07 NOTE — TELEPHONE ENCOUNTER
Medication: gabapentin 300 MG     Date of last refill: 09/12/24      Medication: acetaminophen-codeine 300-60 MG     Date of last refill: 09/04/24  [place]24    Last office visit: 07/11/24  Due back to clinic per last office note:  prn  Date next office visit scheduled:  10/11/24    Last URINE Screen: 07/05/24  Screen Results:  as expected      Narcotic Contract EXPIRATION date: 10/25/24    Last OV note recommendation: Plan: Status post L2 kyphoplasty with spine heena at [facility] on 3/5/2024, and while low back pain is largely eliminated, has had ongoing groin and anterior thigh pain, limiting her ADLs.  Has reduced Tylenol with codeine from 6/day to 2/day, and was recently refilled.  Can call for refill when needed.  In addition, continues with Neurontin and metaxalone, without adverse effects, call for refills when needed.  Follow with [facility] regarding options for her bony retropulsion.  No orders of the defined types were placed in this encounter.

## 2024-10-07 NOTE — TELEPHONE ENCOUNTER
Medication: Metaxalone (SKELAXIN) 800 MG     Date of last refill: 08/16/24  [place]16[place]24    Last office visit: 07/11/24  Due back to clinic per last office note:  prn  Date next office visit scheduled:  10/11/24        Last OV note recommendation: Plan: Status post L2 kyphoplasty with spine heena at [facility] on 3/5/2024, and while low back pain is largely eliminated, has had ongoing groin and anterior thigh pain, limiting her ADLs.  Has reduced Tylenol with codeine from 6/day to 2/day, and was recently refilled.  Can call for refill when needed.  In addition, continues with Neurontin and metaxalone, without adverse effects, call for refills when needed.  Follow with [facility] regarding options for her bony retropulsion.  No orders of the defined types were placed in this encounter

## 2024-10-11 ENCOUNTER — VIRTUAL PHONE E/M (OUTPATIENT)
Dept: PAIN CLINIC | Facility: CLINIC | Age: 59
End: 2024-10-11
Payer: COMMERCIAL

## 2024-10-11 DIAGNOSIS — Z79.891 CHRONIC PRESCRIPTION OPIATE USE: ICD-10-CM

## 2024-10-11 DIAGNOSIS — M54.16 LUMBAR RADICULOPATHY: Primary | ICD-10-CM

## 2024-10-11 DIAGNOSIS — M48.062 SPINAL STENOSIS OF LUMBAR REGION WITH NEUROGENIC CLAUDICATION: ICD-10-CM

## 2024-10-11 NOTE — PROGRESS NOTES
Alesia Bland verbally consents to a tele Visit Check-In service on 10/11/24.    Duration of Service:  20 minutes      HPI:   Alesia Bland presents with complaints of R groin and anterior thigh pain.    The pain is described as moderate aching that is intermittent.  The patient’s activity level has remained the same since last visit.  The pain is worst in the late evening.    Changes in condition/history since last visit: patient is here today for follow up to discuss medication (T#4).  She is stable on 2 a day of T #4, and is using gabapentin and metaxalone.  No side effects reported, and is pleased with her response.  Just filled medication on 10/7/24, and has plenty remaining at this time.  Last UDS 6/29/24.      She had been seen at [facility] for L2 kyphoplasty with bone jack on 3/5/24.  Procedure was well tolerated, and had no adverse effects.  Overall, reports gradual improvement, and had reduced T#4 down to 2/day.  Her left groin and radiating anterior thigh pain has largely resolved, though the right continues, unchanged.  She has been discussing with [facility], and the possibility of a corpectomy was raised.  She has recently updated her MRI, and no additional surgeries were recommended through [facility], though was encouraged to consider injections.    Last procedure:  L2 kypho (at [facility]) Date:  3/5/24    % relief:  60%, though continues to slowly improve    Duration:  sustained     The following activities will increase the patient’s pain: ROM    The following activities decrease the patient’s pain: medications     Functional Assessment: Patient reports that they are able to complete all of their ADL's such as eating, bathing, using the toilet, dressing and getting up from a bed or a chair independently.    Current Medications:  Current Outpatient Medications   Medication Sig Dispense Refill    acetaminophen-codeine 300-60 MG Oral Tab Take 1 tablet by mouth 2 (two) times daily as needed  for Pain. 60 tablet 0    gabapentin 300 MG Oral Cap Take 3 capsules (900 mg total) by mouth 3 (three) times daily. 270 capsule 0    Metaxalone 800 MG Oral Tab Take 1 tablet (800 mg total) by mouth 3 (three) times daily as needed for Pain. 90 tablet 0    Polyethylene Glycol 3350 (MIRALAX) 17 g Oral Powd Pack Take 17 g by mouth daily as needed (Take to avoid constipation, especially if taking narcotic pain medications.). 20 packet 1    oxybutynin 5 MG Oral Tab Take 1 tablet (5 mg total) by mouth 3 (three) times daily as needed (Bladder spasms). 30 tablet 0    phenazopyridine (PYRIDIUM) 100 MG Oral Tab Take 1 tablet (100 mg total) by mouth 3 (three) times daily as needed for Pain. This will turn your urine orange. 10 tablet 0    METOPROLOL SUCCINATE  MG Oral Tablet 24 Hr TAKE 1 TABLET BY MOUTH DAILY 90 tablet 0    folic acid 1 MG Oral Tab Take 1 tablet (1 mg total) by mouth daily.      Mirabegron ER 25 MG Oral Tablet 24 Hr Take 1 tablet (25 mg total) by mouth daily. 90 tablet 6    Multiple Vitamins-Minerals (ALIVE MULTI-VITAMIN OR) Take 1 tablet by mouth daily.      NON FORMULARY Take 1-2 capsules by mouth daily as needed (pain). Product: Relieve-R capsules      ondansetron (ZOFRAN) 4 mg tablet Take 1 tablet (4 mg total) by mouth every 8 (eight) hours as needed for Nausea. 30 tablet 0    losartan 100 MG Oral Tab TAKE 1 TABLET BY MOUTH EVERY DAY 30 tablet 0    rizatriptan (MAXALT-MLT) 10 MG Oral Tablet Dispersible Take 1 tablet (10 mg total) by mouth as needed for Migraine. 90 tablet 1    triamcinolone 0.1 % External Cream Apply topically 2 (two) times daily. To affected skin. 80 g 1    Armodafinil 250 MG Oral Tab Take 250 mg by mouth daily.      LATUDA 60 MG Oral Tab Take 1 tablet (60 mg total) by mouth daily with food.      traZODone HCl 50 MG Oral Tab Take 1 tablet (50 mg total) by mouth nightly as needed.      DULoxetine HCl 60 MG Oral Cap DR Particles Take 2 capsules (120 mg total) by mouth daily.           Side effects of medications: None    Relief obtained from medication management: good relief     Patient requires assistance with: No assistance required    Reviewed Patient History Dated: 7/11/24 no changes noted  Patient is currently on pain medications:  Yes  [place] Prescription Monitoring review: Yes-Compliant    Physical Exam:   Vitals: [facility] 12/23/2014 (Exact Date)   VAS Pain Score:  5/10    General Appearance: Well developed, well nourished, normal build, independent body habitus, no apparent physical disabilities, well groomed    Neurological Exam: WNL-Orientation to time, place and person, normal mood & effect, normal concentration & attention span  Inspection: non-antalgic, no acute distress   Radiology/Lab Test Reviewed: MRI:  CONCLUSION:       1. There is a moderate to severe chronic compression fracture of L2 status post vertebral augmentation.  There is retropulsion of the posterior fracture fragments by approximately 5 mm.  This results in mild to moderate spinal canal compromise.  No other    compression fracture is seen within the lumbar spine.      2. Mild to moderate degenerative changes in the lumbar spine as described in detail above.       Patient educated and verbalized understanding.  Medical Decision Making:   Diagnosis:    Encounter Diagnoses   Name Primary?    Lumbar radiculopathy Yes    Spinal stenosis of lumbar region with neurogenic claudication     Chronic prescription opiate use        Impression: patient underwent cryoablation of her hemangioma with [facility] in 11/2023, after which, had significant improvement in pain.  As such, she had been reducing her T#4, and had just weaned off it without adverse effects when, 4 days later, had acute onset of left lumbosacral pain.  Seen in the ER, and x-ray did reveal a new fracture of the L2.  She had returned to taking Tylenol #4 with codeine, and after MRI, returned to [facility] where she underwent kyphoplasty with spine heena, from  which, she has had gradual improvement in pain.    While at [place], she was given oxycodone, though did not find it to be as effective as Tylenol with codeine, and returned to T#4.  She had been using 6/day, though has since weaned down to 2/day.  While her low back pain at this point has been largely resolved, she has unfortunately had increase in bilateral lower extremity pain along the groin and anterior thighs, stopping at the knees.  This is been associated with a significant reduction in her ADL tolerance, and had returned to metaxalone, having been on this for a number of years prior to weaning.  Updated MRI does show significant bony retropulsion resulting and subarticular stenosis, in keeping with these complaints.  She has been in contact with [facility], and there had been some discussion of a possible corpectomy, ultimately decided against proceeding.  She was encouraged to consider injections.  We did discuss right L2 and L3 TF-FREEMAN, and wishes to consider this for now.  Will contact us should she decide to proceed.  Plan: Status post L2 kyphoplasty with spine heena at [facility] on 3/5/2024, and while low back pain is largely eliminated, has had ongoing groin and anterior thigh pain, limiting her ADLs.  Has reduced Tylenol with codeine from 6/day to 2/day, and is stable on this dose.  In addition, continues with Neurontin and metaxalone, without adverse effects, call for refills when needed.  Follow up 3 months for medication discussion.  Should she decide to proceed, contact clinic and we will be happy to place order for a right L2 and L3 TF-FREEMAN.  No orders of the defined types were placed in this encounter.      Meds & Refills for this Visit:  Requested Prescriptions      No prescriptions requested or ordered in this encounter       Imaging & Consults:  None    The patient indicates understanding of these issues and agrees to the plan.    KELLE Elizabeth

## 2024-11-07 DIAGNOSIS — M54.16 LUMBAR RADICULITIS: ICD-10-CM

## 2024-11-07 RX ORDER — GABAPENTIN 300 MG/1
900 CAPSULE ORAL 3 TIMES DAILY
Qty: 270 CAPSULE | Refills: 0 | OUTPATIENT
Start: 2024-11-07

## 2024-11-07 NOTE — TELEPHONE ENCOUNTER
Contacted pt at this time.Per pt, the request is not meant for the [facility]. Per pt, this request is for her other doctor. Advised pt that this request will be denied on our end then.Pt vu and no further questions at this time.

## 2024-11-08 NOTE — TELEPHONE ENCOUNTER
I contacted the [place] at [place] time and [place]ab[place] been disp[place] on 11/2. Pt states that she does not need the refill now as she wants to make sure when she needs it, it's ready for her to  in the pharmacy. Advised pt that she just needs to call us a few days before she runs out of the medication so we can refill it for her. PT states that she thought when I called yesterday that I was from Dr. Snider's office calling about her refill; that's why she responded that she gets her medication from another provider. No further questions at this time; reiterated to the patient that she has to call a few days before she runs out of the medication. Pt vu and no further questions at this time.

## 2024-11-29 DIAGNOSIS — M54.16 LUMBAR RADICULITIS: ICD-10-CM

## 2024-11-29 RX ORDER — GABAPENTIN 300 MG/1
900 CAPSULE ORAL 3 TIMES DAILY
Qty: 270 CAPSULE | Refills: 0 | Status: SHIPPED | OUTPATIENT
Start: 2024-11-29

## 2024-11-29 NOTE — TELEPHONE ENCOUNTER
Medication:   gabapentin 300 MG Oral Cap     Date of last refill: 10/7/24    Last office visit: 7/11/24  Due back to clinic per last office note:  NA  Date next office visit scheduled:  None    Last OV note recommendation: Impression: patient underwent cryoablation of her hemangioma with [facility] in 11/2023, after which, had significant improvement in pain.  As such, she had been reducing her T#4, and had just weaned off it without adverse effects when, 4 days later, had acute onset of left lumbosacral pain.  Seen in the ER, and x-ray did reveal a new fracture of the L2.  She had returned to taking Tylenol #4 with codeine, and after MRI, returned to [facility] where she underwent kyphoplasty with spine heena, from which, she has had gradual improvement in pain.    While at [place], she was given oxycodone, though did not find it to be as effective as Tylenol with codeine, and returned to T#4.  She had been using 6/day, though has since weaned down to 2/day.  While her low back pain at this point has been largely resolved, she has unfortunately had increase in bilateral lower extremity pain along the groin and anterior thighs, stopping at the knees.  This is been associated with a significant reduction in her ADL tolerance, and had returned to metaxalone, having been on this for a number of years prior to weaning.  Updated MRI does show significant bony retropulsion resulting and subarticular stenosis, in keeping with these complaints.  She has been in contact with [facility], and while they have not seen the most recent MRI findings, there has been some discussion of a possible corpectomy.  Will defer to [facility].  Plan: Status post L2 kyphoplasty with spine heena at [facility] on 3/5/2024, and while low back pain is largely eliminated, has had ongoing groin and anterior thigh pain, limiting her ADLs.  Has reduced Tylenol with codeine from 6/day to 2/day, and was recently refilled.  Can call for refill when  needed.  In addition, continues with Neurontin and metaxalone, without adverse effects, call for refills when needed.  Follow with [facility] regarding options for her bony retropulsion.  No orders of the defined types were placed in this encounter.        Meds & Refills for this Visit:  Requested Prescriptions        No prescriptions requested or ordered in this encounter         Imaging & Consults:  None     The patient indicates understanding of these issues and agrees to the plan.     KELLE Elizabeth

## 2024-12-06 DIAGNOSIS — F11.90 CHRONIC, CONTINUOUS USE OF OPIOIDS: ICD-10-CM

## 2024-12-06 DIAGNOSIS — G89.29 OTHER CHRONIC PAIN: ICD-10-CM

## 2024-12-06 RX ORDER — METAXALONE 800 MG/1
800 TABLET ORAL 3 TIMES DAILY PRN
Qty: 90 TABLET | Refills: 0 | Status: SHIPPED | OUTPATIENT
Start: 2024-12-06 | End: 2025-01-05

## 2024-12-06 RX ORDER — ACETAMINOPHEN AND CODEINE PHOSPHATE 300; 60 MG/1; MG/1
1 TABLET ORAL 2 TIMES DAILY PRN
Qty: 60 TABLET | Refills: 0 | Status: SHIPPED | OUTPATIENT
Start: 2024-12-06 | End: 2025-01-05

## 2024-12-06 NOTE — TELEPHONE ENCOUNTER
Medication:   Metaxalone 800 MG Oral Tab     Date of last refill: 11/7/24    Medication:   acetaminophen-codeine 300-60 MG Oral Tab     Date of last refill: 11/7/24  [place]7/24    Last office visit: 10/11/24  Due back to clinic per last office note:  3 months  Date next office visit scheduled:  None    Last URINE Screen: 6/29/24  Screen Results:    Results as expected based on medication prescribed     Narcotic Contract EXPIRATION date: 10/25/24    Last OV note recommendation: Impression: patient underwent cryoablation of her hemangioma with [facility] in 11/2023, after which, had significant improvement in pain.  As such, she had been reducing her T#4, and had just weaned off it without adverse effects when, 4 days later, had acute onset of left lumbosacral pain.  Seen in the ER, and x-ray did reveal a new fracture of the L2.  She had returned to taking Tylenol #4 with codeine, and after MRI, returned to [facility] where she underwent kyphoplasty with spine heena, from which, she has had gradual improvement in pain.    While at [place], she was given oxycodone, though did not find it to be as effective as Tylenol with codeine, and returned to T#4.  She had been using 6/day, though has since weaned down to 2/day.  While her low back pain at this point has been largely resolved, she has unfortunately had increase in bilateral lower extremity pain along the groin and anterior thighs, stopping at the knees.  This is been associated with a significant reduction in her ADL tolerance, and had returned to metaxalone, having been on this for a number of years prior to weaning.  Updated MRI does show significant bony retropulsion resulting and subarticular stenosis, in keeping with these complaints.  She has been in contact with [facility], and there had been some discussion of a possible corpectomy, ultimately decided against proceeding.  She was encouraged to consider injections.  We  did discuss right L2 and L3 TF-FREEMAN, and wishes to consider this for now.  Will contact us should she decide to proceed.  Plan: Status post L2 kyphoplasty with spine heena at [facility] on 3/5/2024, and while low back pain is largely eliminated, has had ongoing groin and anterior thigh pain, limiting her ADLs.  Has reduced Tylenol with codeine from 6/day to 2/day, and is stable on this dose.  In addition, continues with Neurontin and metaxalone, without adverse effects, call for refills when needed.  Follow up 3 months for medication discussion.  Should she decide to proceed, contact clinic and we will be happy to place order for a right L2 and L3 TF-FREEMAN.  No orders of the defined types were placed in this encounter.        Meds & Refills for this Visit:  Requested Prescriptions        No prescriptions requested or ordered in this encounter         Imaging & Consults:  None     The patient indicates understanding of these issues and agrees to the plan.     KELLE Elizabeth

## 2024-12-27 DIAGNOSIS — M54.16 LUMBAR RADICULITIS: ICD-10-CM

## 2024-12-27 RX ORDER — GABAPENTIN 300 MG/1
900 CAPSULE ORAL 3 TIMES DAILY
Qty: 270 CAPSULE | Refills: 0 | Status: SHIPPED | OUTPATIENT
Start: 2024-12-27

## 2024-12-27 NOTE — TELEPHONE ENCOUNTER
Medication:   GABAPENTIN 300 MG Oral Cap     Date of last refill: 11/29/24    Last office visit: 10/11/24  Due back to clinic per last office note:  3 months  Date next office visit scheduled:  None    Last OV note recommendation: Plan: Status post L2 kyphoplasty with spine heena at [facility] on 3/5/2024, and while low back pain is largely eliminated, has had ongoing groin and anterior thigh pain, limiting her ADLs.  Has reduced Tylenol with codeine from 6/day to 2/day, and is stable on this dose.  In addition, continues with Neurontin and metaxalone, without adverse effects, call for refills when needed.  Follow up 3 months for medication discussion.  Should she decide to proceed, contact clinic and we will be happy to place order for a right L2 and L3 TF-FREEMAN.

## 2025-01-03 DIAGNOSIS — G89.29 OTHER CHRONIC PAIN: ICD-10-CM

## 2025-01-03 DIAGNOSIS — M54.50 CHRONIC RIGHT-SIDED LOW BACK PAIN WITHOUT SCIATICA: ICD-10-CM

## 2025-01-03 DIAGNOSIS — R53.83 FATIGUE, UNSPECIFIED TYPE: ICD-10-CM

## 2025-01-03 DIAGNOSIS — M79.604 RIGHT LEG PAIN: ICD-10-CM

## 2025-01-03 DIAGNOSIS — M79.7 FIBROMYALGIA: Primary | ICD-10-CM

## 2025-01-03 DIAGNOSIS — L30.9 ECZEMA, UNSPECIFIED TYPE: ICD-10-CM

## 2025-01-03 DIAGNOSIS — F11.90 CHRONIC, CONTINUOUS USE OF OPIOIDS: ICD-10-CM

## 2025-01-03 DIAGNOSIS — G89.29 CHRONIC RIGHT-SIDED LOW BACK PAIN WITHOUT SCIATICA: ICD-10-CM

## 2025-01-03 DIAGNOSIS — M54.16 LUMBAR RADICULITIS: ICD-10-CM

## 2025-01-03 NOTE — TELEPHONE ENCOUNTER
Medication:   gabapentin 300 MG Oral Cap     Date of last refill: 12/27/24    Last office visit: 10/11/24  Due back to clinic per last office note:  3 months  Date next office visit scheduled:  None    Last OV note recommendation: Plan: Status post L2 kyphoplasty with spine heena at [facility] on 3/5/2024, and while low back pain is largely eliminated, has had ongoing groin and anterior thigh pain, limiting her ADLs.  Has reduced Tylenol with codeine from 6/day to 2/day, and is stable on this dose.  In addition, continues with Neurontin and metaxalone, without adverse effects, call for refills when needed.  Follow up 3 months for medication discussion.  Should she decide to proceed, contact clinic and we will be happy to place order for a right L2 and L3 TF-FREEMAN.

## 2025-01-03 NOTE — TELEPHONE ENCOUNTER
.A refill request was received for:  Requested Prescriptions     Pending Prescriptions Disp Refills    Armodafinil 250 MG Oral Tab  0     Sig: Take 250 mg by mouth daily.       Last refill date:   historically reported    Last office visit: 7/11/24    Follow up due:  Future Appointments   Date Time Provider Department Center   1/15/2025 10:45 AM Griselda Snider MD [facility]N [facility] [place]   2/5/2025  8:15 AM Nitin Aguero MD [id number] [facility] [address]   6/19/2025 11:00 AM Griselda Snider MD [id number] [facility] [address]

## 2025-01-03 NOTE — TELEPHONE ENCOUNTER
.A refill request was received for:  Requested Prescriptions     Pending Prescriptions Disp Refills    triamcinolone 0.1 % External Cream 80 g 1     Sig: Apply topically 2 (two) times daily. To affected skin.       Last refill date:   7/26/22    Last office visit: 7/11/24    Follow up due:  Future Appointments   Date Time Provider Department Center   1/15/2025 10:45 AM Griselda Snider MD [facility] [place]   2/5/2025  8:15 AM Nitin Aguero MD [id number] [facility] [address]   6/19/2025 11:00 AM Griselda Snider MD [id number] [address]

## 2025-01-03 NOTE — TELEPHONE ENCOUNTER
.A refill request was received for:  Requested Prescriptions     Pending Prescriptions Disp Refills    ondansetron (ZOFRAN) 4 mg tablet 30 tablet 0     Sig: Take 1 tablet (4 mg total) by mouth every 8 (eight) hours as needed for Nausea.       Last refill date:   6/17/24    Last office visit: 7/11/24    Follow up due:  Future Appointments   Date Time Provider Department Center   1/15/2025 10:45 AM Griselda Snider MD [facility] [place]   2/5/2025  8:15 AM Nitin Aguero MD [id number] [facility] [address]   6/19/2025 11:00 AM Griselda Snider MD [id number] [address]

## 2025-01-03 NOTE — TELEPHONE ENCOUNTER
Medication:   Metaxalone 800 MG Oral Tab     Date of last refill: 12/6/24    Last office visit: 10/11/24  Due back to clinic per last office note:  3 months  Date next office visit scheduled:  None    Last OV note recommendation: Plan: Status post L2 kyphoplasty with spine heena at [facility] on 3/5/2024, and while low back pain is largely eliminated, has had ongoing groin and anterior thigh pain, limiting her ADLs.  Has reduced Tylenol with codeine from 6/day to 2/day, and is stable on this dose.  In addition, continues with Neurontin and metaxalone, without adverse effects, call for refills when needed.  Follow up 3 months for medication discussion.  Should she decide to proceed, contact clinic and we will be happy to place order for a right L2 and L3 TF-FREEMAN.

## 2025-01-03 NOTE — TELEPHONE ENCOUNTER
Medication:   acetaminophen-codeine 300-60 MG Oral Tab     Date of last refill: 12/6/24  [place] 12[place]6/24    Last office visit: 10/11/24  Due back to clinic per last office note:  3 months  Date next office visit scheduled:  None    Last URINE Screen: 6/29/24  Screen Results:    Results as expected based on medication prescribed     Narcotic Contract EXPIRATION date: 10/25/24    Last OV note recommendation: Plan: Status post L2 kyphoplasty with spine heena at [facility] on 3/5/2024, and while low back pain is largely eliminated, has had ongoing groin and anterior thigh pain, limiting her ADLs.  Has reduced Tylenol with codeine from 6/day to 2/day, and is stable on this dose.  In addition, continues with Neurontin and metaxalone, without adverse effects, call for refills when needed.  Follow up 3 months for medication discussion.  Should she decide to proceed, contact clinic and we will be happy to place order for a right L2 and L3 TF-FREEMAN.

## 2025-01-04 RX ORDER — ARMODAFINIL 250 MG/1
250 TABLET ORAL DAILY
Qty: 30 TABLET | Refills: 1 | Status: SHIPPED | OUTPATIENT
Start: 2025-01-04

## 2025-01-04 RX ORDER — TRIAMCINOLONE ACETONIDE 1 MG/G
CREAM TOPICAL 2 TIMES DAILY
Qty: 80 G | Refills: 1 | Status: SHIPPED | OUTPATIENT
Start: 2025-01-04

## 2025-01-04 RX ORDER — ONDANSETRON 4 MG/1
4 TABLET, FILM COATED ORAL EVERY 8 HOURS PRN
Qty: 30 TABLET | Refills: 0 | Status: SHIPPED | OUTPATIENT
Start: 2025-01-04

## 2025-01-06 RX ORDER — GABAPENTIN 300 MG/1
900 CAPSULE ORAL 3 TIMES DAILY
Qty: 270 CAPSULE | Refills: 0 | Status: SHIPPED | OUTPATIENT
Start: 2025-01-06

## 2025-01-06 RX ORDER — ACETAMINOPHEN AND CODEINE PHOSPHATE 300; 60 MG/1; MG/1
1 TABLET ORAL 2 TIMES DAILY PRN
Qty: 60 TABLET | Refills: 0 | Status: SHIPPED | OUTPATIENT
Start: 2025-01-06 | End: 2025-02-05

## 2025-01-06 RX ORDER — METAXALONE 800 MG/1
800 TABLET ORAL 3 TIMES DAILY PRN
Qty: 90 TABLET | Refills: 0 | Status: SHIPPED | OUTPATIENT
Start: 2025-01-06 | End: 2025-02-05

## 2025-02-12 DIAGNOSIS — G89.29 OTHER CHRONIC PAIN: Primary | ICD-10-CM

## 2025-02-12 DIAGNOSIS — M54.16 LUMBAR RADICULOPATHY: ICD-10-CM

## 2025-02-12 DIAGNOSIS — M79.7 FIBROMYALGIA: ICD-10-CM

## 2025-02-12 DIAGNOSIS — M48.062 SPINAL STENOSIS OF LUMBAR REGION WITH NEUROGENIC CLAUDICATION: ICD-10-CM

## 2025-02-12 DIAGNOSIS — R53.83 FATIGUE, UNSPECIFIED TYPE: ICD-10-CM

## 2025-02-12 DIAGNOSIS — M54.16 LUMBAR RADICULITIS: ICD-10-CM

## 2025-02-12 RX ORDER — METAXALONE 800 MG/1
800 TABLET ORAL 3 TIMES DAILY PRN
Qty: 90 TABLET | Refills: 0 | Status: SHIPPED | OUTPATIENT
Start: 2025-02-12

## 2025-02-12 RX ORDER — ACETAMINOPHEN AND CODEINE PHOSPHATE 300; 60 MG/1; MG/1
1 TABLET ORAL 2 TIMES DAILY
Qty: 60 TABLET | Refills: 0 | Status: SHIPPED | OUTPATIENT
Start: 2025-02-12

## 2025-02-12 RX ORDER — GABAPENTIN 300 MG/1
900 CAPSULE ORAL 3 TIMES DAILY
Qty: 270 CAPSULE | Refills: 0 | Status: SHIPPED | OUTPATIENT
Start: 2025-02-12

## 2025-02-12 NOTE — TELEPHONE ENCOUNTER
Patient is out of town for work (in [place], [place]) so I have her scheduled for a video visit.      Medication: Metaxalone 800 MG     Date of last refill: 01/06/25        Medication: gabapentin 300 MG      Date of last refill: 01/06/25        Medication: acetaminophen-codeine 300-60 MG     Date of last refill: 01/06/25  [place] 01[place]06/25    Last office visit: 10/11/24  Due back to clinic per last office note:  3 months  Date next office visit scheduled:  02/20/25    Last URINE Screen: 07/05/24  Screen Results:  as expected      Narcotic Contract EXPIRATION date: 10/25/24    Last OV note recommendation:   Plan: Status post L2 kyphoplasty with spine heena at [facility] on 3/5/2024, and while low back pain is largely eliminated, has had ongoing groin and anterior thigh pain, limiting her ADLs.  Has reduced Tylenol with codeine from 6/day to 2/day, and is stable on this dose.  In addition, continues with Neurontin and metaxalone, without adverse effects, call for refills when needed.  Follow up 3 months for medication discussion.  Should she decide to proceed, contact clinic and we will be happy to place order for a right L2 and L3 TF-FREEMAN.

## 2025-02-13 RX ORDER — ARMODAFINIL 250 MG/1
250 TABLET ORAL DAILY
Qty: 15 TABLET | Refills: 0 | Status: SHIPPED | OUTPATIENT
Start: 2025-02-13

## 2025-02-13 NOTE — TELEPHONE ENCOUNTER
A refill request was received for:  Requested Prescriptions     Pending Prescriptions Disp Refills    Armodafinil 250 MG Oral Tab 30 tablet 1     Sig: Take 250 mg by mouth daily.       Last refill date:   1/4/25    Last office visit: 7/11/24    Follow up due:  Future Appointments   Date Time Provider Department Center   2/20/2025  1:00 PM Rob Arrieta PA ENIP[facility] [place]   6/19/2025 11:00 AM Griselda Snider MD [id number] [facility] [address]

## 2025-02-20 ENCOUNTER — TELEMEDICINE (OUTPATIENT)
Dept: PAIN CLINIC | Facility: CLINIC | Age: 60
End: 2025-02-20
Payer: COMMERCIAL

## 2025-02-20 DIAGNOSIS — Z79.891 CHRONIC PRESCRIPTION OPIATE USE: Primary | ICD-10-CM

## 2025-02-20 PROCEDURE — 98005 SYNCH AUDIO-VIDEO EST LOW 20: CPT | Performed by: PHYSICIAN ASSISTANT

## 2025-02-20 RX ORDER — METHYLPREDNISOLONE 4 MG/1
TABLET ORAL
Qty: 21 TABLET | Refills: 0 | Status: SHIPPED | OUTPATIENT
Start: 2025-02-20

## 2025-02-20 NOTE — PROGRESS NOTES
Alesia Bland verbally consents to a tele Visit Check-In service on 2/20/25.    Duration of Service:  20 minutes      HPI:   Alesia Bland presents with complaints of low back and bilateral posterior lateral thigh pain to the proximal lower legs.    The pain is described as moderate aching that is intermittent.  The patient’s activity level has remained the same since last visit.  The pain is worst in the late evening.    Changes in condition/history since last visit: patient is here today for follow up to discuss medication (T#4).  She is stable on 2 a day of T #4, and is using gabapentin and metaxalone.  No side effects reported, and is pleased with her response.  Just filled medication on 2/12/25, and has plenty remaining at this time.  Last UDS 6/29/24.      She had been seen at [facility] for L2 kyphoplasty with bone heena on 3/5/24.  Procedure was well tolerated, and had no adverse effects.  Overall, reports gradual improvement, and had reduced T#4 down to 2/day.  Her left groin and radiating anterior thigh pain has largely resolved, though the right continues, unchanged.  She has been discussing with [facility], and the possibility of a corpectomy was raised.  She has recently updated her MRI, and no additional surgeries were recommended through [facility], though was encouraged to consider injections.  We had discussed L2 and L3 TF-FREEMAN, though wanted to hold off on this.    After increase in workload recently has had flare of low back and radiating bilateral posterior lateral thigh pain to the proximal lower legs, and does have MRI evidence of mild to moderate L4-5 stenosis.  We discussed possible intralaminar injection, though at this time does not feel as though this is necessary and is really just looking for a course of tapered p.o. steroid.    Last procedure:  L2 kypho (at [facility]) Date:  3/5/24    % relief:  60%, though continues to slowly improve    Duration:  sustained     The following  activities will increase the patient’s pain: ROM    The following activities decrease the patient’s pain: medications     Functional Assessment: Patient reports that they are able to complete all of their ADL's such as eating, bathing, using the toilet, dressing and getting up from a bed or a chair independently.    Current Medications:  Current Outpatient Medications   Medication Sig Dispense Refill    Armodafinil 250 MG Oral Tab Take 250 mg by mouth daily. 15 tablet 0    gabapentin 300 MG Oral Cap Take 3 capsules (900 mg total) by mouth 3 (three) times daily. 270 capsule 0    acetaminophen-codeine 300-60 MG Oral Tab Take 1 tablet by mouth in the morning and 1 tablet before bedtime. 60 tablet 0    Metaxalone 800 MG Oral Tab Take 1 tablet (800 mg total) by mouth 3 (three) times daily as needed for Pain. 90 tablet 0    triamcinolone 0.1 % External Cream Apply topically 2 (two) times daily. To affected skin. 80 g 1    ondansetron (ZOFRAN) 4 mg tablet Take 1 tablet (4 mg total) by mouth every 8 (eight) hours as needed for Nausea. 30 tablet 0    oxybutynin 5 MG Oral Tab Take 1 tablet (5 mg total) by mouth 3 (three) times daily as needed (Bladder spasms). 30 tablet 0    phenazopyridine (PYRIDIUM) 100 MG Oral Tab Take 1 tablet (100 mg total) by mouth 3 (three) times daily as needed for Pain. This will turn your urine orange. 10 tablet 0    METOPROLOL SUCCINATE  MG Oral Tablet 24 Hr TAKE 1 TABLET BY MOUTH DAILY 90 tablet 0    folic acid 1 MG Oral Tab Take 1 tablet (1 mg total) by mouth daily.      Mirabegron ER 25 MG Oral Tablet 24 Hr Take 1 tablet (25 mg total) by mouth daily. 90 tablet 6    Multiple Vitamins-Minerals (ALIVE MULTI-VITAMIN OR) Take 1 tablet by mouth daily.      NON FORMULARY Take 1-2 capsules by mouth daily as needed (pain). Product: Relieve-R capsules      losartan 100 MG Oral Tab TAKE 1 TABLET BY MOUTH EVERY DAY 30 tablet 0    rizatriptan (MAXALT-MLT) 10 MG Oral Tablet Dispersible Take 1 tablet (10  mg total) by mouth as needed for Migraine. 90 tablet 1    LATUDA 60 MG Oral Tab Take 1 tablet (60 mg total) by mouth daily with food.      traZODone HCl 50 MG Oral Tab Take 1 tablet (50 mg total) by mouth nightly as needed.      DULoxetine HCl 60 MG Oral Cap DR Particles Take 2 capsules (120 mg total) by mouth daily.          Side effects of medications: None    Relief obtained from medication management: good relief     Patient requires assistance with: No assistance required    Reviewed Patient History Dated: 10/11/24 no changes noted  Patient is currently on pain medications:  Yes  [place] Prescription Monitoring review: Yes-Compliant    Physical Exam:   Vitals: [facility] 12/23/2014 (Exact Date)   VAS Pain Score:  5/10    General Appearance: Well developed, well nourished, normal build, independent body habitus, no apparent physical disabilities, well groomed    Neurological Exam: WNL-Orientation to time, place and person, normal mood & effect, normal concentration & attention span  Inspection: non-antalgic, no acute distress   Radiology/Lab Test Reviewed: MRI:  CONCLUSION:       1. There is a moderate to severe chronic compression fracture of L2 status post vertebral augmentation.  There is retropulsion of the posterior fracture fragments by approximately 5 mm.  This results in mild to moderate spinal canal compromise.  No other    compression fracture is seen within the lumbar spine.      2. Mild to moderate degenerative changes in the lumbar spine as described in detail above.       Patient educated and verbalized understanding.  Medical Decision Making:   Diagnosis:    Encounter Diagnosis   Name Primary?    Chronic prescription opiate use Yes     Impression: patient underwent cryoablation of her hemangioma with [facility] in 11/2023, after which, had significant improvement in pain.  As such, she had been reducing her T#4, and had just weaned off it without adverse effects when, 4 days later, had acute onset of  left lumbosacral pain.  Seen in the ER, and x-ray did reveal a new fracture of the L2.  She had returned to taking Tylenol #4 with codeine, and after MRI, returned to [facility] where she underwent kyphoplasty with spine heena, from which, she has had gradual improvement in pain.    While at [place], she was given oxycodone, though did not find it to be as effective as Tylenol with codeine, and returned to T#4.  She had been using 6/day, though has since weaned down to 2/day.  While her low back pain at this point has been largely resolved, she has unfortunately had increase in bilateral lower extremity pain along the groin and anterior thighs, stopping at the knees.  This is been associated with a significant reduction in her ADL tolerance, and had returned to metaxalone, having been on this for a number of years prior to weaning.  Updated MRI does show significant bony retropulsion resulting and subarticular stenosis, in keeping with these complaints.  She has been in contact with [facility], and there had been some discussion of a possible corpectomy, ultimately decided against proceeding.  She was encouraged to consider injections.  We did discuss right L2 and L3 TF-FREEMAN, though decided against proceeding.  She has had recent flare of pain after increased workload.  Requesting a course of tapered p.o. steroid, which was provided.  Plan: With regards to her opiates, has reduced Tylenol with codeine from 6/day to 2/day, and is stable on this dose.  In addition, continues with Neurontin and metaxalone, without adverse effects, call for refills when needed.  Follow up 3 months for medication discussion, order for updated UDS placed which she can submit the next time she is in town (she is working out of town in [facility]).  She did have recent flare of low back and radiating bilateral posterior lower extremity pain and while we did discuss potential LESI, opted to simply proceed with a course of tapered p.o.  steroid.    Orders Placed This Encounter   Procedures    Pain Management Drug Panel, Urine       Meds & Refills for this Visit:  Requested Prescriptions     Signed Prescriptions Disp Refills    methylPREDNISolone (MEDROL) 4 MG Oral Tablet Therapy Pack 21 tablet 0     Sig: Take as directed       Imaging & Consults:  None    The patient indicates understanding of these issues and agrees to the plan.    KELLE Elizabeth

## 2025-03-13 DIAGNOSIS — M54.16 LUMBAR RADICULOPATHY: ICD-10-CM

## 2025-03-13 DIAGNOSIS — M48.062 SPINAL STENOSIS OF LUMBAR REGION WITH NEUROGENIC CLAUDICATION: ICD-10-CM

## 2025-03-13 DIAGNOSIS — M54.16 LUMBAR RADICULITIS: ICD-10-CM

## 2025-03-13 DIAGNOSIS — G89.29 OTHER CHRONIC PAIN: ICD-10-CM

## 2025-03-13 RX ORDER — GABAPENTIN 300 MG/1
900 CAPSULE ORAL 3 TIMES DAILY
Qty: 270 CAPSULE | Refills: 0 | Status: SHIPPED | OUTPATIENT
Start: 2025-03-13

## 2025-03-13 NOTE — TELEPHONE ENCOUNTER
Medication: gabapentin 300 MG Oral Cap     Date of last refill: 2/12/25    Last office visit: 2/20/25    Due back to clinic per last office note:  3 months  Date next office visit scheduled:  None    Last OV note recommendation:   Medical Decision Making:   Diagnosis:         Encounter Diagnosis   Name Primary?    Chronic prescription opiate use Yes      Impression: patient underwent cryoablation of her hemangioma with [facility] in 11/2023, after which, had significant improvement in pain.  As such, she had been reducing her T#4, and had just weaned off it without adverse effects when, 4 days later, had acute onset of left lumbosacral pain.  Seen in the ER, and x-ray did reveal a new fracture of the L2.  She had returned to taking Tylenol #4 with codeine, and after MRI, returned to [facility] where she underwent kyphoplasty with spine heena, from which, she has had gradual improvement in pain.    While at [place], she was given oxycodone, though did not find it to be as effective as Tylenol with codeine, and returned to T#4.  She had been using 6/day, though has since weaned down to 2/day.  While her low back pain at this point has been largely resolved, she has unfortunately had increase in bilateral lower extremity pain along the groin and anterior thighs, stopping at the knees.  This is been associated with a significant reduction in her ADL tolerance, and had returned to metaxalone, having been on this for a number of years prior to weaning.  Updated MRI does show significant bony retropulsion resulting and subarticular stenosis, in keeping with these complaints.  She has been in contact with [facility], and there had been some discussion of a possible corpectomy, ultimately decided against proceeding.  She was encouraged to consider injections.  We did discuss right L2 and L3 TF-FREEMAN, though decided against proceeding.  She has had recent flare of pain after increased workload.  Requesting a course of tapered  p.o. steroid, which was provided.  Plan: With regards to her opiates, has reduced Tylenol with codeine from 6/day to 2/day, and is stable on this dose.  In addition, continues with Neurontin and metaxalone, without adverse effects, call for refills when needed.  Follow up 3 months for medication discussion, order for updated UDS placed which she can submit the next time she is in town (she is working out of town in [facility]).  She did have recent flare of low back and radiating bilateral posterior lower extremity pain and while we did discuss potential LESI, opted to simply proceed with a course of tapered p.o. steroid.        Orders Placed This Encounter   Procedures    Pain Management Drug Panel, Urine         Meds & Refills for this Visit:  Requested Prescriptions             Signed Prescriptions Disp Refills    methylPREDNISolone (MEDROL) 4 MG Oral Tablet Therapy Pack 21 tablet 0       Sig: Take as directed         Imaging & Consults:  None     The patient indicates understanding of these issues and agrees to the plan.     KELLE Elizabeth

## 2025-03-17 ENCOUNTER — PATIENT MESSAGE (OUTPATIENT)
Dept: PAIN CLINIC | Facility: CLINIC | Age: 60
End: 2025-03-17

## 2025-03-17 DIAGNOSIS — G89.29 OTHER CHRONIC PAIN: ICD-10-CM

## 2025-03-17 DIAGNOSIS — M54.16 LUMBAR RADICULOPATHY: ICD-10-CM

## 2025-03-17 DIAGNOSIS — M48.062 SPINAL STENOSIS OF LUMBAR REGION WITH NEUROGENIC CLAUDICATION: ICD-10-CM

## 2025-03-17 DIAGNOSIS — M54.16 LUMBAR RADICULITIS: ICD-10-CM

## 2025-03-17 RX ORDER — METAXALONE 800 MG/1
800 TABLET ORAL 3 TIMES DAILY PRN
Qty: 90 TABLET | Refills: 0 | Status: SHIPPED | OUTPATIENT
Start: 2025-03-17

## 2025-03-17 RX ORDER — ACETAMINOPHEN AND CODEINE PHOSPHATE 300; 60 MG/1; MG/1
1 TABLET ORAL 2 TIMES DAILY
Qty: 60 TABLET | Refills: 0 | Status: SHIPPED | OUTPATIENT
Start: 2025-03-17

## 2025-03-17 NOTE — TELEPHONE ENCOUNTER
Medication: Metaxalone 800 MG Oral Tab   Date of last refill: 2/12/25  [place]25    Medication: acetaminophen-codeine 300-60 MG Oral Tab   Date of last refill: 2/12/25  [place]25    Last office visit: 2/20/25  Due back to clinic per last office note:  Follow up in 3 months  Date next office visit scheduled:  N/A    Last URINE Screen: 7/5/24 (UDS ordered 2/20/25 no results)  Screen Results:    Rosie HIGH Hernandez, APRN  7/8/2024  7:55 AM CDT Back to Top      Results as expected based on medication prescribed         Narcotic Contract EXPIRATION date: 10/25/24    Last OV note recommendation:   Medical Decision Making:   Diagnosis:         Encounter Diagnosis   Name Primary?    Chronic prescription opiate use Yes      Impression: patient underwent cryoablation of her hemangioma with [facility] in 11/2023, after which, had significant improvement in pain.  As such, she had been reducing her T#4, and had just weaned off it without adverse effects when, 4 days later, had acute onset of left lumbosacral pain.  Seen in the ER, and x-ray did reveal a new fracture of the L2.  She had returned to taking Tylenol #4 with codeine, and after MRI, returned to [facility] where she underwent kyphoplasty with spine heena, from which, she has had gradual improvement in pain.    While at [place], she was given oxycodone, though did not find it to be as effective as Tylenol with codeine, and returned to T#4.  She had been using 6/day, though has since weaned down to 2/day.  While her low back pain at this point has been largely resolved, she has unfortunately had increase in bilateral lower extremity pain along the groin and anterior thighs, stopping at the knees.  This is been associated with a significant reduction in her ADL tolerance, and had returned to metaxalone, having been on this for a number of years prior to weaning.  Updated MRI does show  significant bony retropulsion resulting and subarticular stenosis, in keeping with these complaints.  She has been in contact with [facility], and there had been some discussion of a possible corpectomy, ultimately decided against proceeding.  She was encouraged to consider injections.  We did discuss right L2 and L3 TF-FREEMAN, though decided against proceeding.  She has had recent flare of pain after increased workload.  Requesting a course of tapered p.o. steroid, which was provided.  Plan: With regards to her opiates, has reduced Tylenol with codeine from 6/day to 2/day, and is stable on this dose.  In addition, continues with Neurontin and metaxalone, without adverse effects, call for refills when needed.  Follow up 3 months for medication discussion, order for updated UDS placed which she can submit the next time she is in town (she is working out of town in [facility]).  She did have recent flare of low back and radiating bilateral posterior lower extremity pain and while we did discuss potential LESI, opted to simply proceed with a course of tapered p.o. steroid.        Orders Placed This Encounter   Procedures    Pain Management Drug Panel, Urine         Meds & Refills for this Visit:  Requested Prescriptions             Signed Prescriptions Disp Refills    methylPREDNISolone (MEDROL) 4 MG Oral Tablet Therapy Pack 21 tablet 0       Sig: Take as directed         Imaging & Consults:  None     The patient indicates understanding of these issues and agrees to the plan.     KELLE Elizabeth

## 2025-03-17 NOTE — TELEPHONE ENCOUNTER
Disp Refills Start End    GABAPENTIN 300 MG Oral Cap 270 capsule 0 3/13/2025 --    Sig - Route: TAKE 3 CAPSULES BY MOUTH 3 TIMES A DAY - Oral    Sent to pharmacy as: Gabapentin 300 MG Oral Capsule (Neurontin)    E-Prescribing Status: Receipt confirmed by pharmacy (3/13/2025 12:49 PM CDT)

## 2025-04-04 DIAGNOSIS — M54.16 LUMBAR RADICULITIS: ICD-10-CM

## 2025-04-04 DIAGNOSIS — M48.062 SPINAL STENOSIS OF LUMBAR REGION WITH NEUROGENIC CLAUDICATION: ICD-10-CM

## 2025-04-04 DIAGNOSIS — M54.16 LUMBAR RADICULOPATHY: ICD-10-CM

## 2025-04-04 DIAGNOSIS — G89.29 OTHER CHRONIC PAIN: ICD-10-CM

## 2025-04-04 RX ORDER — GABAPENTIN 300 MG/1
900 CAPSULE ORAL 3 TIMES DAILY
Qty: 270 CAPSULE | Refills: 0 | Status: SHIPPED | OUTPATIENT
Start: 2025-04-04

## 2025-04-04 NOTE — TELEPHONE ENCOUNTER
Medication: GABAPENTIN 300 MG Oral Cap     Date of last refill: 3/13/25    Last office visit: 2/20/25  Due back to clinic per last office note: 3 months  Date next office visit scheduled: None      Last OV note recommendation:   Medical Decision Making:   Diagnosis:         Encounter Diagnosis   Name Primary?    Chronic prescription opiate use Yes      Impression: patient underwent cryoablation of her hemangioma with [facility] in 11/2023, after which, had significant improvement in pain.  As such, she had been reducing her T#4, and had just weaned off it without adverse effects when, 4 days later, had acute onset of left lumbosacral pain.  Seen in the ER, and x-ray did reveal a new fracture of the L2.  She had returned to taking Tylenol #4 with codeine, and after MRI, returned to [facility] where she underwent kyphoplasty with spine heena, from which, she has had gradual improvement in pain.    While at [place], she was given oxycodone, though did not find it to be as effective as Tylenol with codeine, and returned to T#4.  She had been using 6/day, though has since weaned down to 2/day.  While her low back pain at this point has been largely resolved, she has unfortunately had increase in bilateral lower extremity pain along the groin and anterior thighs, stopping at the knees.  This is been associated with a significant reduction in her ADL tolerance, and had returned to metaxalone, having been on this for a number of years prior to weaning.  Updated MRI does show significant bony retropulsion resulting and subarticular stenosis, in keeping with these complaints.  She has been in contact with [facility], and there had been some discussion of a possible corpectomy, ultimately decided against proceeding.  She was encouraged to consider injections.  We did discuss right L2 and L3 TF-FREEMAN, though decided against proceeding.  She has had recent flare of pain after increased workload.  Requesting a course of tapered p.o.  steroid, which was provided.  Plan: With regards to her opiates, has reduced Tylenol with codeine from 6/day to 2/day, and is stable on this dose.  In addition, continues with Neurontin and metaxalone, without adverse effects, call for refills when needed.  Follow up 3 months for medication discussion, order for updated UDS placed which she can submit the next time she is in town (she is working out of town in [facility]).  She did have recent flare of low back and radiating bilateral posterior lower extremity pain and while we did discuss potential LESI, opted to simply proceed with a course of tapered p.o. steroid.        Orders Placed This Encounter   Procedures    Pain Management Drug Panel, Urine         Meds & Refills for this Visit:  Requested Prescriptions             Signed Prescriptions Disp Refills    methylPREDNISolone (MEDROL) 4 MG Oral Tablet Therapy Pack 21 tablet 0       Sig: Take as directed         Imaging & Consults:  None     The patient indicates understanding of these issues and agrees to the plan.     KELLE Elizabeth

## 2025-04-21 DIAGNOSIS — M54.16 LUMBAR RADICULITIS: ICD-10-CM

## 2025-04-21 DIAGNOSIS — M48.062 SPINAL STENOSIS OF LUMBAR REGION WITH NEUROGENIC CLAUDICATION: ICD-10-CM

## 2025-04-21 DIAGNOSIS — M54.16 LUMBAR RADICULOPATHY: ICD-10-CM

## 2025-04-21 DIAGNOSIS — G89.29 OTHER CHRONIC PAIN: ICD-10-CM

## 2025-04-21 RX ORDER — METAXALONE 800 MG/1
800 TABLET ORAL 3 TIMES DAILY PRN
Qty: 90 TABLET | Refills: 0 | Status: SHIPPED | OUTPATIENT
Start: 2025-04-21

## 2025-04-21 RX ORDER — ACETAMINOPHEN AND CODEINE PHOSPHATE 300; 60 MG/1; MG/1
1 TABLET ORAL 2 TIMES DAILY
Qty: 60 TABLET | Refills: 0 | Status: SHIPPED | OUTPATIENT
Start: 2025-04-21

## 2025-04-21 NOTE — TELEPHONE ENCOUNTER
Medication: acetaminophen-codeine 300-60 MG     Date of last refill: 03/17/25  [place]25    Last office visit: 02/20/25 telemedicine  Due back to clinic per last office note:  3 months  Date next office visit scheduled:  none    Last URINE Screen: 06/29/24  Screen Results:  as expected      Narcotic Contract EXPIRATION date: 10/25/24    Last OV note recommendation:   Plan: With regards to her opiates, has reduced Tylenol with codeine from 6/day to 2/day, and is stable on this dose.  In addition, continues with Neurontin and metaxalone, without adverse effects, call for refills when needed.  Follow up 3 months for medication discussion, order for updated UDS placed which she can submit the next time she is in town (she is working out of town in [facility]).  She did have recent flare of low back and radiating bilateral posterior lower extremity pain and while we did discuss potential LESI, opted to simply proceed with a course of tapered p.o. steroid.

## 2025-04-21 NOTE — TELEPHONE ENCOUNTER
Medication: Metaxalone 800 MG     Date of last refill: 03/17/25    Last office visit: 02/20/25 telemedicine  Due back to clinic per last office note:  na  Date next office visit scheduled:  none        Last OV note recommendation:   Plan: With regards to her opiates, has reduced Tylenol with codeine from 6/day to 2/day, and is stable on this dose.  In addition, continues with Neurontin and metaxalone, without adverse effects, call for refills when needed.  Follow up 3 months for medication discussion, order for updated UDS placed which she can submit the next time she is in town (she is working out of town in [facility]).  She did have recent flare of low back and radiating bilateral posterior lower extremity pain and while we did discuss potential LESI, opted to simply proceed with a course of tapered p.o. steroid

## 2025-04-22 NOTE — TELEPHONE ENCOUNTER
Patient called stating that she had a bad fall on 04/08/2025 patient received a note from the immediate care to be off work until today 04/22/25 and was wondering if she should continue to stay off work longer or can Dr. VILLAGOMEZ  give her another note to stay off longer or do she have to be seen first.    Patient would like to know if a video visit is okay for her to have as a follow-up appointment. Follow-up appointment is set for 04/28/2025 @ 2:45 pm    Patient has third party liability Work Comp.    Should the patient keep the video visit for work comp or not?    Please call [phone] patient to discuss.    Please advise

## 2025-04-22 NOTE — TELEPHONE ENCOUNTER
Needs appt indefinitely to address this--YP has not seen pt since LAST JULY, if she is wanting any sort of documentation needs to keep appt. Up to pt to do virtual but  in person may be better if he has to physically examine her for whatever she is requesting

## 2025-04-28 NOTE — PROGRESS NOTES
Subjective:   Patient ID: Alesia Bland is a 60 year old female.    History of fall on 8th April. Complains of ankle pain left. Left lower leg all bruise. Complains of chest wall pain and left shoulder pain.  + swelling.  Pain improving.  Wants to return back to work.      History/Other:   Review of Systems   All other systems reviewed and are negative.    Current Medications[1]  Allergies:Allergies[2]    Objective:   Physical Exam  Constitutional:       Appearance: She is well-developed.   Cardiovascular:      Rate and Rhythm: Normal rate and regular rhythm.      Heart sounds: Normal heart sounds.   Pulmonary:      Effort: Pulmonary effort is normal.      Breath sounds: Normal breath sounds.   Abdominal:      General: Bowel sounds are normal.      Palpations: Abdomen is soft.   Musculoskeletal:      Comments: Present tenderness Lateral left malleolus at ankle joint     Knee exam normal, hip exam normal, shoulder exam normal.   Skin:     General: Skin is warm and dry.   Neurological:      Mental Status: She is alert and oriented to person, place, and time.      Deep Tendon Reflexes: Reflexes are normal and symmetric.         Assessment & Plan:   1. Fall, initial encounter    2. Injury of left lower extremity, initial encounter    1. Fall, initial encounter  - XR TIBIA + FIBULA (2 VIEWS), LEFT (CPT=73590); Future    2. Injury of left lower extremity, initial encounter  - XR TIBIA + FIBULA (2 VIEWS), LEFT (CPT=73590); Future      Meds This Visit:  Requested Prescriptions      No prescriptions requested or ordered in this encounter       Imaging & Referrals:  None         [1]   Current Outpatient Medications   Medication Sig Dispense Refill    metoprolol succinate  MG Oral Tablet 24 Hr Take 1 tablet (100 mg total) by mouth daily.      acetaminophen-codeine 300-60 MG Oral Tab Take 1 tablet by mouth in the morning and 1 tablet before bedtime. 60 tablet 0    Metaxalone 800 MG Oral Tab Take 1 tablet (800 mg total)  by mouth 3 (three) times daily as needed for Pain. 90 tablet 0    gabapentin 300 MG Oral Cap Take 3 capsules (900 mg total) by mouth 3 (three) times daily. 270 capsule 0    Armodafinil 250 MG Oral Tab Take 250 mg by mouth daily. 15 tablet 0    triamcinolone 0.1 % External Cream Apply topically 2 (two) times daily. To affected skin. 80 g 1    ondansetron (ZOFRAN) 4 mg tablet Take 1 tablet (4 mg total) by mouth every 8 (eight) hours as needed for Nausea. 30 tablet 0    oxybutynin 5 MG Oral Tab Take 1 tablet (5 mg total) by mouth 3 (three) times daily as needed (Bladder spasms). 30 tablet 0    Multiple Vitamins-Minerals (ALIVE MULTI-VITAMIN OR) Take 1 tablet by mouth daily.      NON FORMULARY Take 1-2 capsules by mouth daily as needed (pain). Product: Relieve-R capsules      rizatriptan (MAXALT-MLT) 10 MG Oral Tablet Dispersible Take 1 tablet (10 mg total) by mouth as needed for Migraine. 90 tablet 1    LATUDA 60 MG Oral Tab Take 1 tablet (60 mg total) by mouth daily with food.      traZODone HCl 50 MG Oral Tab Take 1 tablet (50 mg total) by mouth nightly as needed.      DULoxetine HCl 60 MG Oral Cap DR Particles Take 2 capsules (120 mg total) by mouth daily.     [2]   Allergies  Allergen Reactions    Hydrocodone NAUSEA AND VOMITING    Benzoyl Peroxide RASH     CREA    Levofloxacin RASH    Pcn [Penicillins] RASH     As a child - simple rash, no emergency treatment needed. States has had cephalosporin without reactions

## 2025-05-05 NOTE — TELEPHONE ENCOUNTER
Medication: gabapentin 300 MG     Date of last refill: 04/04/25      Last office visit: 02/20/25 telemedicine  Due back to clinic per last office note:  na  Date next office visit scheduled:  none        Last OV note recommendation:   Plan: With regards to her opiates, has reduced Tylenol with codeine from 6/day to 2/day, and is stable on this dose.  In addition, continues with Neurontin and metaxalone, without adverse effects, call for refills when needed.  Follow up 3 months for medication discussion, order for updated UDS placed which she can submit the next time she is in town (she is working out of town in [facility]).  She did have recent flare of low back and radiating bilateral posterior lower extremity pain and while we did discuss potential LESI, opted to simply proceed with a course of tapered p.o. steroid.

## 2025-05-23 NOTE — TELEPHONE ENCOUNTER
Medication: GABAPENTIN 300 MG     [place]05/25    Last office visit: 02/20/25 telemedicine  Due back to clinic per last office note:  NA  Date next office visit scheduled:  none        Last OV note recommendation:   Plan: With regards to her opiates, has reduced Tylenol with codeine from 6/day to 2/day, and is stable on this dose.  In addition, continues with Neurontin and metaxalone, without adverse effects, call for refills when needed.  Follow up 3 months for medication discussion, order for updated UDS placed which she can submit the next time she is in town (she is working out of town in [facility]).  She did have recent flare of low back and radiating bilateral posterior lower extremity pain and while we did discuss potential LESI, opted to simply proceed with a course of tapered p.o. steroid.

## 2025-06-19 NOTE — TELEPHONE ENCOUNTER
Patient was a no show for their appointment on 6/19/2025. Medical Assistant called to see if patient was still coming or to see if they wanted th schedule another appointment and had to leave a message.  [facility]. Also documented in FYI.

## 2025-06-26 NOTE — TELEPHONE ENCOUNTER
Medication: gabapentin 300 MG Oral Cap     [place]23/2025    Last office visit: Virtual 2/20/25  Due back to clinic per last office note: 3 months  Date next office visit scheduled:    No schedule      Last OV note recommendation: Plan: With regards to her opiates, has reduced Tylenol with codeine from 6/day to 2/day, and is stable on this dose.  In addition, continues with Neurontin and metaxalone, without adverse effects, call for refills when needed.  Follow up 3 months for medication discussion, order for updated UDS placed which she can submit the next time she is in town (she is working out of town in [facility]).  She did have recent flare of low back and radiating bilateral posterior lower extremity pain and while we did discuss potential LESI, opted to simply proceed with a course of tapered p.o. steroid.

## 2025-06-27 NOTE — TELEPHONE ENCOUNTER
Attempted to contact patient, mail box is full. Over due for follow up and UDS.            Medication: GABAPENTIN 300 MG     [place]FI[place]ED 06/26/25, duplicate request here denied.        Medication: Metaxalone 800 MG     [place] 05[place]23/25        Medication: acetaminophen-codeine 300-60 MG     [place]/25    Last office visit: 02/20/25  Due back to clinic per last office note:  3 months  Date next office visit scheduled:      Last URINE Screen: ordered 02/20/25  Screen Results:  due at next office visit per last OV note      Narcotic Contract EXPIRATION date: 10/25/24    Last OV note recommendation:   Plan: With regards to her opiates, has reduced Tylenol with codeine from 6/day to 2/day, and is stable on this dose.  In addition, continues with Neurontin and metaxalone, without adverse effects, call for refills when needed.  Follow up 3 months for medication discussion, order for updated UDS placed which she can submit the next time she is in town (she is working out of town in [facility]).  She did have recent flare of low back and radiating bilateral posterior lower extremity pain and while we did discuss potential LESI, opted to simply proceed with a course of tapered p.o. steroid.

## 2025-07-02 NOTE — TELEPHONE ENCOUNTER
Unable to leave message, Mailbox full.  Needs to schedule and appointment.      Medication: gabapentin 300 MG     [place]25    Last office visit: 02/20/25  Due back to clinic per last office note:  3 months  Date next office visit scheduled:          Last OV note recommendation:   Plan: With regards to her opiates, has reduced Tylenol with codeine from 6/day to 2/day, and is stable on this dose.  In addition, continues with Neurontin and metaxalone, without adverse effects, call for refills when needed.  Follow up 3 months for medication discussion, order for updated UDS placed which she can submit the next time she is in town (she is working out of town in [facility]).  She did have recent flare of low back and radiating bilateral posterior lower extremity pain and while we did discuss potential LESI, opted to simply proceed with a course of tapered p.o. steroid.

## 2025-07-07 NOTE — TELEPHONE ENCOUNTER
[facility] called asking that there be an advance refill request on file for next time.    Any questions, please call [id number] - [place], [place] - [facility] [address] AT [facility] [address], [address], [phone], [phone]

## 2025-07-15 NOTE — TELEPHONE ENCOUNTER
2nd attempt was made to contact patient but her voicemail was full. She is overdue for an appointment

## (undated) DIAGNOSIS — M79.604 RIGHT LEG PAIN: ICD-10-CM

## (undated) DIAGNOSIS — M54.50 CHRONIC RIGHT-SIDED LOW BACK PAIN WITHOUT SCIATICA: ICD-10-CM

## (undated) DIAGNOSIS — G89.29 CHRONIC RIGHT-SIDED LOW BACK PAIN WITHOUT SCIATICA: ICD-10-CM

## (undated) DIAGNOSIS — Z12.31 ENCOUNTER FOR SCREENING MAMMOGRAM FOR MALIGNANT NEOPLASM OF BREAST: Primary | ICD-10-CM

## (undated) DIAGNOSIS — I10 ESSENTIAL HYPERTENSION: ICD-10-CM

## (undated) DEVICE — RETRIEVAL DEPLOYMENT DEVICE: Brand: LITHOVUE EMPOWER™

## (undated) DEVICE — CATHETER URET 5FR L70CM FLX OPN TIP NONPORTED

## (undated) DEVICE — SYRINGE MED 20ML STD CLR PLAS LL TIP N CTRL

## (undated) DEVICE — SLEEVE COMPR MD KNEE LEN SGL USE KENDALL SCD

## (undated) DEVICE — ADHESIVE LIQ 2/3ML VI MASTISOL

## (undated) DEVICE — NITINOL WIRE WITH HYDROPHILIC TIP: Brand: SENSOR

## (undated) DEVICE — SKN PREP SPNG STKS PVP PNT STR: Brand: MEDLINE INDUSTRIES, INC.

## (undated) DEVICE — PACK PBDS CYSTOSCOPY

## (undated) DEVICE — SINGLE ACTION PUMPING SYSTEM

## (undated) DEVICE — GLOVE SUR 7.5 SENSICARE PI PIP CRM PWD F

## (undated) DEVICE — SOLUTION IRRIG 3000ML 0.9% NACL FLX CONT

## (undated) DEVICE — DILATOR/SHEATH SET: Brand: 8/10 DILATOR/SHEATH SET

## (undated) DEVICE — ADAPTER BX SEAL Y BIOPSY PORT ADJ FOR HYSTEROSCOPE

## (undated) DEVICE — SINGLE-USE DIGITAL FLEXIBLE URETEROSCOPE: Brand: LITHOVUE

## (undated) DEVICE — URETERAL ACCESS SHEATH SET: Brand: NAVIGATOR HD

## (undated) DEVICE — FIBER LSR 200UM 2J 80HZ 60W DL FOR LITHO

## (undated) DEVICE — BANDAID COVERLET 1X3

## (undated) DEVICE — REMOVER DURAPREP 3M

## (undated) DEVICE — SYRINGE MED 10ML LL TIP W/O SFTY DISP

## (undated) DEVICE — NITINOL STONE RETRIEVAL BASKET: Brand: ZERO TIP

## (undated) DEVICE — GLOVE SURG SENSICARE SZ 7-1/2

## (undated) DEVICE — GLOVE SURG SENSICARE SZ 6-1/2

## (undated) DEVICE — NEEDLE SPINAL 22X5 405148

## (undated) DEVICE — 20 ML SYRINGE LUER-LOCK TIP: Brand: MONOJECT

## (undated) DEVICE — PAIN TRAY: Brand: MEDLINE INDUSTRIES, INC.

## (undated) NOTE — LETTER
Your patient was recently seen at the [facility] for a hospital follow-up visit. The visit note is attached. Please contact the clinic with any questions at [phone].    Thank you,  BRE Jones

## (undated) NOTE — MR AVS SNAPSHOT
After Visit Summary   4/12/2017    Leonid Rust    MRN: [id number]           Visit Information        Provider Department Dept Phone    4/12/2017 10:00 PM Bed1  [facility] Clinic [phone]      Your Amita Lynn Were     LMP                   12/23/201 headache and pink eye. The cost for a Video Visit is currently $10.         If you receive a survey from [employer], please take a few minutes to complete it and provide feedback.  We strive to deliver the best patient experience and are looking for ways

## (undated) NOTE — LETTER
02/18/22        [address] [id number]      Dear Alejandro Palm,    [facility] records indicate that you have outstanding lab work and or testing that was ordered for you and has not yet been completed:  Orders Placed This Encounter      RUPA MELONIE 2D+3D SCREENING [facility]([id number])    To provide you with the best possible care, please complete these orders at your earliest convenience. If you have recently completed these orders please disregard this letter. If you have any questions please call the office at Dept: [phone].      Thank you,       Selin Olivera, DO

## (undated) NOTE — LETTER
Date: 2/26/2018    Patient Name: Zahida Campuzano          To Whom it may concern: The above patient was seen 02/26/2018 at the [facility] for treatment of a medical condition.     This patient should be excused from attending work/school f

## (undated) NOTE — LETTER
19        [address] [id number]      Dear Claudene Newborn,    [facility] records indicate that you have outstanding lab work and or testing that was ordered for you and has not yet been completed:  Orders Placed This Encou

## (undated) NOTE — LETTER
To Whom It May Concern: This certifies that Laura Du was admitted to [facility] on 3/8/2021 for treatment of a medical condition. She was released on 3/9/2021. Please excuse her from work 3/8/2021 until 3/14/2021.  Patient may return to work on

## (undated) NOTE — Clinical Note
Patient Name: Mary Espitia  : 3/10/1965  MRN: [id number]  Patient Address: [place] [id number]      Coronavirus Disease 2019 (COVID-19)     [facility] is committed to the safety and well-being of our patients, me 2. Monitor your symptoms carefully. If your symptoms get worse, call your healthcare provider immediately. 3. Get rest and stay hydrated.    4. If you have a medical appointment, call the healthcare provider ahead of time and tell them that you have or may ? At least 24 hours have passed since recovery defined as resolution of fever without the use of fever-reducing medications; and  · Improvement in respiratory symptoms (e.g., cough, shortness of breath); and  · At least 10 days have passed since symptoms f If you would be interested in donating your plasma to help treat others diagnosed with the virus, please contact [facility] directly on their website: [email or web address]    Who is eligible to donate convalescent plasma?

## (undated) NOTE — LETTER
Date: 6/20/2024    Patient Name: Alesia Bland          To Whom it may concern:    The above patient was seen at [facility] for treatment of a medical condition.    This patient should be excused from attending work from 6/6/2024 through 6/19/2024.    The patient may return to work on 6/20/2024 with the following limitations use cane.        Sincerely,    BRE Jones

## (undated) NOTE — LETTER
Date: 4/28/2025    Patient Name: Alesia Bland          To Whom it may concern:    The above patient was seen at [facility] for treatment of a medical condition.    This patient should be excused from attending work from 4/16/25 through 05/01/2025.    The patient may return to work on 5/1/25 without restrictions.        Sincerely,    Toni Callahan, DO

## (undated) NOTE — LETTER
Date: 2/20/2024    Patient Name: Alesia Bland          To Whom it may concern:    This letter has been written at the patient's request. The above patient was seen at the [facility] for treatment of a medical condition.    This patient should be excused from attending work from 2/20/24 through 2/23/24.    The patient may return to work on 2/25/24.        Sincerely,    Rob Arrieta PA-C

## (undated) NOTE — LETTER
Date: 4/28/2025    Patient Name: Alesia Bland          To Whom it may concern:    The above patient was seen at [facility] for treatment of a medical condition.    This patient should be excused from attending work from 4/16/25 through 4/28/25.    The patient may return to work on 5/1/25 without restrictions.      Sincerely,    Dr. Toni Callahan, DO

## (undated) NOTE — LETTER
Date & Time: 2/18/2024, 11:42 PM  Patient: Alesia Bladn  Encounter Provider(s):    Francine Zhao DO       To Whom It May Concern:    Alesia Bland was seen and treated in our department on 2/18/2024. She  is to be excused from work for 3 days .    If you have any questions or concerns, please do not hesitate to call.        _____________________________  Physician/APC Signature

## (undated) NOTE — MR AVS SNAPSHOT
[address]  [address] [address] [phone]               Thank you for choosing us for your health care visit with Rj Durbin DO.   We are glad to serve you and happy to provide you with this SLEEP MEDICINE - INTERNAL [[id number] CUSTOM]  Order #:  [id number]         **REFERRAL REQUEST**    Your physician has referred you to a specialist.  Your physician or the clinic staff will provide you with the phone number you should call to schedule your a Commonly known as:  HYDRODIURIL           lurasidone HCl 40 MG Tabs   Take 1 tablet (40 mg total) by mouth daily with breakfast.   Commonly known as:  LATUDA           Nebivolol HCl 10 MG Tabs   Take 1 tablet (10 mg total) by mouth daily.    Commonly known If you've recently had a stay at the Hospital you can access your discharge instructions in [employer] by going to Visits < Admission Summaries.  If you've been to the Emergency Department or your doctor's office, you can view your past visit information in My Visit [facility] online at  [email or web address]